# Patient Record
Sex: MALE | Race: ASIAN | NOT HISPANIC OR LATINO | URBAN - METROPOLITAN AREA
[De-identification: names, ages, dates, MRNs, and addresses within clinical notes are randomized per-mention and may not be internally consistent; named-entity substitution may affect disease eponyms.]

---

## 2023-12-07 ENCOUNTER — INPATIENT (INPATIENT)
Facility: HOSPITAL | Age: 43
LOS: 6 days | Discharge: HOME | End: 2023-12-14
Attending: HOSPITALIST | Admitting: HOSPITALIST
Payer: MEDICAID

## 2023-12-07 VITALS
TEMPERATURE: 99 F | RESPIRATION RATE: 17 BRPM | SYSTOLIC BLOOD PRESSURE: 170 MMHG | OXYGEN SATURATION: 100 % | HEART RATE: 134 BPM | DIASTOLIC BLOOD PRESSURE: 100 MMHG

## 2023-12-07 PROCEDURE — 99285 EMERGENCY DEPT VISIT HI MDM: CPT

## 2023-12-07 NOTE — ED ADULT TRIAGE NOTE - CHIEF COMPLAINT QUOTE
Pt c/o diarrhea, body shaking and body aches x2 days. Last alcoholic drink yesterday. Pt states was going to detox center tomorrow. Hx. HTN. DM2. HLD. Pt denies chest pain, sob, n/v, palpitations, seizures or hospitalizations for withdrawals. Pt tachycardiac and tremulous in triage. Awaiting EKG.

## 2023-12-08 DIAGNOSIS — F10.939 ALCOHOL USE, UNSPECIFIED WITH WITHDRAWAL, UNSPECIFIED: ICD-10-CM

## 2023-12-08 LAB
ALBUMIN SERPL ELPH-MCNC: 3.1 G/DL — LOW (ref 3.3–5)
ALBUMIN SERPL ELPH-MCNC: 3.1 G/DL — LOW (ref 3.3–5)
ALBUMIN SERPL ELPH-MCNC: 3.4 G/DL — SIGNIFICANT CHANGE UP (ref 3.3–5)
ALBUMIN SERPL ELPH-MCNC: 3.4 G/DL — SIGNIFICANT CHANGE UP (ref 3.3–5)
ALP SERPL-CCNC: 164 U/L — HIGH (ref 40–120)
ALP SERPL-CCNC: 164 U/L — HIGH (ref 40–120)
ALP SERPL-CCNC: 189 U/L — HIGH (ref 40–120)
ALP SERPL-CCNC: 189 U/L — HIGH (ref 40–120)
ALT FLD-CCNC: 43 U/L — HIGH (ref 4–41)
ALT FLD-CCNC: 43 U/L — HIGH (ref 4–41)
ALT FLD-CCNC: 50 U/L — HIGH (ref 4–41)
ALT FLD-CCNC: 50 U/L — HIGH (ref 4–41)
AMPHET UR-MCNC: NEGATIVE — SIGNIFICANT CHANGE UP
AMPHET UR-MCNC: NEGATIVE — SIGNIFICANT CHANGE UP
ANION GAP SERPL CALC-SCNC: 10 MMOL/L — SIGNIFICANT CHANGE UP (ref 7–14)
ANION GAP SERPL CALC-SCNC: 10 MMOL/L — SIGNIFICANT CHANGE UP (ref 7–14)
ANION GAP SERPL CALC-SCNC: 14 MMOL/L — SIGNIFICANT CHANGE UP (ref 7–14)
ANION GAP SERPL CALC-SCNC: 14 MMOL/L — SIGNIFICANT CHANGE UP (ref 7–14)
ANISOCYTOSIS BLD QL: SLIGHT — SIGNIFICANT CHANGE UP
ANISOCYTOSIS BLD QL: SLIGHT — SIGNIFICANT CHANGE UP
APAP SERPL-MCNC: <10 UG/ML — LOW (ref 15–25)
APAP SERPL-MCNC: <10 UG/ML — LOW (ref 15–25)
APPEARANCE UR: CLEAR — SIGNIFICANT CHANGE UP
APPEARANCE UR: CLEAR — SIGNIFICANT CHANGE UP
AST SERPL-CCNC: 158 U/L — HIGH (ref 4–40)
AST SERPL-CCNC: 158 U/L — HIGH (ref 4–40)
AST SERPL-CCNC: 184 U/L — HIGH (ref 4–40)
AST SERPL-CCNC: 184 U/L — HIGH (ref 4–40)
B PERT DNA SPEC QL NAA+PROBE: SIGNIFICANT CHANGE UP
B PERT DNA SPEC QL NAA+PROBE: SIGNIFICANT CHANGE UP
B PERT+PARAPERT DNA PNL SPEC NAA+PROBE: SIGNIFICANT CHANGE UP
B PERT+PARAPERT DNA PNL SPEC NAA+PROBE: SIGNIFICANT CHANGE UP
BACTERIA # UR AUTO: ABNORMAL /HPF
BACTERIA # UR AUTO: ABNORMAL /HPF
BARBITURATES UR SCN-MCNC: NEGATIVE — SIGNIFICANT CHANGE UP
BARBITURATES UR SCN-MCNC: NEGATIVE — SIGNIFICANT CHANGE UP
BASE EXCESS BLDV CALC-SCNC: 4.9 MMOL/L — HIGH (ref -2–3)
BASE EXCESS BLDV CALC-SCNC: 4.9 MMOL/L — HIGH (ref -2–3)
BASE EXCESS BLDV CALC-SCNC: 5 MMOL/L — HIGH (ref -2–3)
BASE EXCESS BLDV CALC-SCNC: 5 MMOL/L — HIGH (ref -2–3)
BASOPHILS # BLD AUTO: 0.07 K/UL — SIGNIFICANT CHANGE UP (ref 0–0.2)
BASOPHILS # BLD AUTO: 0.07 K/UL — SIGNIFICANT CHANGE UP (ref 0–0.2)
BASOPHILS NFR BLD AUTO: 0.9 % — SIGNIFICANT CHANGE UP (ref 0–2)
BASOPHILS NFR BLD AUTO: 0.9 % — SIGNIFICANT CHANGE UP (ref 0–2)
BENZODIAZ UR-MCNC: NEGATIVE — SIGNIFICANT CHANGE UP
BENZODIAZ UR-MCNC: NEGATIVE — SIGNIFICANT CHANGE UP
BILIRUB DIRECT SERPL-MCNC: 1.5 MG/DL — HIGH (ref 0–0.3)
BILIRUB DIRECT SERPL-MCNC: 1.5 MG/DL — HIGH (ref 0–0.3)
BILIRUB INDIRECT FLD-MCNC: 0.9 MG/DL — SIGNIFICANT CHANGE UP (ref 0–1)
BILIRUB INDIRECT FLD-MCNC: 0.9 MG/DL — SIGNIFICANT CHANGE UP (ref 0–1)
BILIRUB SERPL-MCNC: 2.3 MG/DL — HIGH (ref 0.2–1.2)
BILIRUB SERPL-MCNC: 2.3 MG/DL — HIGH (ref 0.2–1.2)
BILIRUB SERPL-MCNC: 2.4 MG/DL — HIGH (ref 0.2–1.2)
BILIRUB SERPL-MCNC: 2.4 MG/DL — HIGH (ref 0.2–1.2)
BILIRUB UR-MCNC: ABNORMAL
BILIRUB UR-MCNC: ABNORMAL
BLOOD GAS VENOUS COMPREHENSIVE RESULT: SIGNIFICANT CHANGE UP
BORDETELLA PARAPERTUSSIS (RAPRVP): SIGNIFICANT CHANGE UP
BORDETELLA PARAPERTUSSIS (RAPRVP): SIGNIFICANT CHANGE UP
BUN SERPL-MCNC: 5 MG/DL — LOW (ref 7–23)
BUN SERPL-MCNC: 5 MG/DL — LOW (ref 7–23)
BUN SERPL-MCNC: 6 MG/DL — LOW (ref 7–23)
BUN SERPL-MCNC: 6 MG/DL — LOW (ref 7–23)
C PNEUM DNA SPEC QL NAA+PROBE: SIGNIFICANT CHANGE UP
C PNEUM DNA SPEC QL NAA+PROBE: SIGNIFICANT CHANGE UP
CALCIUM SERPL-MCNC: 7.9 MG/DL — LOW (ref 8.4–10.5)
CALCIUM SERPL-MCNC: 7.9 MG/DL — LOW (ref 8.4–10.5)
CALCIUM SERPL-MCNC: 9.1 MG/DL — SIGNIFICANT CHANGE UP (ref 8.4–10.5)
CALCIUM SERPL-MCNC: 9.1 MG/DL — SIGNIFICANT CHANGE UP (ref 8.4–10.5)
CAST: 2 /LPF — SIGNIFICANT CHANGE UP (ref 0–4)
CAST: 2 /LPF — SIGNIFICANT CHANGE UP (ref 0–4)
CHLORIDE BLDV-SCNC: 98 MMOL/L — SIGNIFICANT CHANGE UP (ref 96–108)
CHLORIDE BLDV-SCNC: 98 MMOL/L — SIGNIFICANT CHANGE UP (ref 96–108)
CHLORIDE BLDV-SCNC: 99 MMOL/L — SIGNIFICANT CHANGE UP (ref 96–108)
CHLORIDE BLDV-SCNC: 99 MMOL/L — SIGNIFICANT CHANGE UP (ref 96–108)
CHLORIDE SERPL-SCNC: 94 MMOL/L — LOW (ref 98–107)
CHLORIDE SERPL-SCNC: 94 MMOL/L — LOW (ref 98–107)
CHLORIDE SERPL-SCNC: 96 MMOL/L — LOW (ref 98–107)
CHLORIDE SERPL-SCNC: 96 MMOL/L — LOW (ref 98–107)
CO2 BLDV-SCNC: 29 MMOL/L — HIGH (ref 22–26)
CO2 BLDV-SCNC: 29 MMOL/L — HIGH (ref 22–26)
CO2 BLDV-SCNC: 29.2 MMOL/L — HIGH (ref 22–26)
CO2 BLDV-SCNC: 29.2 MMOL/L — HIGH (ref 22–26)
CO2 SERPL-SCNC: 23 MMOL/L — SIGNIFICANT CHANGE UP (ref 22–31)
CO2 SERPL-SCNC: 23 MMOL/L — SIGNIFICANT CHANGE UP (ref 22–31)
CO2 SERPL-SCNC: 26 MMOL/L — SIGNIFICANT CHANGE UP (ref 22–31)
CO2 SERPL-SCNC: 26 MMOL/L — SIGNIFICANT CHANGE UP (ref 22–31)
COCAINE METAB.OTHER UR-MCNC: NEGATIVE — SIGNIFICANT CHANGE UP
COCAINE METAB.OTHER UR-MCNC: NEGATIVE — SIGNIFICANT CHANGE UP
COLOR SPEC: SIGNIFICANT CHANGE UP
COLOR SPEC: SIGNIFICANT CHANGE UP
CREAT SERPL-MCNC: 0.52 MG/DL — SIGNIFICANT CHANGE UP (ref 0.5–1.3)
CREAT SERPL-MCNC: 0.52 MG/DL — SIGNIFICANT CHANGE UP (ref 0.5–1.3)
CREAT SERPL-MCNC: 0.56 MG/DL — SIGNIFICANT CHANGE UP (ref 0.5–1.3)
CREAT SERPL-MCNC: 0.56 MG/DL — SIGNIFICANT CHANGE UP (ref 0.5–1.3)
CREATININE URINE RESULT, DAU: 150 MG/DL — SIGNIFICANT CHANGE UP
CREATININE URINE RESULT, DAU: 150 MG/DL — SIGNIFICANT CHANGE UP
DIFF PNL FLD: ABNORMAL
DIFF PNL FLD: ABNORMAL
EGFR: 124 ML/MIN/1.73M2 — SIGNIFICANT CHANGE UP
EGFR: 124 ML/MIN/1.73M2 — SIGNIFICANT CHANGE UP
EGFR: 127 ML/MIN/1.73M2 — SIGNIFICANT CHANGE UP
EGFR: 127 ML/MIN/1.73M2 — SIGNIFICANT CHANGE UP
EOSINOPHIL # BLD AUTO: 0.07 K/UL — SIGNIFICANT CHANGE UP (ref 0–0.5)
EOSINOPHIL # BLD AUTO: 0.07 K/UL — SIGNIFICANT CHANGE UP (ref 0–0.5)
EOSINOPHIL NFR BLD AUTO: 0.9 % — SIGNIFICANT CHANGE UP (ref 0–6)
EOSINOPHIL NFR BLD AUTO: 0.9 % — SIGNIFICANT CHANGE UP (ref 0–6)
ETHANOL SERPL-MCNC: <10 MG/DL — SIGNIFICANT CHANGE UP
ETHANOL SERPL-MCNC: <10 MG/DL — SIGNIFICANT CHANGE UP
FLUAV SUBTYP SPEC NAA+PROBE: SIGNIFICANT CHANGE UP
FLUAV SUBTYP SPEC NAA+PROBE: SIGNIFICANT CHANGE UP
FLUBV RNA SPEC QL NAA+PROBE: SIGNIFICANT CHANGE UP
FLUBV RNA SPEC QL NAA+PROBE: SIGNIFICANT CHANGE UP
GAS PNL BLDV: 133 MMOL/L — LOW (ref 136–145)
GAS PNL BLDV: SIGNIFICANT CHANGE UP
GAS PNL BLDV: SIGNIFICANT CHANGE UP
GIANT PLATELETS BLD QL SMEAR: PRESENT — SIGNIFICANT CHANGE UP
GIANT PLATELETS BLD QL SMEAR: PRESENT — SIGNIFICANT CHANGE UP
GLUCOSE BLDV-MCNC: 173 MG/DL — HIGH (ref 70–99)
GLUCOSE BLDV-MCNC: 173 MG/DL — HIGH (ref 70–99)
GLUCOSE BLDV-MCNC: 193 MG/DL — HIGH (ref 70–99)
GLUCOSE BLDV-MCNC: 193 MG/DL — HIGH (ref 70–99)
GLUCOSE SERPL-MCNC: 184 MG/DL — HIGH (ref 70–99)
GLUCOSE SERPL-MCNC: 184 MG/DL — HIGH (ref 70–99)
GLUCOSE SERPL-MCNC: 243 MG/DL — HIGH (ref 70–99)
GLUCOSE SERPL-MCNC: 243 MG/DL — HIGH (ref 70–99)
GLUCOSE UR QL: 500 MG/DL
GLUCOSE UR QL: 500 MG/DL
HADV DNA SPEC QL NAA+PROBE: SIGNIFICANT CHANGE UP
HADV DNA SPEC QL NAA+PROBE: SIGNIFICANT CHANGE UP
HCO3 BLDV-SCNC: 28 MMOL/L — SIGNIFICANT CHANGE UP (ref 22–29)
HCOV 229E RNA SPEC QL NAA+PROBE: SIGNIFICANT CHANGE UP
HCOV 229E RNA SPEC QL NAA+PROBE: SIGNIFICANT CHANGE UP
HCOV HKU1 RNA SPEC QL NAA+PROBE: SIGNIFICANT CHANGE UP
HCOV HKU1 RNA SPEC QL NAA+PROBE: SIGNIFICANT CHANGE UP
HCOV NL63 RNA SPEC QL NAA+PROBE: SIGNIFICANT CHANGE UP
HCOV NL63 RNA SPEC QL NAA+PROBE: SIGNIFICANT CHANGE UP
HCOV OC43 RNA SPEC QL NAA+PROBE: SIGNIFICANT CHANGE UP
HCOV OC43 RNA SPEC QL NAA+PROBE: SIGNIFICANT CHANGE UP
HCT VFR BLD CALC: 41.3 % — SIGNIFICANT CHANGE UP (ref 39–50)
HCT VFR BLD CALC: 41.3 % — SIGNIFICANT CHANGE UP (ref 39–50)
HCT VFR BLDA CALC: 41 % — SIGNIFICANT CHANGE UP (ref 39–51)
HCT VFR BLDA CALC: 41 % — SIGNIFICANT CHANGE UP (ref 39–51)
HCT VFR BLDA CALC: 44 % — SIGNIFICANT CHANGE UP (ref 39–51)
HCT VFR BLDA CALC: 44 % — SIGNIFICANT CHANGE UP (ref 39–51)
HGB BLD CALC-MCNC: 13.7 G/DL — SIGNIFICANT CHANGE UP (ref 12.6–17.4)
HGB BLD CALC-MCNC: 13.7 G/DL — SIGNIFICANT CHANGE UP (ref 12.6–17.4)
HGB BLD CALC-MCNC: 14.7 G/DL — SIGNIFICANT CHANGE UP (ref 12.6–17.4)
HGB BLD CALC-MCNC: 14.7 G/DL — SIGNIFICANT CHANGE UP (ref 12.6–17.4)
HGB BLD-MCNC: 14.4 G/DL — SIGNIFICANT CHANGE UP (ref 13–17)
HGB BLD-MCNC: 14.4 G/DL — SIGNIFICANT CHANGE UP (ref 13–17)
HMPV RNA SPEC QL NAA+PROBE: SIGNIFICANT CHANGE UP
HMPV RNA SPEC QL NAA+PROBE: SIGNIFICANT CHANGE UP
HPIV1 RNA SPEC QL NAA+PROBE: SIGNIFICANT CHANGE UP
HPIV1 RNA SPEC QL NAA+PROBE: SIGNIFICANT CHANGE UP
HPIV2 RNA SPEC QL NAA+PROBE: SIGNIFICANT CHANGE UP
HPIV2 RNA SPEC QL NAA+PROBE: SIGNIFICANT CHANGE UP
HPIV3 RNA SPEC QL NAA+PROBE: SIGNIFICANT CHANGE UP
HPIV3 RNA SPEC QL NAA+PROBE: SIGNIFICANT CHANGE UP
HPIV4 RNA SPEC QL NAA+PROBE: SIGNIFICANT CHANGE UP
HPIV4 RNA SPEC QL NAA+PROBE: SIGNIFICANT CHANGE UP
IANC: 6.04 K/UL — SIGNIFICANT CHANGE UP (ref 1.8–7.4)
IANC: 6.04 K/UL — SIGNIFICANT CHANGE UP (ref 1.8–7.4)
KETONES UR-MCNC: 15 MG/DL
KETONES UR-MCNC: 15 MG/DL
LACTATE BLDV-MCNC: 2.1 MMOL/L — HIGH (ref 0.5–2)
LACTATE BLDV-MCNC: 2.1 MMOL/L — HIGH (ref 0.5–2)
LACTATE BLDV-MCNC: 2.7 MMOL/L — HIGH (ref 0.5–2)
LACTATE BLDV-MCNC: 2.7 MMOL/L — HIGH (ref 0.5–2)
LEUKOCYTE ESTERASE UR-ACNC: ABNORMAL
LEUKOCYTE ESTERASE UR-ACNC: ABNORMAL
LIDOCAIN IGE QN: 153 U/L — HIGH (ref 7–60)
LIDOCAIN IGE QN: 153 U/L — HIGH (ref 7–60)
LYMPHOCYTES # BLD AUTO: 0.49 K/UL — LOW (ref 1–3.3)
LYMPHOCYTES # BLD AUTO: 0.49 K/UL — LOW (ref 1–3.3)
LYMPHOCYTES # BLD AUTO: 6.1 % — LOW (ref 13–44)
LYMPHOCYTES # BLD AUTO: 6.1 % — LOW (ref 13–44)
M PNEUMO DNA SPEC QL NAA+PROBE: SIGNIFICANT CHANGE UP
M PNEUMO DNA SPEC QL NAA+PROBE: SIGNIFICANT CHANGE UP
MAGNESIUM SERPL-MCNC: 2.6 MG/DL — SIGNIFICANT CHANGE UP (ref 1.6–2.6)
MAGNESIUM SERPL-MCNC: 2.6 MG/DL — SIGNIFICANT CHANGE UP (ref 1.6–2.6)
MCHC RBC-ENTMCNC: 28.8 PG — SIGNIFICANT CHANGE UP (ref 27–34)
MCHC RBC-ENTMCNC: 28.8 PG — SIGNIFICANT CHANGE UP (ref 27–34)
MCHC RBC-ENTMCNC: 34.9 GM/DL — SIGNIFICANT CHANGE UP (ref 32–36)
MCHC RBC-ENTMCNC: 34.9 GM/DL — SIGNIFICANT CHANGE UP (ref 32–36)
MCV RBC AUTO: 82.6 FL — SIGNIFICANT CHANGE UP (ref 80–100)
MCV RBC AUTO: 82.6 FL — SIGNIFICANT CHANGE UP (ref 80–100)
METHADONE UR-MCNC: NEGATIVE — SIGNIFICANT CHANGE UP
METHADONE UR-MCNC: NEGATIVE — SIGNIFICANT CHANGE UP
MICROCYTES BLD QL: SLIGHT — SIGNIFICANT CHANGE UP
MICROCYTES BLD QL: SLIGHT — SIGNIFICANT CHANGE UP
MONOCYTES # BLD AUTO: 0.91 K/UL — HIGH (ref 0–0.9)
MONOCYTES # BLD AUTO: 0.91 K/UL — HIGH (ref 0–0.9)
MONOCYTES NFR BLD AUTO: 11.4 % — SIGNIFICANT CHANGE UP (ref 2–14)
MONOCYTES NFR BLD AUTO: 11.4 % — SIGNIFICANT CHANGE UP (ref 2–14)
NEUTROPHILS # BLD AUTO: 6.46 K/UL — SIGNIFICANT CHANGE UP (ref 1.8–7.4)
NEUTROPHILS # BLD AUTO: 6.46 K/UL — SIGNIFICANT CHANGE UP (ref 1.8–7.4)
NEUTROPHILS NFR BLD AUTO: 80.7 % — HIGH (ref 43–77)
NEUTROPHILS NFR BLD AUTO: 80.7 % — HIGH (ref 43–77)
NITRITE UR-MCNC: NEGATIVE — SIGNIFICANT CHANGE UP
NITRITE UR-MCNC: NEGATIVE — SIGNIFICANT CHANGE UP
OPIATES UR-MCNC: NEGATIVE — SIGNIFICANT CHANGE UP
OPIATES UR-MCNC: NEGATIVE — SIGNIFICANT CHANGE UP
OXYCODONE UR-MCNC: NEGATIVE — SIGNIFICANT CHANGE UP
OXYCODONE UR-MCNC: NEGATIVE — SIGNIFICANT CHANGE UP
PCO2 BLDV: 35 MMHG — LOW (ref 42–55)
PCO2 BLDV: 35 MMHG — LOW (ref 42–55)
PCO2 BLDV: 36 MMHG — LOW (ref 42–55)
PCO2 BLDV: 36 MMHG — LOW (ref 42–55)
PCP SPEC-MCNC: SIGNIFICANT CHANGE UP
PCP SPEC-MCNC: SIGNIFICANT CHANGE UP
PCP UR-MCNC: NEGATIVE — SIGNIFICANT CHANGE UP
PCP UR-MCNC: NEGATIVE — SIGNIFICANT CHANGE UP
PH BLDV: 7.5 — HIGH (ref 7.32–7.43)
PH BLDV: 7.5 — HIGH (ref 7.32–7.43)
PH BLDV: 7.51 — HIGH (ref 7.32–7.43)
PH BLDV: 7.51 — HIGH (ref 7.32–7.43)
PH UR: 7 — SIGNIFICANT CHANGE UP (ref 5–8)
PH UR: 7 — SIGNIFICANT CHANGE UP (ref 5–8)
PHOSPHATE SERPL-MCNC: 2.2 MG/DL — LOW (ref 2.5–4.5)
PHOSPHATE SERPL-MCNC: 2.2 MG/DL — LOW (ref 2.5–4.5)
PLAT MORPH BLD: NORMAL — SIGNIFICANT CHANGE UP
PLAT MORPH BLD: NORMAL — SIGNIFICANT CHANGE UP
PLATELET # BLD AUTO: 78 K/UL — LOW (ref 150–400)
PLATELET # BLD AUTO: 78 K/UL — LOW (ref 150–400)
PLATELET COUNT - ESTIMATE: ABNORMAL
PLATELET COUNT - ESTIMATE: ABNORMAL
PO2 BLDV: 56 MMHG — HIGH (ref 25–45)
PO2 BLDV: 56 MMHG — HIGH (ref 25–45)
PO2 BLDV: 59 MMHG — HIGH (ref 25–45)
PO2 BLDV: 59 MMHG — HIGH (ref 25–45)
POIKILOCYTOSIS BLD QL AUTO: SLIGHT — SIGNIFICANT CHANGE UP
POIKILOCYTOSIS BLD QL AUTO: SLIGHT — SIGNIFICANT CHANGE UP
POTASSIUM BLDV-SCNC: 3.4 MMOL/L — LOW (ref 3.5–5.1)
POTASSIUM BLDV-SCNC: 3.4 MMOL/L — LOW (ref 3.5–5.1)
POTASSIUM BLDV-SCNC: 4.3 MMOL/L — SIGNIFICANT CHANGE UP (ref 3.5–5.1)
POTASSIUM BLDV-SCNC: 4.3 MMOL/L — SIGNIFICANT CHANGE UP (ref 3.5–5.1)
POTASSIUM SERPL-MCNC: 3.3 MMOL/L — LOW (ref 3.5–5.3)
POTASSIUM SERPL-MCNC: 3.3 MMOL/L — LOW (ref 3.5–5.3)
POTASSIUM SERPL-MCNC: 3.8 MMOL/L — SIGNIFICANT CHANGE UP (ref 3.5–5.3)
POTASSIUM SERPL-MCNC: 3.8 MMOL/L — SIGNIFICANT CHANGE UP (ref 3.5–5.3)
POTASSIUM SERPL-SCNC: 3.3 MMOL/L — LOW (ref 3.5–5.3)
POTASSIUM SERPL-SCNC: 3.3 MMOL/L — LOW (ref 3.5–5.3)
POTASSIUM SERPL-SCNC: 3.8 MMOL/L — SIGNIFICANT CHANGE UP (ref 3.5–5.3)
POTASSIUM SERPL-SCNC: 3.8 MMOL/L — SIGNIFICANT CHANGE UP (ref 3.5–5.3)
PROT SERPL-MCNC: 7.7 G/DL — SIGNIFICANT CHANGE UP (ref 6–8.3)
PROT SERPL-MCNC: 7.7 G/DL — SIGNIFICANT CHANGE UP (ref 6–8.3)
PROT SERPL-MCNC: 8.8 G/DL — HIGH (ref 6–8.3)
PROT SERPL-MCNC: 8.8 G/DL — HIGH (ref 6–8.3)
PROT UR-MCNC: 300 MG/DL
PROT UR-MCNC: 300 MG/DL
RAPID RVP RESULT: DETECTED
RAPID RVP RESULT: DETECTED
RBC # BLD: 5 M/UL — SIGNIFICANT CHANGE UP (ref 4.2–5.8)
RBC # BLD: 5 M/UL — SIGNIFICANT CHANGE UP (ref 4.2–5.8)
RBC # FLD: 17.2 % — HIGH (ref 10.3–14.5)
RBC # FLD: 17.2 % — HIGH (ref 10.3–14.5)
RBC BLD AUTO: ABNORMAL
RBC BLD AUTO: ABNORMAL
RBC CASTS # UR COMP ASSIST: 11 /HPF — HIGH (ref 0–4)
RBC CASTS # UR COMP ASSIST: 11 /HPF — HIGH (ref 0–4)
REVIEW: SIGNIFICANT CHANGE UP
REVIEW: SIGNIFICANT CHANGE UP
RSV RNA SPEC QL NAA+PROBE: SIGNIFICANT CHANGE UP
RSV RNA SPEC QL NAA+PROBE: SIGNIFICANT CHANGE UP
RV+EV RNA SPEC QL NAA+PROBE: SIGNIFICANT CHANGE UP
RV+EV RNA SPEC QL NAA+PROBE: SIGNIFICANT CHANGE UP
SALICYLATES SERPL-MCNC: <0.3 MG/DL — LOW (ref 15–30)
SALICYLATES SERPL-MCNC: <0.3 MG/DL — LOW (ref 15–30)
SAO2 % BLDV: 87.4 % — SIGNIFICANT CHANGE UP (ref 67–88)
SAO2 % BLDV: 87.4 % — SIGNIFICANT CHANGE UP (ref 67–88)
SAO2 % BLDV: 88.6 % — HIGH (ref 67–88)
SAO2 % BLDV: 88.6 % — HIGH (ref 67–88)
SARS-COV-2 RNA SPEC QL NAA+PROBE: DETECTED
SARS-COV-2 RNA SPEC QL NAA+PROBE: DETECTED
SODIUM SERPL-SCNC: 130 MMOL/L — LOW (ref 135–145)
SODIUM SERPL-SCNC: 130 MMOL/L — LOW (ref 135–145)
SODIUM SERPL-SCNC: 133 MMOL/L — LOW (ref 135–145)
SODIUM SERPL-SCNC: 133 MMOL/L — LOW (ref 135–145)
SP GR SPEC: 1.05 — HIGH (ref 1–1.03)
SP GR SPEC: 1.05 — HIGH (ref 1–1.03)
SQUAMOUS # UR AUTO: 3 /HPF — SIGNIFICANT CHANGE UP (ref 0–5)
SQUAMOUS # UR AUTO: 3 /HPF — SIGNIFICANT CHANGE UP (ref 0–5)
TARGETS BLD QL SMEAR: SLIGHT — SIGNIFICANT CHANGE UP
TARGETS BLD QL SMEAR: SLIGHT — SIGNIFICANT CHANGE UP
THC UR QL: NEGATIVE — SIGNIFICANT CHANGE UP
THC UR QL: NEGATIVE — SIGNIFICANT CHANGE UP
TOXICOLOGY SCREEN, DRUGS OF ABUSE, SERUM RESULT: SIGNIFICANT CHANGE UP
TOXICOLOGY SCREEN, DRUGS OF ABUSE, SERUM RESULT: SIGNIFICANT CHANGE UP
UROBILINOGEN FLD QL: 1 MG/DL — SIGNIFICANT CHANGE UP (ref 0.2–1)
UROBILINOGEN FLD QL: 1 MG/DL — SIGNIFICANT CHANGE UP (ref 0.2–1)
WBC # BLD: 8 K/UL — SIGNIFICANT CHANGE UP (ref 3.8–10.5)
WBC # BLD: 8 K/UL — SIGNIFICANT CHANGE UP (ref 3.8–10.5)
WBC # FLD AUTO: 8 K/UL — SIGNIFICANT CHANGE UP (ref 3.8–10.5)
WBC # FLD AUTO: 8 K/UL — SIGNIFICANT CHANGE UP (ref 3.8–10.5)
WBC UR QL: 24 /HPF — HIGH (ref 0–5)
WBC UR QL: 24 /HPF — HIGH (ref 0–5)

## 2023-12-08 PROCEDURE — 99223 1ST HOSP IP/OBS HIGH 75: CPT

## 2023-12-08 PROCEDURE — 74177 CT ABD & PELVIS W/CONTRAST: CPT | Mod: 26,MA

## 2023-12-08 RX ORDER — GLUCAGON INJECTION, SOLUTION 0.5 MG/.1ML
1 INJECTION, SOLUTION SUBCUTANEOUS ONCE
Refills: 0 | Status: DISCONTINUED | OUTPATIENT
Start: 2023-12-08 | End: 2023-12-14

## 2023-12-08 RX ORDER — AMLODIPINE BESYLATE 2.5 MG/1
10 TABLET ORAL DAILY
Refills: 0 | Status: DISCONTINUED | OUTPATIENT
Start: 2023-12-08 | End: 2023-12-14

## 2023-12-08 RX ORDER — INFLUENZA VIRUS VACCINE 15; 15; 15; 15 UG/.5ML; UG/.5ML; UG/.5ML; UG/.5ML
0.5 SUSPENSION INTRAMUSCULAR ONCE
Refills: 0 | Status: DISCONTINUED | OUTPATIENT
Start: 2023-12-08 | End: 2023-12-14

## 2023-12-08 RX ORDER — LOSARTAN POTASSIUM 100 MG/1
100 TABLET, FILM COATED ORAL DAILY
Refills: 0 | Status: DISCONTINUED | OUTPATIENT
Start: 2023-12-08 | End: 2023-12-14

## 2023-12-08 RX ORDER — SODIUM CHLORIDE 9 MG/ML
1000 INJECTION, SOLUTION INTRAVENOUS
Refills: 0 | Status: DISCONTINUED | OUTPATIENT
Start: 2023-12-08 | End: 2023-12-14

## 2023-12-08 RX ORDER — ALBUTEROL 90 UG/1
0 AEROSOL, METERED ORAL
Refills: 0 | DISCHARGE

## 2023-12-08 RX ORDER — SODIUM CHLORIDE 9 MG/ML
1000 INJECTION INTRAMUSCULAR; INTRAVENOUS; SUBCUTANEOUS ONCE
Refills: 0 | Status: COMPLETED | OUTPATIENT
Start: 2023-12-08 | End: 2023-12-08

## 2023-12-08 RX ORDER — KETOROLAC TROMETHAMINE 30 MG/ML
15 SYRINGE (ML) INJECTION ONCE
Refills: 0 | Status: DISCONTINUED | OUTPATIENT
Start: 2023-12-08 | End: 2023-12-08

## 2023-12-08 RX ORDER — ALBUTEROL 90 UG/1
2 AEROSOL, METERED ORAL EVERY 6 HOURS
Refills: 0 | Status: DISCONTINUED | OUTPATIENT
Start: 2023-12-08 | End: 2023-12-14

## 2023-12-08 RX ORDER — ONDANSETRON 8 MG/1
4 TABLET, FILM COATED ORAL ONCE
Refills: 0 | Status: COMPLETED | OUTPATIENT
Start: 2023-12-08 | End: 2023-12-08

## 2023-12-08 RX ORDER — MAGNESIUM SULFATE 500 MG/ML
2 VIAL (ML) INJECTION ONCE
Refills: 0 | Status: COMPLETED | OUTPATIENT
Start: 2023-12-08 | End: 2023-12-08

## 2023-12-08 RX ORDER — ACETAMINOPHEN 500 MG
650 TABLET ORAL EVERY 6 HOURS
Refills: 0 | Status: DISCONTINUED | OUTPATIENT
Start: 2023-12-08 | End: 2023-12-14

## 2023-12-08 RX ORDER — LANOLIN ALCOHOL/MO/W.PET/CERES
3 CREAM (GRAM) TOPICAL AT BEDTIME
Refills: 0 | Status: DISCONTINUED | OUTPATIENT
Start: 2023-12-08 | End: 2023-12-14

## 2023-12-08 RX ORDER — THIAMINE MONONITRATE (VIT B1) 100 MG
100 TABLET ORAL ONCE
Refills: 0 | Status: COMPLETED | OUTPATIENT
Start: 2023-12-08 | End: 2023-12-08

## 2023-12-08 RX ORDER — ONDANSETRON 8 MG/1
4 TABLET, FILM COATED ORAL EVERY 8 HOURS
Refills: 0 | Status: DISCONTINUED | OUTPATIENT
Start: 2023-12-08 | End: 2023-12-14

## 2023-12-08 RX ORDER — DEXTROSE 50 % IN WATER 50 %
25 SYRINGE (ML) INTRAVENOUS ONCE
Refills: 0 | Status: DISCONTINUED | OUTPATIENT
Start: 2023-12-08 | End: 2023-12-14

## 2023-12-08 RX ORDER — POTASSIUM CHLORIDE 20 MEQ
40 PACKET (EA) ORAL ONCE
Refills: 0 | Status: COMPLETED | OUTPATIENT
Start: 2023-12-08 | End: 2023-12-08

## 2023-12-08 RX ORDER — FOLIC ACID 0.8 MG
1 TABLET ORAL DAILY
Refills: 0 | Status: DISCONTINUED | OUTPATIENT
Start: 2023-12-08 | End: 2023-12-14

## 2023-12-08 RX ORDER — INSULIN LISPRO 100/ML
VIAL (ML) SUBCUTANEOUS
Refills: 0 | Status: DISCONTINUED | OUTPATIENT
Start: 2023-12-08 | End: 2023-12-14

## 2023-12-08 RX ORDER — THIAMINE MONONITRATE (VIT B1) 100 MG
100 TABLET ORAL DAILY
Refills: 0 | Status: COMPLETED | OUTPATIENT
Start: 2023-12-08 | End: 2023-12-10

## 2023-12-08 RX ORDER — DEXTROSE 50 % IN WATER 50 %
15 SYRINGE (ML) INTRAVENOUS ONCE
Refills: 0 | Status: DISCONTINUED | OUTPATIENT
Start: 2023-12-08 | End: 2023-12-14

## 2023-12-08 RX ADMIN — Medication 100 MILLIGRAM(S): at 11:29

## 2023-12-08 RX ADMIN — Medication 40 MILLIEQUIVALENT(S): at 18:30

## 2023-12-08 RX ADMIN — Medication 25 GRAM(S): at 10:45

## 2023-12-08 RX ADMIN — Medication 2 MILLIGRAM(S): at 10:43

## 2023-12-08 RX ADMIN — ALBUTEROL 2 PUFF(S): 90 AEROSOL, METERED ORAL at 10:45

## 2023-12-08 RX ADMIN — Medication 15 MILLIGRAM(S): at 01:02

## 2023-12-08 RX ADMIN — Medication 2 MILLIGRAM(S): at 14:43

## 2023-12-08 RX ADMIN — Medication 50 MILLIGRAM(S): at 01:02

## 2023-12-08 RX ADMIN — Medication 1: at 17:19

## 2023-12-08 RX ADMIN — Medication 2 MILLIGRAM(S): at 22:42

## 2023-12-08 RX ADMIN — SODIUM CHLORIDE 1000 MILLILITER(S): 9 INJECTION INTRAMUSCULAR; INTRAVENOUS; SUBCUTANEOUS at 04:12

## 2023-12-08 RX ADMIN — Medication 100 MILLIGRAM(S): at 04:12

## 2023-12-08 RX ADMIN — SODIUM CHLORIDE 1000 MILLILITER(S): 9 INJECTION INTRAMUSCULAR; INTRAVENOUS; SUBCUTANEOUS at 01:05

## 2023-12-08 RX ADMIN — Medication 2 MILLIGRAM(S): at 18:30

## 2023-12-08 RX ADMIN — Medication 1 MILLIGRAM(S): at 10:51

## 2023-12-08 RX ADMIN — SODIUM CHLORIDE 100 MILLILITER(S): 9 INJECTION, SOLUTION INTRAVENOUS at 10:45

## 2023-12-08 RX ADMIN — Medication 15 MILLIGRAM(S): at 02:50

## 2023-12-08 RX ADMIN — Medication 1 TABLET(S): at 04:12

## 2023-12-08 RX ADMIN — ONDANSETRON 4 MILLIGRAM(S): 8 TABLET, FILM COATED ORAL at 01:02

## 2023-12-08 RX ADMIN — Medication 2: at 11:17

## 2023-12-08 NOTE — ED PROVIDER NOTE - CLINICAL SUMMARY MEDICAL DECISION MAKING FREE TEXT BOX
42 y/o M with h/o HTN, DM, ETOH abuse p/w diarrhea.  Pt is febrile and tachycardic here, concern for underlying infectious etiology.  Pt also with mild etoh withdrawal (initial CIWA 6), will give librium and supportive care.  Plan for labs, ua, ct a/p, ivfs, antipyretics, reassess. 44 y/o M with h/o HTN, DM, ETOH abuse p/w diarrhea.  Pt is febrile and tachycardic here, concern for underlying infectious etiology.  Pt also with mild etoh withdrawal (initial CIWA 6), will give librium and supportive care.  Plan for labs, ua, ct a/p, ivfs, antipyretics, reassess.

## 2023-12-08 NOTE — ED ADULT NURSE NOTE - OBJECTIVE STATEMENT
Pt received to room 6, A&O x 4, ambulatory, hx of DM2 on Metformin, HTN, HLD, coming to the ED with complaints of diarrhea and withdrawal. Pt reports last alcoholic drink was yesterday, plan to go to detox center tomorrow, currently endorsing tremors and nausea. Sinus tachycardia noted on cardiac monitor,  Pt received to room 6, A&O x 4, ambulatory, hx of DM2 on Metformin, HTN, HLD, coming to the ED with complaints of diarrhea and withdrawal. Pt reports last alcoholic drink was at 10PM on 12/7/23, admits to drinking 1 pint of liquor daily, plans to go to detox center tomorrow. Pt currently endorsing diarrhea for 2 days and decreased appetite, has been drinking fluids, nausea, burping noted, and shakiness, moderate tremors noted to the bilateral upper extremities. Sinus tachycardia noted on cardiac monitor, , pt denies chest pain, palpitations, and SOB. +PERRLA, speech clear, intact sensation, strong , no neuro deficits noted, RR equal and unlabored, pulses 2+ bilaterally. 20G IV placed to the R AC, labs drawn and sent, urine collected. Safety maintained, comfort provided, initial CIWA complete, family member at bedside, awaiting results at this time.

## 2023-12-08 NOTE — ED ADULT NURSE REASSESSMENT NOTE - GENERAL PATIENT STATE
comfortable appearance/family/SO at bedside/resting/sleeping
comfortable appearance/family/SO at bedside
comfortable appearance/cooperative
comfortable appearance/cooperative/family/SO at bedside

## 2023-12-08 NOTE — H&P ADULT - NSHPPHYSICALEXAM_GEN_ALL_CORE
Vital Signs Last 24 Hrs  T(C): 36.8 (08 Dec 2023 10:30), Max: 37.8 (08 Dec 2023 00:45)  T(F): 98.3 (08 Dec 2023 10:30), Max: 100 (08 Dec 2023 00:45)  HR: 101 (08 Dec 2023 10:30) (101 - 134)  BP: 143/98 (08 Dec 2023 10:30) (143/97 - 170/100)  BP(mean): --  RR: 18 (08 Dec 2023 10:30) (17 - 18)  SpO2: 100% (08 Dec 2023 10:30) (100% - 100%)    Parameters below as of 08 Dec 2023 10:30  Patient On (Oxygen Delivery Method): room air        CONSTITUTIONAL: Well-groomed, in no apparent distress, not diaphoretic  EYES: + scleral injection, non-icteric; PERRLA and symmetric  ENMT: No external nasal lesions; nasal mucosa not inflamed; normal dentition; no pharyngeal injection or exudates, oral mucosa with moist membranes  NECK: Trachea midline  RESPIRATORY: Breathing comfortably; no dullness to percussion; lungs CTA without wheeze/rhonchi/rales  CARDIOVASCULAR: +S1S2, tachy, no M/G/R; no carotid bruits; pedal pulses full and symmetric; no lower extremity edema  GASTROINTESTINAL: No palpable masses or tenderness, +BS throughout, no rebound/guarding; no hepatosplenomegaly  MUSCULOSKELETAL: Normal gait and station  SKIN: No rashes or ulcers noted; no subcutaneous nodules or induration palpable  NEUROLOGIC: CN II-XII intact; normal reflexes in upper and lower extremities  PSYCHIATRIC: A+O x 3; mood and affect appropriate; appropriate insight and judgment, tremulous upper extremities

## 2023-12-08 NOTE — ED ADULT NURSE REASSESSMENT NOTE - NS ED NURSE REASSESS COMMENT FT1
Attempted to call report, floor unable to take report at this time. Sitting at edge of bed eating dinner at this time. Wife remains at BS
COVID +, CN notified. Mask provided and explained to pt and wife. Working on finding a room d/t same
Pt resting in stretcher, A&O x 4, ambulatory, offers partial relief of symptoms. RR equal and unlabored, sinus tachycardia noted on cardiac monitor , pt denies chest pain or palpitations, no acute distress noted. Safety maintained, comfort provided, medicated as per EMAR, wife at bedside, awaiting bed placement at this time.
Pt resting in stretcher, A&O x 4, offers no complaints of pain or discomfort at this time. RR equal and unlabored, sinus tachycardia noted on monitor, , pt denies chest pain or palpitations. Visibly mild tremors noted to bilateral upper extremities. , CIWA complete, safety maintained, comfort provided, repeat labs drawn and sent, IVF running, family at bedside, awaiting bed placement at this time.
Report to [facility]
Sleeps when undisturbed. Ativan held for now while sleeping
Awake. Ambulated to BR, gait steady. Sitting on edge of stretcher to eat lunch. Wife remains at BS
Care assumed. Wife at BS. Sitting in chair, awake, alert and O x 4. Denies c/o. Awaiting inpt bed assignment. Given cereal/PO fluids
Wife remains at BS. CIWA 5, medicated as per EMAR. Awaiting inpt bed assignment.

## 2023-12-08 NOTE — H&P ADULT - NSHPPOADEEPVENOUSTHROMB_GEN_A_CORE
Immediate Brief Procedure Note    Patient Name: Yusfe Moreno  YOB: 1953  DATE OF PROCEDURE: 1/19/2022  PROCEDURALIST: Corbin Marquez MD  ASSISTANT(S): None  ANESTHESIA TYPE: Moderate sedation       PROCEDURE PERFORMED: Percutaneous liver biopsy    Pre-procedure Dx:   Patient Active Problem List   Diagnosis   • Essential hypertension, benign   • PAD (peripheral artery disease) (CMS/HCC)   • Other diseases of lung, not elsewhere classified   • Malignant neoplasm of kidney, except pelvis   • Squamous cell lung cancer (CMS/HCC)   • Coronary atherosclerosis of native coronary artery   • Atrial fibrillation (CMS/HCC)   • Lung cancer, primary, with metastasis from lung to other site (CMS/HCC)   • Benign hypertension   • Hyperkalemia   • Solitary kidney, acquired   • ACS (acute coronary syndrome) (CMS/HCC)   • Angina pectoris (CMS/HCC)   • Warfarin anticoagulation   • S/P AVR   • High risk medication use - Amiodarone       Post-procedure Dx: Same    Findings: Percutaneous right lobe liver mass biopsy    Estimated Blood Loss: Less than 5 ml    Complications: None    Specimens Removed: Yes          
Immediate Brief Procedure Note    Patient: Yusef Moreno    Pre-op Diagnosis: Liver lesion    Post-op Diagnosis: Same    Procedure: CT guided liver biopsy     Proceduralist: Corbin Marquez MD    Anesthesia Type: Moderate sedation    Findings: Successful biopsy    Estimated Blood Loss: < 5 mL    Complications: None    Specimens Removed: Yes, sent to lab  
no

## 2023-12-08 NOTE — ED PROVIDER NOTE - PROGRESS NOTE DETAILS
Howard ZULETA: Pt reports feeling better.  I had an extensive discussion with pt and family.  He was planning to go to outpt detox facility in the AM.  I explained to him given his sxs of etoh withdrawal, he will need to be medically stablized prior to an outpatient program.  Pt is agreeable to stay.  Pt reports improvement in nausea, tremulousness, abd discomfort.  Cont to be tachycardic to 113 at rest, increases to 120s with sitting up.  Suspect an associated element of dehydration in setting of acute diarrhea as well.  CT is neg for acute pathology.  Will cont IV hydration, admitted to med for further management.

## 2023-12-08 NOTE — PATIENT PROFILE ADULT - FALL HARM RISK - HARM RISK INTERVENTIONS
Assistance with ambulation/Assistance OOB with selected safe patient handling equipment/Communicate Risk of Fall with Harm to all staff/Monitor for mental status changes/Monitor gait and stability/Reinforce activity limits and safety measures with patient and family/Tailored Fall Risk Interventions/Toileting schedule using arm’s reach rule for commode and bathroom/Use of alarms - bed, chair and/or voice tab/Visual Cue: Yellow wristband and red socks/Bed in lowest position, wheels locked, appropriate side rails in place/Call bell, personal items and telephone in reach/Instruct patient to call for assistance before getting out of bed or chair/Non-slip footwear when patient is out of bed/[place] to call system/Physically safe environment - no spills, clutter or unnecessary equipment/Purposeful Proactive Rounding/Room/bathroom lighting operational, light cord in reach Assistance with ambulation/Assistance OOB with selected safe patient handling equipment/Communicate Risk of Fall with Harm to all staff/Monitor for mental status changes/Monitor gait and stability/Reinforce activity limits and safety measures with patient and family/Tailored Fall Risk Interventions/Toileting schedule using arm’s reach rule for commode and bathroom/Use of alarms - bed, chair and/or voice tab/Visual Cue: Yellow wristband and red socks/Bed in lowest position, wheels locked, appropriate side rails in place/Call bell, personal items and telephone in reach/Instruct patient to call for assistance before getting out of bed or chair/Non-slip footwear when patient is out of bed/[place] to call system/Physically safe environment - no spills, clutter or unnecessary equipment/Purposeful Proactive Rounding/Room/bathroom lighting operational, light cord in reach

## 2023-12-08 NOTE — ED PROVIDER NOTE - ENMT, MLM
Airway patent, Nasal mucosa clear. Mouth with dry mucosa. Throat has no vesicles, no oropharyngeal exudates and uvula is midline.  Mild tongue fasiculations.

## 2023-12-08 NOTE — H&P ADULT - HISTORY OF PRESENT ILLNESS
43yoM admitted for worsening diarrhea over the past 2 days. PMH of etoh use (1 pint per day daily x 2 years, but has used ETOH for over 20 years), DMII, HTN. Last ETOH was Tuesday and has had liquid yellow bowel movements for the past 2 days as well as tremors. Has never had ETOH w/d symptoms in the past though he has not tried to detox in many years, never hospitalization for DTs. Also endorses a productive cough, no sick contacts at home. Has been told by his PCP that he may be developing cirrhosis of the liver. Does endorse aches of the joints. Denies chest pain, SMITH, pleuritic pain, fevers, chills, weight loss, medication changes, hematemesis, melena, or hematochezia.     In the ED, vitals notable for sinus tach in the 130s, BP 170s/100, tremors. Labs notable for lactate of 2.7, down trending to 2.1. Drug test neg. A1c 6.7%, UA showing mod hematuria, small bili, protein 300, neg nitrite, trace LE, lipase 153 T bili 2.3, , ALT 50, aslk phos 189, mag of 1.30. CBC stable except for thrombocytopenia to 78k. CT a/p showing coronary artery calcifications, small volume simple ascites in the peritoneum w patchy mesenteric edema, prominent main portal vein. Impression of cirrhosis w early portal vein hypertension.    He received a dose of Librium, ketorolac, and thiamine and was admitted to Medicine.

## 2023-12-08 NOTE — H&P ADULT - NSHPLABSRESULTS_GEN_ALL_CORE
LABS:                        14.4   8.00  )-----------( 78       ( 08 Dec 2023 01:09 )             41.3     12-    133<L>  |  96<L>  |  6<L>  ----------------------------<  184<H>  3.8   |  23  |  0.52    Ca    9.1      08 Dec 2023 01:09  Phos  2.5     12-  Mg     1.30         TPro  8.8<H>  /  Alb  3.4  /  TBili  2.3<H>  /  DBili  x   /  AST  184<H>  /  ALT  50<H>  /  AlkPhos  189<H>  12          Urinalysis Basic - ( 08 Dec 2023 01:09 )    Color: Dark Yellow / Appearance: Clear / S.047 / pH: x  Gluc: 184 mg/dL / Ketone: 15 mg/dL  / Bili: Small / Urobili: 1.0 mg/dL   Blood: x / Protein: 300 mg/dL / Nitrite: Negative   Leuk Esterase: Trace / RBC: 11 /HPF / WBC 24 /HPF   Sq Epi: x / Non Sq Epi: 3 /HPF / Bacteria: Moderate /HPF        RADIOLOGY & ADDITIONAL TESTS:    Imaging Personally Reviewed: imaging as above.    EKG abnormal-  , QTc 582. V3-V6 abn w no prior for comparison.

## 2023-12-08 NOTE — ED ADULT NURSE NOTE - NSFALLUNIVINTERV_ED_ALL_ED
Bed/Stretcher in lowest position, wheels locked, appropriate side rails in place/Call bell, personal items and telephone in reach/Instruct patient to call for assistance before getting out of bed/chair/stretcher/Non-slip footwear applied when patient is off stretcher/[place] to call system/Physically safe environment - no spills, clutter or unnecessary equipment/Purposeful proactive rounding/Room/bathroom lighting operational, light cord in reach Bed/Stretcher in lowest position, wheels locked, appropriate side rails in place/Call bell, personal items and telephone in reach/Instruct patient to call for assistance before getting out of bed/chair/stretcher/Non-slip footwear applied when patient is off stretcher/[place] to call system/Physically safe environment - no spills, clutter or unnecessary equipment/Purposeful proactive rounding/Room/bathroom lighting operational, light cord in reach

## 2023-12-08 NOTE — H&P ADULT - SOCIAL HISTORY: ALCOHOL USE
1 pint per day as above. Using for 20 years, but only in the past 2 years has he been drinking a pint per day.

## 2023-12-08 NOTE — PATIENT PROFILE ADULT - FUNCTIONAL ASSESSMENT - BASIC MOBILITY 6.
4-calculated by average/Not able to assess (calculate score using [facility] averaging method)  4-calculated by average/Not able to assess (calculate score using [facility] averaging method)

## 2023-12-08 NOTE — ED PROVIDER NOTE - OBJECTIVE STATEMENT
43-year-old male with history of hypertension, hyperlipidemia, type 2 diabetes, EtOH abuse (drinks 1 pint per day, no history of hospitalizations, last drink Wednesday 10 PM) presenting with 2 days of diarrhea.  Patient reports over 20 episodes of watery, nonbloody diarrhea.  Associated with poor appetite, nausea, generalized malaise.  Denies associated drug use.  No recent fever, chills, travel, known sick contacts.  Has been able to tolerate liquids today, but is not eating foods secondary to a poor appetite.  No hallucinations or anxiety.

## 2023-12-08 NOTE — H&P ADULT - ASSESSMENT
43yoM admitted for worsening diarrhea over the past 2 days. PMH of etoh use (1 pint per day daily x 2 years, but has used ETOH for over 20 years), DMII, HTN. Last ETOH was Tuesday and has had liquid yellow bowel movements for the past 2 days as well as tremors. Admitted for ETOH withdrawal, scheduled ativan taper (12/8 start date), electrolyte repletion and cardiac work up.     ETOH withdrawal   Diarrhea, POA, improving  Hypomagnesemia, POA  Last ETOH on Tues 12/5. Severe diarrhea now improving. No evidence of GIB. No hx of w/d though has not tried a detox. He is ready for etoh cessation at this time.   PLAN:   - Ativan scheduled taper (given ongoing tachycardia, tremulousness on admission) starting 12/8  - CTM on tele  - Daily BMP, mag, phos. Replete K to goal of 4.0 and magnesium to a goal of 2.0.     QTc prolongation, POA  Abnormal EKG  Coronary artery calcifications, POA  EKG abnormal-  , QTc 582. V3-V6 abn w no prior for comparison.  - REpeat EKG [ ]  - F/u trops [ ]  - CTM on tele  - Replete mag    DMII A1c of 6.7%  - Hold home oral antihyperglycemics (Jardiance, Janumet)    HTN  - Cont home losartan and amlodipine    Cirrhosis of the liver  Thrombocytopenia, POA  Pt already aware of this diagnosis, has been worked up by his PCP reportedly. Does not have a hepatologist. Portal HTN suggested on CT.   PLAN:  - Trend CMP, CBC  - If no improvement in liver chemistries, would obtain RUQ US  - ETOH cessation, DM mgmt    Asthma, POA  Productive cough, POA  - Uses prn albuterol at home, ordered  - Now endorsing cough, no hypoxia- f/u RVP [ ]

## 2023-12-09 DIAGNOSIS — Z29.9 ENCOUNTER FOR PROPHYLACTIC MEASURES, UNSPECIFIED: ICD-10-CM

## 2023-12-09 DIAGNOSIS — R94.31 ABNORMAL ELECTROCARDIOGRAM [ECG] [EKG]: ICD-10-CM

## 2023-12-09 DIAGNOSIS — I10 ESSENTIAL (PRIMARY) HYPERTENSION: ICD-10-CM

## 2023-12-09 DIAGNOSIS — E11.9 TYPE 2 DIABETES MELLITUS WITHOUT COMPLICATIONS: ICD-10-CM

## 2023-12-09 DIAGNOSIS — J45.909 UNSPECIFIED ASTHMA, UNCOMPLICATED: ICD-10-CM

## 2023-12-09 DIAGNOSIS — F10.239 ALCOHOL DEPENDENCE WITH WITHDRAWAL, UNSPECIFIED: ICD-10-CM

## 2023-12-09 DIAGNOSIS — K74.60 UNSPECIFIED CIRRHOSIS OF LIVER: ICD-10-CM

## 2023-12-09 LAB
ALBUMIN SERPL ELPH-MCNC: 3.3 G/DL — SIGNIFICANT CHANGE UP (ref 3.3–5)
ALBUMIN SERPL ELPH-MCNC: 3.3 G/DL — SIGNIFICANT CHANGE UP (ref 3.3–5)
ALP SERPL-CCNC: 176 U/L — HIGH (ref 40–120)
ALP SERPL-CCNC: 176 U/L — HIGH (ref 40–120)
ALT FLD-CCNC: 49 U/L — HIGH (ref 4–41)
ALT FLD-CCNC: 49 U/L — HIGH (ref 4–41)
ANION GAP SERPL CALC-SCNC: 11 MMOL/L — SIGNIFICANT CHANGE UP (ref 7–14)
AST SERPL-CCNC: 204 U/L — HIGH (ref 4–40)
AST SERPL-CCNC: 204 U/L — HIGH (ref 4–40)
BILIRUB SERPL-MCNC: 2.6 MG/DL — HIGH (ref 0.2–1.2)
BILIRUB SERPL-MCNC: 2.6 MG/DL — HIGH (ref 0.2–1.2)
BUN SERPL-MCNC: 5 MG/DL — LOW (ref 7–23)
CALCIUM SERPL-MCNC: 8.3 MG/DL — LOW (ref 8.4–10.5)
CALCIUM SERPL-MCNC: 8.3 MG/DL — LOW (ref 8.4–10.5)
CALCIUM SERPL-MCNC: 8.4 MG/DL — SIGNIFICANT CHANGE UP (ref 8.4–10.5)
CALCIUM SERPL-MCNC: 8.4 MG/DL — SIGNIFICANT CHANGE UP (ref 8.4–10.5)
CHLORIDE SERPL-SCNC: 98 MMOL/L — SIGNIFICANT CHANGE UP (ref 98–107)
CO2 SERPL-SCNC: 22 MMOL/L — SIGNIFICANT CHANGE UP (ref 22–31)
CO2 SERPL-SCNC: 22 MMOL/L — SIGNIFICANT CHANGE UP (ref 22–31)
CO2 SERPL-SCNC: 23 MMOL/L — SIGNIFICANT CHANGE UP (ref 22–31)
CO2 SERPL-SCNC: 23 MMOL/L — SIGNIFICANT CHANGE UP (ref 22–31)
CREAT SERPL-MCNC: 0.55 MG/DL — SIGNIFICANT CHANGE UP (ref 0.5–1.3)
CREAT SERPL-MCNC: 0.55 MG/DL — SIGNIFICANT CHANGE UP (ref 0.5–1.3)
CREAT SERPL-MCNC: 0.58 MG/DL — SIGNIFICANT CHANGE UP (ref 0.5–1.3)
CREAT SERPL-MCNC: 0.58 MG/DL — SIGNIFICANT CHANGE UP (ref 0.5–1.3)
EGFR: 123 ML/MIN/1.73M2 — SIGNIFICANT CHANGE UP
EGFR: 123 ML/MIN/1.73M2 — SIGNIFICANT CHANGE UP
EGFR: 125 ML/MIN/1.73M2 — SIGNIFICANT CHANGE UP
EGFR: 125 ML/MIN/1.73M2 — SIGNIFICANT CHANGE UP
GLUCOSE SERPL-MCNC: 125 MG/DL — HIGH (ref 70–99)
GLUCOSE SERPL-MCNC: 125 MG/DL — HIGH (ref 70–99)
GLUCOSE SERPL-MCNC: 128 MG/DL — HIGH (ref 70–99)
GLUCOSE SERPL-MCNC: 128 MG/DL — HIGH (ref 70–99)
HCT VFR BLD CALC: 41.2 % — SIGNIFICANT CHANGE UP (ref 39–50)
HCT VFR BLD CALC: 41.2 % — SIGNIFICANT CHANGE UP (ref 39–50)
HGB BLD-MCNC: 14 G/DL — SIGNIFICANT CHANGE UP (ref 13–17)
HGB BLD-MCNC: 14 G/DL — SIGNIFICANT CHANGE UP (ref 13–17)
MAGNESIUM SERPL-MCNC: 1.6 MG/DL — SIGNIFICANT CHANGE UP (ref 1.6–2.6)
MAGNESIUM SERPL-MCNC: 1.6 MG/DL — SIGNIFICANT CHANGE UP (ref 1.6–2.6)
MCHC RBC-ENTMCNC: 28.5 PG — SIGNIFICANT CHANGE UP (ref 27–34)
MCHC RBC-ENTMCNC: 28.5 PG — SIGNIFICANT CHANGE UP (ref 27–34)
MCHC RBC-ENTMCNC: 34 GM/DL — SIGNIFICANT CHANGE UP (ref 32–36)
MCHC RBC-ENTMCNC: 34 GM/DL — SIGNIFICANT CHANGE UP (ref 32–36)
MCV RBC AUTO: 83.9 FL — SIGNIFICANT CHANGE UP (ref 80–100)
MCV RBC AUTO: 83.9 FL — SIGNIFICANT CHANGE UP (ref 80–100)
NRBC # BLD: 0 /100 WBCS — SIGNIFICANT CHANGE UP (ref 0–0)
NRBC # BLD: 0 /100 WBCS — SIGNIFICANT CHANGE UP (ref 0–0)
NRBC # FLD: 0 K/UL — SIGNIFICANT CHANGE UP (ref 0–0)
NRBC # FLD: 0 K/UL — SIGNIFICANT CHANGE UP (ref 0–0)
PHOSPHATE SERPL-MCNC: 1.9 MG/DL — LOW (ref 2.5–4.5)
PHOSPHATE SERPL-MCNC: 1.9 MG/DL — LOW (ref 2.5–4.5)
PLATELET # BLD AUTO: 75 K/UL — LOW (ref 150–400)
PLATELET # BLD AUTO: 75 K/UL — LOW (ref 150–400)
POTASSIUM SERPL-MCNC: 3.8 MMOL/L — SIGNIFICANT CHANGE UP (ref 3.5–5.3)
POTASSIUM SERPL-MCNC: 3.8 MMOL/L — SIGNIFICANT CHANGE UP (ref 3.5–5.3)
POTASSIUM SERPL-MCNC: 4 MMOL/L — SIGNIFICANT CHANGE UP (ref 3.5–5.3)
POTASSIUM SERPL-MCNC: 4 MMOL/L — SIGNIFICANT CHANGE UP (ref 3.5–5.3)
POTASSIUM SERPL-SCNC: 3.8 MMOL/L — SIGNIFICANT CHANGE UP (ref 3.5–5.3)
POTASSIUM SERPL-SCNC: 3.8 MMOL/L — SIGNIFICANT CHANGE UP (ref 3.5–5.3)
POTASSIUM SERPL-SCNC: 4 MMOL/L — SIGNIFICANT CHANGE UP (ref 3.5–5.3)
POTASSIUM SERPL-SCNC: 4 MMOL/L — SIGNIFICANT CHANGE UP (ref 3.5–5.3)
PROT SERPL-MCNC: 8.2 G/DL — SIGNIFICANT CHANGE UP (ref 6–8.3)
PROT SERPL-MCNC: 8.2 G/DL — SIGNIFICANT CHANGE UP (ref 6–8.3)
RBC # BLD: 4.91 M/UL — SIGNIFICANT CHANGE UP (ref 4.2–5.8)
RBC # BLD: 4.91 M/UL — SIGNIFICANT CHANGE UP (ref 4.2–5.8)
RBC # FLD: 17.3 % — HIGH (ref 10.3–14.5)
RBC # FLD: 17.3 % — HIGH (ref 10.3–14.5)
SODIUM SERPL-SCNC: 131 MMOL/L — LOW (ref 135–145)
SODIUM SERPL-SCNC: 131 MMOL/L — LOW (ref 135–145)
SODIUM SERPL-SCNC: 132 MMOL/L — LOW (ref 135–145)
SODIUM SERPL-SCNC: 132 MMOL/L — LOW (ref 135–145)
WBC # BLD: 7.51 K/UL — SIGNIFICANT CHANGE UP (ref 3.8–10.5)
WBC # BLD: 7.51 K/UL — SIGNIFICANT CHANGE UP (ref 3.8–10.5)
WBC # FLD AUTO: 7.51 K/UL — SIGNIFICANT CHANGE UP (ref 3.8–10.5)
WBC # FLD AUTO: 7.51 K/UL — SIGNIFICANT CHANGE UP (ref 3.8–10.5)

## 2023-12-09 PROCEDURE — 99232 SBSQ HOSP IP/OBS MODERATE 35: CPT

## 2023-12-09 PROCEDURE — 93306 TTE W/DOPPLER COMPLETE: CPT | Mod: 26

## 2023-12-09 RX ADMIN — Medication 1 MILLIGRAM(S): at 13:21

## 2023-12-09 RX ADMIN — Medication 1.5 MILLIGRAM(S): at 10:28

## 2023-12-09 RX ADMIN — Medication 1.5 MILLIGRAM(S): at 13:20

## 2023-12-09 RX ADMIN — Medication 2 MILLIGRAM(S): at 02:18

## 2023-12-09 RX ADMIN — Medication 100 MILLIGRAM(S): at 13:22

## 2023-12-09 RX ADMIN — Medication 1.5 MILLIGRAM(S): at 18:43

## 2023-12-09 RX ADMIN — Medication 2: at 13:18

## 2023-12-09 RX ADMIN — Medication 1.5 MILLIGRAM(S): at 22:41

## 2023-12-09 RX ADMIN — AMLODIPINE BESYLATE 10 MILLIGRAM(S): 2.5 TABLET ORAL at 06:12

## 2023-12-09 RX ADMIN — LOSARTAN POTASSIUM 100 MILLIGRAM(S): 100 TABLET, FILM COATED ORAL at 06:12

## 2023-12-09 RX ADMIN — Medication 2 MILLIGRAM(S): at 06:13

## 2023-12-09 NOTE — PROVIDER CONTACT NOTE (OTHER) - SITUATION
patient unsteady on his feet, wife is at bedside assisting him to bathroom  patient and wife denying a fall but patient complaining of "leaning" on his right hand when using the bathroom and some pain

## 2023-12-09 NOTE — PROVIDER CONTACT NOTE (OTHER) - ASSESSMENT
Patient states that he is dizzy when getting up. Patient has full use of right hand, range of motion intact. No swelling or redness. Notified patient and wife that patient must stay in bed from now on and to use the call light for any assistance without getting up; both verbalized understanding. Provided urinals for patient use. Wife requested a diaper for patient; provided.

## 2023-12-09 NOTE — PROGRESS NOTE ADULT - SUBJECTIVE AND OBJECTIVE BOX
Medicine Progress Note    Patient is a 43y old  Male who presents with a chief complaint of diarrhea, shaking (08 Dec 2023 09:18)      SUBJECTIVE / OVERNIGHT EVENTS: patient denied any complaints when I saw him     ADDITIONAL REVIEW OF SYSTEMS: negative     MEDICATIONS  (STANDING):  amLODIPine   Tablet 10 milliGRAM(s) Oral daily  dextrose 5%. 1000 milliLiter(s) (50 mL/Hr) IV Continuous <Continuous>  dextrose 5%. 1000 milliLiter(s) (100 mL/Hr) IV Continuous <Continuous>  dextrose 50% Injectable 25 Gram(s) IV Push once  folic acid 1 milliGRAM(s) Oral daily  glucagon  Injectable 1 milliGRAM(s) IntraMuscular once  influenza   Vaccine 0.5 milliLiter(s) IntraMuscular once  insulin lispro (ADMELOG) corrective regimen sliding scale   SubCutaneous three times a day before meals  lactated ringers. 1000 milliLiter(s) (100 mL/Hr) IV Continuous <Continuous>  LORazepam     Tablet   Oral   LORazepam     Tablet 1.5 milliGRAM(s) Oral every 4 hours  losartan 100 milliGRAM(s) Oral daily  thiamine 100 milliGRAM(s) Oral daily    MEDICATIONS  (PRN):  acetaminophen     Tablet .. 650 milliGRAM(s) Oral every 6 hours PRN Temp greater or equal to 38C (100.4F), Mild Pain (1 - 3)  albuterol    90 MICROgram(s) HFA Inhaler 2 Puff(s) Inhalation every 6 hours PRN Bronchospasm  dextrose Oral Gel 15 Gram(s) Oral once PRN Blood Glucose LESS THAN 70 milliGRAM(s)/deciliter  melatonin 3 milliGRAM(s) Oral at bedtime PRN Insomnia  ondansetron Injectable 4 milliGRAM(s) IV Push every 8 hours PRN Nausea and/or Vomiting    CAPILLARY BLOOD GLUCOSE      POCT Blood Glucose.: 144 mg/dL (09 Dec 2023 17:30)  POCT Blood Glucose.: 213 mg/dL (09 Dec 2023 12:26)  POCT Blood Glucose.: 121 mg/dL (09 Dec 2023 09:11)  POCT Blood Glucose.: 175 mg/dL (08 Dec 2023 22:44)    I&O's Summary      PHYSICAL EXAM:  Vital Signs Last 24 Hrs  T(C): 36.8 (09 Dec 2023 15:30), Max: 36.8 (09 Dec 2023 15:30)  T(F): 98.3 (09 Dec 2023 15:30), Max: 98.3 (09 Dec 2023 15:30)  HR: 112 (09 Dec 2023 15:30) (98 - 120)  BP: 134/85 (09 Dec 2023 15:30) (125/87 - 148/86)  BP(mean): --  RR: 18 (09 Dec 2023 15:30) (18 - 18)  SpO2: 100% (09 Dec 2023 15:30) (98% - 100%)    Parameters below as of 09 Dec 2023 15:30  Patient On (Oxygen Delivery Method): room air      CONSTITUTIONAL: NAD,   ENMT: Moist oral mucosa, no pharyngeal injection or exudates;   RESPIRATORY: Normal respiratory effort; lungs are clear to auscultation bilaterally  CARDIOVASCULAR: Regular rate and rhythm, normal S1 and S2,  No lower extremity edema;  ABDOMEN: Nontender to palpation, normoactive bowel sounds, no rebound/guarding;  PSYCH: A+O to person, place, and time; affect appropriate  NEUROLOGY: CN 2-12 are intact and symmetric; no gross sensory deficits   SKIN: No rashes; no palpable lesions    LABS:                        14.0   7.51  )-----------( 75       ( 09 Dec 2023 06:55 )             41.2     12-09    132<L>  |  98  |  5<L>  ----------------------------<  128<H>  4.0   |  23  |  0.58    Ca    8.3<L>      09 Dec 2023 06:55  Phos  1.9     12-09  Mg     1.60     12-09    TPro  8.2  /  Alb  3.3  /  TBili  2.6<H>  /  DBili  x   /  AST  204<H>  /  ALT  49<H>  /  AlkPhos  176<H>  12-09          Urinalysis Basic - ( 09 Dec 2023 06:55 )    Color: x / Appearance: x / SG: x / pH: x  Gluc: 128 mg/dL / Ketone: x  / Bili: x / Urobili: x   Blood: x / Protein: x / Nitrite: x   Leuk Esterase: x / RBC: x / WBC x   Sq Epi: x / Non Sq Epi: x / Bacteria: x        Culture - Blood (collected 08 Dec 2023 01:00)  Source: .Blood Blood-Peripheral  Preliminary Report (09 Dec 2023 07:02):    No growth at 24 hours    Culture - Blood (collected 08 Dec 2023 00:30)  Source: .Blood Blood-Peripheral  Preliminary Report (09 Dec 2023 07:02):    No growth at 24 hours      SARS-CoV-2: Detected (08 Dec 2023 11:34)      RADIOLOGY & ADDITIONAL TESTS:  Imaging from Last 24 Hours:    Electrocardiogram/QTc Interval:    COORDINATION OF CARE:  Care Discussed with Consultants/Other Providers:

## 2023-12-09 NOTE — PROGRESS NOTE ADULT - ASSESSMENT
43yoM admitted for worsening diarrhea over the past 2 days. PMH of etoh use (1 pint per day daily x 2 years, but has used ETOH for over 20 years), DMII, HTN. Last ETOH was Tuesday and has had liquid yellow bowel movements for the past 2 days as well as tremors. Admitted for ETOH withdrawal, scheduled ativan taper (12/8 start date), electrolyte repletion and cardiac work up.

## 2023-12-10 DIAGNOSIS — U07.1 COVID-19: ICD-10-CM

## 2023-12-10 LAB
ALBUMIN SERPL ELPH-MCNC: 3.2 G/DL — LOW (ref 3.3–5)
ALBUMIN SERPL ELPH-MCNC: 3.2 G/DL — LOW (ref 3.3–5)
ALP SERPL-CCNC: 171 U/L — HIGH (ref 40–120)
ALP SERPL-CCNC: 171 U/L — HIGH (ref 40–120)
ALT FLD-CCNC: 45 U/L — HIGH (ref 4–41)
ALT FLD-CCNC: 45 U/L — HIGH (ref 4–41)
ANION GAP SERPL CALC-SCNC: 11 MMOL/L — SIGNIFICANT CHANGE UP (ref 7–14)
ANION GAP SERPL CALC-SCNC: 11 MMOL/L — SIGNIFICANT CHANGE UP (ref 7–14)
AST SERPL-CCNC: 170 U/L — HIGH (ref 4–40)
AST SERPL-CCNC: 170 U/L — HIGH (ref 4–40)
BILIRUB SERPL-MCNC: 2.9 MG/DL — HIGH (ref 0.2–1.2)
BILIRUB SERPL-MCNC: 2.9 MG/DL — HIGH (ref 0.2–1.2)
BUN SERPL-MCNC: 5 MG/DL — LOW (ref 7–23)
BUN SERPL-MCNC: 5 MG/DL — LOW (ref 7–23)
CALCIUM SERPL-MCNC: 8.4 MG/DL — SIGNIFICANT CHANGE UP (ref 8.4–10.5)
CALCIUM SERPL-MCNC: 8.4 MG/DL — SIGNIFICANT CHANGE UP (ref 8.4–10.5)
CHLORIDE SERPL-SCNC: 98 MMOL/L — SIGNIFICANT CHANGE UP (ref 98–107)
CHLORIDE SERPL-SCNC: 98 MMOL/L — SIGNIFICANT CHANGE UP (ref 98–107)
CO2 SERPL-SCNC: 22 MMOL/L — SIGNIFICANT CHANGE UP (ref 22–31)
CO2 SERPL-SCNC: 22 MMOL/L — SIGNIFICANT CHANGE UP (ref 22–31)
CREAT SERPL-MCNC: 0.57 MG/DL — SIGNIFICANT CHANGE UP (ref 0.5–1.3)
CREAT SERPL-MCNC: 0.57 MG/DL — SIGNIFICANT CHANGE UP (ref 0.5–1.3)
CULTURE RESULTS: SIGNIFICANT CHANGE UP
CULTURE RESULTS: SIGNIFICANT CHANGE UP
EGFR: 123 ML/MIN/1.73M2 — SIGNIFICANT CHANGE UP
EGFR: 123 ML/MIN/1.73M2 — SIGNIFICANT CHANGE UP
GLUCOSE SERPL-MCNC: 146 MG/DL — HIGH (ref 70–99)
GLUCOSE SERPL-MCNC: 146 MG/DL — HIGH (ref 70–99)
HCT VFR BLD CALC: 38.7 % — LOW (ref 39–50)
HCT VFR BLD CALC: 38.7 % — LOW (ref 39–50)
HGB BLD-MCNC: 13.6 G/DL — SIGNIFICANT CHANGE UP (ref 13–17)
HGB BLD-MCNC: 13.6 G/DL — SIGNIFICANT CHANGE UP (ref 13–17)
INR BLD: 1.74 RATIO — HIGH (ref 0.85–1.18)
INR BLD: 1.74 RATIO — HIGH (ref 0.85–1.18)
MAGNESIUM SERPL-MCNC: 1.5 MG/DL — LOW (ref 1.6–2.6)
MAGNESIUM SERPL-MCNC: 1.5 MG/DL — LOW (ref 1.6–2.6)
MCHC RBC-ENTMCNC: 28.8 PG — SIGNIFICANT CHANGE UP (ref 27–34)
MCHC RBC-ENTMCNC: 28.8 PG — SIGNIFICANT CHANGE UP (ref 27–34)
MCHC RBC-ENTMCNC: 35.1 GM/DL — SIGNIFICANT CHANGE UP (ref 32–36)
MCHC RBC-ENTMCNC: 35.1 GM/DL — SIGNIFICANT CHANGE UP (ref 32–36)
MCV RBC AUTO: 82 FL — SIGNIFICANT CHANGE UP (ref 80–100)
MCV RBC AUTO: 82 FL — SIGNIFICANT CHANGE UP (ref 80–100)
MELD SCORE WITH DIALYSIS: 32 POINTS — SIGNIFICANT CHANGE UP
MELD SCORE WITH DIALYSIS: 32 POINTS — SIGNIFICANT CHANGE UP
MELD SCORE WITHOUT DIALYSIS: 22 POINTS — SIGNIFICANT CHANGE UP
MELD SCORE WITHOUT DIALYSIS: 22 POINTS — SIGNIFICANT CHANGE UP
MRSA PCR RESULT.: SIGNIFICANT CHANGE UP
MRSA PCR RESULT.: SIGNIFICANT CHANGE UP
NRBC # BLD: 0 /100 WBCS — SIGNIFICANT CHANGE UP (ref 0–0)
NRBC # BLD: 0 /100 WBCS — SIGNIFICANT CHANGE UP (ref 0–0)
NRBC # FLD: 0 K/UL — SIGNIFICANT CHANGE UP (ref 0–0)
NRBC # FLD: 0 K/UL — SIGNIFICANT CHANGE UP (ref 0–0)
PHOSPHATE SERPL-MCNC: 2.3 MG/DL — LOW (ref 2.5–4.5)
PHOSPHATE SERPL-MCNC: 2.3 MG/DL — LOW (ref 2.5–4.5)
PLATELET # BLD AUTO: 96 K/UL — LOW (ref 150–400)
PLATELET # BLD AUTO: 96 K/UL — LOW (ref 150–400)
POTASSIUM SERPL-MCNC: 4 MMOL/L — SIGNIFICANT CHANGE UP (ref 3.5–5.3)
POTASSIUM SERPL-MCNC: 4 MMOL/L — SIGNIFICANT CHANGE UP (ref 3.5–5.3)
POTASSIUM SERPL-SCNC: 4 MMOL/L — SIGNIFICANT CHANGE UP (ref 3.5–5.3)
POTASSIUM SERPL-SCNC: 4 MMOL/L — SIGNIFICANT CHANGE UP (ref 3.5–5.3)
PROT SERPL-MCNC: 7.9 G/DL — SIGNIFICANT CHANGE UP (ref 6–8.3)
PROT SERPL-MCNC: 7.9 G/DL — SIGNIFICANT CHANGE UP (ref 6–8.3)
PROTHROM AB SERPL-ACNC: 19.2 SEC — HIGH (ref 9.5–13)
PROTHROM AB SERPL-ACNC: 19.2 SEC — HIGH (ref 9.5–13)
RBC # BLD: 4.72 M/UL — SIGNIFICANT CHANGE UP (ref 4.2–5.8)
RBC # BLD: 4.72 M/UL — SIGNIFICANT CHANGE UP (ref 4.2–5.8)
RBC # FLD: 17.2 % — HIGH (ref 10.3–14.5)
RBC # FLD: 17.2 % — HIGH (ref 10.3–14.5)
S AUREUS DNA NOSE QL NAA+PROBE: DETECTED
S AUREUS DNA NOSE QL NAA+PROBE: DETECTED
SODIUM SERPL-SCNC: 131 MMOL/L — LOW (ref 135–145)
SODIUM SERPL-SCNC: 131 MMOL/L — LOW (ref 135–145)
SPECIMEN SOURCE: SIGNIFICANT CHANGE UP
SPECIMEN SOURCE: SIGNIFICANT CHANGE UP
WBC # BLD: 8.81 K/UL — SIGNIFICANT CHANGE UP (ref 3.8–10.5)
WBC # BLD: 8.81 K/UL — SIGNIFICANT CHANGE UP (ref 3.8–10.5)
WBC # FLD AUTO: 8.81 K/UL — SIGNIFICANT CHANGE UP (ref 3.8–10.5)
WBC # FLD AUTO: 8.81 K/UL — SIGNIFICANT CHANGE UP (ref 3.8–10.5)

## 2023-12-10 PROCEDURE — 99232 SBSQ HOSP IP/OBS MODERATE 35: CPT

## 2023-12-10 RX ORDER — CHLORHEXIDINE GLUCONATE 213 G/1000ML
1 SOLUTION TOPICAL DAILY
Refills: 0 | Status: DISCONTINUED | OUTPATIENT
Start: 2023-12-10 | End: 2023-12-14

## 2023-12-10 RX ORDER — ERYTHROMYCIN BASE 5 MG/GRAM
1 OINTMENT (GRAM) OPHTHALMIC (EYE) EVERY 6 HOURS
Refills: 0 | Status: DISCONTINUED | OUTPATIENT
Start: 2023-12-10 | End: 2023-12-14

## 2023-12-10 RX ORDER — MUPIROCIN 20 MG/G
1 OINTMENT TOPICAL
Refills: 0 | Status: DISCONTINUED | OUTPATIENT
Start: 2023-12-10 | End: 2023-12-14

## 2023-12-10 RX ORDER — MAGNESIUM SULFATE 500 MG/ML
2 VIAL (ML) INJECTION ONCE
Refills: 0 | Status: COMPLETED | OUTPATIENT
Start: 2023-12-10 | End: 2023-12-10

## 2023-12-10 RX ADMIN — Medication 1 APPLICATION(S): at 18:29

## 2023-12-10 RX ADMIN — Medication 2: at 18:28

## 2023-12-10 RX ADMIN — Medication 1 MILLIGRAM(S): at 18:29

## 2023-12-10 RX ADMIN — Medication 1 MILLIGRAM(S): at 22:14

## 2023-12-10 RX ADMIN — Medication 1 MILLIGRAM(S): at 13:28

## 2023-12-10 RX ADMIN — Medication 1 MILLIGRAM(S): at 09:49

## 2023-12-10 RX ADMIN — Medication 1.5 MILLIGRAM(S): at 01:04

## 2023-12-10 RX ADMIN — Medication 1 DROP(S): at 18:30

## 2023-12-10 RX ADMIN — Medication 1.5 MILLIGRAM(S): at 05:56

## 2023-12-10 RX ADMIN — LOSARTAN POTASSIUM 100 MILLIGRAM(S): 100 TABLET, FILM COATED ORAL at 05:56

## 2023-12-10 RX ADMIN — Medication 1: at 13:31

## 2023-12-10 RX ADMIN — AMLODIPINE BESYLATE 10 MILLIGRAM(S): 2.5 TABLET ORAL at 06:02

## 2023-12-10 RX ADMIN — Medication 1 APPLICATION(S): at 05:57

## 2023-12-10 RX ADMIN — Medication 25 GRAM(S): at 18:29

## 2023-12-10 RX ADMIN — Medication 1 APPLICATION(S): at 13:29

## 2023-12-10 RX ADMIN — Medication 1 MILLIGRAM(S): at 13:27

## 2023-12-10 RX ADMIN — Medication 100 MILLIGRAM(S): at 13:27

## 2023-12-10 RX ADMIN — CHLORHEXIDINE GLUCONATE 1 APPLICATION(S): 213 SOLUTION TOPICAL at 13:27

## 2023-12-10 RX ADMIN — Medication 1 DROP(S): at 05:57

## 2023-12-10 NOTE — PROGRESS NOTE ADULT - PROBLEM SELECTOR PLAN 4
c/w home meds Amlodipine /Losartan   Avoid hypotension
c/w home meds Amlodipine /Losartan   Avoid hypotension

## 2023-12-10 NOTE — PROGRESS NOTE ADULT - SUBJECTIVE AND OBJECTIVE BOX
Medicine Progress Note    Patient is a 43y old  Male who presents with a chief complaint of diarrhea, shaking (09 Dec 2023 20:46)      SUBJECTIVE / OVERNIGHT EVENTS: CIWA-A Protocol is zero   I consulted SBIRT to see the patient   Patient is COVID positive   Asymptomatic     MEDICATIONS  (STANDING):    amLODIPine   Tablet 10 milliGRAM(s) Oral daily  artificial  tears Solution 1 Drop(s) Both EYES every 12 hours  chlorhexidine 2% Cloths 1 Application(s) Topical daily  dextrose 5%. 1000 milliLiter(s) (50 mL/Hr) IV Continuous <Continuous>  dextrose 5%. 1000 milliLiter(s) (100 mL/Hr) IV Continuous <Continuous>  dextrose 50% Injectable 25 Gram(s) IV Push once  erythromycin   Ointment 1 Application(s) Right EYE every 6 hours  folic acid 1 milliGRAM(s) Oral daily  glucagon  Injectable 1 milliGRAM(s) IntraMuscular once  influenza   Vaccine 0.5 milliLiter(s) IntraMuscular once  insulin lispro (ADMELOG) corrective regimen sliding scale   SubCutaneous three times a day before meals  lactated ringers. 1000 milliLiter(s) (100 mL/Hr) IV Continuous <Continuous>  LORazepam     Tablet   Oral   LORazepam     Tablet 1 milliGRAM(s) Oral every 4 hours  losartan 100 milliGRAM(s) Oral daily    MEDICATIONS  (PRN):  acetaminophen     Tablet .. 650 milliGRAM(s) Oral every 6 hours PRN Temp greater or equal to 38C (100.4F), Mild Pain (1 - 3)  albuterol    90 MICROgram(s) HFA Inhaler 2 Puff(s) Inhalation every 6 hours PRN Bronchospasm  dextrose Oral Gel 15 Gram(s) Oral once PRN Blood Glucose LESS THAN 70 milliGRAM(s)/deciliter  melatonin 3 milliGRAM(s) Oral at bedtime PRN Insomnia  ondansetron Injectable 4 milliGRAM(s) IV Push every 8 hours PRN Nausea and/or Vomiting    CAPILLARY BLOOD GLUCOSE      POCT Blood Glucose.: 159 mg/dL (10 Dec 2023 12:38)  POCT Blood Glucose.: 141 mg/dL (10 Dec 2023 09:11)  POCT Blood Glucose.: 149 mg/dL (09 Dec 2023 22:50)  POCT Blood Glucose.: 144 mg/dL (09 Dec 2023 17:30)    I&O's Summary      PHYSICAL EXAM:  Vital Signs Last 24 Hrs  T(C): 36.9 (10 Dec 2023 12:12), Max: 36.9 (09 Dec 2023 19:30)  T(F): 98.5 (10 Dec 2023 12:12), Max: 98.5 (10 Dec 2023 12:12)  HR: 115 (10 Dec 2023 12:12) (102 - 116)  BP: 132/89 (10 Dec 2023 12:12) (125/84 - 147/94)  BP(mean): --  RR: 18 (10 Dec 2023 12:12) (18 - 18)  SpO2: 97% (10 Dec 2023 12:12) (97% - 100%)    Parameters below as of 10 Dec 2023 12:12  Patient On (Oxygen Delivery Method): room air      CONSTITUTIONAL: NAD,   ENMT: Moist oral mucosa, no pharyngeal injection or exudates;   RESPIRATORY: Normal respiratory effort; lungs are clear to auscultation bilaterally  CARDIOVASCULAR: Regular rate and rhythm, normal S1 and S2,; No lower extremity edema;   ABDOMEN: Nontender to palpation, normoactive bowel sounds, no rebound/guarding;   PSYCH: A+O to person, place, and time; affect appropriate  NEUROLOGY: CN 2-12 are intact and symmetric; no gross sensory deficits   SKIN: No rashes; no palpable lesions    LABS:                        13.6   8.81  )-----------( 96       ( 10 Dec 2023 06:47 )             38.7     12-10    131<L>  |  98  |  5<L>  ----------------------------<  146<H>  4.0   |  22  |  0.57    Ca    8.4      10 Dec 2023 06:47  Phos  2.3     12-10  Mg     1.50     12-10    TPro  7.9  /  Alb  3.2<L>  /  TBili  2.9<H>  /  DBili  x   /  AST  170<H>  /  ALT  45<H>  /  AlkPhos  171<H>  12-10    PT/INR - ( 10 Dec 2023 06:47 )   PT: 19.2 sec;   INR: 1.74 ratio               Urinalysis Basic - ( 10 Dec 2023 06:47 )    Color: x / Appearance: x / SG: x / pH: x  Gluc: 146 mg/dL / Ketone: x  / Bili: x / Urobili: x   Blood: x / Protein: x / Nitrite: x   Leuk Esterase: x / RBC: x / WBC x   Sq Epi: x / Non Sq Epi: x / Bacteria: x        Culture - Urine (collected 08 Dec 2023 01:18)  Source: Clean Catch Clean Catch (Midstream)  Final Report (10 Dec 2023 10:28):    >=3 organisms. Probable collection contamination.    Culture - Blood (collected 08 Dec 2023 01:00)  Source: .Blood Blood-Peripheral  Preliminary Report (10 Dec 2023 07:02):    No growth at 48 Hours    Culture - Blood (collected 08 Dec 2023 00:30)  Source: .Blood Blood-Peripheral  Preliminary Report (10 Dec 2023 07:02):    No growth at 48 Hours      SARS-CoV-2: Detected (08 Dec 2023 11:34)      RADIOLOGY & ADDITIONAL TESTS:  Imaging from Last 24 Hours:    Electrocardiogram/QTc Interval:    COORDINATION OF CARE:  Care Discussed with Consultants/Other Providers: JOON

## 2023-12-10 NOTE — PHYSICAL THERAPY INITIAL EVALUATION ADULT - GENERAL OBSERVATIONS, REHAB EVAL
Patient received semi-supine in no apparent distress, agreeable to participate. Wife at bedside. 100% oxygen saturation.

## 2023-12-10 NOTE — PROGRESS NOTE ADULT - PROBLEM SELECTOR PLAN 2
EKG abnormal-  , QTc 582. V3-V6 abn w no prior for comparison.  - REpeat EKG [ ]  - F/u trops [ ]  - CTM on tele  - Replete mag, keep >2, potassium >4   Echo   1. Left ventricular cavity is small. Left ventricular wall thickness is severely increased. Left ventricular systolic function is hyperdynamic with an ejection fraction of 69 % by Damon's method of disks. There are no regional wall motion abnormalities seen.   2. Normal left ventricular diastolic function, with normal filling pressure.   3. There is no evidence of a left ventricular thrombus.   4. Normal right ventricular cavity size and systolic function.   5. The left atrium is normal.   6. Normal atria.   7. Fibrocalcific aortic valve sclerosis without stenosis.   8. Pulmonary artery systolic pressure could not be estimated.   9. No pericardial effusion seen.  10. No prior echocardiogram is available for comparison.
EKG abnormal-  , QTc 582. V3-V6 abn w no prior for comparison.  - REpeat EKG [ ]  - F/u trops [ ]  - CTM on tele  - Replete mag, keep >2, potassium >4   Echo   1. Left ventricular cavity is small. Left ventricular wall thickness is severely increased. Left ventricular systolic function is hyperdynamic with an ejection fraction of 69 % by Damon's method of disks. There are no regional wall motion abnormalities seen.   2. Normal left ventricular diastolic function, with normal filling pressure.   3. There is no evidence of a left ventricular thrombus.   4. Normal right ventricular cavity size and systolic function.   5. The left atrium is normal.   6. Normal atria.   7. Fibrocalcific aortic valve sclerosis without stenosis.   8. Pulmonary artery systolic pressure could not be estimated.   9. No pericardial effusion seen.  10. No prior echocardiogram is available for comparison.

## 2023-12-10 NOTE — PROGRESS NOTE ADULT - PROBLEM SELECTOR PLAN 3
Repeat HBA1C  BS goal 140-180  C/w ISS   Hold home antidiabetic meds , resume on DC
Repeat HBA1C  BS goal 140-180  C/w ISS   Hold home antidiabetic meds , resume on DC

## 2023-12-10 NOTE — PHYSICAL THERAPY INITIAL EVALUATION ADULT - REFERRING PHYSICIAN, REHAB EVAL
Chief Complaint   Patient presents with     Consult     New patient consult for bi lateral upper extremity pain and leg pain after losing a lot of weight. Concern that the loose skin she no has, has made it worse      Video Visit Smart phone AW touch point   [phone]  Shahram Bailey CMA on 2/3/2021 at 9:57 AM     Karthik Espinosa

## 2023-12-10 NOTE — PHYSICAL THERAPY INITIAL EVALUATION ADULT - PERTINENT HX OF CURRENT PROBLEM, REHAB EVAL
Patient is a 43 year old  Male who presents with a chief complaint of diarrhea, shaking. Found to be in ETOH withdrawal and covid+.

## 2023-12-10 NOTE — PHYSICAL THERAPY INITIAL EVALUATION ADULT - ADDITIONAL COMMENTS
Patient lives in a house with his wife, +4-5 steps to enter. Pt reports independence with ADLs and ambulation. Pt and wife report multiple falls and trips at home.    Patient left sitting at edge of bed, in no apparent distress, +call bell. Wife at bedside.

## 2023-12-10 NOTE — PROGRESS NOTE ADULT - PROBLEM SELECTOR PLAN 5
MELD labs daily   Hepatology consult outpatient   Thrombocytopenia in setting of cirrhosis , monitor  CT A/P : Cirrhosis with suggestion of early portal venous hypertension. Mild   ascites.  SBIRT consulted
MELD labs daily   Hepatology consult outpatient   Thrombocytopenia in setting of cirrhosis , monitor  CT A/P : Cirrhosis with suggestion of early portal venous hypertension. Mild   ascites.  SBIRT consult

## 2023-12-10 NOTE — PROGRESS NOTE ADULT - PROBLEM SELECTOR PLAN 1
Patient last drink was om 12/5  On CIWA Protocol, now zero , complete benzo course   Diarrhea in setting of malabsorption, now resolved   F/w labs , correct electrolytes PRN  Keep K >4, Mag >2  C/w Thiamine , Folic Acid
Patient last drink was om 12/5  On CIWA Protocol   Diarrhea in setting of malabsorption, now resolved   F/w labs , correct electrolytes PRN  Keep K >4, Mag >2  C/w Thiamine , Folic Acid

## 2023-12-10 NOTE — PHYSICAL THERAPY INITIAL EVALUATION ADULT - PLANNED THERAPY INTERVENTIONS, PT EVAL
balance training/bed mobility training/gait training/motor coordination training/strengthening/stretching/transfer training

## 2023-12-10 NOTE — PROGRESS NOTE ADULT - PROBLEM SELECTOR PLAN 7
New infection, patient is asymptomatic  He was placed in isolation   Supportive Care
SCD in setting of thrombocytopenia    D/w wife at bedside 12/9,

## 2023-12-11 LAB
ALBUMIN SERPL ELPH-MCNC: 3.3 G/DL — SIGNIFICANT CHANGE UP (ref 3.3–5)
ALBUMIN SERPL ELPH-MCNC: 3.3 G/DL — SIGNIFICANT CHANGE UP (ref 3.3–5)
ALP SERPL-CCNC: 175 U/L — HIGH (ref 40–120)
ALP SERPL-CCNC: 175 U/L — HIGH (ref 40–120)
ALT FLD-CCNC: 41 U/L — SIGNIFICANT CHANGE UP (ref 4–41)
ALT FLD-CCNC: 41 U/L — SIGNIFICANT CHANGE UP (ref 4–41)
ANION GAP SERPL CALC-SCNC: 11 MMOL/L — SIGNIFICANT CHANGE UP (ref 7–14)
ANION GAP SERPL CALC-SCNC: 11 MMOL/L — SIGNIFICANT CHANGE UP (ref 7–14)
AST SERPL-CCNC: 129 U/L — HIGH (ref 4–40)
AST SERPL-CCNC: 129 U/L — HIGH (ref 4–40)
BILIRUB SERPL-MCNC: 3.3 MG/DL — HIGH (ref 0.2–1.2)
BILIRUB SERPL-MCNC: 3.3 MG/DL — HIGH (ref 0.2–1.2)
BUN SERPL-MCNC: 7 MG/DL — SIGNIFICANT CHANGE UP (ref 7–23)
BUN SERPL-MCNC: 7 MG/DL — SIGNIFICANT CHANGE UP (ref 7–23)
CALCIUM SERPL-MCNC: 8.6 MG/DL — SIGNIFICANT CHANGE UP (ref 8.4–10.5)
CALCIUM SERPL-MCNC: 8.6 MG/DL — SIGNIFICANT CHANGE UP (ref 8.4–10.5)
CHLORIDE SERPL-SCNC: 96 MMOL/L — LOW (ref 98–107)
CHLORIDE SERPL-SCNC: 96 MMOL/L — LOW (ref 98–107)
CO2 SERPL-SCNC: 21 MMOL/L — LOW (ref 22–31)
CO2 SERPL-SCNC: 21 MMOL/L — LOW (ref 22–31)
CREAT ?TM UR-MCNC: 66 MG/DL — SIGNIFICANT CHANGE UP
CREAT ?TM UR-MCNC: 66 MG/DL — SIGNIFICANT CHANGE UP
CREAT SERPL-MCNC: 0.66 MG/DL — SIGNIFICANT CHANGE UP (ref 0.5–1.3)
CREAT SERPL-MCNC: 0.66 MG/DL — SIGNIFICANT CHANGE UP (ref 0.5–1.3)
EGFR: 118 ML/MIN/1.73M2 — SIGNIFICANT CHANGE UP
EGFR: 118 ML/MIN/1.73M2 — SIGNIFICANT CHANGE UP
GLUCOSE SERPL-MCNC: 181 MG/DL — HIGH (ref 70–99)
GLUCOSE SERPL-MCNC: 181 MG/DL — HIGH (ref 70–99)
HCT VFR BLD CALC: 41.3 % — SIGNIFICANT CHANGE UP (ref 39–50)
HCT VFR BLD CALC: 41.3 % — SIGNIFICANT CHANGE UP (ref 39–50)
HGB BLD-MCNC: 14.2 G/DL — SIGNIFICANT CHANGE UP (ref 13–17)
HGB BLD-MCNC: 14.2 G/DL — SIGNIFICANT CHANGE UP (ref 13–17)
MAGNESIUM SERPL-MCNC: 1.9 MG/DL — SIGNIFICANT CHANGE UP (ref 1.6–2.6)
MAGNESIUM SERPL-MCNC: 1.9 MG/DL — SIGNIFICANT CHANGE UP (ref 1.6–2.6)
MCHC RBC-ENTMCNC: 29 PG — SIGNIFICANT CHANGE UP (ref 27–34)
MCHC RBC-ENTMCNC: 29 PG — SIGNIFICANT CHANGE UP (ref 27–34)
MCHC RBC-ENTMCNC: 34.4 GM/DL — SIGNIFICANT CHANGE UP (ref 32–36)
MCHC RBC-ENTMCNC: 34.4 GM/DL — SIGNIFICANT CHANGE UP (ref 32–36)
MCV RBC AUTO: 84.3 FL — SIGNIFICANT CHANGE UP (ref 80–100)
MCV RBC AUTO: 84.3 FL — SIGNIFICANT CHANGE UP (ref 80–100)
NRBC # BLD: 0 /100 WBCS — SIGNIFICANT CHANGE UP (ref 0–0)
NRBC # BLD: 0 /100 WBCS — SIGNIFICANT CHANGE UP (ref 0–0)
NRBC # FLD: 0 K/UL — SIGNIFICANT CHANGE UP (ref 0–0)
NRBC # FLD: 0 K/UL — SIGNIFICANT CHANGE UP (ref 0–0)
OSMOLALITY SERPL: 273 MOSM/KG — LOW (ref 275–295)
OSMOLALITY SERPL: 273 MOSM/KG — LOW (ref 275–295)
OSMOLALITY UR: 338 MOSM/KG — SIGNIFICANT CHANGE UP (ref 50–1200)
OSMOLALITY UR: 338 MOSM/KG — SIGNIFICANT CHANGE UP (ref 50–1200)
PHOSPHATE SERPL-MCNC: 2.8 MG/DL — SIGNIFICANT CHANGE UP (ref 2.5–4.5)
PHOSPHATE SERPL-MCNC: 2.8 MG/DL — SIGNIFICANT CHANGE UP (ref 2.5–4.5)
PLATELET # BLD AUTO: 126 K/UL — LOW (ref 150–400)
PLATELET # BLD AUTO: 126 K/UL — LOW (ref 150–400)
POTASSIUM SERPL-MCNC: 4 MMOL/L — SIGNIFICANT CHANGE UP (ref 3.5–5.3)
POTASSIUM SERPL-MCNC: 4 MMOL/L — SIGNIFICANT CHANGE UP (ref 3.5–5.3)
POTASSIUM SERPL-SCNC: 4 MMOL/L — SIGNIFICANT CHANGE UP (ref 3.5–5.3)
POTASSIUM SERPL-SCNC: 4 MMOL/L — SIGNIFICANT CHANGE UP (ref 3.5–5.3)
PROT SERPL-MCNC: 8.3 G/DL — SIGNIFICANT CHANGE UP (ref 6–8.3)
PROT SERPL-MCNC: 8.3 G/DL — SIGNIFICANT CHANGE UP (ref 6–8.3)
RBC # BLD: 4.9 M/UL — SIGNIFICANT CHANGE UP (ref 4.2–5.8)
RBC # BLD: 4.9 M/UL — SIGNIFICANT CHANGE UP (ref 4.2–5.8)
RBC # FLD: 17.3 % — HIGH (ref 10.3–14.5)
RBC # FLD: 17.3 % — HIGH (ref 10.3–14.5)
SODIUM SERPL-SCNC: 128 MMOL/L — LOW (ref 135–145)
SODIUM SERPL-SCNC: 128 MMOL/L — LOW (ref 135–145)
SODIUM UR-SCNC: 64 MMOL/L — SIGNIFICANT CHANGE UP
SODIUM UR-SCNC: 64 MMOL/L — SIGNIFICANT CHANGE UP
WBC # BLD: 11.19 K/UL — HIGH (ref 3.8–10.5)
WBC # BLD: 11.19 K/UL — HIGH (ref 3.8–10.5)
WBC # FLD AUTO: 11.19 K/UL — HIGH (ref 3.8–10.5)
WBC # FLD AUTO: 11.19 K/UL — HIGH (ref 3.8–10.5)

## 2023-12-11 PROCEDURE — 99233 SBSQ HOSP IP/OBS HIGH 50: CPT

## 2023-12-11 PROCEDURE — 73120 X-RAY EXAM OF HAND: CPT | Mod: 26,RT

## 2023-12-11 PROCEDURE — 70450 CT HEAD/BRAIN W/O DYE: CPT | Mod: 26

## 2023-12-11 RX ADMIN — Medication 0.5 MILLIGRAM(S): at 10:24

## 2023-12-11 RX ADMIN — MUPIROCIN 1 APPLICATION(S): 20 OINTMENT TOPICAL at 18:15

## 2023-12-11 RX ADMIN — CHLORHEXIDINE GLUCONATE 1 APPLICATION(S): 213 SOLUTION TOPICAL at 12:58

## 2023-12-11 RX ADMIN — Medication 1 APPLICATION(S): at 00:07

## 2023-12-11 RX ADMIN — Medication 1 DROP(S): at 06:07

## 2023-12-11 RX ADMIN — LOSARTAN POTASSIUM 100 MILLIGRAM(S): 100 TABLET, FILM COATED ORAL at 06:06

## 2023-12-11 RX ADMIN — Medication 1 MILLIGRAM(S): at 02:46

## 2023-12-11 RX ADMIN — Medication 0.5 MILLIGRAM(S): at 22:02

## 2023-12-11 RX ADMIN — Medication 0.5 MILLIGRAM(S): at 15:03

## 2023-12-11 RX ADMIN — Medication 1 MILLIGRAM(S): at 06:06

## 2023-12-11 RX ADMIN — Medication 1 DROP(S): at 18:15

## 2023-12-11 RX ADMIN — Medication 1 MILLIGRAM(S): at 12:57

## 2023-12-11 RX ADMIN — Medication 1 APPLICATION(S): at 06:07

## 2023-12-11 RX ADMIN — AMLODIPINE BESYLATE 10 MILLIGRAM(S): 2.5 TABLET ORAL at 06:06

## 2023-12-11 RX ADMIN — Medication 0.5 MILLIGRAM(S): at 18:15

## 2023-12-11 RX ADMIN — MUPIROCIN 1 APPLICATION(S): 20 OINTMENT TOPICAL at 06:06

## 2023-12-11 RX ADMIN — Medication 1: at 12:59

## 2023-12-11 RX ADMIN — Medication 1 APPLICATION(S): at 18:15

## 2023-12-11 RX ADMIN — Medication 1 APPLICATION(S): at 12:58

## 2023-12-11 NOTE — PROGRESS NOTE ADULT - ASSESSMENT
42 yo M w/ etoh use disorder, cirrhosis, HTN, DM2, presenting with loose stools, admitted for alcohol withdrawal, on ativan taper. Found to be COVID+ but not hypoxic.     # etoh withdrawal  - initial CIWA 4 in ED  - monitoring on CIWA protocol with ativan taper, recent CIWA scores 0  - c/w folic acid, s/p course of thiamine  - encourage alcohol cessation, appreciate SBIRT assistance    # ?slurred speech   - speech is understandable to me, but wife notes is off since Saturday, unclear if in setting of etoh  - CT head obtained, no acute infarcts noted (thus lower concern for CVA as etiology)  - appears to be tolerating diet without issue  - continue to monitor    # COVID19  - is not hypoxic, is not requiring supplemental O2  - no indication for steroids or remdesivir at this time  - c/w supportive care  - c/w isolation as per facility protocols  - c/w albuterol prn (hx asthma)    # Hyponatremia  - Na 128  - could potentially be in setting of cirrhosis, but urine studies (Ivette 64, Uosm 338) also suggestive of SIADH  - will place patient on fluid restriction, continue to monitor Na level    # Cirrhosis  - likely etiology of patient's thrombocytopenia, elevated INR  - no asterixis or large ascites on exam  - imaging noting cirrhosis, early portal hypertension, mild ascites  - estimated MELD 3.0 22 points, continue to monitor   - reinforced importance of etoh cessation and otpt followup, patient/wife reports previously seen by hepatologist    # DM2   - A1C 6.7  - hold home medications (jardiance, janumet) while inpatient  - c/w sliding scale coverage, monitor FS, adjust as needed    # Essential HTN  - c/w amlodipine, losartan  - monitor BP and adjust as needed, goal SBP given diabetes <130    # Diarrhea  - appears to be improved, continue to monitor     # Need for prophylactic measure  VTE ppx: ambulate as tolerated  Regular diet w/ fluid restriction  PT recs home w/ otpt PT  Dispo: pending continued clinical improvement 44 yo M w/ etoh use disorder, cirrhosis, HTN, DM2, presenting with loose stools, admitted for alcohol withdrawal, on ativan taper. Found to be COVID+ but not hypoxic.     # etoh withdrawal  - initial CIWA 4 in ED  - monitoring on CIWA protocol with ativan taper, recent CIWA scores 0  - c/w folic acid, s/p course of thiamine  - encourage alcohol cessation, appreciate SBIRT assistance    # ?slurred speech   - speech is understandable to me, but wife notes is off since Saturday, unclear if in setting of etoh  - CT head obtained, no acute infarcts noted (thus lower concern for CVA as etiology)  - appears to be tolerating diet without issue  - continue to monitor    # COVID19  - is not hypoxic, is not requiring supplemental O2  - no indication for steroids or remdesivir at this time  - c/w supportive care  - c/w isolation as per facility protocols  - c/w albuterol prn (hx asthma)    # Hyponatremia  - Na 128  - could potentially be in setting of cirrhosis, but urine studies (Ivette 64, Uosm 338) also suggestive of SIADH  - will place patient on fluid restriction, continue to monitor Na level    # Cirrhosis  - likely etiology of patient's thrombocytopenia, elevated INR  - no asterixis or large ascites on exam  - imaging noting cirrhosis, early portal hypertension, mild ascites  - estimated MELD 3.0 22 points, continue to monitor   - reinforced importance of etoh cessation and otpt followup, patient/wife reports previously seen by hepatologist    # DM2   - A1C 6.7  - hold home medications (jardiance, janumet) while inpatient  - c/w sliding scale coverage, monitor FS, adjust as needed    # Essential HTN  - c/w amlodipine, losartan  - monitor BP and adjust as needed, goal SBP given diabetes <130    # Diarrhea  - appears to be improved, continue to monitor     # Need for prophylactic measure  VTE ppx: ambulate as tolerated  Regular diet w/ fluid restriction  PT recs home w/ otpt PT  Dispo: pending continued clinical improvement

## 2023-12-11 NOTE — PROGRESS NOTE ADULT - SUBJECTIVE AND OBJECTIVE BOX
[facility] Medicine  Pager [phone]    Patient is a 43y old  Male who presents with a chief complaint of diarrhea, shaking (10 Dec 2023 15:07)      INTERVAL HPI/OVERNIGHT EVENTS:    MEDICATIONS  (STANDING):  amLODIPine   Tablet 10 milliGRAM(s) Oral daily  artificial  tears Solution 1 Drop(s) Both EYES every 12 hours  chlorhexidine 2% Cloths 1 Application(s) Topical daily  dextrose 5%. 1000 milliLiter(s) (50 mL/Hr) IV Continuous <Continuous>  dextrose 5%. 1000 milliLiter(s) (100 mL/Hr) IV Continuous <Continuous>  dextrose 50% Injectable 25 Gram(s) IV Push once  erythromycin   Ointment 1 Application(s) Right EYE every 6 hours  folic acid 1 milliGRAM(s) Oral daily  glucagon  Injectable 1 milliGRAM(s) IntraMuscular once  influenza   Vaccine 0.5 milliLiter(s) IntraMuscular once  insulin lispro (ADMELOG) corrective regimen sliding scale   SubCutaneous three times a day before meals  lactated ringers. 1000 milliLiter(s) (100 mL/Hr) IV Continuous <Continuous>  LORazepam     Tablet 0.5 milliGRAM(s) Oral every 4 hours  LORazepam     Tablet   Oral   losartan 100 milliGRAM(s) Oral daily  mupirocin 2% Ointment 1 Application(s) Topical two times a day    MEDICATIONS  (PRN):  acetaminophen     Tablet .. 650 milliGRAM(s) Oral every 6 hours PRN Temp greater or equal to 38C (100.4F), Mild Pain (1 - 3)  albuterol    90 MICROgram(s) HFA Inhaler 2 Puff(s) Inhalation every 6 hours PRN Bronchospasm  dextrose Oral Gel 15 Gram(s) Oral once PRN Blood Glucose LESS THAN 70 milliGRAM(s)/deciliter  melatonin 3 milliGRAM(s) Oral at bedtime PRN Insomnia  ondansetron Injectable 4 milliGRAM(s) IV Push every 8 hours PRN Nausea and/or Vomiting      Allergies    amoxicillin (Angioedema)    Intolerances        REVIEW OF SYSTEMS:  Please see interval HPI:    Vital Signs Last 24 Hrs  T(C): 36.7 (11 Dec 2023 12:53), Max: 37.2 (11 Dec 2023 00:00)  T(F): 98 (11 Dec 2023 12:53), Max: 99 (11 Dec 2023 00:00)  HR: 110 (11 Dec 2023 12:53) (105 - 110)  BP: 130/86 (11 Dec 2023 12:53) (128/78 - 155/90)  BP(mean): --  RR: 18 (11 Dec 2023 12:53) (16 - 18)  SpO2: 98% (11 Dec 2023 12:53) (95% - 99%)    Parameters below as of 11 Dec 2023 12:53  Patient On (Oxygen Delivery Method): room air      I&O's Detail    10 Dec 2023 07:01  -  11 Dec 2023 07:00  --------------------------------------------------------  IN:    Oral Fluid: 360 mL  Total IN: 360 mL    OUT:  Total OUT: 0 mL    Total NET: 360 mL            PHYSICAL EXAM:  GENERAL:   HEAD:    EYES:   ENMT:   NECK:   NERVOUS SYSTEM:    CHEST/LUNG:   HEART:   ABDOMEN:   EXTREMITIES:    LYMPH:   SKIN:     LABS:                        14.2   11.19 )-----------( 126      ( 11 Dec 2023 03:15 )             41.3     11 Dec 2023 03:15    128    |  96     |  7      ----------------------------<  181    4.0     |  21     |  0.66     Ca    8.6        11 Dec 2023 03:15  Phos  2.8       11 Dec 2023 03:15  Mg     1.90      11 Dec 2023 03:15    TPro  8.3    /  Alb  3.3    /  TBili  3.3    /  DBili  x      /  AST  129    /  ALT  41     /  AlkPhos  175    11 Dec 2023 03:15    PT/INR - ( 10 Dec 2023 06:47 )   PT: 19.2 sec;   INR: 1.74 ratio           CAPILLARY BLOOD GLUCOSE      POCT Blood Glucose.: 199 mg/dL (11 Dec 2023 12:35)  POCT Blood Glucose.: 132 mg/dL (11 Dec 2023 08:56)  POCT Blood Glucose.: 141 mg/dL (10 Dec 2023 22:18)  POCT Blood Glucose.: 205 mg/dL (10 Dec 2023 17:47)      12-08 @ 01:18   >=3 organisms. Probable collection contamination.  --  --  12-08 @ 01:00   No growth at 72 Hours  --  --  12-08 @ 00:30   No growth at 72 Hours  --  --    RADIOLOGY & ADDITIONAL TESTS:    Imaging Personally Reviewed:  [ ] YES     Consultant(s) Notes Reviewed:      Care Discussed with Consultants/Other Providers: [facility] Medicine  Pager [phone]    Patient is a 43y old  Male who presents with a chief complaint of diarrhea, shaking (10 Dec 2023 15:07)      INTERVAL HPI/OVERNIGHT EVENTS:    MEDICATIONS  (STANDING):  amLODIPine   Tablet 10 milliGRAM(s) Oral daily  artificial  tears Solution 1 Drop(s) Both EYES every 12 hours  chlorhexidine 2% Cloths 1 Application(s) Topical daily  dextrose 5%. 1000 milliLiter(s) (50 mL/Hr) IV Continuous <Continuous>  dextrose 5%. 1000 milliLiter(s) (100 mL/Hr) IV Continuous <Continuous>  dextrose 50% Injectable 25 Gram(s) IV Push once  erythromycin   Ointment 1 Application(s) Right EYE every 6 hours  folic acid 1 milliGRAM(s) Oral daily  glucagon  Injectable 1 milliGRAM(s) IntraMuscular once  influenza   Vaccine 0.5 milliLiter(s) IntraMuscular once  insulin lispro (ADMELOG) corrective regimen sliding scale   SubCutaneous three times a day before meals  lactated ringers. 1000 milliLiter(s) (100 mL/Hr) IV Continuous <Continuous>  LORazepam     Tablet 0.5 milliGRAM(s) Oral every 4 hours  LORazepam     Tablet   Oral   losartan 100 milliGRAM(s) Oral daily  mupirocin 2% Ointment 1 Application(s) Topical two times a day    MEDICATIONS  (PRN):  acetaminophen     Tablet .. 650 milliGRAM(s) Oral every 6 hours PRN Temp greater or equal to 38C (100.4F), Mild Pain (1 - 3)  albuterol    90 MICROgram(s) HFA Inhaler 2 Puff(s) Inhalation every 6 hours PRN Bronchospasm  dextrose Oral Gel 15 Gram(s) Oral once PRN Blood Glucose LESS THAN 70 milliGRAM(s)/deciliter  melatonin 3 milliGRAM(s) Oral at bedtime PRN Insomnia  ondansetron Injectable 4 milliGRAM(s) IV Push every 8 hours PRN Nausea and/or Vomiting      Allergies    amoxicillin (Angioedema)    Intolerances        REVIEW OF SYSTEMS:  Please see interval HPI:    Vital Signs Last 24 Hrs  T(C): 36.7 (11 Dec 2023 12:53), Max: 37.2 (11 Dec 2023 00:00)  T(F): 98 (11 Dec 2023 12:53), Max: 99 (11 Dec 2023 00:00)  HR: 110 (11 Dec 2023 12:53) (105 - 110)  BP: 130/86 (11 Dec 2023 12:53) (128/78 - 155/90)  BP(mean): --  RR: 18 (11 Dec 2023 12:53) (16 - 18)  SpO2: 98% (11 Dec 2023 12:53) (95% - 99%)    Parameters below as of 11 Dec 2023 12:53  Patient On (Oxygen Delivery Method): room air      I&O's Detail    10 Dec 2023 07:01  -  11 Dec 2023 07:00  --------------------------------------------------------  IN:    Oral Fluid: 360 mL  Total IN: 360 mL    OUT:  Total OUT: 0 mL    Total NET: 360 mL            PHYSICAL EXAM:  GENERAL:   HEAD:    EYES:   ENMT:   NECK:   NERVOUS SYSTEM:    CHEST/LUNG:   HEART:   ABDOMEN:   EXTREMITIES:    LYMPH:   SKIN:     LABS:                        14.2   11.19 )-----------( 126      ( 11 Dec 2023 03:15 )             41.3     11 Dec 2023 03:15    128    |  96     |  7      ----------------------------<  181    4.0     |  21     |  0.66     Ca    8.6        11 Dec 2023 03:15  Phos  2.8       11 Dec 2023 03:15  Mg     1.90      11 Dec 2023 03:15    TPro  8.3    /  Alb  3.3    /  TBili  3.3    /  DBili  x      /  AST  129    /  ALT  41     /  AlkPhos  175    11 Dec 2023 03:15    PT/INR - ( 10 Dec 2023 06:47 )   PT: 19.2 sec;   INR: 1.74 ratio           CAPILLARY BLOOD GLUCOSE      POCT Blood Glucose.: 199 mg/dL (11 Dec 2023 12:35)  POCT Blood Glucose.: 132 mg/dL (11 Dec 2023 08:56)  POCT Blood Glucose.: 141 mg/dL (10 Dec 2023 22:18)  POCT Blood Glucose.: 205 mg/dL (10 Dec 2023 17:47)      12-08 @ 01:18   >=3 organisms. Probable collection contamination.  --  --  12-08 @ 01:00   No growth at 72 Hours  --  --  12-08 @ 00:30   No growth at 72 Hours  --  --    RADIOLOGY & ADDITIONAL TESTS:    Imaging Personally Reviewed:  [ ] YES     Consultant(s) Notes Reviewed:      Care Discussed with Consultants/Other Providers: [facility] Medicine  Pager [phone]    Patient is a 43y old  Male who presents with a chief complaint of diarrhea, shaking (10 Dec 2023 15:07)      INTERVAL HPI/OVERNIGHT EVENTS:  Denies shortness of breath or cough, did have slight muscle aches previously. Denies tremors currently. Does note hard to "hold urine", sometimes wets self, pees 5-6 times/day. Feels unsteady on feet at times. Wife at bedside has been noticing some slurring of his speech since Saturday, unclear if could be 2/2 etoh, thinks is still off. Discussed signs/symptoms of CVA that would prompt urgent notification of team.     Counseled on importance of etoh cessation, patient verbalized understanding. Discussed also findings of cirrhosis, patient/wife notes he did see a hepatologist previously (?Dr. Carbajal in [place]?) and was told he needed to stop drinking. Reviewed potential complications of cirrhosis and need for continued followup, patient/wife aware.     MEDICATIONS  (STANDING):  amLODIPine   Tablet 10 milliGRAM(s) Oral daily  artificial  tears Solution 1 Drop(s) Both EYES every 12 hours  chlorhexidine 2% Cloths 1 Application(s) Topical daily  dextrose 5%. 1000 milliLiter(s) (50 mL/Hr) IV Continuous <Continuous>  dextrose 5%. 1000 milliLiter(s) (100 mL/Hr) IV Continuous <Continuous>  dextrose 50% Injectable 25 Gram(s) IV Push once  erythromycin   Ointment 1 Application(s) Right EYE every 6 hours  folic acid 1 milliGRAM(s) Oral daily  glucagon  Injectable 1 milliGRAM(s) IntraMuscular once  influenza   Vaccine 0.5 milliLiter(s) IntraMuscular once  insulin lispro (ADMELOG) corrective regimen sliding scale   SubCutaneous three times a day before meals  lactated ringers. 1000 milliLiter(s) (100 mL/Hr) IV Continuous <Continuous>  LORazepam     Tablet 0.5 milliGRAM(s) Oral every 4 hours  LORazepam     Tablet   Oral   losartan 100 milliGRAM(s) Oral daily  mupirocin 2% Ointment 1 Application(s) Topical two times a day    MEDICATIONS  (PRN):  acetaminophen     Tablet .. 650 milliGRAM(s) Oral every 6 hours PRN Temp greater or equal to 38C (100.4F), Mild Pain (1 - 3)  albuterol    90 MICROgram(s) HFA Inhaler 2 Puff(s) Inhalation every 6 hours PRN Bronchospasm  dextrose Oral Gel 15 Gram(s) Oral once PRN Blood Glucose LESS THAN 70 milliGRAM(s)/deciliter  melatonin 3 milliGRAM(s) Oral at bedtime PRN Insomnia  ondansetron Injectable 4 milliGRAM(s) IV Push every 8 hours PRN Nausea and/or Vomiting    Allergies  amoxicillin (Angioedema)    Intolerances    REVIEW OF SYSTEMS:  Please see interval HPI:    Vital Signs Last 24 Hrs  T(C): 36.7 (11 Dec 2023 12:53), Max: 37.2 (11 Dec 2023 00:00)  T(F): 98 (11 Dec 2023 12:53), Max: 99 (11 Dec 2023 00:00)  HR: 110 (11 Dec 2023 12:53) (105 - 110)  BP: 130/86 (11 Dec 2023 12:53) (128/78 - 155/90)  RR: 18 (11 Dec 2023 12:53) (16 - 18)  SpO2: 98% (11 Dec 2023 12:53) (95% - 99%)    Parameters below as of 11 Dec 2023 12:53  Patient On (Oxygen Delivery Method): room air      I&O's Detail    10 Dec 2023 07:01  -  11 Dec 2023 07:00  --------------------------------------------------------  IN:    Oral Fluid: 360 mL  Total IN: 360 mL    OUT:  Total OUT: 0 mL    Total NET: 360 mL    PHYSICAL EXAM:  GENERAL: NAD, sitting in bed, wife at bedside  HEAD:  NC/AT  EYES: EOMI, clear sclera/conjunctiva  ENMT: MMM, hearing intact to voice  NECK: supple  NERVOUS SYSTEM: no significant tremor, moving all extremities, AOx3 (self, year 2023, [facility]), but unable to do serial 7s, does follow commands, speech is understandable to me, repeats no ifs and or buts, did have error with repeating gray arambula, no facial droop noted    CHEST/LUNG: Comfortable on RA, speaking in full sentences  ABDOMEN: soft, non-tender  EXTREMITIES:  no c/c/e      LABS:                        14.2   11.19 )-----------( 126      ( 11 Dec 2023 03:15 )             41.3     11 Dec 2023 03:15    128    |  96     |  7      ----------------------------<  181    4.0     |  21     |  0.66     Ca    8.6        11 Dec 2023 03:15  Phos  2.8       11 Dec 2023 03:15  Mg     1.90      11 Dec 2023 03:15    TPro  8.3    /  Alb  3.3    /  TBili  3.3    /  DBili  x      /  AST  129    /  ALT  41     /  AlkPhos  175    11 Dec 2023 03:15    PT/INR - ( 10 Dec 2023 06:47 )   PT: 19.2 sec;   INR: 1.74 ratio      CAPILLARY BLOOD GLUCOSE  POCT Blood Glucose.: 199 mg/dL (11 Dec 2023 12:35)  POCT Blood Glucose.: 132 mg/dL (11 Dec 2023 08:56)  POCT Blood Glucose.: 141 mg/dL (10 Dec 2023 22:18)  POCT Blood Glucose.: 205 mg/dL (10 Dec 2023 17:47)    URINE CULTURE  12-08 @ 01:18   >=3 organisms. Probable collection contamination.  --  --    BLOOD CULTURE  12-08 @ 01:00   No growth at 72 Hours  --  --  12-08 @ 00:30   No growth at 72 Hours  --  --    RADIOLOGY & ADDITIONAL TESTS:    Imaging Personally Reviewed:  [ ] YES   < from: CT Head No Cont (12.11.23 @ 13:19) >  FINDINGS: There is greater than expected cerebral volume loss for the   patient's age.    There is no acute intracranial hemorrhage, mass effect, shift of the   midline structures, herniation, extra-axial fluid collection, or   hydrocephalus.    Scattered mucosal thickening as well as secretions are seen throughout   the paranasal sinuses. The tympanomastoid cavities are clear. The orbits   are unremarkable. The calvarium is intact.    IMPRESSION: No acute intracranial hemorrhage, mass effect, or shift of   the midline structures.    < end of copied text >      Consultant(s) Notes Reviewed:      Care Discussed with Consultants/Other Providers: ACP Erick re: reasonable to obtain head imaging to further investigate speech concerns, gait instability, though also in setting of etoh, workup of hyponatremia, otpt f/u for cirrhosis [facility] Medicine  Pager [phone]    Patient is a 43y old  Male who presents with a chief complaint of diarrhea, shaking (10 Dec 2023 15:07)      INTERVAL HPI/OVERNIGHT EVENTS:  Denies shortness of breath or cough, did have slight muscle aches previously. Denies tremors currently. Does note hard to "hold urine", sometimes wets self, pees 5-6 times/day. Feels unsteady on feet at times. Wife at bedside has been noticing some slurring of his speech since Saturday, unclear if could be 2/2 etoh, thinks is still off. Discussed signs/symptoms of CVA that would prompt urgent notification of team.     Counseled on importance of etoh cessation, patient verbalized understanding. Discussed also findings of cirrhosis, patient/wife notes he did see a hepatologist previously (?Dr. Carbajal in [place]?) and was told he needed to stop drinking. Reviewed potential complications of cirrhosis and need for continued followup, patient/wife aware.     MEDICATIONS  (STANDING):  amLODIPine   Tablet 10 milliGRAM(s) Oral daily  artificial  tears Solution 1 Drop(s) Both EYES every 12 hours  chlorhexidine 2% Cloths 1 Application(s) Topical daily  dextrose 5%. 1000 milliLiter(s) (50 mL/Hr) IV Continuous <Continuous>  dextrose 5%. 1000 milliLiter(s) (100 mL/Hr) IV Continuous <Continuous>  dextrose 50% Injectable 25 Gram(s) IV Push once  erythromycin   Ointment 1 Application(s) Right EYE every 6 hours  folic acid 1 milliGRAM(s) Oral daily  glucagon  Injectable 1 milliGRAM(s) IntraMuscular once  influenza   Vaccine 0.5 milliLiter(s) IntraMuscular once  insulin lispro (ADMELOG) corrective regimen sliding scale   SubCutaneous three times a day before meals  lactated ringers. 1000 milliLiter(s) (100 mL/Hr) IV Continuous <Continuous>  LORazepam     Tablet 0.5 milliGRAM(s) Oral every 4 hours  LORazepam     Tablet   Oral   losartan 100 milliGRAM(s) Oral daily  mupirocin 2% Ointment 1 Application(s) Topical two times a day    MEDICATIONS  (PRN):  acetaminophen     Tablet .. 650 milliGRAM(s) Oral every 6 hours PRN Temp greater or equal to 38C (100.4F), Mild Pain (1 - 3)  albuterol    90 MICROgram(s) HFA Inhaler 2 Puff(s) Inhalation every 6 hours PRN Bronchospasm  dextrose Oral Gel 15 Gram(s) Oral once PRN Blood Glucose LESS THAN 70 milliGRAM(s)/deciliter  melatonin 3 milliGRAM(s) Oral at bedtime PRN Insomnia  ondansetron Injectable 4 milliGRAM(s) IV Push every 8 hours PRN Nausea and/or Vomiting    Allergies  amoxicillin (Angioedema)    Intolerances    REVIEW OF SYSTEMS:  Please see interval HPI:    Vital Signs Last 24 Hrs  T(C): 36.7 (11 Dec 2023 12:53), Max: 37.2 (11 Dec 2023 00:00)  T(F): 98 (11 Dec 2023 12:53), Max: 99 (11 Dec 2023 00:00)  HR: 110 (11 Dec 2023 12:53) (105 - 110)  BP: 130/86 (11 Dec 2023 12:53) (128/78 - 155/90)  RR: 18 (11 Dec 2023 12:53) (16 - 18)  SpO2: 98% (11 Dec 2023 12:53) (95% - 99%)    Parameters below as of 11 Dec 2023 12:53  Patient On (Oxygen Delivery Method): room air      I&O's Detail    10 Dec 2023 07:01  -  11 Dec 2023 07:00  --------------------------------------------------------  IN:    Oral Fluid: 360 mL  Total IN: 360 mL    OUT:  Total OUT: 0 mL    Total NET: 360 mL    PHYSICAL EXAM:  GENERAL: NAD, sitting in bed, wife at bedside  HEAD:  NC/AT  EYES: EOMI, clear sclera/conjunctiva  ENMT: MMM, hearing intact to voice  NECK: supple  NERVOUS SYSTEM: no significant tremor, moving all extremities, AOx3 (self, year 2023, [facility]), but unable to do serial 7s, does follow commands, speech is understandable to me, repeats no ifs and or buts, did have error with repeating gray arambula, no facial droop noted    CHEST/LUNG: Comfortable on RA, speaking in full sentences  ABDOMEN: soft, non-tender  EXTREMITIES:  no c/c/e      LABS:                        14.2   11.19 )-----------( 126      ( 11 Dec 2023 03:15 )             41.3     11 Dec 2023 03:15    128    |  96     |  7      ----------------------------<  181    4.0     |  21     |  0.66     Ca    8.6        11 Dec 2023 03:15  Phos  2.8       11 Dec 2023 03:15  Mg     1.90      11 Dec 2023 03:15    TPro  8.3    /  Alb  3.3    /  TBili  3.3    /  DBili  x      /  AST  129    /  ALT  41     /  AlkPhos  175    11 Dec 2023 03:15    PT/INR - ( 10 Dec 2023 06:47 )   PT: 19.2 sec;   INR: 1.74 ratio      CAPILLARY BLOOD GLUCOSE  POCT Blood Glucose.: 199 mg/dL (11 Dec 2023 12:35)  POCT Blood Glucose.: 132 mg/dL (11 Dec 2023 08:56)  POCT Blood Glucose.: 141 mg/dL (10 Dec 2023 22:18)  POCT Blood Glucose.: 205 mg/dL (10 Dec 2023 17:47)    URINE CULTURE  12-08 @ 01:18   >=3 organisms. Probable collection contamination.  --  --    BLOOD CULTURE  12-08 @ 01:00   No growth at 72 Hours  --  --  12-08 @ 00:30   No growth at 72 Hours  --  --    RADIOLOGY & ADDITIONAL TESTS:    Imaging Personally Reviewed:  [ ] YES   < from: CT Head No Cont (12.11.23 @ 13:19) >  FINDINGS: There is greater than expected cerebral volume loss for the   patient's age.    There is no acute intracranial hemorrhage, mass effect, shift of the   midline structures, herniation, extra-axial fluid collection, or   hydrocephalus.    Scattered mucosal thickening as well as secretions are seen throughout   the paranasal sinuses. The tympanomastoid cavities are clear. The orbits   are unremarkable. The calvarium is intact.    IMPRESSION: No acute intracranial hemorrhage, mass effect, or shift of   the midline structures.    < end of copied text >      Consultant(s) Notes Reviewed:      Care Discussed with Consultants/Other Providers: ACP Erick re: reasonable to obtain head imaging to further investigate speech concerns, gait instability, though also in setting of etoh, workup of hyponatremia, otpt f/u for cirrhosis

## 2023-12-12 LAB
ALBUMIN SERPL ELPH-MCNC: 3.1 G/DL — LOW (ref 3.3–5)
ALBUMIN SERPL ELPH-MCNC: 3.1 G/DL — LOW (ref 3.3–5)
ALP SERPL-CCNC: 171 U/L — HIGH (ref 40–120)
ALP SERPL-CCNC: 171 U/L — HIGH (ref 40–120)
ALT FLD-CCNC: 38 U/L — SIGNIFICANT CHANGE UP (ref 4–41)
ALT FLD-CCNC: 38 U/L — SIGNIFICANT CHANGE UP (ref 4–41)
ANION GAP SERPL CALC-SCNC: 13 MMOL/L — SIGNIFICANT CHANGE UP (ref 7–14)
ANION GAP SERPL CALC-SCNC: 13 MMOL/L — SIGNIFICANT CHANGE UP (ref 7–14)
AST SERPL-CCNC: 104 U/L — HIGH (ref 4–40)
AST SERPL-CCNC: 104 U/L — HIGH (ref 4–40)
BILIRUB SERPL-MCNC: 3.6 MG/DL — HIGH (ref 0.2–1.2)
BILIRUB SERPL-MCNC: 3.6 MG/DL — HIGH (ref 0.2–1.2)
BUN SERPL-MCNC: 8 MG/DL — SIGNIFICANT CHANGE UP (ref 7–23)
BUN SERPL-MCNC: 8 MG/DL — SIGNIFICANT CHANGE UP (ref 7–23)
CALCIUM SERPL-MCNC: 8.4 MG/DL — SIGNIFICANT CHANGE UP (ref 8.4–10.5)
CALCIUM SERPL-MCNC: 8.4 MG/DL — SIGNIFICANT CHANGE UP (ref 8.4–10.5)
CHLORIDE SERPL-SCNC: 95 MMOL/L — LOW (ref 98–107)
CHLORIDE SERPL-SCNC: 95 MMOL/L — LOW (ref 98–107)
CO2 SERPL-SCNC: 21 MMOL/L — LOW (ref 22–31)
CO2 SERPL-SCNC: 21 MMOL/L — LOW (ref 22–31)
CREAT SERPL-MCNC: 0.55 MG/DL — SIGNIFICANT CHANGE UP (ref 0.5–1.3)
CREAT SERPL-MCNC: 0.55 MG/DL — SIGNIFICANT CHANGE UP (ref 0.5–1.3)
EGFR: 125 ML/MIN/1.73M2 — SIGNIFICANT CHANGE UP
EGFR: 125 ML/MIN/1.73M2 — SIGNIFICANT CHANGE UP
GLUCOSE SERPL-MCNC: 172 MG/DL — HIGH (ref 70–99)
GLUCOSE SERPL-MCNC: 172 MG/DL — HIGH (ref 70–99)
HCT VFR BLD CALC: 40.8 % — SIGNIFICANT CHANGE UP (ref 39–50)
HCT VFR BLD CALC: 40.8 % — SIGNIFICANT CHANGE UP (ref 39–50)
HGB BLD-MCNC: 13.9 G/DL — SIGNIFICANT CHANGE UP (ref 13–17)
HGB BLD-MCNC: 13.9 G/DL — SIGNIFICANT CHANGE UP (ref 13–17)
MAGNESIUM SERPL-MCNC: 1.8 MG/DL — SIGNIFICANT CHANGE UP (ref 1.6–2.6)
MAGNESIUM SERPL-MCNC: 1.8 MG/DL — SIGNIFICANT CHANGE UP (ref 1.6–2.6)
MCHC RBC-ENTMCNC: 28.3 PG — SIGNIFICANT CHANGE UP (ref 27–34)
MCHC RBC-ENTMCNC: 28.3 PG — SIGNIFICANT CHANGE UP (ref 27–34)
MCHC RBC-ENTMCNC: 34.1 GM/DL — SIGNIFICANT CHANGE UP (ref 32–36)
MCHC RBC-ENTMCNC: 34.1 GM/DL — SIGNIFICANT CHANGE UP (ref 32–36)
MCV RBC AUTO: 82.9 FL — SIGNIFICANT CHANGE UP (ref 80–100)
MCV RBC AUTO: 82.9 FL — SIGNIFICANT CHANGE UP (ref 80–100)
NRBC # BLD: 0 /100 WBCS — SIGNIFICANT CHANGE UP (ref 0–0)
NRBC # BLD: 0 /100 WBCS — SIGNIFICANT CHANGE UP (ref 0–0)
NRBC # FLD: 0 K/UL — SIGNIFICANT CHANGE UP (ref 0–0)
NRBC # FLD: 0 K/UL — SIGNIFICANT CHANGE UP (ref 0–0)
PLATELET # BLD AUTO: 143 K/UL — LOW (ref 150–400)
PLATELET # BLD AUTO: 143 K/UL — LOW (ref 150–400)
POTASSIUM SERPL-MCNC: 4 MMOL/L — SIGNIFICANT CHANGE UP (ref 3.5–5.3)
POTASSIUM SERPL-MCNC: 4 MMOL/L — SIGNIFICANT CHANGE UP (ref 3.5–5.3)
POTASSIUM SERPL-SCNC: 4 MMOL/L — SIGNIFICANT CHANGE UP (ref 3.5–5.3)
POTASSIUM SERPL-SCNC: 4 MMOL/L — SIGNIFICANT CHANGE UP (ref 3.5–5.3)
PROT SERPL-MCNC: 8.2 G/DL — SIGNIFICANT CHANGE UP (ref 6–8.3)
PROT SERPL-MCNC: 8.2 G/DL — SIGNIFICANT CHANGE UP (ref 6–8.3)
RBC # BLD: 4.92 M/UL — SIGNIFICANT CHANGE UP (ref 4.2–5.8)
RBC # BLD: 4.92 M/UL — SIGNIFICANT CHANGE UP (ref 4.2–5.8)
RBC # FLD: 17.2 % — HIGH (ref 10.3–14.5)
RBC # FLD: 17.2 % — HIGH (ref 10.3–14.5)
SODIUM SERPL-SCNC: 129 MMOL/L — LOW (ref 135–145)
SODIUM SERPL-SCNC: 129 MMOL/L — LOW (ref 135–145)
WBC # BLD: 9.68 K/UL — SIGNIFICANT CHANGE UP (ref 3.8–10.5)
WBC # BLD: 9.68 K/UL — SIGNIFICANT CHANGE UP (ref 3.8–10.5)
WBC # FLD AUTO: 9.68 K/UL — SIGNIFICANT CHANGE UP (ref 3.8–10.5)
WBC # FLD AUTO: 9.68 K/UL — SIGNIFICANT CHANGE UP (ref 3.8–10.5)

## 2023-12-12 PROCEDURE — 99233 SBSQ HOSP IP/OBS HIGH 50: CPT

## 2023-12-12 RX ADMIN — Medication 1 APPLICATION(S): at 12:47

## 2023-12-12 RX ADMIN — Medication 1: at 12:46

## 2023-12-12 RX ADMIN — LOSARTAN POTASSIUM 100 MILLIGRAM(S): 100 TABLET, FILM COATED ORAL at 05:46

## 2023-12-12 RX ADMIN — Medication 1 DROP(S): at 18:58

## 2023-12-12 RX ADMIN — Medication 1 APPLICATION(S): at 05:47

## 2023-12-12 RX ADMIN — Medication 0.5 MILLIGRAM(S): at 21:23

## 2023-12-12 RX ADMIN — Medication 2: at 09:40

## 2023-12-12 RX ADMIN — Medication 0.5 MILLIGRAM(S): at 05:46

## 2023-12-12 RX ADMIN — MUPIROCIN 1 APPLICATION(S): 20 OINTMENT TOPICAL at 05:47

## 2023-12-12 RX ADMIN — Medication 0.5 MILLIGRAM(S): at 01:57

## 2023-12-12 RX ADMIN — Medication 1 MILLIGRAM(S): at 12:47

## 2023-12-12 RX ADMIN — Medication 1 APPLICATION(S): at 01:58

## 2023-12-12 RX ADMIN — MUPIROCIN 1 APPLICATION(S): 20 OINTMENT TOPICAL at 18:58

## 2023-12-12 RX ADMIN — AMLODIPINE BESYLATE 10 MILLIGRAM(S): 2.5 TABLET ORAL at 05:46

## 2023-12-12 RX ADMIN — CHLORHEXIDINE GLUCONATE 1 APPLICATION(S): 213 SOLUTION TOPICAL at 12:47

## 2023-12-12 RX ADMIN — Medication 1 DROP(S): at 05:47

## 2023-12-12 RX ADMIN — Medication 1: at 18:57

## 2023-12-12 RX ADMIN — Medication 1 APPLICATION(S): at 18:57

## 2023-12-12 NOTE — PROGRESS NOTE ADULT - NSPROGADDITIONALINFOA_GEN_ALL_CORE
Updated wife @ [phone] re: events of day, CT scan results, management of hyponatremia, reported fall in room, importance of etoh cessation. In regards to the speech, she actually feels that patient sounds cleared today when she spoke to him (so improving). No additional questions at this time. Updated wife @ [phone] re: events of day, CT scan results, management of hyponatremia, reported fall in room, importance of etoh cessation. In regards to the speech, she actually feels that patient sounds cleared today when she spoke to him (so improving). No additional questions at this time.

## 2023-12-12 NOTE — PROGRESS NOTE ADULT - ASSESSMENT
44 yo M w/ etoh use disorder, cirrhosis, HTN, DM2, presenting with loose stools, admitted for alcohol withdrawal, on ativan taper. Found to be COVID+ but not hypoxic.     # etoh withdrawal  - initial CIWA 4 in ED  - monitoring on CIWA protocol with ativan taper, recent CIWA scores 0  - c/w folic acid, s/p course of thiamine  - encourage alcohol cessation, appreciate SBIRT assistance    # ?slurred speech   - speech is understandable to me, but wife notes is off since Saturday, unclear if in setting of etoh  - CT head obtained, no acute infarcts noted (thus lower concern for CVA as etiology)  - appears to be tolerating diet without issue  - continue to monitor    # Fall - patient reporting fall in room, with prior history of falls  - Xray of R hand (related to prior fall) was negative for fracture or dislocation  - denies LOC, or head strike, thinks slipped on floor?  - exam without obvious trauma, denies pain, will hold off on additional imaging at this time  - c/w fall precautions, reinforced "call don't fall"  - PT recs otpt PT    # COVID19  - is not hypoxic, is not requiring supplemental O2  - no indication for steroids or remdesivir at this time  - c/w supportive care  - c/w isolation as per facility protocols  - c/w albuterol prn (hx asthma)    # Hyponatremia  - could potentially be in setting of cirrhosis, but urine studies (Ivette 64, Uosm 338) also suggestive of SIADH  - will place patient on fluid restriction, continue to monitor Na level (Na with slight increase, 128 -> 129)    # Cirrhosis  - likely etiology of patient's thrombocytopenia, elevated INR  - no asterixis or large ascites on exam  - imaging noting cirrhosis, early portal hypertension, mild ascites  - estimated MELD 3.0 22 points on 12/11, continue to monitor   - reinforced importance of etoh cessation and otpt followup, patient/wife reports previously seen by hepatologist    # DM2   - A1C 6.7  - hold home medications (jardiance, janumet) while inpatient  - c/w sliding scale coverage, monitor FS, adjust as needed    # Essential HTN  - c/w amlodipine, losartan  - monitor BP and adjust as needed, goal SBP given diabetes <130    # Diarrhea  - appears to be improved, continue to monitor     # Need for prophylactic measure  VTE ppx: ambulate w/ assistance  Regular diet w/ fluid restriction  PT recs home w/ otpt PT  Dispo: pending continued clinical improvement   42 yo M w/ etoh use disorder, cirrhosis, HTN, DM2, presenting with loose stools, admitted for alcohol withdrawal, on ativan taper. Found to be COVID+ but not hypoxic.     # etoh withdrawal  - initial CIWA 4 in ED  - monitoring on CIWA protocol with ativan taper, recent CIWA scores 0  - c/w folic acid, s/p course of thiamine  - encourage alcohol cessation, appreciate SBIRT assistance    # ?slurred speech   - speech is understandable to me, but wife notes is off since Saturday, unclear if in setting of etoh  - CT head obtained, no acute infarcts noted (thus lower concern for CVA as etiology)  - appears to be tolerating diet without issue  - continue to monitor    # Fall - patient reporting fall in room, with prior history of falls  - Xray of R hand (related to prior fall) was negative for fracture or dislocation  - denies LOC, or head strike, thinks slipped on floor?  - exam without obvious trauma, denies pain, will hold off on additional imaging at this time  - c/w fall precautions, reinforced "call don't fall"  - PT recs otpt PT    # COVID19  - is not hypoxic, is not requiring supplemental O2  - no indication for steroids or remdesivir at this time  - c/w supportive care  - c/w isolation as per facility protocols  - c/w albuterol prn (hx asthma)    # Hyponatremia  - could potentially be in setting of cirrhosis, but urine studies (Ivette 64, Uosm 338) also suggestive of SIADH  - will place patient on fluid restriction, continue to monitor Na level (Na with slight increase, 128 -> 129)    # Cirrhosis  - likely etiology of patient's thrombocytopenia, elevated INR  - no asterixis or large ascites on exam  - imaging noting cirrhosis, early portal hypertension, mild ascites  - estimated MELD 3.0 22 points on 12/11, continue to monitor   - reinforced importance of etoh cessation and otpt followup, patient/wife reports previously seen by hepatologist    # DM2   - A1C 6.7  - hold home medications (jardiance, janumet) while inpatient  - c/w sliding scale coverage, monitor FS, adjust as needed    # Essential HTN  - c/w amlodipine, losartan  - monitor BP and adjust as needed, goal SBP given diabetes <130    # Diarrhea  - appears to be improved, continue to monitor     # Need for prophylactic measure  VTE ppx: ambulate w/ assistance  Regular diet w/ fluid restriction  PT recs home w/ otpt PT  Dispo: pending continued clinical improvement

## 2023-12-12 NOTE — PROGRESS NOTE ADULT - SUBJECTIVE AND OBJECTIVE BOX
[facility] Medicine  Pager [phone]    Patient is a 43y old  Male who presents with a chief complaint of diarrhea, shaking (11 Dec 2023 13:07)      INTERVAL HPI/OVERNIGHT EVENTS:    MEDICATIONS  (STANDING):  amLODIPine   Tablet 10 milliGRAM(s) Oral daily  artificial  tears Solution 1 Drop(s) Both EYES every 12 hours  chlorhexidine 2% Cloths 1 Application(s) Topical daily  dextrose 5%. 1000 milliLiter(s) (50 mL/Hr) IV Continuous <Continuous>  dextrose 5%. 1000 milliLiter(s) (100 mL/Hr) IV Continuous <Continuous>  dextrose 50% Injectable 25 Gram(s) IV Push once  erythromycin   Ointment 1 Application(s) Right EYE every 6 hours  folic acid 1 milliGRAM(s) Oral daily  glucagon  Injectable 1 milliGRAM(s) IntraMuscular once  influenza   Vaccine 0.5 milliLiter(s) IntraMuscular once  insulin lispro (ADMELOG) corrective regimen sliding scale   SubCutaneous three times a day before meals  lactated ringers. 1000 milliLiter(s) (100 mL/Hr) IV Continuous <Continuous>  LORazepam     Tablet   Oral   LORazepam     Tablet 0.5 milliGRAM(s) Oral every 12 hours  losartan 100 milliGRAM(s) Oral daily  mupirocin 2% Ointment 1 Application(s) Topical two times a day    MEDICATIONS  (PRN):  acetaminophen     Tablet .. 650 milliGRAM(s) Oral every 6 hours PRN Temp greater or equal to 38C (100.4F), Mild Pain (1 - 3)  albuterol    90 MICROgram(s) HFA Inhaler 2 Puff(s) Inhalation every 6 hours PRN Bronchospasm  dextrose Oral Gel 15 Gram(s) Oral once PRN Blood Glucose LESS THAN 70 milliGRAM(s)/deciliter  melatonin 3 milliGRAM(s) Oral at bedtime PRN Insomnia  ondansetron Injectable 4 milliGRAM(s) IV Push every 8 hours PRN Nausea and/or Vomiting      Allergies    amoxicillin (Angioedema)    Intolerances        REVIEW OF SYSTEMS:  Please see interval HPI:    Vital Signs Last 24 Hrs  T(C): 36.7 (12 Dec 2023 12:32), Max: 37.2 (11 Dec 2023 20:00)  T(F): 98 (12 Dec 2023 12:32), Max: 99 (11 Dec 2023 20:00)  HR: 113 (12 Dec 2023 12:32) (98 - 114)  BP: 133/87 (12 Dec 2023 12:32) (128/72 - 143/92)  BP(mean): --  RR: 18 (12 Dec 2023 12:32) (18 - 19)  SpO2: 100% (12 Dec 2023 12:32) (95% - 100%)    Parameters below as of 12 Dec 2023 12:32  Patient On (Oxygen Delivery Method): room air      I&O's Detail        PHYSICAL EXAM:  GENERAL:   HEAD:    EYES:   ENMT:   NECK:   NERVOUS SYSTEM:    CHEST/LUNG:   HEART:   ABDOMEN:   EXTREMITIES:    LYMPH:   SKIN:     LABS:                        13.9   9.68  )-----------( 143      ( 12 Dec 2023 05:55 )             40.8     12 Dec 2023 05:55    129    |  95     |  8      ----------------------------<  172    4.0     |  21     |  0.55     Ca    8.4        12 Dec 2023 05:55  Mg     1.80      12 Dec 2023 05:55    TPro  8.2    /  Alb  3.1    /  TBili  3.6    /  DBili  x      /  AST  104    /  ALT  38     /  AlkPhos  171    12 Dec 2023 05:55      CAPILLARY BLOOD GLUCOSE      POCT Blood Glucose.: 164 mg/dL (12 Dec 2023 12:04)  POCT Blood Glucose.: 202 mg/dL (12 Dec 2023 08:41)  POCT Blood Glucose.: 219 mg/dL (11 Dec 2023 22:19)  POCT Blood Glucose.: 146 mg/dL (11 Dec 2023 18:10)    BLOOD CULTURE    RADIOLOGY & ADDITIONAL TESTS:    Imaging Personally Reviewed:  [ ] YES     Consultant(s) Notes Reviewed:      Care Discussed with Consultants/Other Providers: [facility] Medicine  Pager [phone]    Patient is a 43y old  Male who presents with a chief complaint of diarrhea, shaking (11 Dec 2023 13:07)      INTERVAL HPI/OVERNIGHT EVENTS:    MEDICATIONS  (STANDING):  amLODIPine   Tablet 10 milliGRAM(s) Oral daily  artificial  tears Solution 1 Drop(s) Both EYES every 12 hours  chlorhexidine 2% Cloths 1 Application(s) Topical daily  dextrose 5%. 1000 milliLiter(s) (50 mL/Hr) IV Continuous <Continuous>  dextrose 5%. 1000 milliLiter(s) (100 mL/Hr) IV Continuous <Continuous>  dextrose 50% Injectable 25 Gram(s) IV Push once  erythromycin   Ointment 1 Application(s) Right EYE every 6 hours  folic acid 1 milliGRAM(s) Oral daily  glucagon  Injectable 1 milliGRAM(s) IntraMuscular once  influenza   Vaccine 0.5 milliLiter(s) IntraMuscular once  insulin lispro (ADMELOG) corrective regimen sliding scale   SubCutaneous three times a day before meals  lactated ringers. 1000 milliLiter(s) (100 mL/Hr) IV Continuous <Continuous>  LORazepam     Tablet   Oral   LORazepam     Tablet 0.5 milliGRAM(s) Oral every 12 hours  losartan 100 milliGRAM(s) Oral daily  mupirocin 2% Ointment 1 Application(s) Topical two times a day    MEDICATIONS  (PRN):  acetaminophen     Tablet .. 650 milliGRAM(s) Oral every 6 hours PRN Temp greater or equal to 38C (100.4F), Mild Pain (1 - 3)  albuterol    90 MICROgram(s) HFA Inhaler 2 Puff(s) Inhalation every 6 hours PRN Bronchospasm  dextrose Oral Gel 15 Gram(s) Oral once PRN Blood Glucose LESS THAN 70 milliGRAM(s)/deciliter  melatonin 3 milliGRAM(s) Oral at bedtime PRN Insomnia  ondansetron Injectable 4 milliGRAM(s) IV Push every 8 hours PRN Nausea and/or Vomiting      Allergies    amoxicillin (Angioedema)    Intolerances        REVIEW OF SYSTEMS:  Please see interval HPI:    Vital Signs Last 24 Hrs  T(C): 36.7 (12 Dec 2023 12:32), Max: 37.2 (11 Dec 2023 20:00)  T(F): 98 (12 Dec 2023 12:32), Max: 99 (11 Dec 2023 20:00)  HR: 113 (12 Dec 2023 12:32) (98 - 114)  BP: 133/87 (12 Dec 2023 12:32) (128/72 - 143/92)  BP(mean): --  RR: 18 (12 Dec 2023 12:32) (18 - 19)  SpO2: 100% (12 Dec 2023 12:32) (95% - 100%)    Parameters below as of 12 Dec 2023 12:32  Patient On (Oxygen Delivery Method): room air      I&O's Detail        PHYSICAL EXAM:  GENERAL:   HEAD:    EYES:   ENMT:   NECK:   NERVOUS SYSTEM:    CHEST/LUNG:   HEART:   ABDOMEN:   EXTREMITIES:    LYMPH:   SKIN:     LABS:                        13.9   9.68  )-----------( 143      ( 12 Dec 2023 05:55 )             40.8     12 Dec 2023 05:55    129    |  95     |  8      ----------------------------<  172    4.0     |  21     |  0.55     Ca    8.4        12 Dec 2023 05:55  Mg     1.80      12 Dec 2023 05:55    TPro  8.2    /  Alb  3.1    /  TBili  3.6    /  DBili  x      /  AST  104    /  ALT  38     /  AlkPhos  171    12 Dec 2023 05:55      CAPILLARY BLOOD GLUCOSE      POCT Blood Glucose.: 164 mg/dL (12 Dec 2023 12:04)  POCT Blood Glucose.: 202 mg/dL (12 Dec 2023 08:41)  POCT Blood Glucose.: 219 mg/dL (11 Dec 2023 22:19)  POCT Blood Glucose.: 146 mg/dL (11 Dec 2023 18:10)    BLOOD CULTURE    RADIOLOGY & ADDITIONAL TESTS:    Imaging Personally Reviewed:  [ ] YES     Consultant(s) Notes Reviewed:      Care Discussed with Consultants/Other Providers: [facility] Medicine  Pager [phone]    Patient is a 43y old  Male who presents with a chief complaint of diarrhea, shaking (11 Dec 2023 13:07)      INTERVAL HPI/OVERNIGHT EVENTS:  Reviewed results of CT scan (negative for acute infarct). Patient thinks his speech is still off. Aware of importance of alcohol cessation. Discussed management of hyponatremia. Reinforced need for outpatient hepatology followup.   Patient reports fall in room, denies head strike, reports hit knees. Denies current pain, advised "call, don't fall".     MEDICATIONS  (STANDING):  amLODIPine   Tablet 10 milliGRAM(s) Oral daily  artificial  tears Solution 1 Drop(s) Both EYES every 12 hours  chlorhexidine 2% Cloths 1 Application(s) Topical daily  dextrose 5%. 1000 milliLiter(s) (50 mL/Hr) IV Continuous <Continuous>  dextrose 5%. 1000 milliLiter(s) (100 mL/Hr) IV Continuous <Continuous>  dextrose 50% Injectable 25 Gram(s) IV Push once  erythromycin   Ointment 1 Application(s) Right EYE every 6 hours  folic acid 1 milliGRAM(s) Oral daily  glucagon  Injectable 1 milliGRAM(s) IntraMuscular once  influenza   Vaccine 0.5 milliLiter(s) IntraMuscular once  insulin lispro (ADMELOG) corrective regimen sliding scale   SubCutaneous three times a day before meals  lactated ringers. 1000 milliLiter(s) (100 mL/Hr) IV Continuous <Continuous>  LORazepam     Tablet   Oral   LORazepam     Tablet 0.5 milliGRAM(s) Oral every 12 hours  losartan 100 milliGRAM(s) Oral daily  mupirocin 2% Ointment 1 Application(s) Topical two times a day    MEDICATIONS  (PRN):  acetaminophen     Tablet .. 650 milliGRAM(s) Oral every 6 hours PRN Temp greater or equal to 38C (100.4F), Mild Pain (1 - 3)  albuterol    90 MICROgram(s) HFA Inhaler 2 Puff(s) Inhalation every 6 hours PRN Bronchospasm  dextrose Oral Gel 15 Gram(s) Oral once PRN Blood Glucose LESS THAN 70 milliGRAM(s)/deciliter  melatonin 3 milliGRAM(s) Oral at bedtime PRN Insomnia  ondansetron Injectable 4 milliGRAM(s) IV Push every 8 hours PRN Nausea and/or Vomiting      Allergies  amoxicillin (Angioedema)    Intolerances    REVIEW OF SYSTEMS:  Please see interval HPI:    Vital Signs Last 24 Hrs  T(C): 36.7 (12 Dec 2023 12:32), Max: 37.2 (11 Dec 2023 20:00)  T(F): 98 (12 Dec 2023 12:32), Max: 99 (11 Dec 2023 20:00)  HR: 113 (12 Dec 2023 12:32) (98 - 114)  BP: 133/87 (12 Dec 2023 12:32) (128/72 - 143/92)  RR: 18 (12 Dec 2023 12:32) (18 - 19)  SpO2: 100% (12 Dec 2023 12:32) (95% - 100%)    Parameters below as of 12 Dec 2023 12:32  Patient On (Oxygen Delivery Method): room air      I&O's Detail    PHYSICAL EXAM:  GENERAL: NAD, sitting in bed, eating lunch  HEAD:  NC/AT  EYES: EOMI, clear sclera/conjunctiva  ENMT: MMM, hearing intact to voice  NECK: supple  NERVOUS SYSTEM: no significant tremor, moving all extremities, AOx3 (self, year 2023, [facility]), no facial droop noted, is able to spell WORLD, but not backwards  CHEST/LUNG: Comfortable on RA, speaking in full sentences  ABDOMEN: soft, non-tender  EXTREMITIES:  no c/c/e, knees non-tender to palpation, no obvious deformity    LABS:                        13.9   9.68  )-----------( 143      ( 12 Dec 2023 05:55 )             40.8     12 Dec 2023 05:55    129    |  95     |  8      ----------------------------<  172    4.0     |  21     |  0.55     Ca    8.4        12 Dec 2023 05:55  Mg     1.80      12 Dec 2023 05:55    TPro  8.2    /  Alb  3.1    /  TBili  3.6    /  DBili  x      /  AST  104    /  ALT  38     /  AlkPhos  171    12 Dec 2023 05:55      CAPILLARY BLOOD GLUCOSE  POCT Blood Glucose.: 164 mg/dL (12 Dec 2023 12:04)  POCT Blood Glucose.: 202 mg/dL (12 Dec 2023 08:41)  POCT Blood Glucose.: 219 mg/dL (11 Dec 2023 22:19)  POCT Blood Glucose.: 146 mg/dL (11 Dec 2023 18:10)    BLOOD CULTURE    RADIOLOGY & ADDITIONAL TESTS:    Imaging Personally Reviewed:  [ ] YES   < from: Xray Hand 2 Views, Right (12.11.23 @ 19:23) >  IMPRESSION: No fracture or dislocation.    < end of copied text >      Consultant(s) Notes Reviewed:      Care Discussed with Consultants/Other Providers: JOON Suarez re: patient reported fall in room, no obvious evidence of trauma on my exam, reinforced call don't fall, on fall precautions, PT recs home w/ otpt PT,  [facility] Medicine  Pager [phone]    Patient is a 43y old  Male who presents with a chief complaint of diarrhea, shaking (11 Dec 2023 13:07)      INTERVAL HPI/OVERNIGHT EVENTS:  Reviewed results of CT scan (negative for acute infarct). Patient thinks his speech is still off. Aware of importance of alcohol cessation. Discussed management of hyponatremia. Reinforced need for outpatient hepatology followup.   Patient reports fall in room, denies head strike, reports hit knees. Denies current pain, advised "call, don't fall".     MEDICATIONS  (STANDING):  amLODIPine   Tablet 10 milliGRAM(s) Oral daily  artificial  tears Solution 1 Drop(s) Both EYES every 12 hours  chlorhexidine 2% Cloths 1 Application(s) Topical daily  dextrose 5%. 1000 milliLiter(s) (50 mL/Hr) IV Continuous <Continuous>  dextrose 5%. 1000 milliLiter(s) (100 mL/Hr) IV Continuous <Continuous>  dextrose 50% Injectable 25 Gram(s) IV Push once  erythromycin   Ointment 1 Application(s) Right EYE every 6 hours  folic acid 1 milliGRAM(s) Oral daily  glucagon  Injectable 1 milliGRAM(s) IntraMuscular once  influenza   Vaccine 0.5 milliLiter(s) IntraMuscular once  insulin lispro (ADMELOG) corrective regimen sliding scale   SubCutaneous three times a day before meals  lactated ringers. 1000 milliLiter(s) (100 mL/Hr) IV Continuous <Continuous>  LORazepam     Tablet   Oral   LORazepam     Tablet 0.5 milliGRAM(s) Oral every 12 hours  losartan 100 milliGRAM(s) Oral daily  mupirocin 2% Ointment 1 Application(s) Topical two times a day    MEDICATIONS  (PRN):  acetaminophen     Tablet .. 650 milliGRAM(s) Oral every 6 hours PRN Temp greater or equal to 38C (100.4F), Mild Pain (1 - 3)  albuterol    90 MICROgram(s) HFA Inhaler 2 Puff(s) Inhalation every 6 hours PRN Bronchospasm  dextrose Oral Gel 15 Gram(s) Oral once PRN Blood Glucose LESS THAN 70 milliGRAM(s)/deciliter  melatonin 3 milliGRAM(s) Oral at bedtime PRN Insomnia  ondansetron Injectable 4 milliGRAM(s) IV Push every 8 hours PRN Nausea and/or Vomiting      Allergies  amoxicillin (Angioedema)    Intolerances    REVIEW OF SYSTEMS:  Please see interval HPI:    Vital Signs Last 24 Hrs  T(C): 36.7 (12 Dec 2023 12:32), Max: 37.2 (11 Dec 2023 20:00)  T(F): 98 (12 Dec 2023 12:32), Max: 99 (11 Dec 2023 20:00)  HR: 113 (12 Dec 2023 12:32) (98 - 114)  BP: 133/87 (12 Dec 2023 12:32) (128/72 - 143/92)  RR: 18 (12 Dec 2023 12:32) (18 - 19)  SpO2: 100% (12 Dec 2023 12:32) (95% - 100%)    Parameters below as of 12 Dec 2023 12:32  Patient On (Oxygen Delivery Method): room air      I&O's Detail    PHYSICAL EXAM:  GENERAL: NAD, sitting in bed, eating lunch  HEAD:  NC/AT  EYES: EOMI, clear sclera/conjunctiva  ENMT: MMM, hearing intact to voice  NECK: supple  NERVOUS SYSTEM: no significant tremor, moving all extremities, AOx3 (self, year 2023, [facility]), no facial droop noted, is able to spell WORLD, but not backwards  CHEST/LUNG: Comfortable on RA, speaking in full sentences  ABDOMEN: soft, non-tender  EXTREMITIES:  no c/c/e, knees non-tender to palpation, no obvious deformity    LABS:                        13.9   9.68  )-----------( 143      ( 12 Dec 2023 05:55 )             40.8     12 Dec 2023 05:55    129    |  95     |  8      ----------------------------<  172    4.0     |  21     |  0.55     Ca    8.4        12 Dec 2023 05:55  Mg     1.80      12 Dec 2023 05:55    TPro  8.2    /  Alb  3.1    /  TBili  3.6    /  DBili  x      /  AST  104    /  ALT  38     /  AlkPhos  171    12 Dec 2023 05:55      CAPILLARY BLOOD GLUCOSE  POCT Blood Glucose.: 164 mg/dL (12 Dec 2023 12:04)  POCT Blood Glucose.: 202 mg/dL (12 Dec 2023 08:41)  POCT Blood Glucose.: 219 mg/dL (11 Dec 2023 22:19)  POCT Blood Glucose.: 146 mg/dL (11 Dec 2023 18:10)    BLOOD CULTURE    RADIOLOGY & ADDITIONAL TESTS:    Imaging Personally Reviewed:  [ ] YES   < from: Xray Hand 2 Views, Right (12.11.23 @ 19:23) >  IMPRESSION: No fracture or dislocation.    < end of copied text >      Consultant(s) Notes Reviewed:      Care Discussed with Consultants/Other Providers: JOON Suarez re: patient reported fall in room, no obvious evidence of trauma on my exam, reinforced call don't fall, on fall precautions, PT recs home w/ otpt PT,

## 2023-12-13 LAB
ALBUMIN SERPL ELPH-MCNC: 3.3 G/DL — SIGNIFICANT CHANGE UP (ref 3.3–5)
ALBUMIN SERPL ELPH-MCNC: 3.3 G/DL — SIGNIFICANT CHANGE UP (ref 3.3–5)
ALP SERPL-CCNC: 179 U/L — HIGH (ref 40–120)
ALP SERPL-CCNC: 179 U/L — HIGH (ref 40–120)
ALT FLD-CCNC: 35 U/L — SIGNIFICANT CHANGE UP (ref 4–41)
ALT FLD-CCNC: 35 U/L — SIGNIFICANT CHANGE UP (ref 4–41)
ANION GAP SERPL CALC-SCNC: 12 MMOL/L — SIGNIFICANT CHANGE UP (ref 7–14)
ANION GAP SERPL CALC-SCNC: 12 MMOL/L — SIGNIFICANT CHANGE UP (ref 7–14)
AST SERPL-CCNC: 104 U/L — HIGH (ref 4–40)
AST SERPL-CCNC: 104 U/L — HIGH (ref 4–40)
BILIRUB SERPL-MCNC: 4.5 MG/DL — HIGH (ref 0.2–1.2)
BILIRUB SERPL-MCNC: 4.5 MG/DL — HIGH (ref 0.2–1.2)
BUN SERPL-MCNC: 10 MG/DL — SIGNIFICANT CHANGE UP (ref 7–23)
BUN SERPL-MCNC: 10 MG/DL — SIGNIFICANT CHANGE UP (ref 7–23)
CALCIUM SERPL-MCNC: 8.5 MG/DL — SIGNIFICANT CHANGE UP (ref 8.4–10.5)
CALCIUM SERPL-MCNC: 8.5 MG/DL — SIGNIFICANT CHANGE UP (ref 8.4–10.5)
CHLORIDE SERPL-SCNC: 94 MMOL/L — LOW (ref 98–107)
CHLORIDE SERPL-SCNC: 94 MMOL/L — LOW (ref 98–107)
CO2 SERPL-SCNC: 23 MMOL/L — SIGNIFICANT CHANGE UP (ref 22–31)
CO2 SERPL-SCNC: 23 MMOL/L — SIGNIFICANT CHANGE UP (ref 22–31)
CREAT SERPL-MCNC: 0.71 MG/DL — SIGNIFICANT CHANGE UP (ref 0.5–1.3)
CREAT SERPL-MCNC: 0.71 MG/DL — SIGNIFICANT CHANGE UP (ref 0.5–1.3)
CULTURE RESULTS: SIGNIFICANT CHANGE UP
EGFR: 115 ML/MIN/1.73M2 — SIGNIFICANT CHANGE UP
EGFR: 115 ML/MIN/1.73M2 — SIGNIFICANT CHANGE UP
GLUCOSE SERPL-MCNC: 219 MG/DL — HIGH (ref 70–99)
GLUCOSE SERPL-MCNC: 219 MG/DL — HIGH (ref 70–99)
HCT VFR BLD CALC: 44.3 % — SIGNIFICANT CHANGE UP (ref 39–50)
HCT VFR BLD CALC: 44.3 % — SIGNIFICANT CHANGE UP (ref 39–50)
HGB BLD-MCNC: 14.9 G/DL — SIGNIFICANT CHANGE UP (ref 13–17)
HGB BLD-MCNC: 14.9 G/DL — SIGNIFICANT CHANGE UP (ref 13–17)
MAGNESIUM SERPL-MCNC: 1.8 MG/DL — SIGNIFICANT CHANGE UP (ref 1.6–2.6)
MAGNESIUM SERPL-MCNC: 1.8 MG/DL — SIGNIFICANT CHANGE UP (ref 1.6–2.6)
MCHC RBC-ENTMCNC: 29 PG — SIGNIFICANT CHANGE UP (ref 27–34)
MCHC RBC-ENTMCNC: 29 PG — SIGNIFICANT CHANGE UP (ref 27–34)
MCHC RBC-ENTMCNC: 33.6 GM/DL — SIGNIFICANT CHANGE UP (ref 32–36)
MCHC RBC-ENTMCNC: 33.6 GM/DL — SIGNIFICANT CHANGE UP (ref 32–36)
MCV RBC AUTO: 86.2 FL — SIGNIFICANT CHANGE UP (ref 80–100)
MCV RBC AUTO: 86.2 FL — SIGNIFICANT CHANGE UP (ref 80–100)
NRBC # BLD: 0 /100 WBCS — SIGNIFICANT CHANGE UP (ref 0–0)
NRBC # BLD: 0 /100 WBCS — SIGNIFICANT CHANGE UP (ref 0–0)
NRBC # FLD: 0 K/UL — SIGNIFICANT CHANGE UP (ref 0–0)
NRBC # FLD: 0 K/UL — SIGNIFICANT CHANGE UP (ref 0–0)
PLATELET # BLD AUTO: 175 K/UL — SIGNIFICANT CHANGE UP (ref 150–400)
PLATELET # BLD AUTO: 175 K/UL — SIGNIFICANT CHANGE UP (ref 150–400)
POTASSIUM SERPL-MCNC: 3.9 MMOL/L — SIGNIFICANT CHANGE UP (ref 3.5–5.3)
POTASSIUM SERPL-MCNC: 3.9 MMOL/L — SIGNIFICANT CHANGE UP (ref 3.5–5.3)
POTASSIUM SERPL-SCNC: 3.9 MMOL/L — SIGNIFICANT CHANGE UP (ref 3.5–5.3)
POTASSIUM SERPL-SCNC: 3.9 MMOL/L — SIGNIFICANT CHANGE UP (ref 3.5–5.3)
PROT SERPL-MCNC: 8.6 G/DL — HIGH (ref 6–8.3)
PROT SERPL-MCNC: 8.6 G/DL — HIGH (ref 6–8.3)
RBC # BLD: 5.14 M/UL — SIGNIFICANT CHANGE UP (ref 4.2–5.8)
RBC # BLD: 5.14 M/UL — SIGNIFICANT CHANGE UP (ref 4.2–5.8)
RBC # FLD: 17.2 % — HIGH (ref 10.3–14.5)
RBC # FLD: 17.2 % — HIGH (ref 10.3–14.5)
SODIUM SERPL-SCNC: 129 MMOL/L — LOW (ref 135–145)
SODIUM SERPL-SCNC: 129 MMOL/L — LOW (ref 135–145)
SPECIMEN SOURCE: SIGNIFICANT CHANGE UP
WBC # BLD: 9.05 K/UL — SIGNIFICANT CHANGE UP (ref 3.8–10.5)
WBC # BLD: 9.05 K/UL — SIGNIFICANT CHANGE UP (ref 3.8–10.5)
WBC # FLD AUTO: 9.05 K/UL — SIGNIFICANT CHANGE UP (ref 3.8–10.5)
WBC # FLD AUTO: 9.05 K/UL — SIGNIFICANT CHANGE UP (ref 3.8–10.5)

## 2023-12-13 PROCEDURE — 99233 SBSQ HOSP IP/OBS HIGH 50: CPT

## 2023-12-13 RX ADMIN — Medication 1 APPLICATION(S): at 05:11

## 2023-12-13 RX ADMIN — Medication 1 MILLIGRAM(S): at 14:27

## 2023-12-13 RX ADMIN — Medication 2: at 18:49

## 2023-12-13 RX ADMIN — Medication 0.5 MILLIGRAM(S): at 10:05

## 2023-12-13 RX ADMIN — Medication 0.5 MILLIGRAM(S): at 18:59

## 2023-12-13 RX ADMIN — CHLORHEXIDINE GLUCONATE 1 APPLICATION(S): 213 SOLUTION TOPICAL at 14:26

## 2023-12-13 RX ADMIN — Medication 1 APPLICATION(S): at 14:26

## 2023-12-13 RX ADMIN — Medication 1: at 10:08

## 2023-12-13 RX ADMIN — Medication 1 APPLICATION(S): at 18:51

## 2023-12-13 RX ADMIN — Medication 1 DROP(S): at 05:12

## 2023-12-13 RX ADMIN — MUPIROCIN 1 APPLICATION(S): 20 OINTMENT TOPICAL at 18:52

## 2023-12-13 RX ADMIN — Medication 1 DROP(S): at 18:52

## 2023-12-13 RX ADMIN — AMLODIPINE BESYLATE 10 MILLIGRAM(S): 2.5 TABLET ORAL at 05:11

## 2023-12-13 RX ADMIN — LOSARTAN POTASSIUM 100 MILLIGRAM(S): 100 TABLET, FILM COATED ORAL at 05:12

## 2023-12-13 RX ADMIN — Medication 1 APPLICATION(S): at 00:06

## 2023-12-13 RX ADMIN — MUPIROCIN 1 APPLICATION(S): 20 OINTMENT TOPICAL at 05:12

## 2023-12-13 NOTE — PROGRESS NOTE ADULT - SUBJECTIVE AND OBJECTIVE BOX
[facility] Medicine  Pager [phone]    Patient is a 43y old  Male who presents with a chief complaint of diarrhea, shaking (12 Dec 2023 13:01)      INTERVAL HPI/OVERNIGHT EVENTS:    MEDICATIONS  (STANDING):  amLODIPine   Tablet 10 milliGRAM(s) Oral daily  artificial  tears Solution 1 Drop(s) Both EYES every 12 hours  chlorhexidine 2% Cloths 1 Application(s) Topical daily  dextrose 5%. 1000 milliLiter(s) (100 mL/Hr) IV Continuous <Continuous>  dextrose 5%. 1000 milliLiter(s) (50 mL/Hr) IV Continuous <Continuous>  dextrose 50% Injectable 25 Gram(s) IV Push once  erythromycin   Ointment 1 Application(s) Right EYE every 6 hours  folic acid 1 milliGRAM(s) Oral daily  glucagon  Injectable 1 milliGRAM(s) IntraMuscular once  influenza   Vaccine 0.5 milliLiter(s) IntraMuscular once  insulin lispro (ADMELOG) corrective regimen sliding scale   SubCutaneous three times a day before meals  lactated ringers. 1000 milliLiter(s) (100 mL/Hr) IV Continuous <Continuous>  LORazepam     Tablet   Oral   LORazepam     Tablet 0.5 milliGRAM(s) Oral every 12 hours  losartan 100 milliGRAM(s) Oral daily  mupirocin 2% Ointment 1 Application(s) Topical two times a day    MEDICATIONS  (PRN):  acetaminophen     Tablet .. 650 milliGRAM(s) Oral every 6 hours PRN Temp greater or equal to 38C (100.4F), Mild Pain (1 - 3)  albuterol    90 MICROgram(s) HFA Inhaler 2 Puff(s) Inhalation every 6 hours PRN Bronchospasm  dextrose Oral Gel 15 Gram(s) Oral once PRN Blood Glucose LESS THAN 70 milliGRAM(s)/deciliter  melatonin 3 milliGRAM(s) Oral at bedtime PRN Insomnia  ondansetron Injectable 4 milliGRAM(s) IV Push every 8 hours PRN Nausea and/or Vomiting      Allergies    amoxicillin (Angioedema)    Intolerances        REVIEW OF SYSTEMS:  Please see interval HPI:    Vital Signs Last 24 Hrs  T(C): 37.1 (13 Dec 2023 05:00), Max: 37.1 (13 Dec 2023 05:00)  T(F): 98.7 (13 Dec 2023 05:00), Max: 98.7 (13 Dec 2023 05:00)  HR: 100 (13 Dec 2023 05:00) (99 - 113)  BP: 137/86 (13 Dec 2023 05:00) (118/74 - 137/86)  BP(mean): --  RR: 18 (13 Dec 2023 05:00) (18 - 18)  SpO2: 100% (13 Dec 2023 05:00) (99% - 100%)    Parameters below as of 13 Dec 2023 05:00  Patient On (Oxygen Delivery Method): room air      I&O's Detail        PHYSICAL EXAM:  GENERAL:   HEAD:    EYES:   ENMT:   NECK:   NERVOUS SYSTEM:    CHEST/LUNG:   HEART:   ABDOMEN:   EXTREMITIES:    LYMPH:   SKIN:     LABS:                        14.9   9.05  )-----------( 175      ( 13 Dec 2023 06:20 )             44.3     13 Dec 2023 06:20    129    |  94     |  10     ----------------------------<  219    3.9     |  23     |  0.71     Ca    8.5        13 Dec 2023 06:20  Mg     1.80      13 Dec 2023 06:20    TPro  8.6    /  Alb  3.3    /  TBili  4.5    /  DBili  x      /  AST  104    /  ALT  35     /  AlkPhos  179    13 Dec 2023 06:20      CAPILLARY BLOOD GLUCOSE      POCT Blood Glucose.: 190 mg/dL (13 Dec 2023 08:50)  POCT Blood Glucose.: 169 mg/dL (12 Dec 2023 21:34)  POCT Blood Glucose.: 160 mg/dL (12 Dec 2023 18:05)  POCT Blood Glucose.: 164 mg/dL (12 Dec 2023 12:04)    BLOOD CULTURE    RADIOLOGY & ADDITIONAL TESTS:    Imaging Personally Reviewed:  [ ] YES     Consultant(s) Notes Reviewed:      Care Discussed with Consultants/Other Providers: [facility]  Pager [phone]    Patient is a 43y old  Male who presents with a chief complaint of diarrhea, shaking (12 Dec 2023 13:01)      INTERVAL HPI/OVERNIGHT EVENTS:    MEDICATIONS  (STANDING):  amLODIPine   Tablet 10 milliGRAM(s) Oral daily  artificial  tears Solution 1 Drop(s) Both EYES every 12 hours  chlorhexidine 2% Cloths 1 Application(s) Topical daily  dextrose 5%. 1000 milliLiter(s) (100 mL/Hr) IV Continuous <Continuous>  dextrose 5%. 1000 milliLiter(s) (50 mL/Hr) IV Continuous <Continuous>  dextrose 50% Injectable 25 Gram(s) IV Push once  erythromycin   Ointment 1 Application(s) Right EYE every 6 hours  folic acid 1 milliGRAM(s) Oral daily  glucagon  Injectable 1 milliGRAM(s) IntraMuscular once  influenza   Vaccine 0.5 milliLiter(s) IntraMuscular once  insulin lispro (ADMELOG) corrective regimen sliding scale   SubCutaneous three times a day before meals  lactated ringers. 1000 milliLiter(s) (100 mL/Hr) IV Continuous <Continuous>  LORazepam     Tablet   Oral   LORazepam     Tablet 0.5 milliGRAM(s) Oral every 12 hours  losartan 100 milliGRAM(s) Oral daily  mupirocin 2% Ointment 1 Application(s) Topical two times a day    MEDICATIONS  (PRN):  acetaminophen     Tablet .. 650 milliGRAM(s) Oral every 6 hours PRN Temp greater or equal to 38C (100.4F), Mild Pain (1 - 3)  albuterol    90 MICROgram(s) HFA Inhaler 2 Puff(s) Inhalation every 6 hours PRN Bronchospasm  dextrose Oral Gel 15 Gram(s) Oral once PRN Blood Glucose LESS THAN 70 milliGRAM(s)/deciliter  melatonin 3 milliGRAM(s) Oral at bedtime PRN Insomnia  ondansetron Injectable 4 milliGRAM(s) IV Push every 8 hours PRN Nausea and/or Vomiting      Allergies    amoxicillin (Angioedema)    Intolerances        REVIEW OF SYSTEMS:  Please see interval HPI:    Vital Signs Last 24 Hrs  T(C): 37.1 (13 Dec 2023 05:00), Max: 37.1 (13 Dec 2023 05:00)  T(F): 98.7 (13 Dec 2023 05:00), Max: 98.7 (13 Dec 2023 05:00)  HR: 100 (13 Dec 2023 05:00) (99 - 113)  BP: 137/86 (13 Dec 2023 05:00) (118/74 - 137/86)  BP(mean): --  RR: 18 (13 Dec 2023 05:00) (18 - 18)  SpO2: 100% (13 Dec 2023 05:00) (99% - 100%)    Parameters below as of 13 Dec 2023 05:00  Patient On (Oxygen Delivery Method): room air      I&O's Detail        PHYSICAL EXAM:  GENERAL:   HEAD:    EYES:   ENMT:   NECK:   NERVOUS SYSTEM:    CHEST/LUNG:   HEART:   ABDOMEN:   EXTREMITIES:    LYMPH:   SKIN:     LABS:                        14.9   9.05  )-----------( 175      ( 13 Dec 2023 06:20 )             44.3     13 Dec 2023 06:20    129    |  94     |  10     ----------------------------<  219    3.9     |  23     |  0.71     Ca    8.5        13 Dec 2023 06:20  Mg     1.80      13 Dec 2023 06:20    TPro  8.6    /  Alb  3.3    /  TBili  4.5    /  DBili  x      /  AST  104    /  ALT  35     /  AlkPhos  179    13 Dec 2023 06:20      CAPILLARY BLOOD GLUCOSE      POCT Blood Glucose.: 190 mg/dL (13 Dec 2023 08:50)  POCT Blood Glucose.: 169 mg/dL (12 Dec 2023 21:34)  POCT Blood Glucose.: 160 mg/dL (12 Dec 2023 18:05)  POCT Blood Glucose.: 164 mg/dL (12 Dec 2023 12:04)    BLOOD CULTURE    RADIOLOGY & ADDITIONAL TESTS:    Imaging Personally Reviewed:  [ ] YES     Consultant(s) Notes Reviewed:      Care Discussed with Consultants/Other Providers: [facility]  Pager [phone]    Patient is a 43y old  Male who presents with a chief complaint of diarrhea, shaking (12 Dec 2023 13:01)      INTERVAL HPI/OVERNIGHT EVENTS:  Feels better today, some gassiness, but no burping. No tremors. Wife at bedside also feels that patient's speech is clearer.   Again counseled on importance of alcohol cessation, as patient works as  also strongly counseled against drunk driving, Reviewed signs/symptoms that would prompt seeking medical attention/return to hospital.   Reinforced need to followup with PMD and hepatology.   Discussed fluid restriction and provided education on hyponatremia.     MEDICATIONS  (STANDING):  amLODIPine   Tablet 10 milliGRAM(s) Oral daily  artificial  tears Solution 1 Drop(s) Both EYES every 12 hours  chlorhexidine 2% Cloths 1 Application(s) Topical daily  dextrose 5%. 1000 milliLiter(s) (100 mL/Hr) IV Continuous <Continuous>  dextrose 5%. 1000 milliLiter(s) (50 mL/Hr) IV Continuous <Continuous>  dextrose 50% Injectable 25 Gram(s) IV Push once  erythromycin   Ointment 1 Application(s) Right EYE every 6 hours  folic acid 1 milliGRAM(s) Oral daily  glucagon  Injectable 1 milliGRAM(s) IntraMuscular once  influenza   Vaccine 0.5 milliLiter(s) IntraMuscular once  insulin lispro (ADMELOG) corrective regimen sliding scale   SubCutaneous three times a day before meals  lactated ringers. 1000 milliLiter(s) (100 mL/Hr) IV Continuous <Continuous>  LORazepam     Tablet   Oral   LORazepam     Tablet 0.5 milliGRAM(s) Oral every 12 hours  losartan 100 milliGRAM(s) Oral daily  mupirocin 2% Ointment 1 Application(s) Topical two times a day    MEDICATIONS  (PRN):  acetaminophen     Tablet .. 650 milliGRAM(s) Oral every 6 hours PRN Temp greater or equal to 38C (100.4F), Mild Pain (1 - 3)  albuterol    90 MICROgram(s) HFA Inhaler 2 Puff(s) Inhalation every 6 hours PRN Bronchospasm  dextrose Oral Gel 15 Gram(s) Oral once PRN Blood Glucose LESS THAN 70 milliGRAM(s)/deciliter  melatonin 3 milliGRAM(s) Oral at bedtime PRN Insomnia  ondansetron Injectable 4 milliGRAM(s) IV Push every 8 hours PRN Nausea and/or Vomiting      Allergies  amoxicillin (Angioedema)    Intolerances    REVIEW OF SYSTEMS:  Please see interval HPI:    Vital Signs Last 24 Hrs  T(C): 37.1 (13 Dec 2023 05:00), Max: 37.1 (13 Dec 2023 05:00)  T(F): 98.7 (13 Dec 2023 05:00), Max: 98.7 (13 Dec 2023 05:00)  HR: 100 (13 Dec 2023 05:00) (99 - 113)  BP: 137/86 (13 Dec 2023 05:00) (118/74 - 137/86)  RR: 18 (13 Dec 2023 05:00) (18 - 18)  SpO2: 100% (13 Dec 2023 05:00) (99% - 100%)    Parameters below as of 13 Dec 2023 05:00  Patient On (Oxygen Delivery Method): room air    I&O's Detail    PHYSICAL EXAM:  GENERAL: NAD, sitting in bed, wife at bedside  HEAD:  NC/AT  EYES: EOMI, clear sclera/conjunctiva  ENMT: MMM, hearing intact to voice  NECK: supple  NERVOUS SYSTEM: no significant tremor, moving all extremities, AOx3 ), follows commands, observed walking to bathroom unassisted, gait appears steady, proprioception big toe intact  CHEST/LUNG: Comfortable on RA, speaking in full sentences  ABDOMEN: soft, non-tender  EXTREMITIES:  no c/c/e, +callous on big toe    LABS:                        14.9   9.05  )-----------( 175      ( 13 Dec 2023 06:20 )             44.3     13 Dec 2023 06:20    129    |  94     |  10     ----------------------------<  219    3.9     |  23     |  0.71     Ca    8.5        13 Dec 2023 06:20  Mg     1.80      13 Dec 2023 06:20    TPro  8.6    /  Alb  3.3    /  TBili  4.5    /  DBili  x      /  AST  104    /  ALT  35     /  AlkPhos  179    13 Dec 2023 06:20      CAPILLARY BLOOD GLUCOSE      POCT Blood Glucose.: 190 mg/dL (13 Dec 2023 08:50)  POCT Blood Glucose.: 169 mg/dL (12 Dec 2023 21:34)  POCT Blood Glucose.: 160 mg/dL (12 Dec 2023 18:05)  POCT Blood Glucose.: 164 mg/dL (12 Dec 2023 12:04)    BLOOD CULTURE    RADIOLOGY & ADDITIONAL TESTS:    Imaging Personally Reviewed:  [ ] YES     Consultant(s) Notes Reviewed:      Care Discussed with Consultants/Other Providers: ACP Erick re; monitoring Na, wife reports speech improved, able to ambulate, anticipate d/c home soon [facility]  Pager [phone]    Patient is a 43y old  Male who presents with a chief complaint of diarrhea, shaking (12 Dec 2023 13:01)      INTERVAL HPI/OVERNIGHT EVENTS:  Feels better today, some gassiness, but no burping. No tremors. Wife at bedside also feels that patient's speech is clearer.   Again counseled on importance of alcohol cessation, as patient works as  also strongly counseled against drunk driving, Reviewed signs/symptoms that would prompt seeking medical attention/return to hospital.   Reinforced need to followup with PMD and hepatology.   Discussed fluid restriction and provided education on hyponatremia.     MEDICATIONS  (STANDING):  amLODIPine   Tablet 10 milliGRAM(s) Oral daily  artificial  tears Solution 1 Drop(s) Both EYES every 12 hours  chlorhexidine 2% Cloths 1 Application(s) Topical daily  dextrose 5%. 1000 milliLiter(s) (100 mL/Hr) IV Continuous <Continuous>  dextrose 5%. 1000 milliLiter(s) (50 mL/Hr) IV Continuous <Continuous>  dextrose 50% Injectable 25 Gram(s) IV Push once  erythromycin   Ointment 1 Application(s) Right EYE every 6 hours  folic acid 1 milliGRAM(s) Oral daily  glucagon  Injectable 1 milliGRAM(s) IntraMuscular once  influenza   Vaccine 0.5 milliLiter(s) IntraMuscular once  insulin lispro (ADMELOG) corrective regimen sliding scale   SubCutaneous three times a day before meals  lactated ringers. 1000 milliLiter(s) (100 mL/Hr) IV Continuous <Continuous>  LORazepam     Tablet   Oral   LORazepam     Tablet 0.5 milliGRAM(s) Oral every 12 hours  losartan 100 milliGRAM(s) Oral daily  mupirocin 2% Ointment 1 Application(s) Topical two times a day    MEDICATIONS  (PRN):  acetaminophen     Tablet .. 650 milliGRAM(s) Oral every 6 hours PRN Temp greater or equal to 38C (100.4F), Mild Pain (1 - 3)  albuterol    90 MICROgram(s) HFA Inhaler 2 Puff(s) Inhalation every 6 hours PRN Bronchospasm  dextrose Oral Gel 15 Gram(s) Oral once PRN Blood Glucose LESS THAN 70 milliGRAM(s)/deciliter  melatonin 3 milliGRAM(s) Oral at bedtime PRN Insomnia  ondansetron Injectable 4 milliGRAM(s) IV Push every 8 hours PRN Nausea and/or Vomiting      Allergies  amoxicillin (Angioedema)    Intolerances    REVIEW OF SYSTEMS:  Please see interval HPI:    Vital Signs Last 24 Hrs  T(C): 37.1 (13 Dec 2023 05:00), Max: 37.1 (13 Dec 2023 05:00)  T(F): 98.7 (13 Dec 2023 05:00), Max: 98.7 (13 Dec 2023 05:00)  HR: 100 (13 Dec 2023 05:00) (99 - 113)  BP: 137/86 (13 Dec 2023 05:00) (118/74 - 137/86)  RR: 18 (13 Dec 2023 05:00) (18 - 18)  SpO2: 100% (13 Dec 2023 05:00) (99% - 100%)    Parameters below as of 13 Dec 2023 05:00  Patient On (Oxygen Delivery Method): room air    I&O's Detail    PHYSICAL EXAM:  GENERAL: NAD, sitting in bed, wife at bedside  HEAD:  NC/AT  EYES: EOMI, clear sclera/conjunctiva  ENMT: MMM, hearing intact to voice  NECK: supple  NERVOUS SYSTEM: no significant tremor, moving all extremities, AOx3 ), follows commands, observed walking to bathroom unassisted, gait appears steady, proprioception big toe intact  CHEST/LUNG: Comfortable on RA, speaking in full sentences  ABDOMEN: soft, non-tender  EXTREMITIES:  no c/c/e, +callous on big toe    LABS:                        14.9   9.05  )-----------( 175      ( 13 Dec 2023 06:20 )             44.3     13 Dec 2023 06:20    129    |  94     |  10     ----------------------------<  219    3.9     |  23     |  0.71     Ca    8.5        13 Dec 2023 06:20  Mg     1.80      13 Dec 2023 06:20    TPro  8.6    /  Alb  3.3    /  TBili  4.5    /  DBili  x      /  AST  104    /  ALT  35     /  AlkPhos  179    13 Dec 2023 06:20      CAPILLARY BLOOD GLUCOSE      POCT Blood Glucose.: 190 mg/dL (13 Dec 2023 08:50)  POCT Blood Glucose.: 169 mg/dL (12 Dec 2023 21:34)  POCT Blood Glucose.: 160 mg/dL (12 Dec 2023 18:05)  POCT Blood Glucose.: 164 mg/dL (12 Dec 2023 12:04)    BLOOD CULTURE    RADIOLOGY & ADDITIONAL TESTS:    Imaging Personally Reviewed:  [ ] YES     Consultant(s) Notes Reviewed:      Care Discussed with Consultants/Other Providers: ACP Erick re; monitoring Na, wife reports speech improved, able to ambulate, anticipate d/c home soon

## 2023-12-13 NOTE — PROGRESS NOTE ADULT - TIME BILLING
Reviewing labs/vitals, interviewing/examining patient, updating wife, discussing plan, providing counseling/education on hyponatremia, cirrhosis, alcohol withdrawal, also discussing diabetes and role for podiatry/foot care, coordinating care, documentation
Reviewing labs/vitals/imaging, interviewing/examining patient, discussing plan, updating wife at bedside, answering questions, counseling on etoh cessation, cirrhosis, coordinating care, documentation
Reviewing labs/vitals/imaging, interviewing examining patient, providing counseling re: etoh cessation, fluid restriction, call don't fall, updating wife, answering questions, coordinating care, documentation

## 2023-12-13 NOTE — PROGRESS NOTE ADULT - ASSESSMENT
44 yo M w/ etoh use disorder, cirrhosis, HTN, DM2, presenting with loose stools, admitted for alcohol withdrawal, on ativan taper. Found to be COVID+ but not hypoxic.     # etoh withdrawal  - initial CIWA 4 in ED  - monitoring on CIWA protocol with ativan taper, recent CIWA scores 0  - c/w folic acid, s/p course of thiamine  - encourage alcohol cessation, appreciate SBIRT assistance    # ?slurred speech setting of etoh/withdrawal  - CT head obtained, no acute infarcts noted (thus lower concern for CVA as etiology)  - appears to be tolerating diet without issue  - continue to monitor, wife reports speech is much clearer today (12/13)    # Fall - patient reported fall in room, with prior history of falls  - Xray of R hand (related to prior fall) was negative for fracture or dislocation  - denies LOC, or head strike  - exam without obvious trauma, denies pain, will hold off on additional imaging at this time  - c/w fall precautions, reinforced "call don't fall"  - observed patient ambulating today, gait appears steadier  - PT recs otpt PT    # COVID19  - is not hypoxic, is not requiring supplemental O2  - no indication for steroids or remdesivir at this time  - c/w supportive care  - c/w isolation as per facility protocols  - c/w albuterol prn (hx asthma)    # Hyponatremia  - could potentially be in setting of cirrhosis, but urine studies (Ivette 64, Uosm 338) also suggestive of SIADH  - will continue patient on fluid restriction, continue to monitor Na level (128 -> 129 -> 129), assess role for salt tab?  - otpt f/u for continued monitoring    # Cirrhosis  - likely etiology of patient's thrombocytopenia, elevated INR  - no asterixis or large ascites on exam  - imaging noting cirrhosis, early portal hypertension, mild ascites  - estimated MELD 3.0 22 points on 12/11, continue to monitor   - reinforced importance of etoh cessation and otpt followup, patient/wife reports previously seen by hepatologist (reinforced importance of followup)    # DM2   - A1C 6.7  - hold home medications (jardiance, janumet) while inpatient  - c/w sliding scale coverage, monitor FS, adjust as needed  - large bottle of apple juice noted at bedside, counseled patient on limiting juice intake    # Essential HTN  - c/w amlodipine, losartan  - monitor BP and adjust as needed, goal SBP given diabetes <130    # Diarrhea  - appears to be improved, continue to monitor     # Need for prophylactic measure  VTE ppx: ambulate w/ assistance  Regular diet w/ fluid restriction  PT recs home w/ otpt PT  Dispo: anticipate d/c home tomorrow

## 2023-12-14 VITALS
RESPIRATION RATE: 18 BRPM | SYSTOLIC BLOOD PRESSURE: 135 MMHG | HEART RATE: 96 BPM | OXYGEN SATURATION: 100 % | TEMPERATURE: 99 F | DIASTOLIC BLOOD PRESSURE: 86 MMHG

## 2023-12-14 LAB
ALBUMIN SERPL ELPH-MCNC: 3 G/DL — LOW (ref 3.3–5)
ALBUMIN SERPL ELPH-MCNC: 3 G/DL — LOW (ref 3.3–5)
ALP SERPL-CCNC: 163 U/L — HIGH (ref 40–120)
ALP SERPL-CCNC: 163 U/L — HIGH (ref 40–120)
ALT FLD-CCNC: 41 U/L — SIGNIFICANT CHANGE UP (ref 4–41)
ALT FLD-CCNC: 41 U/L — SIGNIFICANT CHANGE UP (ref 4–41)
ANION GAP SERPL CALC-SCNC: 10 MMOL/L — SIGNIFICANT CHANGE UP (ref 7–14)
ANION GAP SERPL CALC-SCNC: 10 MMOL/L — SIGNIFICANT CHANGE UP (ref 7–14)
AST SERPL-CCNC: 96 U/L — HIGH (ref 4–40)
AST SERPL-CCNC: 96 U/L — HIGH (ref 4–40)
BILIRUB SERPL-MCNC: 4.3 MG/DL — HIGH (ref 0.2–1.2)
BILIRUB SERPL-MCNC: 4.3 MG/DL — HIGH (ref 0.2–1.2)
BUN SERPL-MCNC: 9 MG/DL — SIGNIFICANT CHANGE UP (ref 7–23)
BUN SERPL-MCNC: 9 MG/DL — SIGNIFICANT CHANGE UP (ref 7–23)
CALCIUM SERPL-MCNC: 8.8 MG/DL — SIGNIFICANT CHANGE UP (ref 8.4–10.5)
CALCIUM SERPL-MCNC: 8.8 MG/DL — SIGNIFICANT CHANGE UP (ref 8.4–10.5)
CHLORIDE SERPL-SCNC: 98 MMOL/L — SIGNIFICANT CHANGE UP (ref 98–107)
CHLORIDE SERPL-SCNC: 98 MMOL/L — SIGNIFICANT CHANGE UP (ref 98–107)
CO2 SERPL-SCNC: 25 MMOL/L — SIGNIFICANT CHANGE UP (ref 22–31)
CO2 SERPL-SCNC: 25 MMOL/L — SIGNIFICANT CHANGE UP (ref 22–31)
CREAT SERPL-MCNC: 0.67 MG/DL — SIGNIFICANT CHANGE UP (ref 0.5–1.3)
CREAT SERPL-MCNC: 0.67 MG/DL — SIGNIFICANT CHANGE UP (ref 0.5–1.3)
EGFR: 117 ML/MIN/1.73M2 — SIGNIFICANT CHANGE UP
EGFR: 117 ML/MIN/1.73M2 — SIGNIFICANT CHANGE UP
GLUCOSE SERPL-MCNC: 140 MG/DL — HIGH (ref 70–99)
GLUCOSE SERPL-MCNC: 140 MG/DL — HIGH (ref 70–99)
HCT VFR BLD CALC: 41.1 % — SIGNIFICANT CHANGE UP (ref 39–50)
HCT VFR BLD CALC: 41.1 % — SIGNIFICANT CHANGE UP (ref 39–50)
HGB BLD-MCNC: 13.9 G/DL — SIGNIFICANT CHANGE UP (ref 13–17)
HGB BLD-MCNC: 13.9 G/DL — SIGNIFICANT CHANGE UP (ref 13–17)
MAGNESIUM SERPL-MCNC: 1.6 MG/DL — SIGNIFICANT CHANGE UP (ref 1.6–2.6)
MAGNESIUM SERPL-MCNC: 1.6 MG/DL — SIGNIFICANT CHANGE UP (ref 1.6–2.6)
MCHC RBC-ENTMCNC: 28.3 PG — SIGNIFICANT CHANGE UP (ref 27–34)
MCHC RBC-ENTMCNC: 28.3 PG — SIGNIFICANT CHANGE UP (ref 27–34)
MCHC RBC-ENTMCNC: 33.8 GM/DL — SIGNIFICANT CHANGE UP (ref 32–36)
MCHC RBC-ENTMCNC: 33.8 GM/DL — SIGNIFICANT CHANGE UP (ref 32–36)
MCV RBC AUTO: 83.7 FL — SIGNIFICANT CHANGE UP (ref 80–100)
MCV RBC AUTO: 83.7 FL — SIGNIFICANT CHANGE UP (ref 80–100)
NRBC # BLD: 0 /100 WBCS — SIGNIFICANT CHANGE UP (ref 0–0)
NRBC # BLD: 0 /100 WBCS — SIGNIFICANT CHANGE UP (ref 0–0)
NRBC # FLD: 0 K/UL — SIGNIFICANT CHANGE UP (ref 0–0)
NRBC # FLD: 0 K/UL — SIGNIFICANT CHANGE UP (ref 0–0)
PLATELET # BLD AUTO: 186 K/UL — SIGNIFICANT CHANGE UP (ref 150–400)
PLATELET # BLD AUTO: 186 K/UL — SIGNIFICANT CHANGE UP (ref 150–400)
POTASSIUM SERPL-MCNC: 4.4 MMOL/L — SIGNIFICANT CHANGE UP (ref 3.5–5.3)
POTASSIUM SERPL-MCNC: 4.4 MMOL/L — SIGNIFICANT CHANGE UP (ref 3.5–5.3)
POTASSIUM SERPL-SCNC: 4.4 MMOL/L — SIGNIFICANT CHANGE UP (ref 3.5–5.3)
POTASSIUM SERPL-SCNC: 4.4 MMOL/L — SIGNIFICANT CHANGE UP (ref 3.5–5.3)
PROT SERPL-MCNC: 8.1 G/DL — SIGNIFICANT CHANGE UP (ref 6–8.3)
PROT SERPL-MCNC: 8.1 G/DL — SIGNIFICANT CHANGE UP (ref 6–8.3)
RBC # BLD: 4.91 M/UL — SIGNIFICANT CHANGE UP (ref 4.2–5.8)
RBC # BLD: 4.91 M/UL — SIGNIFICANT CHANGE UP (ref 4.2–5.8)
RBC # FLD: 17.4 % — HIGH (ref 10.3–14.5)
RBC # FLD: 17.4 % — HIGH (ref 10.3–14.5)
SODIUM SERPL-SCNC: 133 MMOL/L — LOW (ref 135–145)
SODIUM SERPL-SCNC: 133 MMOL/L — LOW (ref 135–145)
WBC # BLD: 10.01 K/UL — SIGNIFICANT CHANGE UP (ref 3.8–10.5)
WBC # BLD: 10.01 K/UL — SIGNIFICANT CHANGE UP (ref 3.8–10.5)
WBC # FLD AUTO: 10.01 K/UL — SIGNIFICANT CHANGE UP (ref 3.8–10.5)
WBC # FLD AUTO: 10.01 K/UL — SIGNIFICANT CHANGE UP (ref 3.8–10.5)

## 2023-12-14 PROCEDURE — 99239 HOSP IP/OBS DSCHRG MGMT >30: CPT

## 2023-12-14 RX ORDER — AMLODIPINE BESYLATE 2.5 MG/1
1 TABLET ORAL
Qty: 20 | Refills: 0
Start: 2023-12-14 | End: 2024-01-02

## 2023-12-14 RX ORDER — SITAGLIPTIN AND METFORMIN HYDROCHLORIDE 500; 50 MG/1; MG/1
1 TABLET, FILM COATED ORAL
Refills: 0 | DISCHARGE

## 2023-12-14 RX ORDER — SITAGLIPTIN AND METFORMIN HYDROCHLORIDE 500; 50 MG/1; MG/1
1 TABLET, FILM COATED ORAL
Qty: 20 | Refills: 0
Start: 2023-12-14 | End: 2024-01-02

## 2023-12-14 RX ORDER — LOSARTAN POTASSIUM 100 MG/1
1 TABLET, FILM COATED ORAL
Qty: 20 | Refills: 0
Start: 2023-12-14 | End: 2024-01-02

## 2023-12-14 RX ORDER — EMPAGLIFLOZIN 10 MG/1
1 TABLET, FILM COATED ORAL
Qty: 20 | Refills: 0
Start: 2023-12-14 | End: 2024-01-02

## 2023-12-14 RX ORDER — EMPAGLIFLOZIN 10 MG/1
1 TABLET, FILM COATED ORAL
Refills: 0 | DISCHARGE

## 2023-12-14 RX ORDER — AMLODIPINE BESYLATE 2.5 MG/1
1 TABLET ORAL
Refills: 0 | DISCHARGE

## 2023-12-14 RX ORDER — LOSARTAN POTASSIUM 100 MG/1
1 TABLET, FILM COATED ORAL
Refills: 0 | DISCHARGE

## 2023-12-14 RX ORDER — ERYTHROMYCIN BASE 5 MG/GRAM
1 OINTMENT (GRAM) OPHTHALMIC (EYE)
Qty: 1 | Refills: 0
Start: 2023-12-14 | End: 2023-12-18

## 2023-12-14 RX ADMIN — Medication 1 APPLICATION(S): at 06:36

## 2023-12-14 RX ADMIN — Medication 1 APPLICATION(S): at 00:10

## 2023-12-14 RX ADMIN — LOSARTAN POTASSIUM 100 MILLIGRAM(S): 100 TABLET, FILM COATED ORAL at 06:36

## 2023-12-14 RX ADMIN — Medication 2: at 12:49

## 2023-12-14 RX ADMIN — MUPIROCIN 1 APPLICATION(S): 20 OINTMENT TOPICAL at 06:37

## 2023-12-14 RX ADMIN — Medication 0.5 MILLIGRAM(S): at 06:36

## 2023-12-14 RX ADMIN — Medication 1: at 08:26

## 2023-12-14 RX ADMIN — Medication 1 MILLIGRAM(S): at 12:47

## 2023-12-14 RX ADMIN — Medication 1 APPLICATION(S): at 12:47

## 2023-12-14 RX ADMIN — Medication 1 DROP(S): at 06:36

## 2023-12-14 RX ADMIN — AMLODIPINE BESYLATE 10 MILLIGRAM(S): 2.5 TABLET ORAL at 06:37

## 2023-12-14 RX ADMIN — CHLORHEXIDINE GLUCONATE 1 APPLICATION(S): 213 SOLUTION TOPICAL at 12:47

## 2023-12-14 NOTE — DISCHARGE NOTE PROVIDER - NSDCMRMEDTOKEN_GEN_ALL_CORE_FT
amLODIPine 10 mg oral tablet: 1 tab(s) orally once a day  Janumet 50 mg-1000 mg oral tablet: 1 tab(s) orally  Jardiance 25 mg oral tablet: 1 tab(s) orally once a day (at bedtime)  losartan 100 mg oral tablet: 1 tab(s) orally once a day  Ventolin 90 mcg/inh inhalation aerosol: inhaled   amLODIPine 10 mg oral tablet: 1 tab(s) orally once a day  erythromycin 0.5% ophthalmic ointment: 1 application to each affected eye every 6 hours  Janumet 50 mg-1000 mg oral tablet: 1 tab(s) orally once a day  Jardiance 25 mg oral tablet: 1 tab(s) orally once a day (at bedtime)  losartan 100 mg oral tablet: 1 tab(s) orally once a day  Ventolin 90 mcg/inh inhalation aerosol: inhaled

## 2023-12-14 NOTE — DISCHARGE NOTE NURSING/CASE MANAGEMENT/SOCIAL WORK - PATIENT PORTAL LINK FT
You can access the FollowMyHealth Patient Portal offered by [facility] by registering at the following website: [email or web address]. By joining [employer]’s FollowMyHealth portal, you will also be able to view your health information using other applications (apps) compatible with our system. You can access the FollowMyHealth Patient Portal offered by [facility] by registering at the following website: [email or web address]. By joining [employer]’s FollowMyHealth portal, you will also be able to view your health information using other applications (apps) compatible with our system.

## 2023-12-14 NOTE — DISCHARGE NOTE PROVIDER - CARE PROVIDER_API CALL
Ernesto Carbajal  Your hepatologist  Phone: (   )    -  Fax: (   )    -  Follow Up Time:     Your primary medical doctor,   Phone: (   )    -  Fax: (   )    -  Follow Up Time:

## 2023-12-14 NOTE — PROGRESS NOTE ADULT - ASSESSMENT
42 yo M w/ etoh use disorder, cirrhosis, HTN, DM2, presenting with loose stools, admitted for alcohol withdrawal, s/p ativan taper. Found to be COVID+ but not hypoxic.     # etoh withdrawal  - initial CIWA 4 in ED  - s/p monitoring on CIWA protocol with ativan taper, recent CIWA scores 0  - s/p folic acid, s/p course of thiamine  - encourage alcohol cessation, appreciate SBIRT assistance    # ?slurred speech setting of etoh/withdrawal  - CT head obtained, no acute infarcts noted (thus lower concern for CVA as etiology)  - appears to be tolerating diet without issue  - improved, had confirmed with spouse    # Fall - patient reported fall in room, with prior history of falls  - Xray of R hand (related to prior fall) was negative for fracture or dislocation  - denies LOC, or head strike  - exam without obvious trauma, denies pain, will hold off on additional imaging at this time  - c/w fall precautions, reinforced "call don't fall"  - PT recs otpt PT    # COVID19  - is not hypoxic, is not requiring supplemental O2  - no indication for steroids or remdesivir at this time  - c/w supportive care  - c/w isolation as per facility protocols  - c/w albuterol prn (hx asthma)    # Hyponatremia  - could potentially be in setting of cirrhosis, but urine studies (Ivette 64, Uosm 338) also suggestive of SIADH  - will continue patient on fluid restriction, continue to monitor Na level (128 -> 129 -> 129 -> improving to 133 on 12/14)  - will hold off on salt tabs at this time  - advised otpt f/u for continued monitoring, discussed fluid restriction    # Cirrhosis  - likely etiology of patient's thrombocytopenia, elevated INR  - no asterixis or large ascites on exam  - imaging noting cirrhosis, early portal hypertension, mild ascites  - estimated MELD 3.0 22 points on 12/11, continue to monitor   - reinforced importance of etoh cessation and otpt followup, patient/wife reports previously seen by hepatologist (reinforced importance of followup)    # DM2   - A1C 6.7  - hold home medications (jardiance, janumet) while inpatient (can resume upon discharge)  - c/w sliding scale coverage, monitor FS, adjust as needed  - large bottle of apple juice was noted at bedside, counseled patient on limiting juice intake    # Essential HTN  - c/w amlodipine, losartan  - monitor BP and adjust as needed, goal SBP given diabetes <130    # Diarrhea  - appears to be improved, continue to monitor     # Need for prophylactic measure  VTE ppx: ambulate w/ assistance  Regular diet w/ fluid restriction  PT recs home w/ otpt PT    Dispo: d/c home today, anticipatory guidance provided, extensively counseled on etoh cessation and need for followup, in agreement with discharge plan. paperwork complete.     Discharge time 40 minutes

## 2023-12-14 NOTE — PROGRESS NOTE ADULT - REASON FOR ADMISSION
diarrhea, shaking

## 2023-12-14 NOTE — DISCHARGE NOTE PROVIDER - HOSPITAL COURSE
44 yo M w/ etoh use disorder, cirrhosis, HTN, DM2 (A1c 6.7), presenting with loose stools, admitted for alcohol withdrawal, monitored on CIWA protocol (initial score 4 in the ED), treated with ativan taper, received course of thiamine, folic acid. Patient improved and without further symptoms of withdrawal. He was counseled on alcohol cessation. His wife was concerned for slurring of his speech, CT head was negative for acute infarcts, and his speech was improved/clearer. Patient with history of falls, xray of R hand was negative for fractures or dislocations, PT evaluated patient and recommended otpt PT. Imaging was consistent with cirrhosis, with early portal hypertension, mild ascites, noted to have thrombocytopenia and elevated INR (likely 2/2 cirrhosis), recommended to followup with his PMD and hepatologist after discharge. Patient also hyponatremic, could be related to cirrhosis but urine studies suggestive of SIADH, placed on fluid restriction, Na improved (133 on day of discharge). Found to be COVID+ but not hypoxic, did not require the use of steroids or remdesivir. No further diarrhea noted.     Patient medically stable for discharge, to go home w/ outpatient PMD and instructions to followup with PMD and hepatology.     Followups:  [] alcohol cessation  [] PMD followup, outpatient monitoring Na level, followup re: urinary concerns, management of diabetes  [] podiatry for routine diabetic foot care  [] hepatology followup for cirrhosis 42 yo M w/ etoh use disorder, cirrhosis, HTN, DM2 (A1c 6.7), presenting with loose stools, admitted for alcohol withdrawal, monitored on CIWA protocol (initial score 4 in the ED), treated with ativan taper, received course of thiamine, folic acid. Patient improved and without further symptoms of withdrawal. He was counseled on alcohol cessation. His wife was concerned for slurring of his speech, CT head was negative for acute infarcts, and his speech was improved/clearer. Patient with history of falls, xray of R hand was negative for fractures or dislocations, PT evaluated patient and recommended otpt PT. Imaging was consistent with cirrhosis, with early portal hypertension, mild ascites, noted to have thrombocytopenia and elevated INR (likely 2/2 cirrhosis), recommended to followup with his PMD and hepatologist after discharge. Patient also hyponatremic, could be related to cirrhosis but urine studies suggestive of SIADH, placed on fluid restriction, Na improved (133 on day of discharge). Found to be COVID+ but not hypoxic, did not require the use of steroids or remdesivir. No further diarrhea noted.     Patient medically stable for discharge, to go home w/ outpatient PMD and instructions to followup with PMD and hepatology.     Followups:  [] alcohol cessation  [] PMD followup, outpatient monitoring Na level, followup re: urinary concerns, management of diabetes  [] podiatry for routine diabetic foot care  [] hepatology followup for cirrhosis

## 2023-12-14 NOTE — DISCHARGE NOTE NURSING/CASE MANAGEMENT/SOCIAL WORK - NSDCPEFALRISK_GEN_ALL_CORE
For information on Fall & Injury Prevention, visit: [email or web address] OR  [email or web address] OR  https://www.cdc.gov/steadi/patient.html For information on Fall & Injury Prevention, visit: [email or web address] OR  [email or web address] OR  https://www.cdc.gov/steadi/patient.html

## 2023-12-14 NOTE — PROGRESS NOTE ADULT - SUBJECTIVE AND OBJECTIVE BOX
[facility] Medicine  Pager [phone]    Patient is a 43y old  Male who presents with a chief complaint of diarrhea, shaking (14 Dec 2023 11:10)      INTERVAL HPI/OVERNIGHT EVENTS:    MEDICATIONS  (STANDING):  amLODIPine   Tablet 10 milliGRAM(s) Oral daily  artificial  tears Solution 1 Drop(s) Both EYES every 12 hours  chlorhexidine 2% Cloths 1 Application(s) Topical daily  dextrose 5%. 1000 milliLiter(s) (50 mL/Hr) IV Continuous <Continuous>  dextrose 5%. 1000 milliLiter(s) (100 mL/Hr) IV Continuous <Continuous>  dextrose 50% Injectable 25 Gram(s) IV Push once  erythromycin   Ointment 1 Application(s) Right EYE every 6 hours  folic acid 1 milliGRAM(s) Oral daily  glucagon  Injectable 1 milliGRAM(s) IntraMuscular once  influenza   Vaccine 0.5 milliLiter(s) IntraMuscular once  insulin lispro (ADMELOG) corrective regimen sliding scale   SubCutaneous three times a day before meals  lactated ringers. 1000 milliLiter(s) (100 mL/Hr) IV Continuous <Continuous>  losartan 100 milliGRAM(s) Oral daily  mupirocin 2% Ointment 1 Application(s) Topical two times a day    MEDICATIONS  (PRN):  acetaminophen     Tablet .. 650 milliGRAM(s) Oral every 6 hours PRN Temp greater or equal to 38C (100.4F), Mild Pain (1 - 3)  albuterol    90 MICROgram(s) HFA Inhaler 2 Puff(s) Inhalation every 6 hours PRN Bronchospasm  dextrose Oral Gel 15 Gram(s) Oral once PRN Blood Glucose LESS THAN 70 milliGRAM(s)/deciliter  melatonin 3 milliGRAM(s) Oral at bedtime PRN Insomnia  ondansetron Injectable 4 milliGRAM(s) IV Push every 8 hours PRN Nausea and/or Vomiting      Allergies    amoxicillin (Angioedema)    Intolerances        REVIEW OF SYSTEMS:  Please see interval HPI:    Vital Signs Last 24 Hrs  T(C): 37 (14 Dec 2023 07:30), Max: 37 (13 Dec 2023 12:48)  T(F): 98.6 (14 Dec 2023 07:30), Max: 98.6 (13 Dec 2023 12:48)  HR: 95 (14 Dec 2023 07:30) (95 - 100)  BP: 137/81 (14 Dec 2023 07:30) (136/84 - 146/96)  BP(mean): --  RR: 18 (14 Dec 2023 07:30) (18 - 18)  SpO2: 100% (14 Dec 2023 07:30) (100% - 100%)    Parameters below as of 14 Dec 2023 07:30  Patient On (Oxygen Delivery Method): room air      I&O's Detail        PHYSICAL EXAM:  GENERAL:   HEAD:    EYES:   ENMT:   NECK:   NERVOUS SYSTEM:    CHEST/LUNG:   HEART:   ABDOMEN:   EXTREMITIES:    LYMPH:   SKIN:     LABS:                        13.9   10.01 )-----------( 186      ( 14 Dec 2023 07:10 )             41.1     14 Dec 2023 07:10    133    |  98     |  9      ----------------------------<  140    4.4     |  25     |  0.67     Ca    8.8        14 Dec 2023 07:10  Mg     1.60      14 Dec 2023 07:10    TPro  8.1    /  Alb  3.0    /  TBili  4.3    /  DBili  x      /  AST  96     /  ALT  41     /  AlkPhos  163    14 Dec 2023 07:10      CAPILLARY BLOOD GLUCOSE      POCT Blood Glucose.: 153 mg/dL (14 Dec 2023 08:23)  POCT Blood Glucose.: 183 mg/dL (13 Dec 2023 22:43)  POCT Blood Glucose.: 221 mg/dL (13 Dec 2023 18:20)  POCT Blood Glucose.: 178 mg/dL (13 Dec 2023 12:27)    BLOOD CULTURE    RADIOLOGY & ADDITIONAL TESTS:    Imaging Personally Reviewed:  [ ] YES     Consultant(s) Notes Reviewed:      Care Discussed with Consultants/Other Providers: [facility] Medicine  Pager [phone]    Patient is a 43y old  Male who presents with a chief complaint of diarrhea, shaking (14 Dec 2023 11:10)      INTERVAL HPI/OVERNIGHT EVENTS:    MEDICATIONS  (STANDING):  amLODIPine   Tablet 10 milliGRAM(s) Oral daily  artificial  tears Solution 1 Drop(s) Both EYES every 12 hours  chlorhexidine 2% Cloths 1 Application(s) Topical daily  dextrose 5%. 1000 milliLiter(s) (50 mL/Hr) IV Continuous <Continuous>  dextrose 5%. 1000 milliLiter(s) (100 mL/Hr) IV Continuous <Continuous>  dextrose 50% Injectable 25 Gram(s) IV Push once  erythromycin   Ointment 1 Application(s) Right EYE every 6 hours  folic acid 1 milliGRAM(s) Oral daily  glucagon  Injectable 1 milliGRAM(s) IntraMuscular once  influenza   Vaccine 0.5 milliLiter(s) IntraMuscular once  insulin lispro (ADMELOG) corrective regimen sliding scale   SubCutaneous three times a day before meals  lactated ringers. 1000 milliLiter(s) (100 mL/Hr) IV Continuous <Continuous>  losartan 100 milliGRAM(s) Oral daily  mupirocin 2% Ointment 1 Application(s) Topical two times a day    MEDICATIONS  (PRN):  acetaminophen     Tablet .. 650 milliGRAM(s) Oral every 6 hours PRN Temp greater or equal to 38C (100.4F), Mild Pain (1 - 3)  albuterol    90 MICROgram(s) HFA Inhaler 2 Puff(s) Inhalation every 6 hours PRN Bronchospasm  dextrose Oral Gel 15 Gram(s) Oral once PRN Blood Glucose LESS THAN 70 milliGRAM(s)/deciliter  melatonin 3 milliGRAM(s) Oral at bedtime PRN Insomnia  ondansetron Injectable 4 milliGRAM(s) IV Push every 8 hours PRN Nausea and/or Vomiting      Allergies    amoxicillin (Angioedema)    Intolerances        REVIEW OF SYSTEMS:  Please see interval HPI:    Vital Signs Last 24 Hrs  T(C): 37 (14 Dec 2023 07:30), Max: 37 (13 Dec 2023 12:48)  T(F): 98.6 (14 Dec 2023 07:30), Max: 98.6 (13 Dec 2023 12:48)  HR: 95 (14 Dec 2023 07:30) (95 - 100)  BP: 137/81 (14 Dec 2023 07:30) (136/84 - 146/96)  BP(mean): --  RR: 18 (14 Dec 2023 07:30) (18 - 18)  SpO2: 100% (14 Dec 2023 07:30) (100% - 100%)    Parameters below as of 14 Dec 2023 07:30  Patient On (Oxygen Delivery Method): room air      I&O's Detail        PHYSICAL EXAM:  GENERAL:   HEAD:    EYES:   ENMT:   NECK:   NERVOUS SYSTEM:    CHEST/LUNG:   HEART:   ABDOMEN:   EXTREMITIES:    LYMPH:   SKIN:     LABS:                        13.9   10.01 )-----------( 186      ( 14 Dec 2023 07:10 )             41.1     14 Dec 2023 07:10    133    |  98     |  9      ----------------------------<  140    4.4     |  25     |  0.67     Ca    8.8        14 Dec 2023 07:10  Mg     1.60      14 Dec 2023 07:10    TPro  8.1    /  Alb  3.0    /  TBili  4.3    /  DBili  x      /  AST  96     /  ALT  41     /  AlkPhos  163    14 Dec 2023 07:10      CAPILLARY BLOOD GLUCOSE      POCT Blood Glucose.: 153 mg/dL (14 Dec 2023 08:23)  POCT Blood Glucose.: 183 mg/dL (13 Dec 2023 22:43)  POCT Blood Glucose.: 221 mg/dL (13 Dec 2023 18:20)  POCT Blood Glucose.: 178 mg/dL (13 Dec 2023 12:27)    BLOOD CULTURE    RADIOLOGY & ADDITIONAL TESTS:    Imaging Personally Reviewed:  [ ] YES     Consultant(s) Notes Reviewed:      Care Discussed with Consultants/Other Providers: [facility] Medicine  Pager [phone]    Patient is a 43y old  Male who presents with a chief complaint of diarrhea, shaking (14 Dec 2023 11:10)      INTERVAL HPI/OVERNIGHT EVENTS:  Feels well, ready to go home, wife currently working (in school system), can likely  this afternoon. No complaints at this time. Requests [facility].   Aware of need for etoh cessation, counseled again on need for followup for cirrhosis, also diabetes. Discussed improvement in Na level and need for continued monitoring and trying to limit excessive fluid intake.   Anticipatory guidance provided.   No other questions/concerns at this time, in agreement with discharge plan.     MEDICATIONS  (STANDING):  amLODIPine   Tablet 10 milliGRAM(s) Oral daily  artificial  tears Solution 1 Drop(s) Both EYES every 12 hours  chlorhexidine 2% Cloths 1 Application(s) Topical daily  dextrose 5%. 1000 milliLiter(s) (50 mL/Hr) IV Continuous <Continuous>  dextrose 5%. 1000 milliLiter(s) (100 mL/Hr) IV Continuous <Continuous>  dextrose 50% Injectable 25 Gram(s) IV Push once  erythromycin   Ointment 1 Application(s) Right EYE every 6 hours  folic acid 1 milliGRAM(s) Oral daily  glucagon  Injectable 1 milliGRAM(s) IntraMuscular once  influenza   Vaccine 0.5 milliLiter(s) IntraMuscular once  insulin lispro (ADMELOG) corrective regimen sliding scale   SubCutaneous three times a day before meals  lactated ringers. 1000 milliLiter(s) (100 mL/Hr) IV Continuous <Continuous>  losartan 100 milliGRAM(s) Oral daily  mupirocin 2% Ointment 1 Application(s) Topical two times a day    MEDICATIONS  (PRN):  acetaminophen     Tablet .. 650 milliGRAM(s) Oral every 6 hours PRN Temp greater or equal to 38C (100.4F), Mild Pain (1 - 3)  albuterol    90 MICROgram(s) HFA Inhaler 2 Puff(s) Inhalation every 6 hours PRN Bronchospasm  dextrose Oral Gel 15 Gram(s) Oral once PRN Blood Glucose LESS THAN 70 milliGRAM(s)/deciliter  melatonin 3 milliGRAM(s) Oral at bedtime PRN Insomnia  ondansetron Injectable 4 milliGRAM(s) IV Push every 8 hours PRN Nausea and/or Vomiting    Allergies  amoxicillin (Angioedema)    Intolerances    REVIEW OF SYSTEMS:  Please see interval HPI:    Vital Signs Last 24 Hrs  T(C): 37 (14 Dec 2023 07:30), Max: 37 (13 Dec 2023 12:48)  T(F): 98.6 (14 Dec 2023 07:30), Max: 98.6 (13 Dec 2023 12:48)  HR: 95 (14 Dec 2023 07:30) (95 - 100)  BP: 137/81 (14 Dec 2023 07:30) (136/84 - 146/96)  BP(mean): --  RR: 18 (14 Dec 2023 07:30) (18 - 18)  SpO2: 100% (14 Dec 2023 07:30) (100% - 100%)    Parameters below as of 14 Dec 2023 07:30  Patient On (Oxygen Delivery Method): room air    I&O's Detail    PHYSICAL EXAM:  GENERAL: NAD, sitting in bed  HEAD:  NC/AT  EYES: EOMI, clear sclera/conjunctiva  ENMT: MMM, hearing intact to voice  NECK: supple  NERVOUS SYSTEM: no tremor, moving all extremities, AOx3, follows commands  CHEST/LUNG: Comfortable on RA, speaking in full sentences  ABDOMEN: soft, non-tender  EXTREMITIES:  no c/c/e    LABS:                        13.9   10.01 )-----------( 186      ( 14 Dec 2023 07:10 )             41.1     14 Dec 2023 07:10    133    |  98     |  9      ----------------------------<  140    4.4     |  25     |  0.67     Ca    8.8        14 Dec 2023 07:10  Mg     1.60      14 Dec 2023 07:10    TPro  8.1    /  Alb  3.0    /  TBili  4.3    /  DBili  x      /  AST  96     /  ALT  41     /  AlkPhos  163    14 Dec 2023 07:10      CAPILLARY BLOOD GLUCOSE  POCT Blood Glucose.: 153 mg/dL (14 Dec 2023 08:23)  POCT Blood Glucose.: 183 mg/dL (13 Dec 2023 22:43)  POCT Blood Glucose.: 221 mg/dL (13 Dec 2023 18:20)  POCT Blood Glucose.: 178 mg/dL (13 Dec 2023 12:27)    BLOOD CULTURE    RADIOLOGY & ADDITIONAL TESTS:    Imaging Personally Reviewed:  [ ] YES     Consultant(s) Notes Reviewed:       Care Discussed with Consultants/Other Providers: JOON Martínez re: d/c planning, paperwork done, patient counseled [facility] Medicine  Pager [phone]    Patient is a 43y old  Male who presents with a chief complaint of diarrhea, shaking (14 Dec 2023 11:10)      INTERVAL HPI/OVERNIGHT EVENTS:  Feels well, ready to go home, wife currently working (in school system), can likely  this afternoon. No complaints at this time. Requests meds to be sent to [facility].   Aware of need for etoh cessation, counseled again on need for followup for cirrhosis, also diabetes. Discussed improvement in Na level and need for continued monitoring and trying to limit excessive fluid intake.   Anticipatory guidance provided.   No other questions/concerns at this time, in agreement with discharge plan.     MEDICATIONS  (STANDING):  amLODIPine   Tablet 10 milliGRAM(s) Oral daily  artificial  tears Solution 1 Drop(s) Both EYES every 12 hours  chlorhexidine 2% Cloths 1 Application(s) Topical daily  dextrose 5%. 1000 milliLiter(s) (50 mL/Hr) IV Continuous <Continuous>  dextrose 5%. 1000 milliLiter(s) (100 mL/Hr) IV Continuous <Continuous>  dextrose 50% Injectable 25 Gram(s) IV Push once  erythromycin   Ointment 1 Application(s) Right EYE every 6 hours  folic acid 1 milliGRAM(s) Oral daily  glucagon  Injectable 1 milliGRAM(s) IntraMuscular once  influenza   Vaccine 0.5 milliLiter(s) IntraMuscular once  insulin lispro (ADMELOG) corrective regimen sliding scale   SubCutaneous three times a day before meals  lactated ringers. 1000 milliLiter(s) (100 mL/Hr) IV Continuous <Continuous>  losartan 100 milliGRAM(s) Oral daily  mupirocin 2% Ointment 1 Application(s) Topical two times a day    MEDICATIONS  (PRN):  acetaminophen     Tablet .. 650 milliGRAM(s) Oral every 6 hours PRN Temp greater or equal to 38C (100.4F), Mild Pain (1 - 3)  albuterol    90 MICROgram(s) HFA Inhaler 2 Puff(s) Inhalation every 6 hours PRN Bronchospasm  dextrose Oral Gel 15 Gram(s) Oral once PRN Blood Glucose LESS THAN 70 milliGRAM(s)/deciliter  melatonin 3 milliGRAM(s) Oral at bedtime PRN Insomnia  ondansetron Injectable 4 milliGRAM(s) IV Push every 8 hours PRN Nausea and/or Vomiting    Allergies  amoxicillin (Angioedema)    Intolerances    REVIEW OF SYSTEMS:  Please see interval HPI:    Vital Signs Last 24 Hrs  T(C): 37 (14 Dec 2023 07:30), Max: 37 (13 Dec 2023 12:48)  T(F): 98.6 (14 Dec 2023 07:30), Max: 98.6 (13 Dec 2023 12:48)  HR: 95 (14 Dec 2023 07:30) (95 - 100)  BP: 137/81 (14 Dec 2023 07:30) (136/84 - 146/96)  BP(mean): --  RR: 18 (14 Dec 2023 07:30) (18 - 18)  SpO2: 100% (14 Dec 2023 07:30) (100% - 100%)    Parameters below as of 14 Dec 2023 07:30  Patient On (Oxygen Delivery Method): room air    I&O's Detail    PHYSICAL EXAM:  GENERAL: NAD, sitting in bed  HEAD:  NC/AT  EYES: EOMI, clear sclera/conjunctiva  ENMT: MMM, hearing intact to voice  NECK: supple  NERVOUS SYSTEM: no tremor, moving all extremities, AOx3, follows commands  CHEST/LUNG: Comfortable on RA, speaking in full sentences  ABDOMEN: soft, non-tender  EXTREMITIES:  no c/c/e    LABS:                        13.9   10.01 )-----------( 186      ( 14 Dec 2023 07:10 )             41.1     14 Dec 2023 07:10    133    |  98     |  9      ----------------------------<  140    4.4     |  25     |  0.67     Ca    8.8        14 Dec 2023 07:10  Mg     1.60      14 Dec 2023 07:10    TPro  8.1    /  Alb  3.0    /  TBili  4.3    /  DBili  x      /  AST  96     /  ALT  41     /  AlkPhos  163    14 Dec 2023 07:10      CAPILLARY BLOOD GLUCOSE  POCT Blood Glucose.: 153 mg/dL (14 Dec 2023 08:23)  POCT Blood Glucose.: 183 mg/dL (13 Dec 2023 22:43)  POCT Blood Glucose.: 221 mg/dL (13 Dec 2023 18:20)  POCT Blood Glucose.: 178 mg/dL (13 Dec 2023 12:27)    BLOOD CULTURE    RADIOLOGY & ADDITIONAL TESTS:    Imaging Personally Reviewed:  [ ] YES     Consultant(s) Notes Reviewed:       Care Discussed with Consultants/Other Providers: JOON Martínez re: d/c planning, paperwork done, patient counseled

## 2023-12-14 NOTE — DISCHARGE NOTE PROVIDER - PROVIDER TOKENS
FREE:[LAST:[Solomon],FIRST:[Ernesto],PHONE:[(   )    -],FAX:[(   )    -],ADDRESS:[Your hepatologist]],FREE:[LAST:[Your primary medical doctor],PHONE:[(   )    -],FAX:[(   )    -]]

## 2023-12-14 NOTE — DISCHARGE NOTE PROVIDER - NSDCCPCAREPLAN_GEN_ALL_CORE_FT
PRINCIPAL DISCHARGE DIAGNOSIS  Diagnosis: Alcohol withdrawal  Assessment and Plan of Treatment: You were treated in the hospital for alcohol withdrawal. It is critically important that you stop drinking alcohol. If you do experience symptoms of withdrawal in the future (including tremors, confusions, nausea, sweats etc), please seek medical attention as alcohol withdrawal can be fatal.      SECONDARY DISCHARGE DIAGNOSES  Diagnosis: Cirrhosis  Assessment and Plan of Treatment: You were noted to have evidence of cirrhosis on your lab tests and imaging studies. It is again critically important that you stop drinking alcohol. Please followup with your liver specialist (you reported seeing Dr. Carbajal in the past) for further management. If you notice increasing swelling in your abdomen or legs, confusion, tremorts, unusual bleeding/bruising, yellowing of the skin or eyes, seek medical attention.    Diagnosis: Slurred speech  Assessment and Plan of Treatment: You and your wife was concerned that your speech was different. A scan of your head was negative for stroke. It could have been related to alcohol. However you should seek medical attention if you have facial droop, inability to speak, localized weakness etc as these could be symptoms of stroke.    Diagnosis: DM type 2, goal HbA1c < 8%  Assessment and Plan of Treatment: Please see your primary medical doctor to manage your diabetes. Please take your medications as prescribed. Please seek medical attention if your sugars are excessively high, if you have uncontrolled thirst and urination. You should see the eye doctor, as well as the podiatrist for routine eye and foot care respectively.    Diagnosis: 2019 novel coronavirus disease (COVID-19)  Assessment and Plan of Treatment: You tested positive for COVID, but thankfully your breathing was fine and you did not require extra oxygen or other interventions. Please practice proper hygeine, wear a mask if in public (for at least 10 days from your positive test).    Diagnosis: Diarrhea  Assessment and Plan of Treatment: You did not have further episodes of diarrhea.    Diagnosis: Hyponatremia  Assessment and Plan of Treatment: Your sodium levels were low, which could be due to cirrhosis, however urine studies suggested a condition known as SIADH (essentially your body holding on to too much water). Please restrict how much extra fluid you consume and followup with your primary medical doctor to keep track of your sodium level.

## 2024-01-01 ENCOUNTER — INPATIENT (INPATIENT)
Facility: HOSPITAL | Age: 44
LOS: 3 days | End: 2024-12-09
Attending: INTERNAL MEDICINE | Admitting: INTERNAL MEDICINE
Payer: COMMERCIAL

## 2024-01-01 ENCOUNTER — RESULT REVIEW (OUTPATIENT)
Age: 44
End: 2024-01-01

## 2024-01-01 VITALS — TEMPERATURE: 101 F | OXYGEN SATURATION: 100 % | RESPIRATION RATE: 26 BRPM | HEART RATE: 113 BPM

## 2024-01-01 VITALS
TEMPERATURE: 98 F | SYSTOLIC BLOOD PRESSURE: 146 MMHG | WEIGHT: 216.05 LBS | RESPIRATION RATE: 17 BRPM | HEART RATE: 123 BPM | DIASTOLIC BLOOD PRESSURE: 89 MMHG | OXYGEN SATURATION: 100 %

## 2024-01-01 DIAGNOSIS — N17.9 ACUTE KIDNEY FAILURE, UNSPECIFIED: ICD-10-CM

## 2024-01-01 DIAGNOSIS — E87.20 ACIDOSIS, UNSPECIFIED: ICD-10-CM

## 2024-01-01 DIAGNOSIS — E87.5 HYPERKALEMIA: ICD-10-CM

## 2024-01-01 DIAGNOSIS — K92.2 GASTROINTESTINAL HEMORRHAGE, UNSPECIFIED: ICD-10-CM

## 2024-01-01 LAB
A1C WITH ESTIMATED AVERAGE GLUCOSE RESULT: 5 % — SIGNIFICANT CHANGE UP (ref 4–5.6)
A1C WITH ESTIMATED AVERAGE GLUCOSE RESULT: 6.1 % — HIGH (ref 4–5.6)
ACANTHOCYTES BLD QL SMEAR: SLIGHT — SIGNIFICANT CHANGE UP
ACANTHOCYTES BLD QL SMEAR: SLIGHT — SIGNIFICANT CHANGE UP
ADD ON TEST-SPECIMEN IN LAB: SIGNIFICANT CHANGE UP
ALBUMIN SERPL ELPH-MCNC: 2.1 G/DL — LOW (ref 3.3–5)
ALBUMIN SERPL ELPH-MCNC: 2.1 G/DL — LOW (ref 3.3–5)
ALBUMIN SERPL ELPH-MCNC: 2.3 G/DL — LOW (ref 3.3–5)
ALBUMIN SERPL ELPH-MCNC: 2.4 G/DL — LOW (ref 3.3–5)
ALBUMIN SERPL ELPH-MCNC: 2.5 G/DL — LOW (ref 3.3–5)
ALBUMIN SERPL ELPH-MCNC: 2.6 G/DL — LOW (ref 3.3–5)
ALBUMIN SERPL ELPH-MCNC: 3.1 G/DL — LOW (ref 3.3–5)
ALP SERPL-CCNC: 116 U/L — SIGNIFICANT CHANGE UP (ref 40–120)
ALP SERPL-CCNC: 134 U/L — HIGH (ref 40–120)
ALP SERPL-CCNC: 149 U/L — HIGH (ref 40–120)
ALP SERPL-CCNC: 196 U/L — HIGH (ref 40–120)
ALP SERPL-CCNC: 231 U/L — HIGH (ref 40–120)
ALP SERPL-CCNC: 248 U/L — HIGH (ref 40–120)
ALP SERPL-CCNC: 276 U/L — HIGH (ref 40–120)
ALP SERPL-CCNC: 276 U/L — HIGH (ref 40–120)
ALP SERPL-CCNC: 289 U/L — HIGH (ref 40–120)
ALP SERPL-CCNC: 301 U/L — HIGH (ref 40–120)
ALP SERPL-CCNC: 307 U/L — HIGH (ref 40–120)
ALT FLD-CCNC: 1596 U/L — HIGH (ref 4–41)
ALT FLD-CCNC: 1722 U/L — HIGH (ref 4–41)
ALT FLD-CCNC: 1784 U/L — HIGH (ref 4–41)
ALT FLD-CCNC: 1838 U/L — HIGH (ref 4–41)
ALT FLD-CCNC: 1840 U/L — HIGH (ref 4–41)
ALT FLD-CCNC: 1904 U/L — HIGH (ref 4–41)
ALT FLD-CCNC: 41 U/L — SIGNIFICANT CHANGE UP (ref 4–41)
ALT FLD-CCNC: 415 U/L — HIGH (ref 4–41)
ALT FLD-CCNC: 957 U/L — HIGH (ref 4–41)
ALT FLD-CCNC: SIGNIFICANT CHANGE UP U/L (ref 4–41)
ALT FLD-CCNC: SIGNIFICANT CHANGE UP U/L (ref 4–41)
AMMONIA BLD-MCNC: 120 UMOL/L — HIGH (ref 11–55)
AMMONIA BLD-MCNC: 155 UMOL/L — HIGH (ref 11–55)
AMMONIA BLD-MCNC: 198 UMOL/L — HIGH (ref 11–55)
ANION GAP SERPL CALC-SCNC: 15 MMOL/L — HIGH (ref 7–14)
ANION GAP SERPL CALC-SCNC: 17 MMOL/L — HIGH (ref 7–14)
ANION GAP SERPL CALC-SCNC: 18 MMOL/L — HIGH (ref 7–14)
ANION GAP SERPL CALC-SCNC: 19 MMOL/L — HIGH (ref 7–14)
ANION GAP SERPL CALC-SCNC: 20 MMOL/L — HIGH (ref 7–14)
ANION GAP SERPL CALC-SCNC: 21 MMOL/L — HIGH (ref 7–14)
ANION GAP SERPL CALC-SCNC: 23 MMOL/L — HIGH (ref 7–14)
ANION GAP SERPL CALC-SCNC: 24 MMOL/L — HIGH (ref 7–14)
ANION GAP SERPL CALC-SCNC: 25 MMOL/L — HIGH (ref 7–14)
ANION GAP SERPL CALC-SCNC: 28 MMOL/L — HIGH (ref 7–14)
ANISOCYTOSIS BLD QL: SLIGHT — SIGNIFICANT CHANGE UP
ANISOCYTOSIS BLD QL: SLIGHT — SIGNIFICANT CHANGE UP
APPEARANCE UR: ABNORMAL
APPEARANCE UR: CLEAR — SIGNIFICANT CHANGE UP
APTT BLD: 25.5 SEC — SIGNIFICANT CHANGE UP (ref 24.5–35.6)
APTT BLD: 33.2 SEC — SIGNIFICANT CHANGE UP (ref 24.5–35.6)
APTT BLD: 36.4 SEC — HIGH (ref 24.5–35.6)
APTT BLD: 38.6 SEC — HIGH (ref 24.5–35.6)
APTT BLD: 39.7 SEC — HIGH (ref 24.5–35.6)
APTT BLD: 40 SEC — HIGH (ref 24.5–35.6)
APTT BLD: 40.3 SEC — HIGH (ref 24.5–35.6)
APTT BLD: 42.4 SEC — HIGH (ref 24.5–35.6)
APTT BLD: 44.5 SEC — HIGH (ref 24.5–35.6)
APTT BLD: 48.5 SEC — HIGH (ref 24.5–35.6)
AST SERPL-CCNC: 1173 U/L — HIGH (ref 4–40)
AST SERPL-CCNC: 2924 U/L — HIGH (ref 4–40)
AST SERPL-CCNC: 4284 U/L — HIGH (ref 4–40)
AST SERPL-CCNC: 4596 U/L — HIGH (ref 4–40)
AST SERPL-CCNC: 4637 U/L — HIGH (ref 4–40)
AST SERPL-CCNC: 5326 U/L — HIGH (ref 4–40)
AST SERPL-CCNC: 5595 U/L — HIGH (ref 4–40)
AST SERPL-CCNC: 6711 U/L — HIGH (ref 4–40)
AST SERPL-CCNC: 79 U/L — HIGH (ref 4–40)
AST SERPL-CCNC: SIGNIFICANT CHANGE UP U/L (ref 4–40)
AST SERPL-CCNC: SIGNIFICANT CHANGE UP U/L (ref 4–40)
B-OH-BUTYR SERPL-SCNC: 0.2 MMOL/L — SIGNIFICANT CHANGE UP (ref 0–0.4)
B-OH-BUTYR SERPL-SCNC: <0 MMOL/L — SIGNIFICANT CHANGE UP (ref 0–0.4)
BACTERIA # UR AUTO: NEGATIVE /HPF — SIGNIFICANT CHANGE UP
BASOPHILS # BLD AUTO: 0 K/UL — SIGNIFICANT CHANGE UP (ref 0–0.2)
BASOPHILS # BLD AUTO: 0 K/UL — SIGNIFICANT CHANGE UP (ref 0–0.2)
BASOPHILS # BLD AUTO: 0.04 K/UL — SIGNIFICANT CHANGE UP (ref 0–0.2)
BASOPHILS # BLD AUTO: 0.04 K/UL — SIGNIFICANT CHANGE UP (ref 0–0.2)
BASOPHILS # BLD AUTO: 0.05 K/UL — SIGNIFICANT CHANGE UP (ref 0–0.2)
BASOPHILS # BLD AUTO: 0.05 K/UL — SIGNIFICANT CHANGE UP (ref 0–0.2)
BASOPHILS # BLD AUTO: 0.06 K/UL — SIGNIFICANT CHANGE UP (ref 0–0.2)
BASOPHILS # BLD AUTO: 0.06 K/UL — SIGNIFICANT CHANGE UP (ref 0–0.2)
BASOPHILS # BLD AUTO: 0.07 K/UL — SIGNIFICANT CHANGE UP (ref 0–0.2)
BASOPHILS # BLD AUTO: 0.07 K/UL — SIGNIFICANT CHANGE UP (ref 0–0.2)
BASOPHILS # BLD AUTO: 0.1 K/UL — SIGNIFICANT CHANGE UP (ref 0–0.2)
BASOPHILS # BLD AUTO: 0.14 K/UL — SIGNIFICANT CHANGE UP (ref 0–0.2)
BASOPHILS NFR BLD AUTO: 0 % — SIGNIFICANT CHANGE UP (ref 0–2)
BASOPHILS NFR BLD AUTO: 0 % — SIGNIFICANT CHANGE UP (ref 0–2)
BASOPHILS NFR BLD AUTO: 0.2 % — SIGNIFICANT CHANGE UP (ref 0–2)
BASOPHILS NFR BLD AUTO: 0.3 % — SIGNIFICANT CHANGE UP (ref 0–2)
BASOPHILS NFR BLD AUTO: 0.4 % — SIGNIFICANT CHANGE UP (ref 0–2)
BASOPHILS NFR BLD AUTO: 0.6 % — SIGNIFICANT CHANGE UP (ref 0–2)
BASOPHILS NFR BLD AUTO: 0.6 % — SIGNIFICANT CHANGE UP (ref 0–2)
BILIRUB SERPL-MCNC: 2.4 MG/DL — HIGH (ref 0.2–1.2)
BILIRUB SERPL-MCNC: 3 MG/DL — HIGH (ref 0.2–1.2)
BILIRUB SERPL-MCNC: 3.3 MG/DL — HIGH (ref 0.2–1.2)
BILIRUB SERPL-MCNC: 4.6 MG/DL — HIGH (ref 0.2–1.2)
BILIRUB SERPL-MCNC: 5.3 MG/DL — HIGH (ref 0.2–1.2)
BILIRUB SERPL-MCNC: 5.7 MG/DL — HIGH (ref 0.2–1.2)
BILIRUB SERPL-MCNC: 6.6 MG/DL — HIGH (ref 0.2–1.2)
BILIRUB SERPL-MCNC: 6.6 MG/DL — HIGH (ref 0.2–1.2)
BILIRUB SERPL-MCNC: 7 MG/DL — HIGH (ref 0.2–1.2)
BILIRUB SERPL-MCNC: 7.5 MG/DL — HIGH (ref 0.2–1.2)
BILIRUB SERPL-MCNC: 7.8 MG/DL — HIGH (ref 0.2–1.2)
BILIRUB UR-MCNC: NEGATIVE — SIGNIFICANT CHANGE UP
BILIRUB UR-MCNC: NEGATIVE — SIGNIFICANT CHANGE UP
BLD GP AB SCN SERPL QL: NEGATIVE — SIGNIFICANT CHANGE UP
BLD GP AB SCN SERPL QL: NEGATIVE — SIGNIFICANT CHANGE UP
BLOOD GAS ARTERIAL - LYTES,HGB,ICA,LACT RESULT: SIGNIFICANT CHANGE UP
BLOOD GAS ARTERIAL COMPREHENSIVE RESULT: SIGNIFICANT CHANGE UP
BLOOD GAS VENOUS COMPREHENSIVE RESULT: SIGNIFICANT CHANGE UP
BUN SERPL-MCNC: 16 MG/DL — SIGNIFICANT CHANGE UP (ref 7–23)
BUN SERPL-MCNC: 22 MG/DL — SIGNIFICANT CHANGE UP (ref 7–23)
BUN SERPL-MCNC: 23 MG/DL — SIGNIFICANT CHANGE UP (ref 7–23)
BUN SERPL-MCNC: 24 MG/DL — HIGH (ref 7–23)
BUN SERPL-MCNC: 24 MG/DL — HIGH (ref 7–23)
BUN SERPL-MCNC: 25 MG/DL — HIGH (ref 7–23)
BUN SERPL-MCNC: 25 MG/DL — HIGH (ref 7–23)
BUN SERPL-MCNC: 27 MG/DL — HIGH (ref 7–23)
BUN SERPL-MCNC: 27 MG/DL — HIGH (ref 7–23)
BUN SERPL-MCNC: 30 MG/DL — HIGH (ref 7–23)
BUN SERPL-MCNC: 31 MG/DL — HIGH (ref 7–23)
BUN SERPL-MCNC: 33 MG/DL — HIGH (ref 7–23)
C TRACH RRNA SPEC QL NAA+PROBE: SIGNIFICANT CHANGE UP
CALCIUM SERPL-MCNC: 7.2 MG/DL — LOW (ref 8.4–10.5)
CALCIUM SERPL-MCNC: 7.7 MG/DL — LOW (ref 8.4–10.5)
CALCIUM SERPL-MCNC: 7.9 MG/DL — LOW (ref 8.4–10.5)
CALCIUM SERPL-MCNC: 8 MG/DL — LOW (ref 8.4–10.5)
CALCIUM SERPL-MCNC: 8.1 MG/DL — LOW (ref 8.4–10.5)
CALCIUM SERPL-MCNC: 8.8 MG/DL — SIGNIFICANT CHANGE UP (ref 8.4–10.5)
CALCIUM SERPL-MCNC: 9.1 MG/DL — SIGNIFICANT CHANGE UP (ref 8.4–10.5)
CAST: 6 /LPF — HIGH (ref 0–4)
CHLORIDE SERPL-SCNC: 89 MMOL/L — LOW (ref 98–107)
CHLORIDE SERPL-SCNC: 90 MMOL/L — LOW (ref 98–107)
CHLORIDE SERPL-SCNC: 93 MMOL/L — LOW (ref 98–107)
CHLORIDE SERPL-SCNC: 94 MMOL/L — LOW (ref 98–107)
CHLORIDE SERPL-SCNC: 94 MMOL/L — LOW (ref 98–107)
CHLORIDE SERPL-SCNC: 96 MMOL/L — LOW (ref 98–107)
CHLORIDE SERPL-SCNC: 96 MMOL/L — LOW (ref 98–107)
CHLORIDE SERPL-SCNC: 97 MMOL/L — LOW (ref 98–107)
CHLORIDE SERPL-SCNC: 98 MMOL/L — SIGNIFICANT CHANGE UP (ref 98–107)
CHLORIDE SERPL-SCNC: 98 MMOL/L — SIGNIFICANT CHANGE UP (ref 98–107)
CO2 SERPL-SCNC: 10 MMOL/L — CRITICAL LOW (ref 22–31)
CO2 SERPL-SCNC: 11 MMOL/L — LOW (ref 22–31)
CO2 SERPL-SCNC: 12 MMOL/L — LOW (ref 22–31)
CO2 SERPL-SCNC: 13 MMOL/L — LOW (ref 22–31)
CO2 SERPL-SCNC: 13 MMOL/L — LOW (ref 22–31)
CO2 SERPL-SCNC: 16 MMOL/L — LOW (ref 22–31)
CO2 SERPL-SCNC: 16 MMOL/L — LOW (ref 22–31)
CO2 SERPL-SCNC: 17 MMOL/L — LOW (ref 22–31)
CO2 SERPL-SCNC: 18 MMOL/L — LOW (ref 22–31)
CO2 SERPL-SCNC: 19 MMOL/L — LOW (ref 22–31)
CO2 SERPL-SCNC: 20 MMOL/L — LOW (ref 22–31)
CO2 SERPL-SCNC: 21 MMOL/L — LOW (ref 22–31)
COLOR SPEC: SIGNIFICANT CHANGE UP
COLOR SPEC: YELLOW — SIGNIFICANT CHANGE UP
CREAT SERPL-MCNC: 0.64 MG/DL — SIGNIFICANT CHANGE UP (ref 0.5–1.3)
CREAT SERPL-MCNC: 1.4 MG/DL — HIGH (ref 0.5–1.3)
CREAT SERPL-MCNC: 1.47 MG/DL — HIGH (ref 0.5–1.3)
CREAT SERPL-MCNC: 1.62 MG/DL — HIGH (ref 0.5–1.3)
CREAT SERPL-MCNC: 1.73 MG/DL — HIGH (ref 0.5–1.3)
CREAT SERPL-MCNC: 1.73 MG/DL — HIGH (ref 0.5–1.3)
CREAT SERPL-MCNC: 1.77 MG/DL — HIGH (ref 0.5–1.3)
CREAT SERPL-MCNC: 1.92 MG/DL — HIGH (ref 0.5–1.3)
CREAT SERPL-MCNC: 1.95 MG/DL — HIGH (ref 0.5–1.3)
CREAT SERPL-MCNC: 1.97 MG/DL — HIGH (ref 0.5–1.3)
CREAT SERPL-MCNC: 1.97 MG/DL — HIGH (ref 0.5–1.3)
CREAT SERPL-MCNC: 2 MG/DL — HIGH (ref 0.5–1.3)
CREAT SERPL-MCNC: 2.05 MG/DL — HIGH (ref 0.5–1.3)
CREAT SERPL-MCNC: 2.77 MG/DL — HIGH (ref 0.5–1.3)
CULTURE RESULTS: SIGNIFICANT CHANGE UP
DIFF PNL FLD: ABNORMAL
DIFF PNL FLD: NEGATIVE — SIGNIFICANT CHANGE UP
EGFR: 118 ML/MIN/1.73M2 — SIGNIFICANT CHANGE UP
EGFR: 28 ML/MIN/1.73M2 — LOW
EGFR: 40 ML/MIN/1.73M2 — LOW
EGFR: 41 ML/MIN/1.73M2 — LOW
EGFR: 42 ML/MIN/1.73M2 — LOW
EGFR: 43 ML/MIN/1.73M2 — LOW
EGFR: 47 ML/MIN/1.73M2 — LOW
EGFR: 49 ML/MIN/1.73M2 — LOW
EGFR: 49 ML/MIN/1.73M2 — LOW
EGFR: 53 ML/MIN/1.73M2 — LOW
EGFR: 59 ML/MIN/1.73M2 — LOW
EGFR: 63 ML/MIN/1.73M2 — SIGNIFICANT CHANGE UP
EOSINOPHIL # BLD AUTO: 0 K/UL — SIGNIFICANT CHANGE UP (ref 0–0.5)
EOSINOPHIL # BLD AUTO: 0 K/UL — SIGNIFICANT CHANGE UP (ref 0–0.5)
EOSINOPHIL # BLD AUTO: 0.01 K/UL — SIGNIFICANT CHANGE UP (ref 0–0.5)
EOSINOPHIL # BLD AUTO: 0.03 K/UL — SIGNIFICANT CHANGE UP (ref 0–0.5)
EOSINOPHIL # BLD AUTO: 0.03 K/UL — SIGNIFICANT CHANGE UP (ref 0–0.5)
EOSINOPHIL # BLD AUTO: 0.04 K/UL — SIGNIFICANT CHANGE UP (ref 0–0.5)
EOSINOPHIL # BLD AUTO: 0.05 K/UL — SIGNIFICANT CHANGE UP (ref 0–0.5)
EOSINOPHIL # BLD AUTO: 0.21 K/UL — SIGNIFICANT CHANGE UP (ref 0–0.5)
EOSINOPHIL # BLD AUTO: 0.3 K/UL — SIGNIFICANT CHANGE UP (ref 0–0.5)
EOSINOPHIL # BLD AUTO: 0.4 K/UL — SIGNIFICANT CHANGE UP (ref 0–0.5)
EOSINOPHIL # BLD AUTO: 0.53 K/UL — HIGH (ref 0–0.5)
EOSINOPHIL # BLD AUTO: 0.6 K/UL — HIGH (ref 0–0.5)
EOSINOPHIL NFR BLD AUTO: 0 % — SIGNIFICANT CHANGE UP (ref 0–6)
EOSINOPHIL NFR BLD AUTO: 0.2 % — SIGNIFICANT CHANGE UP (ref 0–6)
EOSINOPHIL NFR BLD AUTO: 0.2 % — SIGNIFICANT CHANGE UP (ref 0–6)
EOSINOPHIL NFR BLD AUTO: 0.3 % — SIGNIFICANT CHANGE UP (ref 0–6)
EOSINOPHIL NFR BLD AUTO: 0.3 % — SIGNIFICANT CHANGE UP (ref 0–6)
EOSINOPHIL NFR BLD AUTO: 0.9 % — SIGNIFICANT CHANGE UP (ref 0–6)
EOSINOPHIL NFR BLD AUTO: 1.2 % — SIGNIFICANT CHANGE UP (ref 0–6)
EOSINOPHIL NFR BLD AUTO: 2.2 % — SIGNIFICANT CHANGE UP (ref 0–6)
EOSINOPHIL NFR BLD AUTO: 2.3 % — SIGNIFICANT CHANGE UP (ref 0–6)
EOSINOPHIL NFR BLD AUTO: 2.9 % — SIGNIFICANT CHANGE UP (ref 0–6)
ESTIMATED AVERAGE GLUCOSE: 128 — SIGNIFICANT CHANGE UP
ESTIMATED AVERAGE GLUCOSE: 97 — SIGNIFICANT CHANGE UP
FIBRINOGEN PPP-MCNC: 131 MG/DL — LOW (ref 200–465)
FIBRINOGEN PPP-MCNC: 151 MG/DL — LOW (ref 200–465)
FIBRINOGEN PPP-MCNC: 156 MG/DL — LOW (ref 200–465)
FIBRINOGEN PPP-MCNC: 180 MG/DL — LOW (ref 200–465)
FIBRINOGEN PPP-MCNC: 187 MG/DL — SIGNIFICANT CHANGE UP (ref 200–465)
GAS PNL BLDA: SIGNIFICANT CHANGE UP
GIANT PLATELETS BLD QL SMEAR: PRESENT — SIGNIFICANT CHANGE UP
GIANT PLATELETS BLD QL SMEAR: PRESENT — SIGNIFICANT CHANGE UP
GLUCOSE BLDC GLUCOMTR-MCNC: 118 MG/DL — HIGH (ref 70–99)
GLUCOSE BLDC GLUCOMTR-MCNC: 122 MG/DL — HIGH (ref 70–99)
GLUCOSE BLDC GLUCOMTR-MCNC: 131 MG/DL — HIGH (ref 70–99)
GLUCOSE BLDC GLUCOMTR-MCNC: 152 MG/DL — HIGH (ref 70–99)
GLUCOSE BLDC GLUCOMTR-MCNC: 160 MG/DL — HIGH (ref 70–99)
GLUCOSE BLDC GLUCOMTR-MCNC: 168 MG/DL — HIGH (ref 70–99)
GLUCOSE BLDC GLUCOMTR-MCNC: 190 MG/DL — HIGH (ref 70–99)
GLUCOSE BLDC GLUCOMTR-MCNC: 197 MG/DL — HIGH (ref 70–99)
GLUCOSE BLDC GLUCOMTR-MCNC: 200 MG/DL — HIGH (ref 70–99)
GLUCOSE BLDC GLUCOMTR-MCNC: 209 MG/DL — HIGH (ref 70–99)
GLUCOSE BLDC GLUCOMTR-MCNC: 227 MG/DL — HIGH (ref 70–99)
GLUCOSE BLDC GLUCOMTR-MCNC: 241 MG/DL — HIGH (ref 70–99)
GLUCOSE BLDC GLUCOMTR-MCNC: 288 MG/DL — HIGH (ref 70–99)
GLUCOSE BLDC GLUCOMTR-MCNC: 329 MG/DL — HIGH (ref 70–99)
GLUCOSE BLDC GLUCOMTR-MCNC: 345 MG/DL — HIGH (ref 70–99)
GLUCOSE BLDC GLUCOMTR-MCNC: 352 MG/DL — HIGH (ref 70–99)
GLUCOSE BLDC GLUCOMTR-MCNC: 66 MG/DL — LOW (ref 70–99)
GLUCOSE BLDC GLUCOMTR-MCNC: 74 MG/DL — SIGNIFICANT CHANGE UP (ref 70–99)
GLUCOSE BLDC GLUCOMTR-MCNC: 83 MG/DL — SIGNIFICANT CHANGE UP (ref 70–99)
GLUCOSE BLDC GLUCOMTR-MCNC: 92 MG/DL — SIGNIFICANT CHANGE UP (ref 70–99)
GLUCOSE SERPL-MCNC: 115 MG/DL — HIGH (ref 70–99)
GLUCOSE SERPL-MCNC: 132 MG/DL — HIGH (ref 70–99)
GLUCOSE SERPL-MCNC: 141 MG/DL — HIGH (ref 70–99)
GLUCOSE SERPL-MCNC: 153 MG/DL — HIGH (ref 70–99)
GLUCOSE SERPL-MCNC: 176 MG/DL — HIGH (ref 70–99)
GLUCOSE SERPL-MCNC: 196 MG/DL — HIGH (ref 70–99)
GLUCOSE SERPL-MCNC: 197 MG/DL — HIGH (ref 70–99)
GLUCOSE SERPL-MCNC: 219 MG/DL — HIGH (ref 70–99)
GLUCOSE SERPL-MCNC: 257 MG/DL — HIGH (ref 70–99)
GLUCOSE SERPL-MCNC: 294 MG/DL — HIGH (ref 70–99)
GLUCOSE SERPL-MCNC: 438 MG/DL — HIGH (ref 70–99)
GLUCOSE SERPL-MCNC: 57 MG/DL — LOW (ref 70–99)
GLUCOSE SERPL-MCNC: 70 MG/DL — SIGNIFICANT CHANGE UP (ref 70–99)
GLUCOSE SERPL-MCNC: 81 MG/DL — SIGNIFICANT CHANGE UP (ref 70–99)
GLUCOSE UR QL: 500 MG/DL
GLUCOSE UR QL: >=1000 MG/DL
HCT VFR BLD CALC: 19.4 % — CRITICAL LOW (ref 39–50)
HCT VFR BLD CALC: 20.7 % — CRITICAL LOW (ref 39–50)
HCT VFR BLD CALC: 21.6 % — LOW (ref 39–50)
HCT VFR BLD CALC: 22.6 % — LOW (ref 39–50)
HCT VFR BLD CALC: 24.6 % — LOW (ref 39–50)
HCT VFR BLD CALC: 25.5 % — LOW (ref 39–50)
HCT VFR BLD CALC: 25.8 % — LOW (ref 39–50)
HCT VFR BLD CALC: 26.3 % — LOW (ref 39–50)
HCT VFR BLD CALC: 26.8 % — LOW (ref 39–50)
HCT VFR BLD CALC: 27.2 % — LOW (ref 39–50)
HCT VFR BLD CALC: 28 % — LOW (ref 39–50)
HCT VFR BLD CALC: 29.1 % — LOW (ref 39–50)
HCT VFR BLD CALC: 30.6 % — LOW (ref 39–50)
HCT VFR BLD CALC: 31.2 % — LOW (ref 39–50)
HCT VFR BLD CALC: 31.8 % — LOW (ref 39–50)
HGB BLD-MCNC: 10 G/DL — LOW (ref 13–17)
HGB BLD-MCNC: 11.2 G/DL — LOW (ref 13–17)
HGB BLD-MCNC: 11.3 G/DL — LOW (ref 13–17)
HGB BLD-MCNC: 11.5 G/DL — LOW (ref 13–17)
HGB BLD-MCNC: 6.7 G/DL — CRITICAL LOW (ref 13–17)
HGB BLD-MCNC: 6.9 G/DL — CRITICAL LOW (ref 13–17)
HGB BLD-MCNC: 6.9 G/DL — CRITICAL LOW (ref 13–17)
HGB BLD-MCNC: 7.3 G/DL — LOW (ref 13–17)
HGB BLD-MCNC: 8.2 G/DL — LOW (ref 13–17)
HGB BLD-MCNC: 8.9 G/DL — LOW (ref 13–17)
HGB BLD-MCNC: 9.1 G/DL — LOW (ref 13–17)
HGB BLD-MCNC: 9.5 G/DL — LOW (ref 13–17)
HGB BLD-MCNC: 9.6 G/DL — LOW (ref 13–17)
HYALINE CASTS # UR AUTO: PRESENT
IANC: 11.89 K/UL — HIGH (ref 1.8–7.4)
IANC: 12.24 K/UL — HIGH (ref 1.8–7.4)
IANC: 14.07 K/UL — HIGH (ref 1.8–7.4)
IANC: 15.49 K/UL — HIGH (ref 1.8–7.4)
IANC: 17.82 K/UL — HIGH (ref 1.8–7.4)
IANC: 18.87 K/UL — HIGH (ref 1.8–7.4)
IANC: 19.17 K/UL — HIGH (ref 1.8–7.4)
IANC: 19.32 K/UL — HIGH (ref 1.8–7.4)
IANC: 19.36 K/UL — HIGH (ref 1.8–7.4)
IANC: 20.84 K/UL — HIGH (ref 1.8–7.4)
IANC: 20.87 K/UL — HIGH (ref 1.8–7.4)
IANC: 8.87 K/UL — HIGH (ref 1.8–7.4)
IMM GRANULOCYTES NFR BLD AUTO: 0.6 % — SIGNIFICANT CHANGE UP (ref 0–0.9)
IMM GRANULOCYTES NFR BLD AUTO: 0.8 % — SIGNIFICANT CHANGE UP (ref 0–0.9)
IMM GRANULOCYTES NFR BLD AUTO: 0.8 % — SIGNIFICANT CHANGE UP (ref 0–0.9)
IMM GRANULOCYTES NFR BLD AUTO: 0.9 % — SIGNIFICANT CHANGE UP (ref 0–0.9)
IMM GRANULOCYTES NFR BLD AUTO: 1.1 % — HIGH (ref 0–0.9)
IMM GRANULOCYTES NFR BLD AUTO: 1.4 % — HIGH (ref 0–0.9)
IMM GRANULOCYTES NFR BLD AUTO: 2.5 % — HIGH (ref 0–0.9)
IMM GRANULOCYTES NFR BLD AUTO: 3.3 % — HIGH (ref 0–0.9)
IMM GRANULOCYTES NFR BLD AUTO: 5.2 % — HIGH (ref 0–0.9)
IMM GRANULOCYTES NFR BLD AUTO: 6.7 % — HIGH (ref 0–0.9)
INR BLD: 1.71 RATIO — HIGH (ref 0.85–1.16)
INR BLD: 2.54 RATIO — HIGH (ref 0.85–1.16)
INR BLD: 2.61 RATIO — HIGH (ref 0.85–1.16)
INR BLD: 2.95 RATIO — HIGH (ref 0.85–1.16)
INR BLD: 3.43 RATIO — HIGH (ref 0.85–1.16)
INR BLD: 3.44 RATIO — HIGH (ref 0.85–1.16)
INR BLD: 3.57 RATIO — HIGH (ref 0.85–1.16)
INR BLD: 3.8 RATIO — HIGH (ref 0.85–1.16)
INR BLD: 4.55 RATIO — HIGH (ref 0.85–1.16)
INR BLD: 5.56 RATIO — CRITICAL HIGH (ref 0.85–1.16)
KETONES UR-MCNC: 15 MG/DL
KETONES UR-MCNC: ABNORMAL MG/DL
LEUKOCYTE ESTERASE UR-ACNC: NEGATIVE — SIGNIFICANT CHANGE UP
LEUKOCYTE ESTERASE UR-ACNC: NEGATIVE — SIGNIFICANT CHANGE UP
LYMPHOCYTES # BLD AUTO: 0.43 K/UL — LOW (ref 1–3.3)
LYMPHOCYTES # BLD AUTO: 0.85 K/UL — LOW (ref 1–3.3)
LYMPHOCYTES # BLD AUTO: 0.95 K/UL — LOW (ref 1–3.3)
LYMPHOCYTES # BLD AUTO: 1.14 K/UL — SIGNIFICANT CHANGE UP (ref 1–3.3)
LYMPHOCYTES # BLD AUTO: 1.35 K/UL — SIGNIFICANT CHANGE UP (ref 1–3.3)
LYMPHOCYTES # BLD AUTO: 1.36 K/UL — SIGNIFICANT CHANGE UP (ref 1–3.3)
LYMPHOCYTES # BLD AUTO: 1.48 K/UL — SIGNIFICANT CHANGE UP (ref 1–3.3)
LYMPHOCYTES # BLD AUTO: 1.8 % — LOW (ref 13–44)
LYMPHOCYTES # BLD AUTO: 1.96 K/UL — SIGNIFICANT CHANGE UP (ref 1–3.3)
LYMPHOCYTES # BLD AUTO: 10.7 % — LOW (ref 13–44)
LYMPHOCYTES # BLD AUTO: 11.9 % — LOW (ref 13–44)
LYMPHOCYTES # BLD AUTO: 13.2 % — SIGNIFICANT CHANGE UP (ref 13–44)
LYMPHOCYTES # BLD AUTO: 13.3 % — SIGNIFICANT CHANGE UP (ref 13–44)
LYMPHOCYTES # BLD AUTO: 17.6 % — SIGNIFICANT CHANGE UP (ref 13–44)
LYMPHOCYTES # BLD AUTO: 2.18 K/UL — SIGNIFICANT CHANGE UP (ref 1–3.3)
LYMPHOCYTES # BLD AUTO: 2.28 K/UL — SIGNIFICANT CHANGE UP (ref 1–3.3)
LYMPHOCYTES # BLD AUTO: 3.25 K/UL — SIGNIFICANT CHANGE UP (ref 1–3.3)
LYMPHOCYTES # BLD AUTO: 3.34 K/UL — HIGH (ref 1–3.3)
LYMPHOCYTES # BLD AUTO: 3.6 % — LOW (ref 13–44)
LYMPHOCYTES # BLD AUTO: 4.2 % — LOW (ref 13–44)
LYMPHOCYTES # BLD AUTO: 4.3 % — LOW (ref 13–44)
LYMPHOCYTES # BLD AUTO: 5.6 % — LOW (ref 13–44)
LYMPHOCYTES # BLD AUTO: 7.1 % — LOW (ref 13–44)
LYMPHOCYTES # BLD AUTO: 9 % — LOW (ref 13–44)
MAGNESIUM SERPL-MCNC: 1.6 MG/DL — SIGNIFICANT CHANGE UP (ref 1.6–2.6)
MAGNESIUM SERPL-MCNC: 1.7 MG/DL — SIGNIFICANT CHANGE UP (ref 1.6–2.6)
MAGNESIUM SERPL-MCNC: 1.8 MG/DL — SIGNIFICANT CHANGE UP (ref 1.6–2.6)
MAGNESIUM SERPL-MCNC: 1.9 MG/DL — SIGNIFICANT CHANGE UP (ref 1.6–2.6)
MAGNESIUM SERPL-MCNC: 1.9 MG/DL — SIGNIFICANT CHANGE UP (ref 1.6–2.6)
MAGNESIUM SERPL-MCNC: 2 MG/DL — SIGNIFICANT CHANGE UP (ref 1.6–2.6)
MCHC RBC-ENTMCNC: 27.7 PG — SIGNIFICANT CHANGE UP (ref 27–34)
MCHC RBC-ENTMCNC: 28 PG — SIGNIFICANT CHANGE UP (ref 27–34)
MCHC RBC-ENTMCNC: 28.1 PG — SIGNIFICANT CHANGE UP (ref 27–34)
MCHC RBC-ENTMCNC: 28.3 PG — SIGNIFICANT CHANGE UP (ref 27–34)
MCHC RBC-ENTMCNC: 28.4 PG — SIGNIFICANT CHANGE UP (ref 27–34)
MCHC RBC-ENTMCNC: 28.7 PG — SIGNIFICANT CHANGE UP (ref 27–34)
MCHC RBC-ENTMCNC: 29.2 PG — SIGNIFICANT CHANGE UP (ref 27–34)
MCHC RBC-ENTMCNC: 29.3 PG — SIGNIFICANT CHANGE UP (ref 27–34)
MCHC RBC-ENTMCNC: 29.5 PG — SIGNIFICANT CHANGE UP (ref 27–34)
MCHC RBC-ENTMCNC: 29.6 PG — SIGNIFICANT CHANGE UP (ref 27–34)
MCHC RBC-ENTMCNC: 29.7 PG — SIGNIFICANT CHANGE UP (ref 27–34)
MCHC RBC-ENTMCNC: 30.4 PG — SIGNIFICANT CHANGE UP (ref 27–34)
MCHC RBC-ENTMCNC: 30.5 PG — SIGNIFICANT CHANGE UP (ref 27–34)
MCHC RBC-ENTMCNC: 31.5 PG — SIGNIFICANT CHANGE UP (ref 27–34)
MCHC RBC-ENTMCNC: 31.9 G/DL — LOW (ref 32–36)
MCHC RBC-ENTMCNC: 32.3 G/DL — SIGNIFICANT CHANGE UP (ref 32–36)
MCHC RBC-ENTMCNC: 32.3 PG — SIGNIFICANT CHANGE UP (ref 27–34)
MCHC RBC-ENTMCNC: 33 G/DL — SIGNIFICANT CHANGE UP (ref 32–36)
MCHC RBC-ENTMCNC: 33.3 G/DL — SIGNIFICANT CHANGE UP (ref 32–36)
MCHC RBC-ENTMCNC: 33.3 G/DL — SIGNIFICANT CHANGE UP (ref 32–36)
MCHC RBC-ENTMCNC: 33.8 G/DL — SIGNIFICANT CHANGE UP (ref 32–36)
MCHC RBC-ENTMCNC: 34.3 G/DL — SIGNIFICANT CHANGE UP (ref 32–36)
MCHC RBC-ENTMCNC: 34.5 G/DL — SIGNIFICANT CHANGE UP (ref 32–36)
MCHC RBC-ENTMCNC: 35.2 G/DL — SIGNIFICANT CHANGE UP (ref 32–36)
MCHC RBC-ENTMCNC: 35.7 G/DL — SIGNIFICANT CHANGE UP (ref 32–36)
MCHC RBC-ENTMCNC: 35.8 G/DL — SIGNIFICANT CHANGE UP (ref 32–36)
MCHC RBC-ENTMCNC: 36.8 G/DL — HIGH (ref 32–36)
MCHC RBC-ENTMCNC: 36.8 G/DL — HIGH (ref 32–36)
MCHC RBC-ENTMCNC: 36.9 G/DL — HIGH (ref 32–36)
MCHC RBC-ENTMCNC: 36.9 G/DL — HIGH (ref 32–36)
MCV RBC AUTO: 82.4 FL — SIGNIFICANT CHANGE UP (ref 80–100)
MCV RBC AUTO: 82.5 FL — SIGNIFICANT CHANGE UP (ref 80–100)
MCV RBC AUTO: 82.8 FL — SIGNIFICANT CHANGE UP (ref 80–100)
MCV RBC AUTO: 83.3 FL — SIGNIFICANT CHANGE UP (ref 80–100)
MCV RBC AUTO: 83.5 FL — SIGNIFICANT CHANGE UP (ref 80–100)
MCV RBC AUTO: 83.8 FL — SIGNIFICANT CHANGE UP (ref 80–100)
MCV RBC AUTO: 84.1 FL — SIGNIFICANT CHANGE UP (ref 80–100)
MCV RBC AUTO: 84.1 FL — SIGNIFICANT CHANGE UP (ref 80–100)
MCV RBC AUTO: 84.7 FL — SIGNIFICANT CHANGE UP (ref 80–100)
MCV RBC AUTO: 85.2 FL — SIGNIFICANT CHANGE UP (ref 80–100)
MCV RBC AUTO: 85.2 FL — SIGNIFICANT CHANGE UP (ref 80–100)
MCV RBC AUTO: 86.9 FL — SIGNIFICANT CHANGE UP (ref 80–100)
MCV RBC AUTO: 87.5 FL — SIGNIFICANT CHANGE UP (ref 80–100)
MCV RBC AUTO: 87.7 FL — SIGNIFICANT CHANGE UP (ref 80–100)
MCV RBC AUTO: 87.8 FL — SIGNIFICANT CHANGE UP (ref 80–100)
MONOCYTES # BLD AUTO: 0.43 K/UL — SIGNIFICANT CHANGE UP (ref 0–0.9)
MONOCYTES # BLD AUTO: 0.91 K/UL — HIGH (ref 0–0.9)
MONOCYTES # BLD AUTO: 1.3 K/UL — HIGH (ref 0–0.9)
MONOCYTES # BLD AUTO: 1.5 K/UL — HIGH (ref 0–0.9)
MONOCYTES # BLD AUTO: 1.88 K/UL — HIGH (ref 0–0.9)
MONOCYTES # BLD AUTO: 2.12 K/UL — HIGH (ref 0–0.9)
MONOCYTES # BLD AUTO: 2.33 K/UL — HIGH (ref 0–0.9)
MONOCYTES # BLD AUTO: 2.33 K/UL — HIGH (ref 0–0.9)
MONOCYTES # BLD AUTO: 2.54 K/UL — HIGH (ref 0–0.9)
MONOCYTES # BLD AUTO: 2.59 K/UL — HIGH (ref 0–0.9)
MONOCYTES # BLD AUTO: 2.72 K/UL — HIGH (ref 0–0.9)
MONOCYTES # BLD AUTO: 3.01 K/UL — HIGH (ref 0–0.9)
MONOCYTES NFR BLD AUTO: 1.8 % — LOW (ref 2–14)
MONOCYTES NFR BLD AUTO: 10.2 % — SIGNIFICANT CHANGE UP (ref 2–14)
MONOCYTES NFR BLD AUTO: 10.7 % — SIGNIFICANT CHANGE UP (ref 2–14)
MONOCYTES NFR BLD AUTO: 12.2 % — SIGNIFICANT CHANGE UP (ref 2–14)
MONOCYTES NFR BLD AUTO: 13.6 % — SIGNIFICANT CHANGE UP (ref 2–14)
MONOCYTES NFR BLD AUTO: 14.9 % — HIGH (ref 2–14)
MONOCYTES NFR BLD AUTO: 6.3 % — SIGNIFICANT CHANGE UP (ref 2–14)
MONOCYTES NFR BLD AUTO: 6.8 % — SIGNIFICANT CHANGE UP (ref 2–14)
MONOCYTES NFR BLD AUTO: 8.1 % — SIGNIFICANT CHANGE UP (ref 2–14)
MONOCYTES NFR BLD AUTO: 8.2 % — SIGNIFICANT CHANGE UP (ref 2–14)
MONOCYTES NFR BLD AUTO: 9.6 % — SIGNIFICANT CHANGE UP (ref 2–14)
MONOCYTES NFR BLD AUTO: 9.6 % — SIGNIFICANT CHANGE UP (ref 2–14)
MRSA PCR RESULT.: SIGNIFICANT CHANGE UP
MYELOCYTES NFR BLD: 0.9 % — HIGH (ref 0–0)
N GONORRHOEA RRNA SPEC QL NAA+PROBE: SIGNIFICANT CHANGE UP
NEUTROPHILS # BLD AUTO: 11.89 K/UL — HIGH (ref 1.8–7.4)
NEUTROPHILS # BLD AUTO: 12.24 K/UL — HIGH (ref 1.8–7.4)
NEUTROPHILS # BLD AUTO: 14.07 K/UL — HIGH (ref 1.8–7.4)
NEUTROPHILS # BLD AUTO: 15.49 K/UL — HIGH (ref 1.8–7.4)
NEUTROPHILS # BLD AUTO: 17.82 K/UL — HIGH (ref 1.8–7.4)
NEUTROPHILS # BLD AUTO: 18.87 K/UL — HIGH (ref 1.8–7.4)
NEUTROPHILS # BLD AUTO: 19.17 K/UL — HIGH (ref 1.8–7.4)
NEUTROPHILS # BLD AUTO: 19.32 K/UL — HIGH (ref 1.8–7.4)
NEUTROPHILS # BLD AUTO: 20.87 K/UL — HIGH (ref 1.8–7.4)
NEUTROPHILS # BLD AUTO: 22.56 K/UL — HIGH (ref 1.8–7.4)
NEUTROPHILS # BLD AUTO: 23.1 K/UL — HIGH (ref 1.8–7.4)
NEUTROPHILS # BLD AUTO: 8.87 K/UL — HIGH (ref 1.8–7.4)
NEUTROPHILS NFR BLD AUTO: 65.2 % — SIGNIFICANT CHANGE UP (ref 43–77)
NEUTROPHILS NFR BLD AUTO: 71.4 % — SIGNIFICANT CHANGE UP (ref 43–77)
NEUTROPHILS NFR BLD AUTO: 72.2 % — SIGNIFICANT CHANGE UP (ref 43–77)
NEUTROPHILS NFR BLD AUTO: 74.1 % — SIGNIFICANT CHANGE UP (ref 43–77)
NEUTROPHILS NFR BLD AUTO: 74.4 % — SIGNIFICANT CHANGE UP (ref 43–77)
NEUTROPHILS NFR BLD AUTO: 78.5 % — HIGH (ref 43–77)
NEUTROPHILS NFR BLD AUTO: 79 % — HIGH (ref 43–77)
NEUTROPHILS NFR BLD AUTO: 80.2 % — HIGH (ref 43–77)
NEUTROPHILS NFR BLD AUTO: 82.6 % — HIGH (ref 43–77)
NEUTROPHILS NFR BLD AUTO: 83.5 % — HIGH (ref 43–77)
NEUTROPHILS NFR BLD AUTO: 88.3 % — HIGH (ref 43–77)
NEUTROPHILS NFR BLD AUTO: 92.7 % — HIGH (ref 43–77)
NEUTS BAND # BLD: 1.7 % — SIGNIFICANT CHANGE UP (ref 0–6)
NEUTS BAND # BLD: 3.7 % — SIGNIFICANT CHANGE UP (ref 0–6)
NITRITE UR-MCNC: NEGATIVE — SIGNIFICANT CHANGE UP
NITRITE UR-MCNC: NEGATIVE — SIGNIFICANT CHANGE UP
NRBC # BLD: 0 /100 WBCS — SIGNIFICANT CHANGE UP (ref 0–0)
NRBC # BLD: 1 /100 WBCS — HIGH (ref 0–0)
NRBC # BLD: 11 /100 WBCS — HIGH (ref 0–0)
NRBC # BLD: 2 /100 WBCS — HIGH (ref 0–0)
NRBC # BLD: 5 /100 WBCS — HIGH (ref 0–0)
NRBC # FLD: 0 K/UL — SIGNIFICANT CHANGE UP (ref 0–0)
NRBC # FLD: 0 K/UL — SIGNIFICANT CHANGE UP (ref 0–0)
NRBC # FLD: 0.03 K/UL — HIGH (ref 0–0)
NRBC # FLD: 0.05 K/UL — HIGH (ref 0–0)
NRBC # FLD: 0.09 K/UL — HIGH (ref 0–0)
NRBC # FLD: 0.09 K/UL — HIGH (ref 0–0)
NRBC # FLD: 0.13 K/UL — HIGH (ref 0–0)
NRBC # FLD: 0.29 K/UL — HIGH (ref 0–0)
NRBC # FLD: 0.61 K/UL — HIGH (ref 0–0)
NRBC # FLD: 1.04 K/UL — HIGH (ref 0–0)
NRBC # FLD: 2 K/UL — HIGH (ref 0–0)
NT-PROBNP SERPL-SCNC: 363 PG/ML — HIGH
OVALOCYTES BLD QL SMEAR: SLIGHT — SIGNIFICANT CHANGE UP
OVALOCYTES BLD QL SMEAR: SLIGHT — SIGNIFICANT CHANGE UP
PH UR: 5.5 — SIGNIFICANT CHANGE UP (ref 5–8)
PH UR: 6 — SIGNIFICANT CHANGE UP (ref 5–8)
PHOSPHATE SERPL-MCNC: 4.1 MG/DL — SIGNIFICANT CHANGE UP (ref 2.5–4.5)
PHOSPHATE SERPL-MCNC: 4.8 MG/DL — HIGH (ref 2.5–4.5)
PHOSPHATE SERPL-MCNC: 4.9 MG/DL — HIGH (ref 2.5–4.5)
PHOSPHATE SERPL-MCNC: 5.5 MG/DL — HIGH (ref 2.5–4.5)
PHOSPHATE SERPL-MCNC: 5.5 MG/DL — HIGH (ref 2.5–4.5)
PHOSPHATE SERPL-MCNC: 5.7 MG/DL — HIGH (ref 2.5–4.5)
PHOSPHATE SERPL-MCNC: 6.8 MG/DL — HIGH (ref 2.5–4.5)
PHOSPHATE SERPL-MCNC: 7.1 MG/DL — HIGH (ref 2.5–4.5)
PHOSPHATE SERPL-MCNC: 7.3 MG/DL — HIGH (ref 2.5–4.5)
PHOSPHATE SERPL-MCNC: 7.3 MG/DL — HIGH (ref 2.5–4.5)
PHOSPHATE SERPL-MCNC: 7.7 MG/DL — HIGH (ref 2.5–4.5)
PLAT MORPH BLD: NORMAL — SIGNIFICANT CHANGE UP
PLAT MORPH BLD: NORMAL — SIGNIFICANT CHANGE UP
PLATELET # BLD AUTO: 136 K/UL — LOW (ref 150–400)
PLATELET # BLD AUTO: 149 K/UL — LOW (ref 150–400)
PLATELET # BLD AUTO: 157 K/UL — SIGNIFICANT CHANGE UP (ref 150–400)
PLATELET # BLD AUTO: 159 K/UL — SIGNIFICANT CHANGE UP (ref 150–400)
PLATELET # BLD AUTO: 186 K/UL — SIGNIFICANT CHANGE UP (ref 150–400)
PLATELET # BLD AUTO: 217 K/UL — SIGNIFICANT CHANGE UP (ref 150–400)
PLATELET # BLD AUTO: 63 K/UL — LOW (ref 150–400)
PLATELET # BLD AUTO: 75 K/UL — LOW (ref 150–400)
PLATELET # BLD AUTO: 77 K/UL — LOW (ref 150–400)
PLATELET # BLD AUTO: 83 K/UL — LOW (ref 150–400)
PLATELET # BLD AUTO: 86 K/UL — LOW (ref 150–400)
PLATELET # BLD AUTO: 87 K/UL — LOW (ref 150–400)
PLATELET # BLD AUTO: 95 K/UL — LOW (ref 150–400)
PLATELET # BLD AUTO: 96 K/UL — LOW (ref 150–400)
PLATELET # BLD AUTO: 99 K/UL — LOW (ref 150–400)
PLATELET COUNT - ESTIMATE: ABNORMAL
PLATELET COUNT - ESTIMATE: NORMAL — SIGNIFICANT CHANGE UP
POIKILOCYTOSIS BLD QL AUTO: SLIGHT — SIGNIFICANT CHANGE UP
POIKILOCYTOSIS BLD QL AUTO: SLIGHT — SIGNIFICANT CHANGE UP
POLYCHROMASIA BLD QL SMEAR: SLIGHT — SIGNIFICANT CHANGE UP
POLYCHROMASIA BLD QL SMEAR: SLIGHT — SIGNIFICANT CHANGE UP
POTASSIUM SERPL-MCNC: 4.3 MMOL/L — SIGNIFICANT CHANGE UP (ref 3.5–5.3)
POTASSIUM SERPL-MCNC: 4.4 MMOL/L — SIGNIFICANT CHANGE UP (ref 3.5–5.3)
POTASSIUM SERPL-MCNC: 4.4 MMOL/L — SIGNIFICANT CHANGE UP (ref 3.5–5.3)
POTASSIUM SERPL-MCNC: 4.5 MMOL/L — SIGNIFICANT CHANGE UP (ref 3.5–5.3)
POTASSIUM SERPL-MCNC: 4.8 MMOL/L — SIGNIFICANT CHANGE UP (ref 3.5–5.3)
POTASSIUM SERPL-MCNC: 5 MMOL/L — SIGNIFICANT CHANGE UP (ref 3.5–5.3)
POTASSIUM SERPL-MCNC: 5.2 MMOL/L — SIGNIFICANT CHANGE UP (ref 3.5–5.3)
POTASSIUM SERPL-MCNC: 5.3 MMOL/L — SIGNIFICANT CHANGE UP (ref 3.5–5.3)
POTASSIUM SERPL-MCNC: 6.6 MMOL/L — CRITICAL HIGH (ref 3.5–5.3)
POTASSIUM SERPL-MCNC: 7 MMOL/L — CRITICAL HIGH (ref 3.5–5.3)
POTASSIUM SERPL-MCNC: 7.2 MMOL/L — CRITICAL HIGH (ref 3.5–5.3)
POTASSIUM SERPL-MCNC: 7.8 MMOL/L — CRITICAL HIGH (ref 3.5–5.3)
POTASSIUM SERPL-SCNC: 4.3 MMOL/L — SIGNIFICANT CHANGE UP (ref 3.5–5.3)
POTASSIUM SERPL-SCNC: 4.4 MMOL/L — SIGNIFICANT CHANGE UP (ref 3.5–5.3)
POTASSIUM SERPL-SCNC: 4.4 MMOL/L — SIGNIFICANT CHANGE UP (ref 3.5–5.3)
POTASSIUM SERPL-SCNC: 4.5 MMOL/L — SIGNIFICANT CHANGE UP (ref 3.5–5.3)
POTASSIUM SERPL-SCNC: 4.8 MMOL/L — SIGNIFICANT CHANGE UP (ref 3.5–5.3)
POTASSIUM SERPL-SCNC: 5 MMOL/L — SIGNIFICANT CHANGE UP (ref 3.5–5.3)
POTASSIUM SERPL-SCNC: 5.2 MMOL/L — SIGNIFICANT CHANGE UP (ref 3.5–5.3)
POTASSIUM SERPL-SCNC: 5.3 MMOL/L — SIGNIFICANT CHANGE UP (ref 3.5–5.3)
POTASSIUM SERPL-SCNC: 6.6 MMOL/L — CRITICAL HIGH (ref 3.5–5.3)
POTASSIUM SERPL-SCNC: 7 MMOL/L — CRITICAL HIGH (ref 3.5–5.3)
POTASSIUM SERPL-SCNC: 7.2 MMOL/L — CRITICAL HIGH (ref 3.5–5.3)
POTASSIUM SERPL-SCNC: 7.8 MMOL/L — CRITICAL HIGH (ref 3.5–5.3)
PROT SERPL-MCNC: 4.9 G/DL — LOW (ref 6–8.3)
PROT SERPL-MCNC: 5.3 G/DL — LOW (ref 6–8.3)
PROT SERPL-MCNC: 5.4 G/DL — LOW (ref 6–8.3)
PROT SERPL-MCNC: 5.7 G/DL — LOW (ref 6–8.3)
PROT SERPL-MCNC: 5.7 G/DL — LOW (ref 6–8.3)
PROT SERPL-MCNC: 5.8 G/DL — LOW (ref 6–8.3)
PROT SERPL-MCNC: 5.8 G/DL — LOW (ref 6–8.3)
PROT SERPL-MCNC: 5.9 G/DL — LOW (ref 6–8.3)
PROT SERPL-MCNC: 6 G/DL — SIGNIFICANT CHANGE UP (ref 6–8.3)
PROT SERPL-MCNC: 6.1 G/DL — SIGNIFICANT CHANGE UP (ref 6–8.3)
PROT SERPL-MCNC: 7.4 G/DL — SIGNIFICANT CHANGE UP (ref 6–8.3)
PROT UR-MCNC: 30 MG/DL
PROT UR-MCNC: SIGNIFICANT CHANGE UP MG/DL
PROTHROM AB SERPL-ACNC: 19.7 SEC — HIGH (ref 9.9–13.4)
PROTHROM AB SERPL-ACNC: 30 SEC — HIGH (ref 9.9–13.4)
PROTHROM AB SERPL-ACNC: 30.8 SEC — HIGH (ref 9.9–13.4)
PROTHROM AB SERPL-ACNC: 34.7 SEC — HIGH (ref 9.9–13.4)
PROTHROM AB SERPL-ACNC: 39.3 SEC — HIGH (ref 9.9–13.4)
PROTHROM AB SERPL-ACNC: 39.4 SEC — HIGH (ref 9.9–13.4)
PROTHROM AB SERPL-ACNC: 41.9 SEC — HIGH (ref 9.9–13.4)
PROTHROM AB SERPL-ACNC: 43.5 SEC — HIGH (ref 9.9–13.4)
PROTHROM AB SERPL-ACNC: 52 SEC — HIGH (ref 9.9–13.4)
PROTHROM AB SERPL-ACNC: 65 SEC — HIGH (ref 9.9–13.4)
RBC # BLD: 2.29 M/UL — LOW (ref 4.2–5.8)
RBC # BLD: 2.43 M/UL — LOW (ref 4.2–5.8)
RBC # BLD: 2.46 M/UL — LOW (ref 4.2–5.8)
RBC # BLD: 2.6 M/UL — LOW (ref 4.2–5.8)
RBC # BLD: 2.81 M/UL — LOW (ref 4.2–5.8)
RBC # BLD: 3.06 M/UL — LOW (ref 4.2–5.8)
RBC # BLD: 3.1 M/UL — LOW (ref 4.2–5.8)
RBC # BLD: 3.13 M/UL — LOW (ref 4.2–5.8)
RBC # BLD: 3.15 M/UL — LOW (ref 4.2–5.8)
RBC # BLD: 3.25 M/UL — LOW (ref 4.2–5.8)
RBC # BLD: 3.34 M/UL — LOW (ref 4.2–5.8)
RBC # BLD: 3.46 M/UL — LOW (ref 4.2–5.8)
RBC # BLD: 3.59 M/UL — LOW (ref 4.2–5.8)
RBC # BLD: 3.77 M/UL — LOW (ref 4.2–5.8)
RBC # BLD: 3.78 M/UL — LOW (ref 4.2–5.8)
RBC # FLD: 14.6 % — HIGH (ref 10.3–14.5)
RBC # FLD: 14.7 % — HIGH (ref 10.3–14.5)
RBC # FLD: 14.7 % — HIGH (ref 10.3–14.5)
RBC # FLD: 14.8 % — HIGH (ref 10.3–14.5)
RBC # FLD: 14.8 % — HIGH (ref 10.3–14.5)
RBC # FLD: 15 % — HIGH (ref 10.3–14.5)
RBC # FLD: 15.5 % — HIGH (ref 10.3–14.5)
RBC # FLD: 15.6 % — HIGH (ref 10.3–14.5)
RBC # FLD: 15.7 % — HIGH (ref 10.3–14.5)
RBC # FLD: 16.7 % — HIGH (ref 10.3–14.5)
RBC # FLD: 17 % — HIGH (ref 10.3–14.5)
RBC # FLD: 18.3 % — HIGH (ref 10.3–14.5)
RBC # FLD: 18.7 % — HIGH (ref 10.3–14.5)
RBC BLD AUTO: ABNORMAL
RBC BLD AUTO: ABNORMAL
RBC CASTS # UR COMP ASSIST: 54 /HPF — HIGH (ref 0–4)
REVIEW: SIGNIFICANT CHANGE UP
RH IG SCN BLD-IMP: POSITIVE — SIGNIFICANT CHANGE UP
RH IG SCN BLD-IMP: POSITIVE — SIGNIFICANT CHANGE UP
S AUREUS DNA NOSE QL NAA+PROBE: DETECTED
SMUDGE CELLS # BLD: PRESENT — SIGNIFICANT CHANGE UP
SMUDGE CELLS # BLD: PRESENT — SIGNIFICANT CHANGE UP
SODIUM SERPL-SCNC: 121 MMOL/L — LOW (ref 135–145)
SODIUM SERPL-SCNC: 127 MMOL/L — LOW (ref 135–145)
SODIUM SERPL-SCNC: 131 MMOL/L — LOW (ref 135–145)
SODIUM SERPL-SCNC: 132 MMOL/L — LOW (ref 135–145)
SODIUM SERPL-SCNC: 133 MMOL/L — LOW (ref 135–145)
SODIUM SERPL-SCNC: 134 MMOL/L — LOW (ref 135–145)
SODIUM SERPL-SCNC: 135 MMOL/L — SIGNIFICANT CHANGE UP (ref 135–145)
SP GR SPEC: 1.03 — HIGH (ref 1–1.03)
SP GR SPEC: 1.05 — HIGH (ref 1–1.03)
SPECIMEN SOURCE: SIGNIFICANT CHANGE UP
SQUAMOUS # UR AUTO: 5 /HPF — SIGNIFICANT CHANGE UP (ref 0–5)
UROBILINOGEN FLD QL: 0.2 MG/DL — SIGNIFICANT CHANGE UP (ref 0.2–1)
UROBILINOGEN FLD QL: 0.2 MG/DL — SIGNIFICANT CHANGE UP (ref 0.2–1)
WBC # BLD: 10.85 K/UL — HIGH (ref 3.8–10.5)
WBC # BLD: 11.3 K/UL — HIGH (ref 3.8–10.5)
WBC # BLD: 17.13 K/UL — HIGH (ref 3.8–10.5)
WBC # BLD: 18.25 K/UL — HIGH (ref 3.8–10.5)
WBC # BLD: 18.98 K/UL — HIGH (ref 3.8–10.5)
WBC # BLD: 20.81 K/UL — HIGH (ref 3.8–10.5)
WBC # BLD: 22.83 K/UL — HIGH (ref 3.8–10.5)
WBC # BLD: 23.64 K/UL — HIGH (ref 3.8–10.5)
WBC # BLD: 23.96 K/UL — HIGH (ref 3.8–10.5)
WBC # BLD: 24.13 K/UL — HIGH (ref 3.8–10.5)
WBC # BLD: 24.28 K/UL — HIGH (ref 3.8–10.5)
WBC # BLD: 24.66 K/UL — HIGH (ref 3.8–10.5)
WBC # BLD: 25.23 K/UL — HIGH (ref 3.8–10.5)
WBC # BLD: 26.26 K/UL — HIGH (ref 3.8–10.5)
WBC # BLD: 26.48 K/UL — HIGH (ref 3.8–10.5)
WBC # FLD AUTO: 10.85 K/UL — HIGH (ref 3.8–10.5)
WBC # FLD AUTO: 11.3 K/UL — HIGH (ref 3.8–10.5)
WBC # FLD AUTO: 17.13 K/UL — HIGH (ref 3.8–10.5)
WBC # FLD AUTO: 18.25 K/UL — HIGH (ref 3.8–10.5)
WBC # FLD AUTO: 18.98 K/UL — HIGH (ref 3.8–10.5)
WBC # FLD AUTO: 20.81 K/UL — HIGH (ref 3.8–10.5)
WBC # FLD AUTO: 22.83 K/UL — HIGH (ref 3.8–10.5)
WBC # FLD AUTO: 23.64 K/UL — HIGH (ref 3.8–10.5)
WBC # FLD AUTO: 23.96 K/UL — HIGH (ref 3.8–10.5)
WBC # FLD AUTO: 24.13 K/UL — HIGH (ref 3.8–10.5)
WBC # FLD AUTO: 24.28 K/UL — HIGH (ref 3.8–10.5)
WBC # FLD AUTO: 24.66 K/UL — HIGH (ref 3.8–10.5)
WBC # FLD AUTO: 25.23 K/UL — HIGH (ref 3.8–10.5)
WBC # FLD AUTO: 26.26 K/UL — HIGH (ref 3.8–10.5)
WBC # FLD AUTO: 26.48 K/UL — HIGH (ref 3.8–10.5)
WBC UR QL: 4 /HPF — SIGNIFICANT CHANGE UP (ref 0–5)

## 2024-01-01 PROCEDURE — 99233 SBSQ HOSP IP/OBS HIGH 50: CPT | Mod: GC

## 2024-01-01 PROCEDURE — 99232 SBSQ HOSP IP/OBS MODERATE 35: CPT

## 2024-01-01 PROCEDURE — 99222 1ST HOSP IP/OBS MODERATE 55: CPT | Mod: GC,25

## 2024-01-01 PROCEDURE — 99232 SBSQ HOSP IP/OBS MODERATE 35: CPT | Mod: GC

## 2024-01-01 PROCEDURE — 71045 X-RAY EXAM CHEST 1 VIEW: CPT | Mod: 26

## 2024-01-01 PROCEDURE — 93010 ELECTROCARDIOGRAM REPORT: CPT

## 2024-01-01 PROCEDURE — 99223 1ST HOSP IP/OBS HIGH 75: CPT

## 2024-01-01 PROCEDURE — 93975 VASCULAR STUDY: CPT | Mod: 26

## 2024-01-01 PROCEDURE — 76604 US EXAM CHEST: CPT | Mod: 26

## 2024-01-01 PROCEDURE — 99233 SBSQ HOSP IP/OBS HIGH 50: CPT

## 2024-01-01 PROCEDURE — 99285 EMERGENCY DEPT VISIT HI MDM: CPT

## 2024-01-01 PROCEDURE — 93306 TTE W/DOPPLER COMPLETE: CPT | Mod: 26

## 2024-01-01 PROCEDURE — 99291 CRITICAL CARE FIRST HOUR: CPT | Mod: GC

## 2024-01-01 PROCEDURE — 74177 CT ABD & PELVIS W/CONTRAST: CPT | Mod: 26,MC

## 2024-01-01 PROCEDURE — 71045 X-RAY EXAM CHEST 1 VIEW: CPT | Mod: 26,76

## 2024-01-01 PROCEDURE — 99233 SBSQ HOSP IP/OBS HIGH 50: CPT | Mod: GC,25

## 2024-01-01 PROCEDURE — 43244 EGD VARICES LIGATION: CPT

## 2024-01-01 PROCEDURE — 99291 CRITICAL CARE FIRST HOUR: CPT | Mod: 25

## 2024-01-01 PROCEDURE — 43244 EGD VARICES LIGATION: CPT | Mod: GC

## 2024-01-01 DEVICE — LIGATOR MULTIBAND 9.5-11.5MM: Type: IMPLANTABLE DEVICE | Status: FUNCTIONAL

## 2024-01-01 RX ORDER — INSULIN REG, HUM S-S BUFF 100/ML
10 VIAL (ML) INJECTION ONCE
Refills: 0 | Status: COMPLETED | OUTPATIENT
Start: 2024-01-01 | End: 2024-01-01

## 2024-01-01 RX ORDER — VANCOMYCIN HCL 900 MCG/MG
1500 POWDER (GRAM) MISCELLANEOUS ONCE
Refills: 0 | Status: COMPLETED | OUTPATIENT
Start: 2024-01-01 | End: 2024-01-01

## 2024-01-01 RX ORDER — FENTANYL 12 UG/H
100 PATCH, EXTENDED RELEASE TRANSDERMAL ONCE
Refills: 0 | Status: DISCONTINUED | OUTPATIENT
Start: 2024-01-01 | End: 2024-01-01

## 2024-01-01 RX ORDER — LORAZEPAM 2 MG/1
2 TABLET ORAL ONCE
Refills: 0 | Status: DISCONTINUED | OUTPATIENT
Start: 2024-01-01 | End: 2024-01-01

## 2024-01-01 RX ORDER — VANCOMYCIN HCL 900 MCG/MG
750 POWDER (GRAM) MISCELLANEOUS EVERY 12 HOURS
Refills: 0 | Status: DISCONTINUED | OUTPATIENT
Start: 2024-01-01 | End: 2024-01-01

## 2024-01-01 RX ORDER — SODIUM BICARBONATE 84 MG/ML
0.12 INJECTION, SOLUTION INTRAVENOUS
Qty: 150 | Refills: 0 | Status: DISCONTINUED | OUTPATIENT
Start: 2024-01-01 | End: 2024-01-01

## 2024-01-01 RX ORDER — LACTULOSE 10 G/15ML
200 SOLUTION ORAL ONCE
Refills: 0 | Status: COMPLETED | OUTPATIENT
Start: 2024-01-01 | End: 2024-01-01

## 2024-01-01 RX ORDER — MEROPENEM 500 MG/1
1000 INJECTION, POWDER, FOR SOLUTION INTRAVENOUS EVERY 8 HOURS
Refills: 0 | Status: DISCONTINUED | OUTPATIENT
Start: 2024-01-01 | End: 2024-01-01

## 2024-01-01 RX ORDER — FENTANYL 12 UG/H
0.5 PATCH, EXTENDED RELEASE TRANSDERMAL
Qty: 2500 | Refills: 0 | Status: DISCONTINUED | OUTPATIENT
Start: 2024-01-01 | End: 2024-01-01

## 2024-01-01 RX ORDER — CHLORHEXIDINE GLUCONATE 1.2 MG/ML
15 RINSE ORAL EVERY 12 HOURS
Refills: 0 | Status: DISCONTINUED | OUTPATIENT
Start: 2024-01-01 | End: 2024-01-01

## 2024-01-01 RX ORDER — MEROPENEM 500 MG/1
1000 INJECTION, POWDER, FOR SOLUTION INTRAVENOUS EVERY 24 HOURS
Refills: 0 | Status: DISCONTINUED | OUTPATIENT
Start: 2024-01-01 | End: 2024-01-01

## 2024-01-01 RX ORDER — 0.9 % SODIUM CHLORIDE 0.9 %
1000 INTRAVENOUS SOLUTION INTRAVENOUS ONCE
Refills: 0 | Status: COMPLETED | OUTPATIENT
Start: 2024-01-01 | End: 2024-01-01

## 2024-01-01 RX ORDER — ALBUTEROL 90 MCG
2.5 AEROSOL (GRAM) INHALATION ONCE
Refills: 0 | Status: COMPLETED | OUTPATIENT
Start: 2024-01-01 | End: 2024-01-01

## 2024-01-01 RX ORDER — VANCOMYCIN HCL 900 MCG/MG
1500 POWDER (GRAM) MISCELLANEOUS ONCE
Refills: 0 | Status: DISCONTINUED | OUTPATIENT
Start: 2024-01-01 | End: 2024-01-01

## 2024-01-01 RX ORDER — ACETYLCYSTEINE
4.8 POWDER (GRAM) MISCELLANEOUS ONCE
Refills: 0 | Status: COMPLETED | OUTPATIENT
Start: 2024-01-01 | End: 2024-01-01

## 2024-01-01 RX ORDER — FENTANYL 12 UG/H
3 PATCH, EXTENDED RELEASE TRANSDERMAL
Qty: 2500 | Refills: 0 | Status: DISCONTINUED | OUTPATIENT
Start: 2024-01-01 | End: 2024-01-01

## 2024-01-01 RX ORDER — FENTANYL 12 UG/H
1 PATCH, EXTENDED RELEASE TRANSDERMAL
Qty: 2500 | Refills: 0 | Status: DISCONTINUED | OUTPATIENT
Start: 2024-01-01 | End: 2024-01-01

## 2024-01-01 RX ORDER — PANTOPRAZOLE SODIUM 40 MG/1
8 TABLET, DELAYED RELEASE ORAL
Qty: 80 | Refills: 0 | Status: DISCONTINUED | OUTPATIENT
Start: 2024-01-01 | End: 2024-01-01

## 2024-01-01 RX ORDER — VANCOMYCIN HCL 900 MCG/MG
POWDER (GRAM) MISCELLANEOUS
Refills: 0 | Status: DISCONTINUED | OUTPATIENT
Start: 2024-01-01 | End: 2024-01-01

## 2024-01-01 RX ORDER — CEFTRIAXONE SODIUM 1 G
1000 VIAL (EA) INJECTION EVERY 24 HOURS
Refills: 0 | Status: DISCONTINUED | OUTPATIENT
Start: 2024-01-01 | End: 2024-01-01

## 2024-01-01 RX ORDER — CEFTRIAXONE SODIUM 1 G
1000 VIAL (EA) INJECTION ONCE
Refills: 0 | Status: COMPLETED | OUTPATIENT
Start: 2024-01-01 | End: 2024-01-01

## 2024-01-01 RX ORDER — CHLORHEXIDINE GLUCONATE 1.2 MG/ML
1 RINSE ORAL
Refills: 0 | Status: DISCONTINUED | OUTPATIENT
Start: 2024-01-01 | End: 2024-01-01

## 2024-01-01 RX ORDER — VANCOMYCIN HCL 900 MCG/MG
750 POWDER (GRAM) MISCELLANEOUS ONCE
Refills: 0 | Status: COMPLETED | OUTPATIENT
Start: 2024-01-01 | End: 2024-01-01

## 2024-01-01 RX ORDER — 0.9 % SODIUM CHLORIDE 0.9 %
1000 INTRAVENOUS SOLUTION INTRAVENOUS
Refills: 0 | Status: DISCONTINUED | OUTPATIENT
Start: 2024-01-01 | End: 2024-01-01

## 2024-01-01 RX ORDER — ACETYLCYSTEINE
14 POWDER (GRAM) MISCELLANEOUS EVERY 24 HOURS
Refills: 0 | Status: DISCONTINUED | OUTPATIENT
Start: 2024-01-01 | End: 2024-01-01

## 2024-01-01 RX ORDER — ACETYLCYSTEINE
14 POWDER (GRAM) MISCELLANEOUS ONCE
Refills: 0 | Status: COMPLETED | OUTPATIENT
Start: 2024-01-01 | End: 2024-01-01

## 2024-01-01 RX ORDER — GLUCAGON INJECTION, SOLUTION 0.5 MG/.1ML
1 INJECTION, SOLUTION SUBCUTANEOUS ONCE
Refills: 0 | Status: DISCONTINUED | OUTPATIENT
Start: 2024-01-01 | End: 2024-01-01

## 2024-01-01 RX ORDER — FUROSEMIDE 40 MG/1
40 TABLET ORAL ONCE
Refills: 0 | Status: COMPLETED | OUTPATIENT
Start: 2024-01-01 | End: 2024-01-01

## 2024-01-01 RX ORDER — NOREPINEPHRINE BITARTRATE 1 MG/ML
0.8 INJECTION, SOLUTION, CONCENTRATE INTRAVENOUS
Qty: 16 | Refills: 0 | Status: DISCONTINUED | OUTPATIENT
Start: 2024-01-01 | End: 2024-01-01

## 2024-01-01 RX ORDER — 0.9 % SODIUM CHLORIDE 0.9 %
1000 INTRAVENOUS SOLUTION INTRAVENOUS ONCE
Refills: 0 | Status: DISCONTINUED | OUTPATIENT
Start: 2024-01-01 | End: 2024-01-01

## 2024-01-01 RX ORDER — POVIDONE, POLYVINYL ALCOHOL 20; 27 G/1000ML; G/1000ML
1 SOLUTION OPHTHALMIC
Refills: 0 | Status: DISCONTINUED | OUTPATIENT
Start: 2024-01-01 | End: 2024-01-01

## 2024-01-01 RX ORDER — SODIUM BICARBONATE 84 MG/ML
0.24 INJECTION, SOLUTION INTRAVENOUS
Qty: 150 | Refills: 0 | Status: DISCONTINUED | OUTPATIENT
Start: 2024-01-01 | End: 2024-01-01

## 2024-01-01 RX ORDER — PROPOFOL 10 MG/ML
20 INJECTION, EMULSION INTRAVENOUS ONCE
Refills: 0 | Status: DISCONTINUED | OUTPATIENT
Start: 2024-01-01 | End: 2024-01-01

## 2024-01-01 RX ORDER — OCTREOTIDE ACETATE 500 UG/ML
50 INJECTION, SOLUTION INTRAVENOUS; SUBCUTANEOUS
Qty: 500 | Refills: 0 | Status: DISCONTINUED | OUTPATIENT
Start: 2024-01-01 | End: 2024-01-01

## 2024-01-01 RX ORDER — OCTREOTIDE ACETATE 500 UG/ML
50 INJECTION, SOLUTION INTRAVENOUS; SUBCUTANEOUS ONCE
Refills: 0 | Status: COMPLETED | OUTPATIENT
Start: 2024-01-01 | End: 2024-01-01

## 2024-01-01 RX ORDER — NOREPINEPHRINE BITARTRATE 1 MG/ML
0.4 INJECTION, SOLUTION, CONCENTRATE INTRAVENOUS
Qty: 8 | Refills: 0 | Status: DISCONTINUED | OUTPATIENT
Start: 2024-01-01 | End: 2024-01-01

## 2024-01-01 RX ORDER — ACETYLCYSTEINE
10 POWDER (GRAM) MISCELLANEOUS ONCE
Refills: 0 | Status: COMPLETED | OUTPATIENT
Start: 2024-01-01 | End: 2024-01-01

## 2024-01-01 RX ORDER — VASOPRESSIN 20 [USP'U]/ML
0.04 INJECTION, SOLUTION INTRAVENOUS
Qty: 40 | Refills: 0 | Status: DISCONTINUED | OUTPATIENT
Start: 2024-01-01 | End: 2024-01-01

## 2024-01-01 RX ORDER — LANOLIN ALCOHOL/MO/W.PET/CERES
500 CREAM (GRAM) TOPICAL DAILY
Refills: 0 | Status: COMPLETED | OUTPATIENT
Start: 2024-01-01 | End: 2024-01-01

## 2024-01-01 RX ORDER — PANTOPRAZOLE SODIUM 40 MG/1
40 TABLET, DELAYED RELEASE ORAL ONCE
Refills: 0 | Status: COMPLETED | OUTPATIENT
Start: 2024-01-01 | End: 2024-01-01

## 2024-01-01 RX ORDER — PHYTONADIONE 5 MG/1
10 TABLET ORAL DAILY
Refills: 0 | Status: DISCONTINUED | OUTPATIENT
Start: 2024-01-01 | End: 2024-01-01

## 2024-01-01 RX ORDER — SODIUM BICARBONATE 84 MG/ML
50 INJECTION, SOLUTION INTRAVENOUS ONCE
Refills: 0 | Status: COMPLETED | OUTPATIENT
Start: 2024-01-01 | End: 2024-01-01

## 2024-01-01 RX ORDER — ONDANSETRON HYDROCHLORIDE 4 MG/1
4 TABLET, FILM COATED ORAL ONCE
Refills: 0 | Status: COMPLETED | OUTPATIENT
Start: 2024-01-01 | End: 2024-01-01

## 2024-01-01 RX ORDER — PROPOFOL 10 MG/ML
30 INJECTION, EMULSION INTRAVENOUS
Qty: 1000 | Refills: 0 | Status: DISCONTINUED | OUTPATIENT
Start: 2024-01-01 | End: 2024-01-01

## 2024-01-01 RX ORDER — GLUCOSAMINE SULFATE DIPOT CHLR 500 MG
1 CAPSULE ORAL DAILY
Refills: 0 | Status: DISCONTINUED | OUTPATIENT
Start: 2024-01-01 | End: 2024-01-01

## 2024-01-01 RX ADMIN — Medication 65.63 GRAM(S): at 16:00

## 2024-01-01 RX ADMIN — Medication 3: at 12:05

## 2024-01-01 RX ADMIN — Medication 200 GRAM(S): at 00:32

## 2024-01-01 RX ADMIN — FENTANYL 19.1 MICROGRAM(S)/KG/HR: 12 PATCH, EXTENDED RELEASE TRANSDERMAL at 07:49

## 2024-01-01 RX ADMIN — OCTREOTIDE ACETATE 50 MICROGRAM(S): 500 INJECTION, SOLUTION INTRAVENOUS; SUBCUTANEOUS at 17:03

## 2024-01-01 RX ADMIN — PANTOPRAZOLE SODIUM 10 MG/HR: 40 TABLET, DELAYED RELEASE ORAL at 08:14

## 2024-01-01 RX ADMIN — FENTANYL 100 MICROGRAM(S): 12 PATCH, EXTENDED RELEASE TRANSDERMAL at 20:06

## 2024-01-01 RX ADMIN — PROPOFOL 17.2 MICROGRAM(S)/KG/MIN: 10 INJECTION, EMULSION INTRAVENOUS at 08:14

## 2024-01-01 RX ADMIN — FENTANYL 4.77 MICROGRAM(S)/KG/HR: 12 PATCH, EXTENDED RELEASE TRANSDERMAL at 01:50

## 2024-01-01 RX ADMIN — Medication 10 UNIT(S): at 07:13

## 2024-01-01 RX ADMIN — VASOPRESSIN 6 UNIT(S)/MIN: 20 INJECTION, SOLUTION INTRAVENOUS at 19:44

## 2024-01-01 RX ADMIN — Medication 50 MILLILITER(S): at 01:17

## 2024-01-01 RX ADMIN — POVIDONE, POLYVINYL ALCOHOL 1 APPLICATION(S): 20; 27 SOLUTION OPHTHALMIC at 06:04

## 2024-01-01 RX ADMIN — POVIDONE, POLYVINYL ALCOHOL 1 APPLICATION(S): 20; 27 SOLUTION OPHTHALMIC at 18:11

## 2024-01-01 RX ADMIN — PROPOFOL 17.2 MICROGRAM(S)/KG/MIN: 10 INJECTION, EMULSION INTRAVENOUS at 07:40

## 2024-01-01 RX ADMIN — FENTANYL 28.6 MICROGRAM(S)/KG/HR: 12 PATCH, EXTENDED RELEASE TRANSDERMAL at 08:15

## 2024-01-01 RX ADMIN — Medication 250 MILLIGRAM(S): at 17:09

## 2024-01-01 RX ADMIN — CHLORHEXIDINE GLUCONATE 15 MILLILITER(S): 1.2 RINSE ORAL at 06:09

## 2024-01-01 RX ADMIN — Medication 300 MILLIGRAM(S): at 07:54

## 2024-01-01 RX ADMIN — MEROPENEM 100 MILLIGRAM(S): 500 INJECTION, POWDER, FOR SOLUTION INTRAVENOUS at 06:05

## 2024-01-01 RX ADMIN — CHLORHEXIDINE GLUCONATE 15 MILLILITER(S): 1.2 RINSE ORAL at 18:13

## 2024-01-01 RX ADMIN — MEROPENEM 100 MILLIGRAM(S): 500 INJECTION, POWDER, FOR SOLUTION INTRAVENOUS at 21:50

## 2024-01-01 RX ADMIN — CHLORHEXIDINE GLUCONATE 15 MILLILITER(S): 1.2 RINSE ORAL at 17:11

## 2024-01-01 RX ADMIN — VASOPRESSIN 6 UNIT(S)/MIN: 20 INJECTION, SOLUTION INTRAVENOUS at 08:13

## 2024-01-01 RX ADMIN — PROPOFOL 17.2 MICROGRAM(S)/KG/MIN: 10 INJECTION, EMULSION INTRAVENOUS at 22:00

## 2024-01-01 RX ADMIN — Medication 250 MILLIGRAM(S): at 04:53

## 2024-01-01 RX ADMIN — LORAZEPAM 2 MILLIGRAM(S): 2 TABLET ORAL at 13:33

## 2024-01-01 RX ADMIN — OCTREOTIDE ACETATE 10 MICROGRAM(S)/HR: 500 INJECTION, SOLUTION INTRAVENOUS; SUBCUTANEOUS at 07:40

## 2024-01-01 RX ADMIN — LACTULOSE 200 GRAM(S): 10 SOLUTION ORAL at 03:39

## 2024-01-01 RX ADMIN — Medication 4 MILLIGRAM(S): at 20:05

## 2024-01-01 RX ADMIN — Medication 50 MILLILITER(S): at 02:42

## 2024-01-01 RX ADMIN — FENTANYL 100 MICROGRAM(S): 12 PATCH, EXTENDED RELEASE TRANSDERMAL at 20:05

## 2024-01-01 RX ADMIN — SODIUM BICARBONATE 50 MILLIEQUIVALENT(S): 84 INJECTION, SOLUTION INTRAVENOUS at 00:32

## 2024-01-01 RX ADMIN — PANTOPRAZOLE SODIUM 40 MILLIGRAM(S): 40 TABLET, DELAYED RELEASE ORAL at 17:03

## 2024-01-01 RX ADMIN — NOREPINEPHRINE BITARTRATE 71.5 MICROGRAM(S)/KG/MIN: 1 INJECTION, SOLUTION, CONCENTRATE INTRAVENOUS at 07:52

## 2024-01-01 RX ADMIN — CHLORHEXIDINE GLUCONATE 15 MILLILITER(S): 1.2 RINSE ORAL at 06:04

## 2024-01-01 RX ADMIN — CHLORHEXIDINE GLUCONATE 1 APPLICATION(S): 1.2 RINSE ORAL at 05:27

## 2024-01-01 RX ADMIN — MEROPENEM 100 MILLIGRAM(S): 500 INJECTION, POWDER, FOR SOLUTION INTRAVENOUS at 14:07

## 2024-01-01 RX ADMIN — MEROPENEM 100 MILLIGRAM(S): 500 INJECTION, POWDER, FOR SOLUTION INTRAVENOUS at 14:25

## 2024-01-01 RX ADMIN — OCTREOTIDE ACETATE 10 MICROGRAM(S)/HR: 500 INJECTION, SOLUTION INTRAVENOUS; SUBCUTANEOUS at 07:50

## 2024-01-01 RX ADMIN — FENTANYL 9.53 MICROGRAM(S)/KG/HR: 12 PATCH, EXTENDED RELEASE TRANSDERMAL at 08:49

## 2024-01-01 RX ADMIN — Medication 1 MILLIGRAM(S): at 22:31

## 2024-01-01 RX ADMIN — Medication 10 UNIT(S): at 02:43

## 2024-01-01 RX ADMIN — MEROPENEM 100 MILLIGRAM(S): 500 INJECTION, POWDER, FOR SOLUTION INTRAVENOUS at 05:09

## 2024-01-01 RX ADMIN — PHYTONADIONE 102 MILLIGRAM(S): 5 TABLET ORAL at 11:15

## 2024-01-01 RX ADMIN — PANTOPRAZOLE SODIUM 40 MILLIGRAM(S): 40 TABLET, DELAYED RELEASE ORAL at 13:32

## 2024-01-01 RX ADMIN — Medication 1 MILLIGRAM(S): at 12:07

## 2024-01-01 RX ADMIN — NOREPINEPHRINE BITARTRATE 71.5 MICROGRAM(S)/KG/MIN: 1 INJECTION, SOLUTION, CONCENTRATE INTRAVENOUS at 08:13

## 2024-01-01 RX ADMIN — Medication 1 APPLICATION(S): at 05:09

## 2024-01-01 RX ADMIN — Medication 320 GRAM(S): at 06:04

## 2024-01-01 RX ADMIN — Medication 200 GRAM(S): at 10:02

## 2024-01-01 RX ADMIN — VASOPRESSIN 6 UNIT(S)/MIN: 20 INJECTION, SOLUTION INTRAVENOUS at 04:15

## 2024-01-01 RX ADMIN — OCTREOTIDE ACETATE 10 MICROGRAM(S)/HR: 500 INJECTION, SOLUTION INTRAVENOUS; SUBCUTANEOUS at 21:32

## 2024-01-01 RX ADMIN — Medication 10 UNIT(S): at 10:09

## 2024-01-01 RX ADMIN — PANTOPRAZOLE SODIUM 10 MG/HR: 40 TABLET, DELAYED RELEASE ORAL at 19:17

## 2024-01-01 RX ADMIN — POVIDONE, POLYVINYL ALCOHOL 1 APPLICATION(S): 20; 27 SOLUTION OPHTHALMIC at 16:58

## 2024-01-01 RX ADMIN — SODIUM BICARBONATE 150 MEQ/KG/HR: 84 INJECTION, SOLUTION INTRAVENOUS at 07:53

## 2024-01-01 RX ADMIN — ONDANSETRON HYDROCHLORIDE 4 MILLIGRAM(S): 4 TABLET, FILM COATED ORAL at 20:06

## 2024-01-01 RX ADMIN — NOREPINEPHRINE BITARTRATE 71.5 MICROGRAM(S)/KG/MIN: 1 INJECTION, SOLUTION, CONCENTRATE INTRAVENOUS at 20:00

## 2024-01-01 RX ADMIN — NOREPINEPHRINE BITARTRATE 71.5 MICROGRAM(S)/KG/MIN: 1 INJECTION, SOLUTION, CONCENTRATE INTRAVENOUS at 19:16

## 2024-01-01 RX ADMIN — Medication 105 MILLIGRAM(S): at 11:53

## 2024-01-01 RX ADMIN — Medication 105 MILLIGRAM(S): at 22:32

## 2024-01-01 RX ADMIN — PROPOFOL 17.2 MICROGRAM(S)/KG/MIN: 10 INJECTION, EMULSION INTRAVENOUS at 19:50

## 2024-01-01 RX ADMIN — MEROPENEM 100 MILLIGRAM(S): 500 INJECTION, POWDER, FOR SOLUTION INTRAVENOUS at 13:18

## 2024-01-01 RX ADMIN — SODIUM BICARBONATE 50 MILLIEQUIVALENT(S): 84 INJECTION, SOLUTION INTRAVENOUS at 16:11

## 2024-01-01 RX ADMIN — PANTOPRAZOLE SODIUM 10 MG/HR: 40 TABLET, DELAYED RELEASE ORAL at 19:00

## 2024-01-01 RX ADMIN — MEROPENEM 100 MILLIGRAM(S): 500 INJECTION, POWDER, FOR SOLUTION INTRAVENOUS at 21:04

## 2024-01-01 RX ADMIN — Medication 1: at 06:05

## 2024-01-01 RX ADMIN — FENTANYL 9.53 MICROGRAM(S)/KG/HR: 12 PATCH, EXTENDED RELEASE TRANSDERMAL at 19:44

## 2024-01-01 RX ADMIN — NOREPINEPHRINE BITARTRATE 71.5 MICROGRAM(S)/KG/MIN: 1 INJECTION, SOLUTION, CONCENTRATE INTRAVENOUS at 07:39

## 2024-01-01 RX ADMIN — Medication 105 MILLIGRAM(S): at 15:07

## 2024-01-01 RX ADMIN — FUROSEMIDE 40 MILLIGRAM(S): 40 TABLET ORAL at 04:30

## 2024-01-01 RX ADMIN — Medication 1: at 23:25

## 2024-01-01 RX ADMIN — CHLORHEXIDINE GLUCONATE 1 APPLICATION(S): 1.2 RINSE ORAL at 06:09

## 2024-01-01 RX ADMIN — OCTREOTIDE ACETATE 10 MICROGRAM(S)/HR: 500 INJECTION, SOLUTION INTRAVENOUS; SUBCUTANEOUS at 16:06

## 2024-01-01 RX ADMIN — PANTOPRAZOLE SODIUM 10 MG/HR: 40 TABLET, DELAYED RELEASE ORAL at 18:27

## 2024-01-01 RX ADMIN — SODIUM BICARBONATE 75 MEQ/KG/HR: 84 INJECTION, SOLUTION INTRAVENOUS at 13:20

## 2024-01-01 RX ADMIN — Medication 131 GRAM(S): at 09:05

## 2024-01-01 RX ADMIN — Medication 100 MILLIGRAM(S): at 13:33

## 2024-01-01 RX ADMIN — PANTOPRAZOLE SODIUM 10 MG/HR: 40 TABLET, DELAYED RELEASE ORAL at 07:50

## 2024-01-01 RX ADMIN — OCTREOTIDE ACETATE 10 MICROGRAM(S)/HR: 500 INJECTION, SOLUTION INTRAVENOUS; SUBCUTANEOUS at 08:15

## 2024-01-01 RX ADMIN — Medication 250 MILLIGRAM(S): at 11:03

## 2024-01-01 RX ADMIN — Medication 50 MILLILITER(S): at 07:13

## 2024-01-01 RX ADMIN — Medication 1 MILLIGRAM(S): at 15:10

## 2024-01-01 RX ADMIN — CHLORHEXIDINE GLUCONATE 15 MILLILITER(S): 1.2 RINSE ORAL at 05:27

## 2024-01-01 RX ADMIN — PANTOPRAZOLE SODIUM 10 MG/HR: 40 TABLET, DELAYED RELEASE ORAL at 07:39

## 2024-01-01 RX ADMIN — FENTANYL 28.6 MICROGRAM(S)/KG/HR: 12 PATCH, EXTENDED RELEASE TRANSDERMAL at 02:00

## 2024-01-01 RX ADMIN — OCTREOTIDE ACETATE 10 MICROGRAM(S)/HR: 500 INJECTION, SOLUTION INTRAVENOUS; SUBCUTANEOUS at 19:16

## 2024-01-01 RX ADMIN — PROPOFOL 17.2 MICROGRAM(S)/KG/MIN: 10 INJECTION, EMULSION INTRAVENOUS at 07:49

## 2024-01-01 RX ADMIN — Medication 1000 MILLILITER(S): at 09:25

## 2024-01-01 RX ADMIN — POVIDONE, POLYVINYL ALCOHOL 1 APPLICATION(S): 20; 27 SOLUTION OPHTHALMIC at 05:09

## 2024-01-01 RX ADMIN — Medication 2: at 17:11

## 2024-01-01 RX ADMIN — VASOPRESSIN 6 UNIT(S)/MIN: 20 INJECTION, SOLUTION INTRAVENOUS at 07:40

## 2024-01-01 RX ADMIN — FENTANYL 28.6 MICROGRAM(S)/KG/HR: 12 PATCH, EXTENDED RELEASE TRANSDERMAL at 19:16

## 2024-01-01 RX ADMIN — POVIDONE, POLYVINYL ALCOHOL 1 APPLICATION(S): 20; 27 SOLUTION OPHTHALMIC at 17:11

## 2024-01-01 RX ADMIN — VASOPRESSIN 6 UNIT(S)/MIN: 20 INJECTION, SOLUTION INTRAVENOUS at 07:52

## 2024-01-01 RX ADMIN — POVIDONE, POLYVINYL ALCOHOL 1 APPLICATION(S): 20; 27 SOLUTION OPHTHALMIC at 06:18

## 2024-01-01 RX ADMIN — Medication 50 MILLILITER(S): at 06:02

## 2024-01-01 RX ADMIN — NOREPINEPHRINE BITARTRATE 71.5 MICROGRAM(S)/KG/MIN: 1 INJECTION, SOLUTION, CONCENTRATE INTRAVENOUS at 19:45

## 2024-01-01 RX ADMIN — CHLORHEXIDINE GLUCONATE 1 APPLICATION(S): 1.2 RINSE ORAL at 06:18

## 2024-01-01 RX ADMIN — VASOPRESSIN 6 UNIT(S)/MIN: 20 INJECTION, SOLUTION INTRAVENOUS at 19:16

## 2024-01-01 RX ADMIN — Medication 1000 MILLILITER(S): at 19:40

## 2024-01-01 RX ADMIN — Medication 125 MILLILITER(S): at 09:05

## 2024-01-01 RX ADMIN — Medication 10 UNIT(S): at 01:18

## 2024-01-01 RX ADMIN — Medication 1 APPLICATION(S): at 21:04

## 2024-01-01 RX ADMIN — Medication 2.5 MILLIGRAM(S): at 04:16

## 2024-01-01 RX ADMIN — PROPOFOL 17.2 MICROGRAM(S)/KG/MIN: 10 INJECTION, EMULSION INTRAVENOUS at 19:16

## 2024-01-22 NOTE — PROGRESS NOTE ADULT - PROBLEM SELECTOR PLAN 8
SCD in setting of thrombocytopenia    D/w wife at bedside 12/9,12/10.
Patient/Caregiver provided printed discharge information.

## 2024-08-20 ENCOUNTER — INPATIENT (INPATIENT)
Facility: HOSPITAL | Age: 44
LOS: 2 days | Discharge: HOME | End: 2024-08-23
Attending: STUDENT IN AN ORGANIZED HEALTH CARE EDUCATION/TRAINING PROGRAM | Admitting: STUDENT IN AN ORGANIZED HEALTH CARE EDUCATION/TRAINING PROGRAM
Payer: MEDICAID

## 2024-08-20 VITALS
RESPIRATION RATE: 20 BRPM | TEMPERATURE: 98 F | HEART RATE: 119 BPM | WEIGHT: 216.05 LBS | SYSTOLIC BLOOD PRESSURE: 171 MMHG | OXYGEN SATURATION: 98 % | HEIGHT: 70 IN | DIASTOLIC BLOOD PRESSURE: 110 MMHG

## 2024-08-20 DIAGNOSIS — Z78.9 OTHER SPECIFIED HEALTH STATUS: ICD-10-CM

## 2024-08-20 DIAGNOSIS — E11.9 TYPE 2 DIABETES MELLITUS WITHOUT COMPLICATIONS: ICD-10-CM

## 2024-08-20 DIAGNOSIS — I10 ESSENTIAL (PRIMARY) HYPERTENSION: ICD-10-CM

## 2024-08-20 DIAGNOSIS — Z29.9 ENCOUNTER FOR PROPHYLACTIC MEASURES, UNSPECIFIED: ICD-10-CM

## 2024-08-20 DIAGNOSIS — R19.7 DIARRHEA, UNSPECIFIED: ICD-10-CM

## 2024-08-20 DIAGNOSIS — K74.60 UNSPECIFIED CIRRHOSIS OF LIVER: ICD-10-CM

## 2024-08-20 DIAGNOSIS — R14.0 ABDOMINAL DISTENSION (GASEOUS): ICD-10-CM

## 2024-08-20 DIAGNOSIS — K29.70 GASTRITIS, UNSPECIFIED, WITHOUT BLEEDING: ICD-10-CM

## 2024-08-20 LAB
ALBUMIN FLD-MCNC: 0.7 G/DL — SIGNIFICANT CHANGE UP
ALBUMIN SERPL ELPH-MCNC: 3 G/DL — LOW (ref 3.3–5)
ALP SERPL-CCNC: 232 U/L — HIGH (ref 40–120)
ALT FLD-CCNC: 32 U/L — SIGNIFICANT CHANGE UP (ref 4–41)
AMMONIA BLD-MCNC: 87 UMOL/L — HIGH (ref 11–55)
ANION GAP SERPL CALC-SCNC: 14 MMOL/L — SIGNIFICANT CHANGE UP (ref 7–14)
ANISOCYTOSIS BLD QL: SLIGHT — SIGNIFICANT CHANGE UP
APTT BLD: 38.5 SEC — HIGH (ref 24.5–35.6)
AST SERPL-CCNC: 135 U/L — HIGH (ref 4–40)
B PERT IGG+IGM PNL SER: ABNORMAL
BASOPHILS # BLD AUTO: 0.07 K/UL — SIGNIFICANT CHANGE UP (ref 0–0.2)
BASOPHILS NFR BLD AUTO: 1 % — SIGNIFICANT CHANGE UP (ref 0–2)
BILIRUB SERPL-MCNC: 3.1 MG/DL — HIGH (ref 0.2–1.2)
BLD GP AB SCN SERPL QL: NEGATIVE — SIGNIFICANT CHANGE UP
BLOOD GAS VENOUS COMPREHENSIVE RESULT: SIGNIFICANT CHANGE UP
BUN SERPL-MCNC: 6 MG/DL — LOW (ref 7–23)
CALCIUM SERPL-MCNC: 8.9 MG/DL — SIGNIFICANT CHANGE UP (ref 8.4–10.5)
CHLORIDE SERPL-SCNC: 96 MMOL/L — LOW (ref 98–107)
CO2 SERPL-SCNC: 21 MMOL/L — LOW (ref 22–31)
COLOR FLD: YELLOW
CREAT SERPL-MCNC: 0.55 MG/DL — SIGNIFICANT CHANGE UP (ref 0.5–1.3)
EGFR: 124 ML/MIN/1.73M2 — SIGNIFICANT CHANGE UP
EOSINOPHIL # BLD AUTO: 0.04 K/UL — SIGNIFICANT CHANGE UP (ref 0–0.5)
EOSINOPHIL # FLD: 0 % — SIGNIFICANT CHANGE UP
EOSINOPHIL NFR BLD AUTO: 0.6 % — SIGNIFICANT CHANGE UP (ref 0–6)
FLUID INTAKE SUBSTANCE CLASS: SIGNIFICANT CHANGE UP
FOLATE+VIT B12 SERBLD-IMP: 0 % — SIGNIFICANT CHANGE UP
GLUCOSE BLDC GLUCOMTR-MCNC: 144 MG/DL — HIGH (ref 70–99)
GLUCOSE BLDC GLUCOMTR-MCNC: 166 MG/DL — HIGH (ref 70–99)
GLUCOSE FLD-MCNC: 172 MG/DL — SIGNIFICANT CHANGE UP
GLUCOSE SERPL-MCNC: 158 MG/DL — HIGH (ref 70–99)
GRAM STN FLD: SIGNIFICANT CHANGE UP
HCT VFR BLD CALC: 40.7 % — SIGNIFICANT CHANGE UP (ref 39–50)
HGB BLD-MCNC: 14.6 G/DL — SIGNIFICANT CHANGE UP (ref 13–17)
IANC: 4.79 K/UL — SIGNIFICANT CHANGE UP (ref 1.8–7.4)
IMM GRANULOCYTES NFR BLD AUTO: 0.4 % — SIGNIFICANT CHANGE UP (ref 0–0.9)
INR BLD: 1.64 RATIO — HIGH (ref 0.85–1.18)
LYMPHOCYTES # BLD AUTO: 0.64 K/UL — LOW (ref 1–3.3)
LYMPHOCYTES # BLD AUTO: 9.6 % — LOW (ref 13–44)
LYMPHOCYTES # FLD: 7 % — SIGNIFICANT CHANGE UP
MACROCYTES BLD QL: SLIGHT — SIGNIFICANT CHANGE UP
MAGNESIUM SERPL-MCNC: 1.7 MG/DL — SIGNIFICANT CHANGE UP (ref 1.6–2.6)
MANUAL SMEAR VERIFICATION: SIGNIFICANT CHANGE UP
MCHC RBC-ENTMCNC: 28.7 PG — SIGNIFICANT CHANGE UP (ref 27–34)
MCHC RBC-ENTMCNC: 35.9 GM/DL — SIGNIFICANT CHANGE UP (ref 32–36)
MCV RBC AUTO: 80.1 FL — SIGNIFICANT CHANGE UP (ref 80–100)
MESOTHL CELL # FLD: 46 % — SIGNIFICANT CHANGE UP
MICROCYTES BLD QL: SLIGHT — SIGNIFICANT CHANGE UP
MONOCYTES # BLD AUTO: 1.1 K/UL — HIGH (ref 0–0.9)
MONOCYTES NFR BLD AUTO: 16.5 % — HIGH (ref 2–14)
MONOS+MACROS # FLD: 47 % — SIGNIFICANT CHANGE UP
NEUTROPHILS # BLD AUTO: 4.79 K/UL — SIGNIFICANT CHANGE UP (ref 1.8–7.4)
NEUTROPHILS NFR BLD AUTO: 71.9 % — SIGNIFICANT CHANGE UP (ref 43–77)
NEUTROPHILS-BODY FLUID: 0 % — SIGNIFICANT CHANGE UP
NRBC # BLD: 0 /100 WBCS — SIGNIFICANT CHANGE UP (ref 0–0)
NRBC # FLD: 0 K/UL — SIGNIFICANT CHANGE UP (ref 0–0)
OTHER CELLS FLD MANUAL: 0 % — SIGNIFICANT CHANGE UP
PLAT MORPH BLD: NORMAL — SIGNIFICANT CHANGE UP
PLATELET # BLD AUTO: 104 K/UL — LOW (ref 150–400)
PLATELET COUNT - ESTIMATE: ABNORMAL
POIKILOCYTOSIS BLD QL AUTO: SLIGHT — SIGNIFICANT CHANGE UP
POLYCHROMASIA BLD QL SMEAR: SLIGHT — SIGNIFICANT CHANGE UP
POTASSIUM SERPL-MCNC: 5.1 MMOL/L — SIGNIFICANT CHANGE UP (ref 3.5–5.3)
POTASSIUM SERPL-SCNC: 5.1 MMOL/L — SIGNIFICANT CHANGE UP (ref 3.5–5.3)
PROT FLD-MCNC: 1.6 G/DL — SIGNIFICANT CHANGE UP
PROT SERPL-MCNC: 9.1 G/DL — HIGH (ref 6–8.3)
PROTHROM AB SERPL-ACNC: 18.3 SEC — HIGH (ref 9.5–13)
RBC # BLD: 5.08 M/UL — SIGNIFICANT CHANGE UP (ref 4.2–5.8)
RBC # FLD: 20.8 % — HIGH (ref 10.3–14.5)
RBC BLD AUTO: ABNORMAL
RCV VOL RI: <2000 CELLS/UL — HIGH (ref 0–5)
RH IG SCN BLD-IMP: POSITIVE — SIGNIFICANT CHANGE UP
SODIUM SERPL-SCNC: 131 MMOL/L — LOW (ref 135–145)
SPECIMEN SOURCE: SIGNIFICANT CHANGE UP
TARGETS BLD QL SMEAR: SLIGHT — SIGNIFICANT CHANGE UP
TOTAL CELLS COUNTED, BODY FLUID: 100 CELLS — SIGNIFICANT CHANGE UP
TOTAL NUCLEATED CELL COUNT, BODY FLUID: 277 CELLS/UL — HIGH (ref 0–5)
TUBE TYPE: SIGNIFICANT CHANGE UP
WBC # BLD: 6.67 K/UL — SIGNIFICANT CHANGE UP (ref 3.8–10.5)
WBC # FLD AUTO: 6.67 K/UL — SIGNIFICANT CHANGE UP (ref 3.8–10.5)

## 2024-08-20 PROCEDURE — 49083 ABD PARACENTESIS W/IMAGING: CPT

## 2024-08-20 PROCEDURE — 71046 X-RAY EXAM CHEST 2 VIEWS: CPT | Mod: 26

## 2024-08-20 PROCEDURE — 99223 1ST HOSP IP/OBS HIGH 75: CPT | Mod: GC

## 2024-08-20 PROCEDURE — 74177 CT ABD & PELVIS W/CONTRAST: CPT | Mod: 26,MC

## 2024-08-20 PROCEDURE — 99285 EMERGENCY DEPT VISIT HI MDM: CPT | Mod: 25

## 2024-08-20 RX ORDER — LIDOCAINE HCL 20 MG/ML
10 VIAL (ML) INJECTION ONCE
Refills: 0 | Status: COMPLETED | OUTPATIENT
Start: 2024-08-20 | End: 2024-08-20

## 2024-08-20 RX ORDER — SPIRONOLACTONE 25 MG/1
25 TABLET, FILM COATED ORAL ONCE
Refills: 0 | Status: COMPLETED | OUTPATIENT
Start: 2024-08-20 | End: 2024-08-20

## 2024-08-20 RX ORDER — CHLORDIAZEPOXIDE HCL 5 MG
25 CAPSULE ORAL ONCE
Refills: 0 | Status: DISCONTINUED | OUTPATIENT
Start: 2024-08-20 | End: 2024-08-20

## 2024-08-20 RX ORDER — DEXTROSE 15 G/33 G
12.5 GEL IN PACKET (GRAM) ORAL ONCE
Refills: 0 | Status: DISCONTINUED | OUTPATIENT
Start: 2024-08-20 | End: 2024-08-23

## 2024-08-20 RX ORDER — FUROSEMIDE 40 MG
40 TABLET ORAL DAILY
Refills: 0 | Status: DISCONTINUED | OUTPATIENT
Start: 2024-08-20 | End: 2024-08-23

## 2024-08-20 RX ORDER — NYSTATIN 100000/G
1 CREAM (GRAM) TOPICAL
Refills: 0 | Status: DISCONTINUED | OUTPATIENT
Start: 2024-08-20 | End: 2024-08-23

## 2024-08-20 RX ORDER — DEXTROSE 15 G/33 G
15 GEL IN PACKET (GRAM) ORAL ONCE
Refills: 0 | Status: DISCONTINUED | OUTPATIENT
Start: 2024-08-20 | End: 2024-08-23

## 2024-08-20 RX ORDER — LORAZEPAM 4 MG/ML
1 INJECTION INTRAMUSCULAR; INTRAVENOUS
Refills: 0 | Status: DISCONTINUED | OUTPATIENT
Start: 2024-08-20 | End: 2024-08-21

## 2024-08-20 RX ORDER — METRONIDAZOLE 250 MG
500 TABLET ORAL THREE TIMES A DAY
Refills: 0 | Status: DISCONTINUED | OUTPATIENT
Start: 2024-08-20 | End: 2024-08-23

## 2024-08-20 RX ORDER — FUROSEMIDE 40 MG
40 TABLET ORAL ONCE
Refills: 0 | Status: COMPLETED | OUTPATIENT
Start: 2024-08-20 | End: 2024-08-20

## 2024-08-20 RX ORDER — CARVEDILOL 6.25 MG/1
3.12 TABLET ORAL EVERY 12 HOURS
Refills: 0 | Status: DISCONTINUED | OUTPATIENT
Start: 2024-08-20 | End: 2024-08-21

## 2024-08-20 RX ORDER — NYSTATIN 100000/G
1 CREAM (GRAM) TOPICAL ONCE
Refills: 0 | Status: DISCONTINUED | OUTPATIENT
Start: 2024-08-20 | End: 2024-08-20

## 2024-08-20 RX ORDER — ACETAMINOPHEN 325 MG/1
650 TABLET ORAL EVERY 6 HOURS
Refills: 0 | Status: DISCONTINUED | OUTPATIENT
Start: 2024-08-20 | End: 2024-08-21

## 2024-08-20 RX ORDER — THIAMINE HCL 250 MG
500 TABLET ORAL THREE TIMES A DAY
Refills: 0 | Status: DISCONTINUED | OUTPATIENT
Start: 2024-08-20 | End: 2024-08-22

## 2024-08-20 RX ORDER — FAMOTIDINE 10 MG/ML
40 INJECTION INTRAVENOUS ONCE
Refills: 0 | Status: COMPLETED | OUTPATIENT
Start: 2024-08-20 | End: 2024-08-20

## 2024-08-20 RX ORDER — PANTOPRAZOLE SODIUM 40 MG
40 TABLET, DELAYED RELEASE (ENTERIC COATED) ORAL
Refills: 0 | Status: DISCONTINUED | OUTPATIENT
Start: 2024-08-20 | End: 2024-08-23

## 2024-08-20 RX ORDER — CLARITHROMYCIN 250 MG/1
500 TABLET ORAL
Refills: 0 | Status: DISCONTINUED | OUTPATIENT
Start: 2024-08-20 | End: 2024-08-23

## 2024-08-20 RX ORDER — SODIUM CHLORIDE 9 MG/ML
1000 INJECTION INTRAMUSCULAR; INTRAVENOUS; SUBCUTANEOUS ONCE
Refills: 0 | Status: COMPLETED | OUTPATIENT
Start: 2024-08-20 | End: 2024-08-20

## 2024-08-20 RX ORDER — DEXTROSE 15 G/33 G
25 GEL IN PACKET (GRAM) ORAL ONCE
Refills: 0 | Status: DISCONTINUED | OUTPATIENT
Start: 2024-08-20 | End: 2024-08-23

## 2024-08-20 RX ORDER — FOLIC ACID 1 MG
1 TABLET ORAL ONCE
Refills: 0 | Status: COMPLETED | OUTPATIENT
Start: 2024-08-20 | End: 2024-08-20

## 2024-08-20 RX ORDER — SPIRONOLACTONE 25 MG/1
25 TABLET, FILM COATED ORAL DAILY
Refills: 0 | Status: DISCONTINUED | OUTPATIENT
Start: 2024-08-20 | End: 2024-08-21

## 2024-08-20 RX ORDER — ENOXAPARIN SODIUM 100 MG/ML
40 INJECTION SUBCUTANEOUS EVERY 24 HOURS
Refills: 0 | Status: DISCONTINUED | OUTPATIENT
Start: 2024-08-20 | End: 2024-08-21

## 2024-08-20 RX ORDER — LOSARTAN POTASSIUM 50 MG/1
100 TABLET ORAL DAILY
Refills: 0 | Status: DISCONTINUED | OUTPATIENT
Start: 2024-08-20 | End: 2024-08-21

## 2024-08-20 RX ORDER — CARVEDILOL 6.25 MG/1
3.12 TABLET ORAL ONCE
Refills: 0 | Status: COMPLETED | OUTPATIENT
Start: 2024-08-20 | End: 2024-08-20

## 2024-08-20 RX ORDER — AMLODIPINE BESYLATE 10 MG/1
10 TABLET ORAL DAILY
Refills: 0 | Status: DISCONTINUED | OUTPATIENT
Start: 2024-08-20 | End: 2024-08-23

## 2024-08-20 RX ORDER — GLUCAGON INJECTION, SOLUTION 1 MG/.2ML
1 INJECTION, SOLUTION SUBCUTANEOUS ONCE
Refills: 0 | Status: DISCONTINUED | OUTPATIENT
Start: 2024-08-20 | End: 2024-08-23

## 2024-08-20 RX ORDER — THIAMINE HCL 250 MG
500 TABLET ORAL ONCE
Refills: 0 | Status: DISCONTINUED | OUTPATIENT
Start: 2024-08-20 | End: 2024-08-20

## 2024-08-20 RX ORDER — ONDANSETRON 2 MG/ML
4 INJECTION, SOLUTION INTRAMUSCULAR; INTRAVENOUS ONCE
Refills: 0 | Status: COMPLETED | OUTPATIENT
Start: 2024-08-20 | End: 2024-08-20

## 2024-08-20 RX ORDER — LORAZEPAM 4 MG/ML
1 INJECTION INTRAMUSCULAR; INTRAVENOUS
Refills: 0 | Status: DISCONTINUED | OUTPATIENT
Start: 2024-08-20 | End: 2024-08-22

## 2024-08-20 RX ADMIN — Medication 40 MILLIGRAM(S): at 23:01

## 2024-08-20 RX ADMIN — ONDANSETRON 4 MILLIGRAM(S): 2 INJECTION, SOLUTION INTRAMUSCULAR; INTRAVENOUS at 10:20

## 2024-08-20 RX ADMIN — CARVEDILOL 3.12 MILLIGRAM(S): 6.25 TABLET ORAL at 23:01

## 2024-08-20 RX ADMIN — SODIUM CHLORIDE 1000 MILLILITER(S): 9 INJECTION INTRAMUSCULAR; INTRAVENOUS; SUBCUTANEOUS at 10:42

## 2024-08-20 RX ADMIN — Medication 10 MILLILITER(S): at 15:30

## 2024-08-20 RX ADMIN — FAMOTIDINE 40 MILLIGRAM(S): 10 INJECTION INTRAVENOUS at 17:52

## 2024-08-20 RX ADMIN — Medication 25 MILLIGRAM(S): at 10:20

## 2024-08-20 RX ADMIN — Medication 500 MILLIGRAM(S): at 23:01

## 2024-08-20 RX ADMIN — Medication 105 MILLIGRAM(S): at 23:00

## 2024-08-20 RX ADMIN — ENOXAPARIN SODIUM 40 MILLIGRAM(S): 100 INJECTION SUBCUTANEOUS at 23:00

## 2024-08-20 RX ADMIN — SPIRONOLACTONE 25 MILLIGRAM(S): 25 TABLET, FILM COATED ORAL at 23:01

## 2024-08-20 RX ADMIN — Medication 1 MILLIGRAM(S): at 23:00

## 2024-08-20 NOTE — H&P ADULT - NSHPSOCIALHISTORY_GEN_ALL_CORE
Lives with wife, son, and mother. No issues with ADLs Denies recent travel history or sick contacts. Patient lives with 1 cat.    For alcohol usage, patient went into detox starting this January. Beforehand patient had 300 mL Vodka per day. Since then detox patient has had 300-400 mL of boxed wine or non-alcoholic wine per day. Patient described stopping alcohol altogether 1 month ago except for last Saturday (8/17) for a party. Patient describes smoking history of 1-2 cigarettes a day before quitting 5 years ago. Denies recreational drug use. Not sexually active.     Lives with wife, son, and mother. No issues with ADLs. Patient works as a . Denies financial worries, acute stressors in life, financial stressors and describes having a strong social support system with friends. Denies recent travel history or sick contacts. Has 1 pet.    For alcohol usage, patient went into detox starting this January. Beforehand patient had 300 mL Vodka per day. Since then detox patient has had 300-400 mL of boxed wine or non-alcoholic wine per day. Patient described stopping alcohol altogether 1 month ago except for last Saturday (8/17) for a party. Patient describes smoking history of 1-2 cigarettes a day before quitting 5 years ago. Denies recreational drug use. Not sexually active.     Lives with wife, son, and mother. No issues with ADLs. Patient works as a . Denies financial worries, acute stressors in life, financial stressors and describes having a strong social support system with friends.

## 2024-08-20 NOTE — ED PROVIDER NOTE - IV ALTEPLASE INCLUSION HIDDEN
----- Message from MICHELLE Esquivel sent at 12/10/2021  7:47 AM CST -----  Please notify pt. Of negative COVID test.    show

## 2024-08-20 NOTE — H&P ADULT - HISTORY OF PRESENT ILLNESS
SAMIR CAMPOS is a 44y Male with a hx of Cirrhosis 2/2 alcohol use disorder, T2DM, and HTN who presents with diarrhea.    Patient reports     In the ED, patient is afebrile and hemodynamically stable. Noted to have ~8 episodes of diarrhea since admission. Diagnostic paracentesis performed in ED. Also CIWQ 4 in ED, given librium 25mg x1 and 1L NS.  SAMIR CAMPOS is a 44y Male with a hx of Cirrhosis 2/2 alcohol use disorder, T2DM, and HTN who presents with diarrhea in the context of recent Abx use 1 week ago.    1 week ago, patient was diagnosed with H-pylori infection and was started on Clarithromycin and Metronidazole.     Sunday (8/18) night, patient began experiencing diarrhea which worsened on Monday morning. Diarrhea is described as being watery and nonbloody Patient reported having ~38 between now and admission. Diarrhea persists even at night time and has not been able to eat much since. During this period patient was also nauseous and vomited once. Experiences mild epigastric persistently. Patient denies fevers, hemoptysis, or blood in the stool.    Patient's last drink was hard liquor on Saturday night at a party.    In the ED, patient is afebrile and hemodynamically stable. Noted to have ~8 episodes of diarrhea since admission. Diagnostic paracentesis performed in ED. Also CIWQ 4 in ED, given librium 25mg x1 and 1L NS.  SAMIR CAMPOS is a 44y Male with a hx of Cirrhosis 2/2 alcohol use disorder, T2DM, and HTN who presents with diarrhea.    Sunday (8/18) night, patient began experiencing diarrhea which worsened on Monday morning. Diarrhea is described as being watery and nonbloody. Patient reported having ~38 between now and admission. Diarrhea persists even at night time and has not been able to eat much since. Of note, patient was diagnosed with H.Pylori a week ago and stared on Clarithromycin Triple Therapy with metronidazole. He has been experiencing mild epigastric pain and nausea. Has had mild spit-ups with frequent belching and heart burn. No overt emesis. Patient denies fevers, hemoptysis, blood in stool, chest pain, SOB, palpitations, lightheadedness, dysuria, or hematuria. No sick contacts, recent travel, or changes in diet.    Patient reports his last drink was hard liquor on Saturday night at a party. Normally he drinks 1 boxed wine per night.    In the ED, patient is afebrile and hemodynamically stable. Noted to have ~8 episodes of diarrhea since admission. Diagnostic paracentesis performed in ED. Also CIWQ 4 in ED, given librium 25mg x1 and 1L NS.  SAMIR CAMPOS is a 44y Male with a hx of asthma, psoriasis, Cirrhosis 2/2 alcohol use disorder, T2DM, and HTN who presents with diarrhea.    Sunday (8/18) night, patient began experiencing diarrhea which worsened on Monday morning. Diarrhea is described as being watery and nonbloody. Patient reported having ~38 between now and admission. Diarrhea persists even at night time and has not been able to eat much since. Of note, patient was diagnosed with H.Pylori a week ago and stared on Clarithromycin Triple Therapy with metronidazole. He has been experiencing mild epigastric pain and nausea. Has had mild spit-ups with frequent belching and heart burn. No overt emesis. Patient denies fevers, hemoptysis, melena or blood in stool, chest pain, SOB, palpitations, lightheadedness, dysuria, or hematuria. No sick contacts, recent travel, or changes in diet.    Patient reports his last drink was hard liquor on Saturday night at a party. Normally he drinks 1 boxed wine per night.    In the ED, patient is afebrile and hemodynamically stable. Noted to have ~8 episodes of diarrhea since admission. Diagnostic paracentesis performed in ED. Also CIWQ 4 in ED, given librium 25mg x1 and 1L NS.

## 2024-08-20 NOTE — H&P ADULT - PROBLEM SELECTOR PLAN 4
Department of Anesthesiology  Postprocedure Note    Patient: Mee Billingsley  MRN: [id number]  YOB: 1992  Date of evaluation: 8/31/2018  Time:  8:35 AM     Procedure Summary     Date:  08/31/18 Room / Location:  [facility] OR  / [facility]    Anesthesia Start:  4820 Anesthesia Stop:      Procedure:  EGD ESOPHAGOGASTRODUODENOSCOPY (N/A ) Diagnosis:  (HEARTBURN, REFLUX R12, K21.9  [phone]))    Surgeon:  Veronica Garcia MD Responsible Provider:  JIA Morris CRNA    Anesthesia Type:  MAC ASA Status:  2          Anesthesia Type: MAC    Remington Phase I: Remington Score: 10    Remington Phase II:      Last vitals: Reviewed and per EMR flowsheets.        Anesthesia Post Evaluation    Patient location during evaluation: PACU  Patient participation: complete - patient cannot participate  Level of consciousness: sleepy but conscious  Pain score: 0  Airway patency: patent  Nausea & Vomiting: no nausea and no vomiting  Complications: no  Cardiovascular status: hemodynamically stable  Respiratory status: acceptable  Hydration status: euvolemic Hx of cirrhosis 2/2 alcohol use disorder. C/b ascites, esophageal varices, and portal hypertension. No active bleed.  - Follows with hepatology outpatient Hx of ascites with cirrhosis. Having mild discomfort with distention.  - s/p diagnostic paracentesis in ED  - consult IR for therapeutic paracentesis Hx of cirrhosis 2/2 alcohol use disorder. C/b ascites, esophageal varices, and portal hypertension. No active bleed.  - Follows with hepatology, Dr. Aleida Oakley, outpatient (Child Class C)  - continue home Spironolactone 25mg qd and Furosemide 40mg qd    Having mild discomfort with distention, CT showing large volume ascites  - s/p diagnostic paracentesis in ED  - consult IR for therapeutic paracentesis Hx of cirrhosis 2/2 alcohol use disorder. C/b ascites, esophageal varices, and portal hypertension. No active bleed.  - Follows with hepatology, Dr. Aleida Oakley, outpatient (Child Class C)  - continue home Spironolactone 25mg qd and Furosemide 40mg qd (reportedly discontinued when diarrhea started)    Having mild discomfort with distention, CT showing large volume ascites  - s/p diagnostic paracentesis in ED  - restart on diuretics, if no improvement in abdominal ascites can consult IR for large vol para Hx of cirrhosis 2/2 alcohol use disorder. C/b ascites, esophageal varices, and portal hypertension. No active bleed.  - Follows with hepatology, Dr. Aleida Oakley, outpatient (Child Class C), MELD Na 21 points (19.6% estimated 3-mo mortality)  - continue home Spironolactone 25mg qd and Furosemide 40mg qd (reportedly discontinued when diarrhea started)    Having mild discomfort with distention, CT showing large volume ascites  - s/p diagnostic paracentesis in ED  - restart on diuretics, if no improvement in abdominal ascites can consult IR for large vol para

## 2024-08-20 NOTE — ED PROVIDER NOTE - CLINICAL SUMMARY MEDICAL DECISION MAKING FREE TEXT BOX
44 male past medical history of diabetes, hypertension, alcoholic cirrhosis, now presenting with watery diarrhea/vomiting and generalized weakness for the past 2 to 3 days. VSS. On exam dist abd - non tender , +ve fluid thrill. Concern for AGE, acute on chronic liver failure, ascites ? SBP, also concern for alcohol withdrawal. Will medicate, send labs, shall likely admit. LE edema and shortness of breath.  symptoms sound like fluid overload.  will admit for further evaluation.

## 2024-08-20 NOTE — H&P ADULT - PROBLEM SELECTOR PLAN 6
Home meds: Januvia qd, Jardiance 25mg qd  - hold home meds  - LDSS  - A1c Home meds: Losartan 100mg qd, Amlodipine 10mg qd, and Carvedilol 3.125mg BID  - continue home meds with hold parameters Home meds: Losartan 100mg qd, Amlodipine 10mg qd; confirm if still taking carvedilol and HCTZ (patient denies  - patient reports stopped taking diuretics recently due to diarrhea   - continue home meds with hold parameters

## 2024-08-20 NOTE — ED PROVIDER NOTE - ATTENDING CONTRIBUTION TO CARE
Agree with resident note  44-year-old male with past medical history of diabetes, hypertension, alcoholic cirrhosis presents with diarrhea/vomiting and weakness.  States has had multiple episodes of diarrhea, nonbloody.  Patient has had recent endoscopy where from history seems like was diagnosed with H. pylori and started on antibiotics.  Patient has had endoscopy which has shown esophageal varices.  Patient's last drink was on Saturday.  Does state feels tremulous and shaky.  Denies fevers.  Large distended abdomen, per wife has been increasing in size over the last 2 to 3 months.  Physical exam  Chronically ill-appearing  Mild jaundice  Hypertensive, tachycardic, afebrile  Clear to auscultation bilaterally  S1-S2 no murmurs rubs or gallops  Abdomen firm, tense, positive fluid wave, umbilical hernia, exam consistent with cirrhosis/ascites  Impression  Profuse diarrhea in the setting of possible recent antibiotic use, will attempt to send stool culture, C. difficile, check basic labs, treat patient for the beginnings of alcohol withdrawal  Given alcohol withdrawal and likely worsening liver failure patient will need to be admitted

## 2024-08-20 NOTE — ED ADULT NURSE NOTE - NSFALLUNIVINTERV_ED_ALL_ED
Bed/Stretcher in lowest position, wheels locked, appropriate side rails in place/Call bell, personal items and telephone in reach/Instruct patient to call for assistance before getting out of bed/chair/stretcher/Non-slip footwear applied when patient is off stretcher/[place] to call system/Physically safe environment - no spills, clutter or unnecessary equipment/Purposeful proactive rounding/Room/bathroom lighting operational, light cord in reach

## 2024-08-20 NOTE — H&P ADULT - TIME BILLING
Preparing to see the patient including review of tests and other providers' notes, confirming history with patient/wife, performing medical examination and evaluation, counseling and educating the patient/wife, documenting clinical information in the EMR, independently interpreting results and communicating results to the patient/wife, care coordination

## 2024-08-20 NOTE — H&P ADULT - ASSESSMENT
SAMIR CAMPOS is a 44y Male with a hx of Cirrhosis 2/2 alcohol use disorder, T2DM, and HTN who presents with diarrhea, concerning for gastroenteritis.  SAMIR CAMPOS is a 44y Male with a hx of Cirrhosis 2/2 alcohol use disorder, T2DM, and HTN who presents with diarrhea, concerning for gastroenteritis. Patient was seen and examined at bedside, was laying and described slight discomfort in abdomen. Was able to ambulate independently and use the restroom. Patient appears clinically stable.  SAMIR CAMPOS is a 44y Male with a hx of Cirrhosis 2/2 alcohol use disorder, T2DM, HTN, and recent H.Pylori infection who presents with diarrhea, concerning for gastroenteritis.  SAMIR CAMPOS is a 44y Male with a hx of Cirrhosis 2/2 alcohol use disorder, asthma, psoriasis, T2DM, HTN, and recent H.Pylori infection who presents with diarrhea, concerning for gastroenteritis.

## 2024-08-20 NOTE — H&P ADULT - NSICDXPASTMEDICALHX_GEN_ALL_CORE_FT
PAST MEDICAL HISTORY:  Alcohol use     Cirrhosis     Diabetes     Hypertension      PAST MEDICAL HISTORY:  Alcohol use     Asthma     Cirrhosis     Diabetes     Hypertension     Psoriasis

## 2024-08-20 NOTE — ED PROVIDER NOTE - OBJECTIVE STATEMENT
44 male past medical history of diabetes, hypertension, alcoholic cirrhosis, now presenting with watery diarrhea/vomiting and generalized weakness for the past 2 to 3 days.  Patient reports having watery diarrhea, quotes greater than 50 non bloody episodes per day, also has associated vomiting–nonbloody, is experiencing anorexia, feels generally weak.  Patient also mentions having abdominal distention over the past 2 months.  Denies prior paracentesis.  Reports following up with a GI doctor Aleida Oakley, upper GI endoscopy performed 2 months ago which showed esophageal varices, CT scan performed 2 weeks ago showed a cirrhotic liver plus ascites, patient is on Aldactone/amlodipine/losartan/Jardiance–reports compliance.  Denies fevers, lightheadedness, headaches, confusion, sore throat/runny nose, neck pain, chest pain, potation's, shortness of breath, urinary complaints.  Reports circular skin rashes to right lower extremity/left upper extremity for past few weeks.   Last alcohol drink was on Saturday, normally consumes a drink every day. Seems fidgety, having peripheral tremors.

## 2024-08-20 NOTE — H&P ADULT - NSHPREVIEWOFSYSTEMS_GEN_ALL_CORE
Review of Systems:  General: no fever or weight loss  Skin: no rashes or itch  HEENT: no trauma or HA. No drainage or bleeding.   CV: no chest pain or palpitations  Pulm: no SOB, cough, or wheezing  GI: (+) diarrhea. (+) mild abd pain due to distention. no nausea or vomiting  Urinary: no incontinence or dysuria  Neuro: no confusion, seizure, or numbness  MSK: no weakness, stiffness, or swelling Review of Systems:  General: no fever. Patient has been experiencing weakness for months, believes associated with cirrhosis. Patient has had 30 kg unintended weight loss since 6 years ago, attributes to cirrhosis and stress.   Skin: no rashes or itch  HEENT: no trauma or HA. No drainage or bleeding. Described having some right-sided neck + shoulder pain for 2-3 days which he attributes to muscular origins.  CV: no chest pain or palpitations  Pulm: no SOB. Endorses chronic cough and wheezing with yellow sputum production which is thick but nonpurulent.   GI: (+) diarrhea. (+) mild abd pain due to distention. no nausea or vomiting. Shifting dullness present.   Urinary: no incontinence or dysuria  Neuro: no confusion, seizure, or numbness  MSK: no weakness, stiffness. 1+ peripheral edema. Review of Systems:  General: no fever or weight loss  Skin: no rashes or itch  HEENT: no trauma or HA. No drainage or bleeding.  CV: no chest pain or palpitations  Pulm: no SOB.  GI: (+) diarrhea. (+) mild abd pain due to distention. no nausea or vomiting.   Urinary: no incontinence or dysuria  Neuro: no confusion, seizure, or numbness  MSK: No weakness and stiffness. Review of Systems:  General: no fevers, chills, or weight loss; No night sweats  Skin: no rashes or itch  HEENT: no trauma or HA. No sore throat, sinus pain/pressure or dysphagia. (+)chronic rhinorrhea  CV: no chest pain or palpitations; chronic b/l minimal LE edema, unchanged   Pulm: no shortness of breath or cough; (+)occasional wheezing  GI: (+) diarrhea. (+) mild abd pain due to distention. no nausea or vomiting.   Urinary: no incontinence or dysuria  Neuro: no confusion, seizure, or numbness, No LH/dizziness.   MSK: No weakness and stiffness. Ambulates without aid; No falls

## 2024-08-20 NOTE — H&P ADULT - PROBLEM SELECTOR PLAN 1
Acute diarrhea >50/day in setting of recent abx use. Patient unclear of abx, however, seems to be on Clarithromycin triple therapy for H.Pylori infection. Concern for C.Diff.  - s/p diagnostic paracentesis in ED: corrected . No longer having abdominal pain. Low concern for SBP  - f/u C.Diff studies, GI PCR, stool culture, BCx Acute diarrhea >50/day in setting of recent Clarithromycin triple therapy for H.Pylori infection. Concern for C.Diff.  - s/p diagnostic paracentesis in ED: corrected . No longer having abdominal pain. Low concern for SBP  - f/u C.Diff studies, GI PCR, stool culture, BCx Acute diarrhea >50/day in setting of recent Clarithromycin triple therapy for H.Pylori infection. Concern for C.Diff.  - s/p diagnostic paracentesis in ED: negative for SBP  - f/u C.Diff studies, GI PCR, stool culture, BCx  - currently on Metronidazole for H.Pylori, continue  - Stool count Acute diarrhea >50/day in setting of recent Clarithromycin triple therapy for H.Pylori infection. Concern for C.Diff.  - s/p diagnostic paracentesis in ED: negative for SBP  - f/u C.Diff studies, GI PCR, stool culture, BCx  - currently on Metronidazole for H.Pylori, continue  - Stool count  - repeat lactate with AM labs Acute diarrhea >50/day in setting of recent Clarithromycin triple therapy for H.Pylori infection. Concern for C.Diff.  - s/p diagnostic paracentesis in ED: negative for SBP  - f/u C.Diff studies, GI PCR, stool culture, BCx  - currently on abx for H.Pylori, continue  - Stool count  - repeat lactate with AM labs

## 2024-08-20 NOTE — SBIRT NOTE ADULT - NSSBIRTALCACTIVEREFTXDET_GEN_A_CORE
Screening results were reviewed with the patient. Patient was provided educational materials on low-risk guidelines and substance use and health. Motivation and goals were discussed. Patient provided with a referral to substance use treatment at [facility]. Patient enrolled in [employer]. Patient provided with resources to support their substance use goals. Patient provided with [employer] SBIRT information. Patient provided with informational flyer on text messaging program for alcohol use.

## 2024-08-20 NOTE — H&P ADULT - ATTENDING COMMENTS
44M w/alcoholic cirrhosis, NIDDM2, HTN, asthma, psoriasis presenting with profuse watery diarrhea. No melena or hematochezia. CT without evidence of colitis, may be secondary to withdrawal. C diff negative. CT showing nonspecific mural/pericholecystic edema of partially contracted gb and gas-filled patulous appendix without e/o acute appendiceal inflammation. Negative Boogie's sign and negative McBurney's point tenderness on exam, check RUQ U/S, trend lactate,  f/u GI PCR, stool counts. Per patient, recently Rx'ed diuretics and abx for H. pylori stated he started taking for a few days then self discontinued secondary to diarrhea. Would confirm home meds with hepatologist/pharmacy in AM (?on rifaximin?). Restart on diuretics, if unchanged ascites can get IR c/s for large volume tap for relief but on my exam patient denies any abdominal pain at all. Trend Lactate. Of note, protein gap noted on labs, would confirm with PCP re any prior w/u (denies any weight loss/night sweats). Patient Child class C/Maddrey score elevated however given c/f enteritis holding steroids for now. CIWA monitoring with PRN lorazepam.    Agree with resident H&P and plan as edited above. 44M w/alcoholic cirrhosis, NIDDM2, HTN, asthma, psoriasis presenting with profuse watery diarrhea. No melena or hematochezia. CT without evidence of colitis, may be secondary to withdrawal. C diff negative. CT showing nonspecific mural/pericholecystic edema of partially contracted gb and gas-filled patulous appendix without e/o acute appendiceal inflammation. Negative Boogie's sign and negative McBurney's point tenderness on exam, check RUQ U/S, trend lactate,  f/u GI PCR, stool counts. Per patient, recently Rx'ed diuretics and abx for H. pylori stated he started taking for a few days then self discontinued secondary to diarrhea. Would confirm home meds with hepatologist/pharmacy in AM (?on rifaximin?). Restart on diuretics, if unchanged ascites can get IR c/s for large volume tap for relief but on my exam patient denies any abdominal pain at all. Trend Lactate. Of note, protein gap noted on labs, would confirm with PCP re any prior w/u (denies any weight loss/night sweats). Patient Child class C/Maddrey score elevated however given c/f enteritis holding steroids for now. CIWA monitoring with PRN lorazepam, encouraged total abstinence from alcohol.    Agree with resident H&P and plan as edited above.

## 2024-08-20 NOTE — H&P ADULT - NSHPLABSRESULTS_GEN_ALL_CORE
Labs:                        14.6   6.67  )-----------( 104      ( 20 Aug 2024 10:20 )             40.7     08-20    131<L>  |  96<L>  |  6<L>  ----------------------------<  158<H>  5.1   |  21<L>  |  0.55    Ca    8.9      20 Aug 2024 10:20  Mg     1.70     08-20    TPro  9.1<H>  /  Alb  3.0<L>  /  TBili  3.1<H>  /  DBili  x   /  AST  135<H>  /  ALT  32  /  AlkPhos  232<H>  08-20    PT/INR - ( 20 Aug 2024 10:20 )   PT: 18.3 sec;   INR: 1.64 ratio    PTT - ( 20 Aug 2024 10:20 )  PTT:38.5 sec    EKG: Sinus tachycardia. QTc 456    RADIOLOGY:  CT Abdomen and Pelvis w/ IV Cont 08.20.24  IMPRESSION:  *  Cirrhosis with evidence of portal hypertension. Heterogeneous liver with numerous small hypodensities that are too small to characterize.  *  Large volume of ascites.  *  Partially contracted gallbladder with nonspecific mural/pericholecystic edema on a background of ascites.  *  Gas-filled patulous appendix measuring 10 mm in diameter without definite evidence of acute appendiceal inflammation.  *  Moderate sized umbilical hernia containing ascites fluid.    Xray Chest 2 Views PA/Lat 08.20.24  IMPRESSION: Clear lungs. Labs:                        14.6   6.67  )-----------( 104      ( 20 Aug 2024 10:20 )             40.7     08-20    131<L>  |  96<L>  |  6<L>  ----------------------------<  158<H>  5.1   |  21<L>  |  0.55    Ca    8.9      20 Aug 2024 10:20  Mg     1.70     08-20    TPro  9.1<H>  /  Alb  3.0<L>  /  TBili  3.1<H>  /  DBili  x   /  AST  135<H>  /  ALT  32  /  AlkPhos  232<H>  08-20    PT/INR - ( 20 Aug 2024 10:20 )   PT: 18.3 sec;   INR: 1.64 ratio    PTT - ( 20 Aug 2024 10:20 )  PTT:38.5 sec    Ammonia, Serum: 87 umol/L (08.20.24 @ 10:20)    10:20 - VBG - pH: 7.46  | pCO2: 37    | pO2: 42    | Lactate: 3.2      Ascites Fluid  Protein 1.6 g/dL  Albumin 0.7 g/dL (SAAG >1.1 g/dL c/w portal HTN)  glucose 172 mg/dL    Cloudy/Yellow  Total Nucleated Cell Count 277 cells/uL  Total RBC <2000 cells/uL  Neutrophils 0%  BF lymphocytes 7%  Monocyte/Macrophage Count 47%  Eosinophil Count 0%  Other Body Cells 0%  Mesothelial Cells 46%  Total Cells Counted 100 Cells   Atypical Lymphocytes 0%    < from: Xray Chest 2 Views PA/Lat (08.20.24 @ 11:30) >  Examination in frontal and lateral projection fails to show evidence of any active pulmonary disease.  The heart is not enlarged and there is no pleural effusion or pneumothorax. There is no acute bone pathology.  COMPARISON: No prior exams available.  IMPRESSION: Clear lungs.  < end of copied text >    < from: CT Abdomen and Pelvis w/ IV Cont (08.20.24 @ 15:05) >  LOWER CHEST: Coronary artery calcifications.  LIVER: Heterogeneous cirrhotic liver. Numerous small hypodensities, which are too small to characterize.  BILE DUCTS: Normal caliber.  GALLBLADDER: Partially contracted. Nonspecific mural/pericholecystic edema on a background of ascites.  SPLEEN: Within normal limits.  PANCREAS: Within normal limits.  ADRENALS: Within normal limits.  KIDNEYS/URETERS: No hydronephrosis. Subcentimeter hypodense focus in the left kidney that is too small to characterize.  BLADDER: Within normal limits.  REPRODUCTIVE ORGANS: Prostate within normal limits.  BOWEL: No bowel obstruction. No overt bowel wall thickening appreciated. Gas-filled patulous appendix measuring up to 10 mm in diameter at the tip, previously 6 mm on 12/8/2023. No significant appendiceal wall thickening. Evaluation for periappendiceal inflammatory changes is limited due to the presence of ascites fluid.  PERITONEUM/RETROPERITONEUM: Large volume of ascites, increased since 12/8/2023.  VESSELS: Mild atherosclerotic changes. Patent portal veins. Perigastric, perisplenic, paraesophageal varices. Recanalized paraumbilical vein.  LYMPHNODES: No lymphadenopathy.  ABDOMINAL WALL: Moderate-sized umbilical hernia containing ascites fluid. Subcutaneous gas and infiltration in the ventral abdominal wall, which could be due to injections.  BONES: Degenerative changes. Multilevel lower lumbar central canal narrowing.  IMPRESSION: Cirrhosis with evidence of portal hypertension. Heterogeneous liver with numerous small hypodensities that are too small to characterize.  Large volume of ascites. Partially contracted gallbladder with nonspecific   mural/pericholecystic edema on a background of ascites.  Gas-filled patulous appendix measuring 10 mm in diameter without definite evidence of acute appendiceal inflammation.  Moderate sized umbilical hernia containing ascites fluid.  < end of copied text >    EKG: Sinus tachycardia. QTc 456 Labs:                        14.6   6.67  )-----------( 104      ( 20 Aug 2024 10:20 )             40.7     08-20    131<L>  |  96<L>  |  6<L>  ----------------------------<  158<H>  5.1   |  21<L>  |  0.55    Ca    8.9      20 Aug 2024 10:20  Mg     1.70     08-20    TPro  9.1<H>  /  Alb  3.0<L>  /  TBili  3.1<H>  /  DBili  x   /  AST  135<H>  /  ALT  32  /  AlkPhos  232<H>  08-20    PT/INR - ( 20 Aug 2024 10:20 )   PT: 18.3 sec;   INR: 1.64 ratio    PTT - ( 20 Aug 2024 10:20 )  PTT:38.5 sec    Ammonia, Serum: 87 umol/L (08.20.24 @ 10:20)    10:20 - VBG - pH: 7.46  | pCO2: 37    | pO2: 42    | Lactate: 3.2      Ascites Fluid  Protein 1.6 g/dL  Albumin 0.7 g/dL (SAAG >1.1 g/dL c/w portal HTN)  glucose 172 mg/dL    Cloudy/Yellow  Total Nucleated Cell Count 277 cells/uL  Total RBC <2000 cells/uL  Neutrophils 0%  BF lymphocytes 7%  Monocyte/Macrophage Count 47%  Eosinophil Count 0%  Other Body Cells 0%  Mesothelial Cells 46%  Total Cells Counted 100 Cells   Atypical Lymphocytes 0%    < from: Xray Chest 2 Views PA/Lat (08.20.24 @ 11:30) >  Examination in frontal and lateral projection fails to show evidence of any active pulmonary disease.  The heart is not enlarged and there is no pleural effusion or pneumothorax. There is no acute bone pathology.  COMPARISON: No prior exams available.  IMPRESSION: Clear lungs.  < end of copied text >    < from: CT Abdomen and Pelvis w/ IV Cont (08.20.24 @ 15:05) >  LOWER CHEST: Coronary artery calcifications.  LIVER: Heterogeneous cirrhotic liver. Numerous small hypodensities, which are too small to characterize.  BILE DUCTS: Normal caliber.  GALLBLADDER: Partially contracted. Nonspecific mural/pericholecystic edema on a background of ascites.  SPLEEN: Within normal limits.  PANCREAS: Within normal limits.  ADRENALS: Within normal limits.  KIDNEYS/URETERS: No hydronephrosis. Subcentimeter hypodense focus in the left kidney that is too small to characterize.  BLADDER: Within normal limits.  REPRODUCTIVE ORGANS: Prostate within normal limits.  BOWEL: No bowel obstruction. No overt bowel wall thickening appreciated. Gas-filled patulous appendix measuring up to 10 mm in diameter at the tip, previously 6 mm on 12/8/2023. No significant appendiceal wall thickening. Evaluation for periappendiceal inflammatory changes is limited due to the presence of ascites fluid.  PERITONEUM/RETROPERITONEUM: Large volume of ascites, increased since 12/8/2023.  VESSELS: Mild atherosclerotic changes. Patent portal veins. Perigastric, perisplenic, paraesophageal varices. Recanalized paraumbilical vein.  LYMPHNODES: No lymphadenopathy.  ABDOMINAL WALL: Moderate-sized umbilical hernia containing ascites fluid. Subcutaneous gas and infiltration in the ventral abdominal wall, which could be due to injections.  BONES: Degenerative changes. Multilevel lower lumbar central canal narrowing.  IMPRESSION: Cirrhosis with evidence of portal hypertension. Heterogeneous liver with numerous small hypodensities that are too small to characterize.  Large volume of ascites. Partially contracted gallbladder with nonspecific   mural/pericholecystic edema on a background of ascites.  Gas-filled patulous appendix measuring 10 mm in diameter without definite evidence of acute appendiceal inflammation.  Moderate sized umbilical hernia containing ascites fluid.  < end of copied text >    EKG personally reviewed and interpreted - ST 111bpm, TWI aVL, low voltage QRS, Q in V1-V3; J point elevation V2; QTc 456ms

## 2024-08-20 NOTE — H&P ADULT - PROBLEM SELECTOR PLAN 7
Home meds: Losartan 100mg qd, Amlodipine 10mg qd Home meds: Losartan 100mg qd, Amlodipine 10mg qd, and Carvedilol 3.125mg BID  - continue home meds DVT ppx: Lovenox 40mg daily  Diet: Consistent carb/DASH  Dispo: pending clinical course. PT consult i/s/o cirrhosis  monitor/trend

## 2024-08-20 NOTE — SBIRT NOTE ADULT - [facility]  [address] [place] [address], [address] Floor,  [place] [address] [phone]

## 2024-08-20 NOTE — ED ADULT TRIAGE NOTE - GLASGOW COMA SCALE: BEST MOTOR RESPONSE, MLM
Chief complaint:   Chief Complaint   Patient presents with   • URI     Patient has had sinus congestion and cough x 2 days. Needs test to participate in baseball game.        Vitals:  Visit Vitals  /68   Pulse 87   Temp 97 °F (36.1 °C)   Resp 24   Wt 35.8 kg   SpO2 97%       HISTORY OF PRESENT ILLNESS     This is a 8 years old male coming in today accompanied by his brother but we have consent from his mother to treat the patient here today for this nasal congestion, runny nose and coughing for the past 2 days with symptoms seems to be worsening. The patient mother denies him having covid infection in the past. She is unsure of any exposure. The patient has been given over the counter medication with minimal improvement of his symptoms. He also had been a little fatigue since the onset of his symptoms. He is up to date with his immunization as well as the influenza vaccine. The patient has not seen anybody for this.       Other significant problems:  There are no problems to display for this patient.      PAST MEDICAL, FAMILY AND SOCIAL HISTORY     Medications:  Current Outpatient Medications   Medication   • azithromycin (ZITHROMAX) 250 MG tablet   • VITAMIN D, ERGOCALCIFEROL, PO   • Lactobacillus (PROBIOTIC CHILDRENS PO)   • vitamin - therapeutic multivitamins w/minerals (CENTRUM SILVER,THERA-M) TABS     No current facility-administered medications for this visit.       Allergies:  ALLERGIES:   Allergen Reactions   • Penicillins HIVES       Past Medical  History/Surgeries:  History reviewed. No pertinent past medical history.    History reviewed. No pertinent surgical history.    Family History:  History reviewed. No pertinent family history.    Social History:  Social History     Tobacco Use   • Smoking status: Not on file   Substance Use Topics   • Alcohol use: Not on file       REVIEW OF SYSTEMS     Review of Systems   Constitutional: Negative for chills, fever and irritability.   HENT: Positive for  congestion and rhinorrhea. Negative for ear discharge, ear pain, sore throat and trouble swallowing.    Respiratory: Positive for cough. Negative for chest tightness, shortness of breath and wheezing.    Cardiovascular: Negative for chest pain and palpitations.   Gastrointestinal: Negative for abdominal pain, nausea and vomiting.   Musculoskeletal: Negative for neck pain.   Neurological: Negative for dizziness and headaches.   Psychiatric/Behavioral: Negative for confusion.       PHYSICAL EXAM     Physical Exam  Vitals and nursing note reviewed.   Constitutional:       Appearance: Normal appearance. He is not ill-appearing, toxic-appearing or diaphoretic.   HENT:      Head: Normocephalic and atraumatic.      Nose: Mucosal edema, congestion and rhinorrhea present.      Mouth/Throat:      Lips: No lesions.      Mouth: Mucous membranes are moist.      Pharynx: Oropharynx is clear. Uvula midline. No pharyngeal swelling, oropharyngeal exudate, posterior oropharyngeal erythema, pharyngeal petechiae or cleft palate.   Eyes:      Conjunctiva/sclera: Conjunctivae normal.   Cardiovascular:      Rate and Rhythm: Normal rate and regular rhythm.      Heart sounds: Normal heart sounds. No murmur heard.     Pulmonary:      Effort: Pulmonary effort is normal. No respiratory distress, nasal flaring or retractions.      Breath sounds: Normal breath sounds. No stridor or decreased air movement. No wheezing, rhonchi or rales.   Musculoskeletal:      Cervical back: Normal range of motion and neck supple.   Lymphadenopathy:      Cervical: No cervical adenopathy.   Skin:     General: Skin is warm.      Capillary Refill: Capillary refill takes less than 2 seconds.      Findings: No rash.   Neurological:      Mental Status: He is alert.   Psychiatric:         Behavior: Behavior is cooperative.         ASSESSMENT/PLAN     Cecilio was seen today for uri.    Diagnoses and all orders for this visit:    Nasal congestion with rhinorrhea  -      COVID DIAGNOSTIC TEST    Cough  -     COVID DIAGNOSTIC TEST      I discussed with the mother regarding testing for covid and cdc recommendation.    Recommend quarantine until symptoms resolves for 24-48 hours even with negative covid test.    I discussed with the patient mother regarding his negative covid test result.    Symptomatic treatment    Close monitoring and follow up.   (M6) obeys commands

## 2024-08-20 NOTE — ED ADULT TRIAGE NOTE - CHIEF COMPLAINT QUOTE
Patient c/o diarrhea, nausea and bloating x 2 days. Patient reports he was started on antibiotics last week for GI infection, reports > 20 bowel movements/day. Denies fever. Patient has a documented history of alcohol abuse, reports last drink 3 days ago, reports history of alcohol withdrawal, patient appears shaky. Phx cirrhosis, HTN, DM2

## 2024-08-20 NOTE — H&P ADULT - PROBLEM SELECTOR PLAN 5
Drinks 1 boxed wine/day. Last drink was on Saturday with hard liquor for a social gathering.  - CIWA protocol  - Symptom triggered Ativan  - SBIRT Hx of cirrhosis 2/2 alcohol use disorder. C/b ascites, esophageal varices, and portal hypertension. No active bleed.  - Follows with hepatologyDr. Aleida Oakley outpatient  - continue home Spironolactone 25mg qd, HCTZ 25mg qd, and Furosemide 40mg qd NIDDM2  Home meds: Januvia qd, Jardiance 25mg qd  - hold home meds  - LDSS  - check A1c

## 2024-08-20 NOTE — PATIENT PROFILE ADULT - PATIENT'S SEXUAL ORIENTATION
Pt recently DC'd from detox, last drink 10 days ago reports feeling dizzy and weak x 6 days. Pt saw his PMD yesterday and was told to come to the ED, states he has difficulty sleeping and walking. Tremors noted. Heterosexual

## 2024-08-20 NOTE — ED PROVIDER NOTE - PHYSICAL EXAMINATION
PHYSICAL EXAM:  GENERAL: NAD, lying in bed comfortably  HEAD:  Atraumatic, Normocephalic  EYES: EOMI, PERRLA, conjunctiva and sclera clear  ENT: No erythema/pallor/petechiae/lesions  NECK: Supple, No JVD  LUNG: CTA b/l; no r/r/w  HEART: RRR, +S1/S2; No m/r/g  ABDOMEN: Distended non tender abd, fluid thrill +ve   EXTREMITIES:  2+ Peripheral Pulses, brisk cap refill. BL LE pitting edema   NERVOUS SYSTEM:  AAOx3, speech clear. No sensory/motor deficits   SKIN: Circular skin rash with skin flaking to R-LE and L-UE, ? T versicolor

## 2024-08-20 NOTE — H&P ADULT - PROBLEM SELECTOR PLAN 2
Patient/spouse notes he had an EGD recently and found to have an infection. Unclear infection and the 2 abx he was started on a week ago. Likely H.Pylori on Clarithromycin triple therapy given amoxicillin allergy.  - continue Pantoprazole 40mg qd  - Clarithromycin 500mg BID  - Metronidazole 500mg TID  - follow-up with spouse regarding abx which she has at home Drinks 1 boxed wine/day. Last drink was on Saturday with hard liquor for a social gathering.  - CIWA protocol  - Symptom triggered Ativan  - SBIRT Drinks 1 boxed wine/day. Last drink was on Saturday with hard liquor for a social gathering.  - CIWA protocol  - Symptom triggered Ativan  - SBIRT  - Thiamine 500mg TID x3d  - Folate daily #Alcohol Withdrawal  Drinks 1 boxed wine/day. Last drink was on Saturday with hard liquor for a social gathering. S/p Chlordiazepoxide 25mg x1 in ED.  - CIWA protocol  - Symptom triggered Ativan  - SBIRT  - Thiamine 500mg TID x3d  - Folate daily Drinks 1 boxed wine/day. Last drink was on Saturday with hard liquor for a social gathering. S/p Chlordiazepoxide 25mg x1 in ED.  - low risk CIWA protocol  - Symptom triggered Ativan  - SBIRT  - Thiamine 500mg TID x3d, MV, folate supplementation  - Babita Discriminant Function  36.7 points however in setting of c/f enterocolitis vs C. diff will hold off on steroids for now Drinks 1 boxed wine/day. Last drink was on Saturday with hard liquor for a social gathering. S/p Chlordiazepoxide 25mg x1 in ED.   - low risk CIWA protocol (In ED CIWA 7->4->4->4)  - Symptom triggered Ativan  - SBIRT  - Thiamine 500mg TID x3d, MV, folate supplementation  - Babita Discriminant Function  36.7 points however in setting of c/f enterocolitis vs C. diff will hold off on steroids for now

## 2024-08-20 NOTE — H&P ADULT - NSHPPHYSICALEXAM_GEN_ALL_CORE
Vital Signs Last 24 Hrs  T(C): 37.3 (20 Aug 2024 19:26), Max: 37.3 (20 Aug 2024 19:26)  T(F): 99.1 (20 Aug 2024 19:26), Max: 99.1 (20 Aug 2024 19:26)  HR: 104 (20 Aug 2024 19:26) (104 - 119)  BP: 152/101 (20 Aug 2024 19:26) (145/97 - 171/110)  BP(mean): --  RR: 17 (20 Aug 2024 19:26) (14 - 20)  SpO2: 100% (20 Aug 2024 19:26) (97% - 100%)    Parameters below as of 20 Aug 2024 14:30  Patient On (Oxygen Delivery Method): room air    Physical Exam:  CONSTITUTIONAL: no apparent distress.  SKIN: (+) round, dark lesions on BLE and LUE. No rashes or ecchymosis.  EYES: PERRLA. EOMI. No scleral icterus.  ENT: Supple. No discharge or glossitis. Oral mucosa with moist membranes. Hearing intact bilaterally.  CV: RRR. Normal S1 and S2. No murmurs, rubs, or gallops. No edema. Pulses equal bilaterally.  PULM: Clear to auscultation throughout. Respirations unlabored. No wheezing, rales, or rhonchi.  GI: (+) abdomen distended. Non-tender. No palpable masses.  MSK: No cyanosis or clubbing.  NEURO: (+) mild tremors with arms extended. (+) tongue fasciculations. CN grossly intact.  PSYCH: A+O x3 Vital Signs Last 24 Hrs  T(C): 37.3 (20 Aug 2024 19:26), Max: 37.3 (20 Aug 2024 19:26)  T(F): 99.1 (20 Aug 2024 19:26), Max: 99.1 (20 Aug 2024 19:26)  HR: 104 (20 Aug 2024 19:26) (104 - 119)  BP: 152/101 (20 Aug 2024 19:26) (145/97 - 171/110)  RR: 17 (20 Aug 2024 19:26) (14 - 20)  SpO2: 100% (20 Aug 2024 19:26) (97% - 100%)    Parameters below as of 20 Aug 2024 14:30  Patient On (Oxygen Delivery Method): room air    Physical Exam:  CONSTITUTIONAL: no apparent distress.  SKIN: (+) round, dark plaques on BLE and LUE. No rashes or ecchymosis.  EYES: PERRLA. EOMI. No scleral icterus.  ENT: Supple. No discharge or glossitis. Oral mucosa with moist membranes. Hearing intact bilaterally.  CV: RRR. Normal S1 and S2. No murmurs, rubs, or gallops. No edema. Pulses equal bilaterally.  PULM: Clear to auscultation throughout. Respirations unlabored. No wheezing, rales, or rhonchi.  GI: (+) abdomen distended. Non-tender. No palpable masses. [attending exam -NTTP, distended, (+)BS, Negative Boogie sign and Negative McBurney point tenderness]  MSK: No cyanosis or clubbing.  NEURO: (+) mild tremors with arms extended. (+) tongue fasciculations. CN grossly intact. [attending exam - no tremor, no asterixis]  PSYCH: A+O x3, normal mood and affect

## 2024-08-20 NOTE — H&P ADULT - PROBLEM SELECTOR PLAN 3
Hx of ascites with cirrhosis. Having mild discomfort with distention.  - s/p diagnostic paracentesis in ED  - consult IR for therapeutic paracentesis Recently diagnosed H.Pylori infection, started on Clarithromycin Triple Therapy with Metronidazole about a week ago.  - continue Pantoprazole 40mg qd  - Clarithromycin 500mg BID  - Metronidazole 500mg TID

## 2024-08-21 DIAGNOSIS — D69.6 THROMBOCYTOPENIA, UNSPECIFIED: ICD-10-CM

## 2024-08-21 DIAGNOSIS — F10.239 ALCOHOL DEPENDENCE WITH WITHDRAWAL, UNSPECIFIED: ICD-10-CM

## 2024-08-21 LAB
A1C WITH ESTIMATED AVERAGE GLUCOSE RESULT: 6 % — HIGH (ref 4–5.6)
ALBUMIN SERPL ELPH-MCNC: 3 G/DL — LOW (ref 3.3–5)
ALP SERPL-CCNC: 220 U/L — HIGH (ref 40–120)
ALT FLD-CCNC: 32 U/L — SIGNIFICANT CHANGE UP (ref 4–41)
ANION GAP SERPL CALC-SCNC: 10 MMOL/L — SIGNIFICANT CHANGE UP (ref 7–14)
ANION GAP SERPL CALC-SCNC: 14 MMOL/L — SIGNIFICANT CHANGE UP (ref 7–14)
AST SERPL-CCNC: 127 U/L — HIGH (ref 4–40)
BILIRUB SERPL-MCNC: 4 MG/DL — HIGH (ref 0.2–1.2)
BLOOD GAS VENOUS COMPREHENSIVE RESULT: SIGNIFICANT CHANGE UP
BUN SERPL-MCNC: 6 MG/DL — LOW (ref 7–23)
BUN SERPL-MCNC: 9 MG/DL — SIGNIFICANT CHANGE UP (ref 7–23)
C DIFF GDH STL QL: NEGATIVE — SIGNIFICANT CHANGE UP
C DIFF GDH STL QL: SIGNIFICANT CHANGE UP
CALCIUM SERPL-MCNC: 8.4 MG/DL — SIGNIFICANT CHANGE UP (ref 8.4–10.5)
CALCIUM SERPL-MCNC: 8.7 MG/DL — SIGNIFICANT CHANGE UP (ref 8.4–10.5)
CHLORIDE SERPL-SCNC: 92 MMOL/L — LOW (ref 98–107)
CHLORIDE SERPL-SCNC: 92 MMOL/L — LOW (ref 98–107)
CO2 SERPL-SCNC: 26 MMOL/L — SIGNIFICANT CHANGE UP (ref 22–31)
CO2 SERPL-SCNC: 27 MMOL/L — SIGNIFICANT CHANGE UP (ref 22–31)
COMMENT - FLUIDS: SIGNIFICANT CHANGE UP
CREAT SERPL-MCNC: 0.58 MG/DL — SIGNIFICANT CHANGE UP (ref 0.5–1.3)
CREAT SERPL-MCNC: 0.69 MG/DL — SIGNIFICANT CHANGE UP (ref 0.5–1.3)
EC EAEA GENE STL QL NAA+PROBE: DETECTED
EGFR: 116 ML/MIN/1.73M2 — SIGNIFICANT CHANGE UP
EGFR: 122 ML/MIN/1.73M2 — SIGNIFICANT CHANGE UP
ESTIMATED AVERAGE GLUCOSE: 126 — SIGNIFICANT CHANGE UP
FOLATE SERPL-MCNC: 6.5 NG/ML — SIGNIFICANT CHANGE UP (ref 3.1–17.5)
GI PCR PANEL: DETECTED
GLUCOSE BLDC GLUCOMTR-MCNC: 120 MG/DL — HIGH (ref 70–99)
GLUCOSE BLDC GLUCOMTR-MCNC: 126 MG/DL — HIGH (ref 70–99)
GLUCOSE BLDC GLUCOMTR-MCNC: 140 MG/DL — HIGH (ref 70–99)
GLUCOSE BLDC GLUCOMTR-MCNC: 174 MG/DL — HIGH (ref 70–99)
GLUCOSE SERPL-MCNC: 116 MG/DL — HIGH (ref 70–99)
GLUCOSE SERPL-MCNC: 137 MG/DL — HIGH (ref 70–99)
HCT VFR BLD CALC: 39.9 % — SIGNIFICANT CHANGE UP (ref 39–50)
HGB BLD-MCNC: 14.1 G/DL — SIGNIFICANT CHANGE UP (ref 13–17)
MAGNESIUM SERPL-MCNC: 1.5 MG/DL — LOW (ref 1.6–2.6)
MAGNESIUM SERPL-MCNC: 2.1 MG/DL — SIGNIFICANT CHANGE UP (ref 1.6–2.6)
MCHC RBC-ENTMCNC: 28.8 PG — SIGNIFICANT CHANGE UP (ref 27–34)
MCHC RBC-ENTMCNC: 35.3 GM/DL — SIGNIFICANT CHANGE UP (ref 32–36)
MCV RBC AUTO: 81.4 FL — SIGNIFICANT CHANGE UP (ref 80–100)
MRSA PCR RESULT.: SIGNIFICANT CHANGE UP
NRBC # BLD: 0 /100 WBCS — SIGNIFICANT CHANGE UP (ref 0–0)
NRBC # FLD: 0 K/UL — SIGNIFICANT CHANGE UP (ref 0–0)
PHOSPHATE SERPL-MCNC: 3.2 MG/DL — SIGNIFICANT CHANGE UP (ref 2.5–4.5)
PHOSPHATE SERPL-MCNC: 3.9 MG/DL — SIGNIFICANT CHANGE UP (ref 2.5–4.5)
PLATELET # BLD AUTO: 97 K/UL — LOW (ref 150–400)
POTASSIUM SERPL-MCNC: 4 MMOL/L — SIGNIFICANT CHANGE UP (ref 3.5–5.3)
POTASSIUM SERPL-MCNC: 4.1 MMOL/L — SIGNIFICANT CHANGE UP (ref 3.5–5.3)
POTASSIUM SERPL-SCNC: 4 MMOL/L — SIGNIFICANT CHANGE UP (ref 3.5–5.3)
POTASSIUM SERPL-SCNC: 4.1 MMOL/L — SIGNIFICANT CHANGE UP (ref 3.5–5.3)
PROT SERPL-MCNC: 9.2 G/DL — HIGH (ref 6–8.3)
RBC # BLD: 4.9 M/UL — SIGNIFICANT CHANGE UP (ref 4.2–5.8)
RBC # FLD: 20.7 % — HIGH (ref 10.3–14.5)
S AUREUS DNA NOSE QL NAA+PROBE: SIGNIFICANT CHANGE UP
SODIUM SERPL-SCNC: 129 MMOL/L — LOW (ref 135–145)
SODIUM SERPL-SCNC: 132 MMOL/L — LOW (ref 135–145)
VIT B12 SERPL-MCNC: 1495 PG/ML — HIGH (ref 200–900)
WBC # BLD: 6.56 K/UL — SIGNIFICANT CHANGE UP (ref 3.8–10.5)
WBC # FLD AUTO: 6.56 K/UL — SIGNIFICANT CHANGE UP (ref 3.8–10.5)

## 2024-08-21 PROCEDURE — 99233 SBSQ HOSP IP/OBS HIGH 50: CPT

## 2024-08-21 PROCEDURE — 76705 ECHO EXAM OF ABDOMEN: CPT | Mod: 26

## 2024-08-21 RX ORDER — ACETAMINOPHEN 325 MG/1
650 TABLET ORAL EVERY 8 HOURS
Refills: 0 | Status: DISCONTINUED | OUTPATIENT
Start: 2024-08-21 | End: 2024-08-23

## 2024-08-21 RX ORDER — SPIRONOLACTONE 25 MG/1
100 TABLET, FILM COATED ORAL DAILY
Refills: 0 | Status: DISCONTINUED | OUTPATIENT
Start: 2024-08-21 | End: 2024-08-23

## 2024-08-21 RX ORDER — CARVEDILOL 6.25 MG/1
3.12 TABLET ORAL EVERY 12 HOURS
Refills: 0 | Status: DISCONTINUED | OUTPATIENT
Start: 2024-08-21 | End: 2024-08-23

## 2024-08-21 RX ORDER — PSYLLIUM HUSK 3.4 G/6.5G
1 GRANULE ORAL DAILY
Refills: 0 | Status: DISCONTINUED | OUTPATIENT
Start: 2024-08-21 | End: 2024-08-23

## 2024-08-21 RX ORDER — L.ACID,PARA/B.BIFIDUM/S.THERM 8B CELL
1 CAPSULE ORAL DAILY
Refills: 0 | Status: DISCONTINUED | OUTPATIENT
Start: 2024-08-21 | End: 2024-08-23

## 2024-08-21 RX ADMIN — Medication 1 TABLET(S): at 14:00

## 2024-08-21 RX ADMIN — Medication 500 MILLIGRAM(S): at 05:19

## 2024-08-21 RX ADMIN — CARVEDILOL 3.12 MILLIGRAM(S): 6.25 TABLET ORAL at 05:19

## 2024-08-21 RX ADMIN — Medication 25 GRAM(S): at 13:58

## 2024-08-21 RX ADMIN — Medication 40 MILLIGRAM(S): at 05:19

## 2024-08-21 RX ADMIN — Medication 105 MILLIGRAM(S): at 05:23

## 2024-08-21 RX ADMIN — SPIRONOLACTONE 25 MILLIGRAM(S): 25 TABLET, FILM COATED ORAL at 05:19

## 2024-08-21 RX ADMIN — Medication 1: at 09:15

## 2024-08-21 RX ADMIN — Medication 500 MILLIGRAM(S): at 14:00

## 2024-08-21 RX ADMIN — LOSARTAN POTASSIUM 100 MILLIGRAM(S): 50 TABLET ORAL at 06:19

## 2024-08-21 RX ADMIN — CLARITHROMYCIN 500 MILLIGRAM(S): 250 TABLET ORAL at 06:18

## 2024-08-21 RX ADMIN — AMLODIPINE BESYLATE 10 MILLIGRAM(S): 10 TABLET ORAL at 05:19

## 2024-08-21 RX ADMIN — Medication 1 TABLET(S): at 13:59

## 2024-08-21 RX ADMIN — Medication 25 GRAM(S): at 18:04

## 2024-08-21 RX ADMIN — Medication 2 PUFF(S): at 22:23

## 2024-08-21 RX ADMIN — SPIRONOLACTONE 100 MILLIGRAM(S): 25 TABLET, FILM COATED ORAL at 18:04

## 2024-08-21 RX ADMIN — CARVEDILOL 3.12 MILLIGRAM(S): 6.25 TABLET ORAL at 18:05

## 2024-08-21 RX ADMIN — CLARITHROMYCIN 500 MILLIGRAM(S): 250 TABLET ORAL at 18:05

## 2024-08-21 RX ADMIN — Medication 40 MILLIGRAM(S): at 05:20

## 2024-08-21 RX ADMIN — Medication 500 MILLIGRAM(S): at 23:08

## 2024-08-21 RX ADMIN — Medication 1 APPLICATION(S): at 05:18

## 2024-08-21 RX ADMIN — Medication 105 MILLIGRAM(S): at 13:58

## 2024-08-21 RX ADMIN — PSYLLIUM HUSK 1 PACKET(S): 3.4 GRANULE ORAL at 14:00

## 2024-08-21 RX ADMIN — Medication 1 APPLICATION(S): at 18:05

## 2024-08-21 RX ADMIN — Medication 105 MILLIGRAM(S): at 23:08

## 2024-08-21 NOTE — CHART NOTE - NSCHARTNOTEFT_GEN_A_CORE
Invasive Procedure Team (IPT) consulted for therapeutic paracentesis.    Indications for Therapeutic Paracentesis:  - Tense ascites associated with abdominal pain, urinary retention, respiratory distress, or other evidence of end-organ compromise that is likely 2/2 the ascites itself (and not another etiology)  - Large ascites refractory to medical management (i.e. diuretics)    Absolute Contraindications:  - DIC  - Acute Abdomen  - Cellulitis    Relative Contraindications:  - Coagulopathy (INR > 2.0 per [facility] IPT policy)  - Severe Thrombocytopenia (Platelet < 20-50, discuss with team/refer to final recommendations below)  - Pregnancy (discuss with team/refer to final recommendations below)  - Cellulitis/Surgical Scarring/Vasculature or Hematoma overlying only safe bedside access site  - MELITA (specifically for therapeutic paracteneses; related to already poor intravascular perfusion. Risk of MELITA increases by 1.5x per 1L of ascites fluid removed).    ASSESSMENT  # Decompensated cirrhosis c/b ascites likely 2/2 ETOH use disorder   - s/p dx para 8/20 SAAG > 1.1, protein < 2.5 c/w transudate   - Non-tense distension of abdomen. No respiratory distress on exam   - Reports discomfort due to distension   - currently on Lasix 40mg QD; spironolactone 25mg QD to be increased to 100mg QD per hepatology     RECOMMENDATIONS  - Plan for procedure 8/22 AM   - Please obtain CBC/PTT/PT/INR/BMP for Cr on morning of procedure  - Please hold Lovenox 40QD for 24h    - Pt does NOT need to be NPO   - If repeat therapeutic paracentesis requested for severe / refractory ascites in < 7 day intervals, should consider IR consultation for indwelling catheter placement.      Jose Ladd MD (Sherry)   EM/IM PGY-2

## 2024-08-21 NOTE — CONSULT NOTE ADULT - ASSESSMENT
# Decompensated etiology cirrhosis c/b ascites  #ETOH Use Disorder on sx triggered CIWA    MELD: 20   Child-Hatfield: ***  HE: [place] ***, currently on (lactulose/rifaximin) target for 2-3 BMs/day   Varices: (last EGD), hold BB in the setting of sepsis    Ascites: (home diuretics, typical ratio lasix 40, spironolactone 100)    - Use Angie:  to titrate for goal ratio >1  SBP: no evidence of SBP on 8/20 paracentesis   HCC: (last MRI date)    #Acute Diarrhea   new diarrhea after starting clarithromycin, flagyl triple therapy for h pylori. Differential includes infectious vs antibiotic-associated diarrhea vs malabsorptive vs symptomatic EtoH withdrawal    #H Pylori Infection    Recommendations:  - ***  -trend clinical symptoms, exam findings, vital signs, CBC, CMP, INR     # Decompensated etiology cirrhosis c/b ascites  #ETOH Use Disorder on sx triggered CIWA  Pt with newly diagnosed cirrhosis, suspected to be in the setting EtOH given concomitant alcohol use disorder w/ hx of withdrawal and AST > ALT. Pt was diagnosed by Dr. Oakley though presented outpatient in the setting of new ascites. Patient was found to have grade II varices on EGD. Acute decompensation w/ worsening ascites likely in the setting of worsening diarrhea as well as medication non-compliance. Patient was prescribed lasix 40 mg and spironolactone 25 mg, however medications were not taken as patient and wife were hesitant to start new meds. No evidence of systemic infection and ascitic fluid negative for SBP. Patient expressed remorse regarding drinking and w/ good support system w/ wife who is willing to take him to appointments and help manage his care. Given absence of severe decompensation and MELD 20, does not require in patient transplant evaluation unless condition worsens.   MELD: 20   Child-Hatfield: 10 C   HE: no evidence of HE, not on lactulose at home   Varices: grade 2 esophageal varices noted on EGD 6/202/2024, no banding, no hx of variceal hemorrhage  Ascites: home dose lasix 40-spironolactone 25 QD but did not take medication now p/w moderate amount of ascites, pending therapeutic paracentesis. adjustment as below   SBP: no evidence of SBP on 8/20 paracentesis, SAAG > 1.1, and protein < 2.5 consistent w/ hepatic ascites   HCC: 1.1 cm R hepatic lobe lesion from outpatient CT, not seen in CT A/P in patient. Will need outpatient MRI abdomen.    #Acute on Chronic Diarrhea   New onset acute diarrhea 2 days after starting clarithromycin, flagyl triple therapy for h pylori. Differential includes infectious vs antibiotic-associated diarrhea vs malabsorptive vs symptomatic EtoH withdrawal. Most likely infectious given improvement on supportive treatment. Less likely antibiotic associated given that diarrhea resolving while still on same antibiotics. Pt w/ hx of chronic diarrhea, 5-6 episodes a day, suspect malabsorptive process. Work up completed outpatient w/ negative infectious w/u, fecal elastase wnl, no evidence of celiac disease on duodenal biopsy. Patient pending colonoscopy scheduled for 8/31 to r/o microscopic colitis vs IBD.     #H Pylori Infection  Diagnosed w/ stool antigen on 7/8, was prescribed clarithromycin, flagyl on 8/5 but pt had not taken abx until 8/15, but stopped on 8/18 w/ onset of diarrhea. While gastric and duodenal biopsies were negative for h pylori, per ACG guidelines, patient still requires full treatment.     Recommendations:  - Agree w/ therapeutic paracentesis for ascites   - Start carvedilol 3.125 mg BID   - Continue lasix 40 mg QD, but increase spironolactone to 100 mg QD. Monitor I/Os, daily weights   -  Continue treatment of H pylori w/ Clarithromycin, Metronidazole and protonix for full 14 days tx. Will need repeat testing as an outpatient for eradication   - Follow up w/ GI PCR, stool culture - if negative can consider immodium PRN   - Patient will need outpatient follow up w/ Dr. Tejeda - patient was referred to him for further management of cirrhosis by outpt GI Dr. Oakley  - trend clinical symptoms, exam findings, vital signs, CBC, CMP, INR    Note incomplete until finalized by attending signature/attestation.    Rickey Sumner  GI/Hepatology Fellow, PGY-4    MONDAY-FRIDAY 8AM-5PM:  Please message via [employer] or email [email or web address]     On Weekends/Holidays (All day) and Weekdays after 5 PM to 8 AM  For nonurgent consults please email:  Please email [email or web address]  For urgent consults:  Please contact on call GI team. See Amion schedule ([facility]), [employer] paging system ([facility]), or call hospital  ([facility])       # Decompensated etiology cirrhosis c/b ascites  #ETOH Use Disorder on sx triggered CIWA  Pt with newly diagnosed cirrhosis, suspected to be in the setting EtOH given concomitant alcohol use disorder w/ hx of withdrawal and AST > ALT. Pt was diagnosed by Dr. Oakley though presented outpatient in the setting of new ascites. Patient was found to have grade II varices on EGD. Acute decompensation w/ worsening ascites likely in the setting of worsening diarrhea as well as medication non-compliance. No hx encephalopathy.   MELD: 20   Child-Hatfield: 10 C   HE: no evidence of HE, not on lactulose at home   Varices: grade 2 esophageal varices noted on EGD 6/202/2024, no banding, no hx of variceal hemorrhage  Ascites: home dose lasix 40-spironolactone 25 QD but did not take medication now p/w moderate amount of ascites, pending therapeutic paracentesis. adjustment as below   SBP: no evidence of SBP on 8/20 paracentesis, SAAG > 1.1, and protein < 2.5 consistent w/ hepatic ascites   HCC: 1.1 cm R hepatic lobe lesion from outpatient CT, not seen in CT A/P in patient. Will need outpatient MRI abdomen.    #Acute on Chronic Diarrhea   New onset acute diarrhea 2 days after starting clarithromycin, flagyl triple therapy for h pylori. Differential includes infectious vs antibiotic-associated diarrhea vs malabsorptive vs symptomatic EtoH withdrawal. Most likely infectious given improvement on supportive treatment. Less likely antibiotic associated given that diarrhea resolving while still on same antibiotics. Pt w/ hx of chronic diarrhea, 5-6 episodes a day, suspect malabsorptive process. Work up completed outpatient w/ negative infectious w/u, fecal elastase wnl, no evidence of celiac disease on duodenal biopsy. Patient pending colonoscopy scheduled for 8/31 to r/o microscopic colitis vs IBD.     #H Pylori Infection  Diagnosed w/ stool antigen on 7/8, was prescribed clarithromycin, flagyl on 8/5 but pt had not taken abx until 8/15, but stopped on 8/18 w/ onset of diarrhea. While gastric and duodenal biopsies were negative for h pylori, per ACG guidelines, patient still requires full treatment.     Recommendations:  - Agree w/ therapeutic paracentesis for ascites   - Start carvedilol 3.125 mg BID   - Continue lasix 40 mg QD, but increase spironolactone to 100 mg QD. Monitor I/Os, daily weights   -  Continue treatment of H pylori w/ Clarithromycin, Metronidazole and protonix for full 14 days tx. Will need repeat testing as an outpatient for eradication   - Follow up w/ GI PCR, stool culture - if negative can consider immodium PRN   - Patient will need outpatient follow up w/ Dr. Tejeda - patient was referred to him for further management of cirrhosis by outpt GI Dr. Oakley  - trend clinical symptoms, exam findings, vital signs, CBC, CMP, INR    Note incomplete until finalized by attending signature/attestation.    Rickey Sumner  GI/Hepatology Fellow, PGY-4    MONDAY-FRIDAY 8AM-5PM:  Please message via [employer] or email [email or web address]     On Weekends/Holidays (All day) and Weekdays after 5 PM to 8 AM  For nonurgent consults please email:  Please email [email or web address] OR [email or web address]  For urgent consults:  Please contact on call GI team. See Amion schedule ([facility]), [employer] paging system ([facility]), or call hospital  ([facility])       # Decompensated etiology cirrhosis c/b ascites  #ETOH Use Disorder on sx triggered CIWA  Pt with newly diagnosed cirrhosis, suspected to be in the setting EtOH given concomitant alcohol use disorder w/ hx of withdrawal and AST > ALT. Pt was diagnosed by Dr. Oakley though presented outpatient in the setting of new ascites. Patient was found to have grade II varices on EGD. Acute decompensation w/ worsening ascites likely in the setting of worsening diarrhea as well as medication non-compliance. No hx encephalopathy.   MELD: 20   Child-Hatfield: 10 C   HE: no evidence of HE, not on lactulose at home   Varices: grade 2 esophageal varices noted on EGD 6/202/2024, no banding, no hx of variceal hemorrhage  Ascites: home dose lasix 40-spironolactone 25 QD but did not take medication now p/w moderate amount of ascites, pending therapeutic paracentesis. adjustment as below   SBP: no evidence of SBP on 8/20 paracentesis, SAAG > 1.1, and protein < 2.5 consistent w/ hepatic ascites   HCC: 1.1 cm R hepatic lobe lesion from outpatient CT, not seen in CT A/P in patient. Will need outpatient MRI abdomen.    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 Pt w/ hx of chronic diarrhea, 5-6 episodes a day. Work up completed outpatient w/ negative infectious w/u, fecal elastase wnl, no evidence of celiac disease on duodenal biopsy. Patient pending colonoscopy scheduled for 8/31 to r/o microscopic colitis vs IBD.     #H Pylori Infection  Diagnosed w/ stool antigen on 7/8, was prescribed clarithromycin, flagyl on 8/5 but pt had not taken abx until 8/15, but stopped on 8/18 w/ onset of diarrhea. While gastric and duodenal biopsies were negative for h pylori, per ACG guidelines, patient still requires full treatment.     Recommendations:  - Agree w/ therapeutic paracentesis for ascites, if > 5L removed please replete w/ albumin 6-8g/L removed  - Continue supportive management for acute diarrhea 2/2 EAEC  - Start carvedilol 3.125 mg BID   - Continue lasix 40 mg QD, but increase spironolactone to 100 mg QD. Monitor I/Os, daily weights   -  Continue treatment of H pylori w/ Clarithromycin, Metronidazole and protonix for full 14 days tx. Will need repeat testing as an outpatient for eradication   - Patient will need outpatient follow up w/ Dr. Tejeda - patient was referred to him for further management of cirrhosis by outpt GI Dr. Oakley  - trend clinical symptoms, exam findings, vital signs, CBC, CMP, INR    Note incomplete until finalized by attending signature/attestation.    Rickey Sumner  GI/Hepatology Fellow, PGY-4    MONDAY-FRIDAY 8AM-5PM:  Please message via [employer] or email [email or web address]     On Weekends/Holidays (All day) and Weekdays after 5 PM to 8 AM  For nonurgent consults please email:  Please email [email or web address]  For urgent consults:  Please contact on call GI team. See Amion schedule ([facility]), [employer] paging system ([facility]), or call hospital  ([facility])       # Decompensated etiology cirrhosis c/b ascites  #ETOH Use Disorder on sx triggered CIWA  Pt with newly diagnosed cirrhosis, suspected to be in the setting EtOH given concomitant alcohol use disorder w/ hx of withdrawal and AST > ALT. Pt was diagnosed by Dr. Oakley though presented outpatient in the setting of new ascites. Patient was found to have grade II varices on EGD. Acute decompensation w/ worsening ascites likely in the setting of worsening diarrhea as well as medication non-compliance. No hx encephalopathy.   MELD: 20   Child-Hatfield: 10 C   HE: no evidence of HE, not on lactulose at home   Varices: grade 2 esophageal varices noted on EGD 6/202/2024, no banding, no hx of variceal hemorrhage  Ascites: home dose lasix 40-spironolactone 25 QD but did not take medication now p/w moderate amount of ascites, pending therapeutic paracentesis. adjustment as below   SBP: no evidence of SBP on 8/20 paracentesis, SAAG > 1.1, and protein < 2.5 consistent w/ hepatic ascites   HCC: 1.1 cm R hepatic lobe lesion from outpatient CT, not seen in CT A/P in patient. Will need outpatient MRI abdomen.    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 Pt w/ hx of chronic diarrhea, 5-6 episodes a day. Work up completed outpatient w/ negative infectious w/u, fecal elastase wnl, no evidence of celiac disease on duodenal biopsy. Patient pending colonoscopy scheduled for 8/31 to r/o microscopic colitis vs IBD.     #H Pylori Infection  Diagnosed w/ stool antigen on 7/8, was prescribed clarithromycin, flagyl on 8/5 but pt had not taken abx until 8/15, but stopped on 8/18 w/ onset of diarrhea. While gastric and duodenal biopsies were negative for h pylori, per ACG guidelines, patient still requires full treatment.     Recommendations:  - Agree w/ therapeutic paracentesis for ascites, if > 5L removed please replete w/ albumin 6-8g/L removed  - Continue supportive management for acute diarrhea 2/2 EAEC  - Start carvedilol 3.125 mg BID   - Continue lasix 40 mg QD, but increase spironolactone to 100 mg QD. Monitor I/Os, daily weights   - Low sodium diet  -  Continue treatment of H pylori w/ Clarithromycin, Metronidazole and protonix for full 14 days tx. Will need repeat testing as an outpatient for eradication   - Patient will need outpatient follow up w/ Dr. Tejeda - patient was referred to him for further management of cirrhosis by outpt GI Dr. Oakley  - trend clinical symptoms, exam findings, vital signs, CBC, CMP, INR    Note incomplete until finalized by attending signature/attestation.    Rickey Sumner  GI/Hepatology Fellow, PGY-4    MONDAY-FRIDAY 8AM-5PM:  Please message via [employer] or email [email or web address]     On Weekends/Holidays (All day) and Weekdays after 5 PM to 8 AM  For nonurgent consults please email:  Please email [email or web address]  For urgent consults:  Please contact on call GI team. See Amion schedule ([facility]), [employer] paging system ([facility]), or call hospital  ([facility])       # Decompensated etiology cirrhosis c/b ascites  #ETOH Use Disorder on sx triggered CIWA  Pt with newly diagnosed cirrhosis, suspected to be in the setting EtOH given concomitant alcohol use disorder w/ hx of withdrawal and AST > ALT. Pt was diagnosed by Dr. Oakley though presented outpatient in the setting of new ascites. Patient was found to have grade II varices on EGD. Acute decompensation w/ worsening ascites likely in the setting of worsening diarrhea as well as medication non-compliance. No hx encephalopathy.   MELD: 20   Child-Hatfield: 10 C   HE: no evidence of HE, not on lactulose at home   Varices: grade 2 esophageal varices noted on EGD 6/202/2024, no banding, no hx of variceal hemorrhage  Ascites: home dose lasix 40-spironolactone 25 QD but did not take medication now p/w moderate amount of ascites, pending therapeutic paracentesis. adjustment as below   SBP: no evidence of SBP on 8/20 paracentesis, SAAG > 1.1, and protein < 2.5 consistent w/ hepatic ascites   HCC: 1.1 cm R hepatic lobe lesion from outpatient CT, not seen in CT A/P in patient. Will need outpatient MRI abdomen.    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 Pt w/ hx of chronic diarrhea, 5-6 episodes a day. Work up completed outpatient w/ negative infectious w/u, fecal elastase wnl, no evidence of celiac disease on duodenal biopsy. Patient pending colonoscopy scheduled for 8/31 to r/o microscopic colitis vs IBD.     #H Pylori Infection  Diagnosed w/ stool antigen on 7/8, was prescribed clarithromycin, flagyl on 8/5 but pt had not taken abx until 8/15, but stopped on 8/18 w/ onset of diarrhea. While gastric and duodenal biopsies were negative for h pylori, per ACG guidelines, patient still requires full treatment.     Recommendations:  - Please obtain urine sodium  - Agree w/ therapeutic paracentesis for ascites, if > 5L removed please replete w/ albumin 6-8g/L removed  - Continue supportive management for acute diarrhea 2/2 EAEC  - Start carvedilol 3.125 mg BID   - Continue lasix 40 mg QD, but increase spironolactone to 100 mg QD. Monitor I/Os, daily weights   - Low sodium diet  -  Continue treatment of H pylori w/ Clarithromycin, Metronidazole and protonix for full 14 days tx. Will need repeat testing as an outpatient for eradication   - Patient will need outpatient follow up w/ Dr. Tejeda - patient was referred to him for further management of cirrhosis by outpt GI Dr. Oakley  - jena clinical symptoms, exam findings, vital signs, CBC, CMP, INR    Discussed w/ Dr. Ramos    Note incomplete until finalized by attending signature/attestation.    Rickey Sumner  GI/Hepatology Fellow, PGY-4    MONDAY-FRIDAY 8AM-5PM:  Please message via [employer] or email [email or web address]     On Weekends/Holidays (All day) and Weekdays after 5 PM to 8 AM  For nonurgent consults please email:  Please email [email or web address] [place] [email or web address]  For urgent consults:  Please contact on call GI team. See Amion schedule ([facility]), [employer] paging system ([facility]), or call hospital  ([facility])

## 2024-08-21 NOTE — CONSULT NOTE ADULT - SUBJECTIVE AND OBJECTIVE BOX
Chief Complaint:  Patient is a 44y old  Male who presents with a chief complaint of Diarrhea (21 Aug 2024 07:36)      HPI:  SAMIR CAMPOS is a 44year old Male with history of *** who presents with ***.    Otherwise, patient denies fevers, chills, weight loss, dysphagia, odynophagia, early satiety, poor oral intake, abdominal pain, nausea, vomiting, diarrhea, melena, hematemesis, hematochezia, change in stool caliber, or family history of GI-related cancers.    ROS:   General:  No fevers, chills, night sweats  Eyes:  Good vision, no reported pain  ENT:  No sore throat, pain, runny nose  CV:  No pain, palpitations  Pulm:  No dyspnea, cough  GI:  See HPI, otherwise negative  :  No incontinence, nocturia  Muscle:  No reported pain, weakness  Neuro:  No memory problems  Psych:  No insomnia, psychosis  Endocrine:  No polyuria, polydipsia  Heme:  No petechiae, ecchymosis, easy bruisability  Skin:  No reported rash    PMHX/PSHX:    Hypertension    Diabetes    Cirrhosis    Alcohol use    Asthma    Psoriasis    No significant past surgical history      Allergies:  amoxicillin (Angioedema)      Home Medications: reviewed  Hospital Medications:  acetaminophen     Tablet .. 650 milliGRAM(s) Oral every 8 hours PRN  albuterol    90 MICROgram(s) HFA Inhaler 2 Puff(s) Inhalation every 6 hours PRN  amLODIPine   Tablet 10 milliGRAM(s) Oral daily  clarithromycin 500 milliGRAM(s) Oral two times a day  dextrose 5%. 1000 milliLiter(s) IV Continuous <Continuous>  dextrose 5%. 1000 milliLiter(s) IV Continuous <Continuous>  dextrose 50% Injectable 25 Gram(s) IV Push once  dextrose 50% Injectable 12.5 Gram(s) IV Push once  dextrose 50% Injectable 25 Gram(s) IV Push once  dextrose Oral Gel 15 Gram(s) Oral once PRN  enoxaparin Injectable 40 milliGRAM(s) SubCutaneous every 24 hours  furosemide    Tablet 40 milliGRAM(s) Oral daily  glucagon  Injectable 1 milliGRAM(s) IntraMuscular once  insulin lispro (ADMELOG) corrective regimen sliding scale   SubCutaneous three times a day before meals  insulin lispro (ADMELOG) corrective regimen sliding scale   SubCutaneous at bedtime  lactobacillus acidophilus 1 Tablet(s) Oral daily  LORazepam   Injectable 1 milliGRAM(s) IV Push every 1 hour PRN  magnesium sulfate  IVPB 2 Gram(s) IV Intermittent every 4 hours  metroNIDAZOLE    Tablet 500 milliGRAM(s) Oral three times a day  multivitamin 1 Tablet(s) Oral daily  nystatin Ointment 1 Application(s) Topical two times a day  pantoprazole    Tablet 40 milliGRAM(s) Oral before breakfast  psyllium Powder 1 Packet(s) Oral daily  spironolactone 25 milliGRAM(s) Oral daily  thiamine IVPB 500 milliGRAM(s) IV Intermittent three times a day      Social History:   Tobacco: Denies  EtOH: Denies  Illicit Drugs: Denies    Family history:    No family history of cardiovascular disease      Denies family history of colon cancer/polyps, stomach cancer/polyps, pancreatic cancer/masses, liver cancer/disease, ovarian cancer and endometrial cancer.    PHYSICAL EXAM:   Vital Signs:  Vital Signs Last 24 Hrs  T(C): 36.6 (21 Aug 2024 09:00), Max: 37.3 (20 Aug 2024 19:26)  T(F): 97.9 (21 Aug 2024 09:00), Max: 99.1 (20 Aug 2024 19:26)  HR: 98 (21 Aug 2024 09:00) (98 - 111)  BP: 113/82 (21 Aug 2024 09:00) (113/82 - 170/113)  BP(mean): --  RR: 18 (21 Aug 2024 09:00) (16 - 18)  SpO2: 100% (21 Aug 2024 09:00) (97% - 100%)    Parameters below as of 21 Aug 2024 09:00  Patient On (Oxygen Delivery Method): room air      Daily     Daily     GENERAL: no acute distress  NEURO: alert  HEENT: anicteric sclera, no conjunctival pallor appreciated  CHEST: no respiratory distress, no accessory muscle use  CARDIAC: regular rate, +S1/S2  ABDOMEN: soft, nondistended, nontender, no rebound or guarding  EXTREMITIES: warm, well perfused, no edema  SKIN: no lesions noted    LABS: reviewed                        14.1   6.56  )-----------( 97       ( 21 Aug 2024 05:25 )             39.9     08-21    132<L>  |  92<L>  |  6<L>  ----------------------------<  116<H>  4.1   |  26  |  0.58    Ca    8.7      21 Aug 2024 06:38  Phos  3.2     08-21  Mg     1.50     08-21    TPro  9.2<H>  /  Alb  3.0<L>  /  TBili  4.0<H>  /  DBili  x   /  AST  127<H>  /  ALT  32  /  AlkPhos  220<H>  08-21    LIVER FUNCTIONS - ( 21 Aug 2024 06:38 )  Alb: 3.0 g/dL / Pro: 9.2 g/dL / ALK PHOS: 220 U/L / ALT: 32 U/L / AST: 127 U/L / GGT: x             Culture - Body Fluid with Gram Stain (collected 20 Aug 2024 15:24)  Source: Ascites Fl Ascites Fluid  Gram Stain (20 Aug 2024 23:21):    polymorphonuclear leukocytes seen    No organisms seen    by cytocentrifuge        Diagnostic Studies: see sunrise for full report         Presented with elevated cr  Likely pre renal  Was given 1 L fluid in Ed  Monitor BMP  Avoid nephrotoxins Chief Complaint:  Patient is a 44y old  Male who presents with a chief complaint of Diarrhea (21 Aug 2024 07:36)      HPI:  SAMIR CAMPOS is a 44year old Male with history of EtOH Cirrhosis (c/b EV, ascites), asthma, psoriasis, DM2, HTN presenting for diarrhea. The patient reports acute onset of diarrhea on 8/18 evening w/ watery, nonbloody voluminous diarrhea approx 30-40 episodes over 24 hour period. The diarrhea persisted throughout the day and through the night, w/ patient unable to sleep due to the diarrhea. No associated fevers, however reports chills and fatigue because of the diarrhea. The patient denies any significant abdominal pain associated w/ his symtpoms, no melena, hematochezia, or emesis. The patient denies any sick contacts, recent travel, changes in diet. The patient however had been noticing progressive abdominal distension w/ acute worsening over the past few days. The patient was recently diagnosed with cirrhosis, and was prescribed furosemide and spironolactone however the patient had only taken 2 doses last week because the family was not sure what the medication was.    The patient is a regular drinker, has around 1 box of wine per night however last drink was on Sunday, because he was feeling unwell from diarrhea. In the ED the patient was afebrile, HDS. Diagnostic paracentesis was performed which was negative for SBP. Since being hospitalized, the patient reports improvement in his diarrhea, now w/ only 5-6 episodesa day, which is closer to his baseline prior to the acute diarrhea episode. He still has abdominal distension in the setting of his ascites, however he is currently pending therapeutic paracentesis.    The patient has a hx of chronic diarrhea for the past 2-3 months. He reports 5-6 episodes of looser stools that occur throughout the day. He notes no inciting factors. He states that he usually is able to make it to the bathroom however due to fear in the setting of fecal urgency, he wears a diaper, and has had 1 episode of fecal incontinence. he denies any hx of watery stools or oily, floating pale colored stools. He reports that they're usually brown and soft. He was evaluated by his outpatient gastroenterologist Dr. Oakley for this and he has underwent stool testing, which was only positive for H pylori. CT A/P as an outpatient w/o any gross lesions or evidence of colitis, just significant for cirrhotic liver morphology, questionable 1.1 cm R lobe lesion, ascites and thickening of duodenum in the setting of possible duodenitis. Patient underwent EGD which was significant for duodenitis and gastritis, however biopsies of duodenum w/ mild nonspecific chronic inflammation w/o evidence of celiac disease or parasitic infection gastric antrum and body biopsies w/ chronic inactive gastritis but negative for H pylori. The patient is currently scheduled for a colonoscopy on 8/31 for further work up of his chronic diarrhea.     Otherwise, patient denies fevers, chills, weight loss, dysphagia, odynophagia, early satiety, poor oral intake, abdominal pain, nausea, vomiting, diarrhea, melena, hematemesis, hematochezia, change in stool caliber, or family history of GI-related cancers.    ROS:   General:  No fevers, chills, night sweats  Eyes:  Good vision, no reported pain  ENT:  No sore throat, pain, runny nose  CV:  No pain, palpitations  Pulm:  No dyspnea, cough  GI:  See HPI, otherwise negative  :  No incontinence, nocturia  Muscle:  No reported pain, weakness  Neuro:  No memory problems  Psych:  No insomnia, psychosis  Endocrine:  No polyuria, polydipsia  Heme:  No petechiae, ecchymosis, easy bruisability  Skin:  No reported rash    PMHX/PSHX:    Hypertension    Diabetes    Cirrhosis    Alcohol use    Asthma    Psoriasis    No significant past surgical history      Allergies:  amoxicillin (Angioedema)      Home Medications: reviewed  Hospital Medications:  acetaminophen     Tablet .. 650 milliGRAM(s) Oral every 8 hours PRN  albuterol    90 MICROgram(s) HFA Inhaler 2 Puff(s) Inhalation every 6 hours PRN  amLODIPine   Tablet 10 milliGRAM(s) Oral daily  clarithromycin 500 milliGRAM(s) Oral two times a day  dextrose 5%. 1000 milliLiter(s) IV Continuous <Continuous>  dextrose 5%. 1000 milliLiter(s) IV Continuous <Continuous>  dextrose 50% Injectable 25 Gram(s) IV Push once  dextrose 50% Injectable 12.5 Gram(s) IV Push once  dextrose 50% Injectable 25 Gram(s) IV Push once  dextrose Oral Gel 15 Gram(s) Oral once PRN  enoxaparin Injectable 40 milliGRAM(s) SubCutaneous every 24 hours  furosemide    Tablet 40 milliGRAM(s) Oral daily  glucagon  Injectable 1 milliGRAM(s) IntraMuscular once  insulin lispro (ADMELOG) corrective regimen sliding scale   SubCutaneous three times a day before meals  insulin lispro (ADMELOG) corrective regimen sliding scale   SubCutaneous at bedtime  lactobacillus acidophilus 1 Tablet(s) Oral daily  LORazepam   Injectable 1 milliGRAM(s) IV Push every 1 hour PRN  magnesium sulfate  IVPB 2 Gram(s) IV Intermittent every 4 hours  metroNIDAZOLE    Tablet 500 milliGRAM(s) Oral three times a day  multivitamin 1 Tablet(s) Oral daily  nystatin Ointment 1 Application(s) Topical two times a day  pantoprazole    Tablet 40 milliGRAM(s) Oral before breakfast  psyllium Powder 1 Packet(s) Oral daily  spironolactone 25 milliGRAM(s) Oral daily  thiamine IVPB 500 milliGRAM(s) IV Intermittent three times a day      Social History:   Tobacco: Denies  EtOH: 1 box of wine a day  Drugs: Denies    Family history:    No family history of cardiovascular disease      Denies family history of colon cancer/polyps, stomach cancer/polyps, pancreatic cancer/masses, liver cancer/disease, ovarian cancer and endometrial cancer.    PHYSICAL EXAM:   Vital Signs:  Vital Signs Last 24 Hrs  T(C): 36.6 (21 Aug 2024 09:00), Max: 37.3 (20 Aug 2024 19:26)  T(F): 97.9 (21 Aug 2024 09:00), Max: 99.1 (20 Aug 2024 19:26)  HR: 98 (21 Aug 2024 09:00) (98 - 111)  BP: 113/82 (21 Aug 2024 09:00) (113/82 - 170/113)  BP(mean): --  RR: 18 (21 Aug 2024 09:00) (16 - 18)  SpO2: 100% (21 Aug 2024 09:00) (97% - 100%)    Parameters below as of 21 Aug 2024 09:00  Patient On (Oxygen Delivery Method): room air      Daily     Daily     GENERAL: no acute distress  NEURO: alert  HEENT: anicteric sclera, no conjunctival pallor appreciated  CHEST: no respiratory distress, no accessory muscle use  CARDIAC: regular rate, +S1/S2  ABDOMEN: distended abdomen w/ + fluid wave   EXTREMITIES: warm, well perfused, no edema  SKIN: no lesions noted    LABS: reviewed                        14.1   6.56  )-----------( 97       ( 21 Aug 2024 05:25 )             39.9     08-21    132<L>  |  92<L>  |  6<L>  ----------------------------<  116<H>  4.1   |  26  |  0.58    Ca    8.7      21 Aug 2024 06:38  Phos  3.2     08-21  Mg     1.50     08-21    TPro  9.2<H>  /  Alb  3.0<L>  /  TBili  4.0<H>  /  DBili  x   /  AST  127<H>  /  ALT  32  /  AlkPhos  220<H>  08-21    LIVER FUNCTIONS - ( 21 Aug 2024 06:38 )  Alb: 3.0 g/dL / Pro: 9.2 g/dL / ALK PHOS: 220 U/L / ALT: 32 U/L / AST: 127 U/L / GGT: x             Culture - Body Fluid with Gram Stain (collected 20 Aug 2024 15:24)  Source: Ascites Fl Ascites Fluid  Gram Stain (20 Aug 2024 23:21):    polymorphonuclear leukocytes seen    No organisms seen    by cytocentrifuge        Diagnostic Studies: see sunrise for full report         Presented with elevated cr  Likely pre renal  Was given 1 L fluid in Ed  Cr 1.40, likely due to contrast  continue with IV fluids   Avoid nephrotoxins

## 2024-08-21 NOTE — PROGRESS NOTE ADULT - PROBLEM SELECTOR PLAN 3
Recently diagnosed H.Pylori infection, started on Clarithromycin Triple Therapy with Metronidazole about a week ago.  - continue Pantoprazole 40mg qd  - Clarithromycin 500mg BID  - Metronidazole 500mg TID

## 2024-08-21 NOTE — PROGRESS NOTE ADULT - PROBLEM SELECTOR PLAN 6
Home meds: Losartan 100mg qd, Amlodipine 10mg qd, and Carvedilol 3.125mg BID  - continue home meds with hold parameters Home meds: Losartan 100mg qd, Amlodipine 10mg qd, and Carvedilol 3.125mg BID  - Hold Losartan

## 2024-08-21 NOTE — CONSULT NOTE ADULT - ATTENDING COMMENTS
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 He reported approximately 30-40 episodes over a 24-hour period, accompanied by chills and fatigue, though without fever or significant abdominal pain. His diarrhea has since improved to 5-6 episodes per day, closer to his baseline of chronic diarrhea. Despite improvement in his acute diarrheal symptoms, his abdominal distension persists. Ascitic fluid analysis suggestive of portal hypertension ( high SAAG) and he is pending a therapeutic paracentesis. Agree with starting diuretics. Noted primary team has already started H. pylori treatment. Advised to complete the full 14-day course of Clarithromycin and Metronidazole, in accordance with ACG guidelines. The patient remains stable, with no current evidence of hepatic encephalopathy or spontaneous bacterial peritonitis. Ongoing supportive care for his acute diarrhea and regular monitoring of clinical parameters are recommended.

## 2024-08-21 NOTE — PROGRESS NOTE ADULT - SUBJECTIVE AND OBJECTIVE BOX
****Radha Garcia Internal Medicine PGY-1*****    CHIEF COMPLAINT:    Overnight Events:  Interval Events:    OBJECTIVE:  ICU Vital Signs Last 24 Hrs  T(C): 36.9 (21 Aug 2024 05:00), Max: 37.3 (20 Aug 2024 19:26)  T(F): 98.4 (21 Aug 2024 05:00), Max: 99.1 (20 Aug 2024 19:26)  HR: 103 (21 Aug 2024 05:00) (99 - 119)  BP: 148/97 (21 Aug 2024 05:00) (132/87 - 171/110)  BP(mean): --  ABP: --  ABP(mean): --  RR: 18 (21 Aug 2024 05:00) (14 - 20)  SpO2: 99% (21 Aug 2024 05:00) (97% - 100%)    O2 Parameters below as of 21 Aug 2024 05:00  Patient On (Oxygen Delivery Method): room air              CAPILLARY BLOOD GLUCOSE      POCT Blood Glucose.: 166 mg/dL (20 Aug 2024 23:04)      PHYSICAL EXAM  General:   Head:    Eyes:   Neck:   Cardiac:   Lungs:   Abdomen:   Extremities:   Neuro  Psych:   Skin:  Etc:      HOSPITAL MEDICATIONS:  MEDICATIONS  (STANDING):  amLODIPine   Tablet 10 milliGRAM(s) Oral daily  clarithromycin 500 milliGRAM(s) Oral two times a day  dextrose 5%. 1000 milliLiter(s) (50 mL/Hr) IV Continuous <Continuous>  dextrose 5%. 1000 milliLiter(s) (100 mL/Hr) IV Continuous <Continuous>  dextrose 50% Injectable 25 Gram(s) IV Push once  dextrose 50% Injectable 12.5 Gram(s) IV Push once  dextrose 50% Injectable 25 Gram(s) IV Push once  enoxaparin Injectable 40 milliGRAM(s) SubCutaneous every 24 hours  furosemide    Tablet 40 milliGRAM(s) Oral daily  glucagon  Injectable 1 milliGRAM(s) IntraMuscular once  insulin lispro (ADMELOG) corrective regimen sliding scale   SubCutaneous three times a day before meals  insulin lispro (ADMELOG) corrective regimen sliding scale   SubCutaneous at bedtime  losartan 100 milliGRAM(s) Oral daily  metroNIDAZOLE    Tablet 500 milliGRAM(s) Oral three times a day  multivitamin 1 Tablet(s) Oral daily  nystatin Ointment 1 Application(s) Topical two times a day  pantoprazole    Tablet 40 milliGRAM(s) Oral before breakfast  spironolactone 25 milliGRAM(s) Oral daily  thiamine IVPB 500 milliGRAM(s) IV Intermittent three times a day    MEDICATIONS  (PRN):  acetaminophen     Tablet .. 650 milliGRAM(s) Oral every 6 hours PRN Temp greater or equal to 38C (100.4F), Mild Pain (1 - 3)  dextrose Oral Gel 15 Gram(s) Oral once PRN Blood Glucose LESS THAN 70 milliGRAM(s)/deciliter  LORazepam   Injectable 1 milliGRAM(s) IV Push every 2 hours PRN CIWA-Ar score increase by 2 points and a total score of 7 or less  LORazepam   Injectable 1 milliGRAM(s) IV Push every 1 hour PRN CIWA-Ar score 8 or greater      LABS:                        14.1   6.56  )-----------( x        ( 21 Aug 2024 05:25 )             39.9     Hgb Trend: 14.1<--, 14.6<--  08-21    132<L>  |  92<L>  |  6<L>  ----------------------------<  116<H>  4.1   |  26  |  0.58    Ca    8.7      21 Aug 2024 06:38  Phos  3.2     08-21  Mg     1.50     08-21    TPro  9.2<H>  /  Alb  3.0<L>  /  TBili  4.0<H>  /  DBili  x   /  AST  127<H>  /  ALT  32  /  AlkPhos  220<H>  08-21    Creatinine Trend: 0.58<--, 0.55<--  PT/INR - ( 20 Aug 2024 10:20 )   PT: 18.3 sec;   INR: 1.64 ratio         PTT - ( 20 Aug 2024 10:20 )  PTT:38.5 sec  Urinalysis Basic - ( 21 Aug 2024 06:38 )    Color: x / Appearance: x / SG: x / pH: x  Gluc: 116 mg/dL / Ketone: x  / Bili: x / Urobili: x   Blood: x / Protein: x / Nitrite: x   Leuk Esterase: x / RBC: x / WBC x   Sq Epi: x / Non Sq Epi: x / Bacteria: x        Venous Blood Gas:  08-21 @ 06:38  7.46/39/74/28/95.2  VBG Lactate: 2.6  Venous Blood Gas:  08-20 @ 10:20  7.46/37/42/26/67.8  VBG Lactate: 3.2      MICROBIOLOGY:     Culture - Body Fluid with Gram Stain (collected 20 Aug 2024 15:24)  Source: Ascites Fl Ascites Fluid  Gram Stain (20 Aug 2024 23:21):    polymorphonuclear leukocytes seen    No organisms seen    by cytocentrifuge        RADIOLOGY:  [ ] Reviewed by me   ****Radha Jose Internal Medicine PGY-1*****    CHIEF COMPLAINT: Diarrhea    Interval Events: Pt examined at bedside this morning. Wife at bedside. Denying any acute complaints, says that his mild abdominal pain has resolved. Still having watery, nonbloody diarrhea that is becoming less frequent. Denies blood in stool, fevers/chills, dysuria.    OBJECTIVE:  ICU Vital Signs Last 24 Hrs  T(C): 36.9 (21 Aug 2024 05:00), Max: 37.3 (20 Aug 2024 19:26)  T(F): 98.4 (21 Aug 2024 05:00), Max: 99.1 (20 Aug 2024 19:26)  HR: 103 (21 Aug 2024 05:00) (99 - 119)  BP: 148/97 (21 Aug 2024 05:00) (132/87 - 171/110)  BP(mean): --  ABP: --  ABP(mean): --  RR: 18 (21 Aug 2024 05:00) (14 - 20)  SpO2: 99% (21 Aug 2024 05:00) (97% - 100%)    O2 Parameters below as of 21 Aug 2024 05:00  Patient On (Oxygen Delivery Method): room air              CAPILLARY BLOOD GLUCOSE      POCT Blood Glucose.: 166 mg/dL (20 Aug 2024 23:04)      PHYSICAL EXAM  CONSTITUTIONAL: no apparent distress.  SKIN: No rashes or ecchymosis.  EYES: No scleral icterus.  CV: RRR. Normal S1 and S2. No murmurs, rubs, or gallops. No edema. Pulses equal bilaterally.  PULM: Clear to auscultation throughout. Respirations unlabored. No wheezing, rales, or rhonchi.  GI: (+) abdomen distended. Non-tender. No palpable masses.  MSK: No cyanosis or clubbing.  NEURO: No focal deficits  PSYCH: A+O x3, normal mood and affect      HOSPITAL MEDICATIONS:  MEDICATIONS  (STANDING):  amLODIPine   Tablet 10 milliGRAM(s) Oral daily  clarithromycin 500 milliGRAM(s) Oral two times a day  dextrose 5%. 1000 milliLiter(s) (50 mL/Hr) IV Continuous <Continuous>  dextrose 5%. 1000 milliLiter(s) (100 mL/Hr) IV Continuous <Continuous>  dextrose 50% Injectable 25 Gram(s) IV Push once  dextrose 50% Injectable 12.5 Gram(s) IV Push once  dextrose 50% Injectable 25 Gram(s) IV Push once  enoxaparin Injectable 40 milliGRAM(s) SubCutaneous every 24 hours  furosemide    Tablet 40 milliGRAM(s) Oral daily  glucagon  Injectable 1 milliGRAM(s) IntraMuscular once  insulin lispro (ADMELOG) corrective regimen sliding scale   SubCutaneous three times a day before meals  insulin lispro (ADMELOG) corrective regimen sliding scale   SubCutaneous at bedtime  losartan 100 milliGRAM(s) Oral daily  metroNIDAZOLE    Tablet 500 milliGRAM(s) Oral three times a day  multivitamin 1 Tablet(s) Oral daily  nystatin Ointment 1 Application(s) Topical two times a day  pantoprazole    Tablet 40 milliGRAM(s) Oral before breakfast  spironolactone 25 milliGRAM(s) Oral daily  thiamine IVPB 500 milliGRAM(s) IV Intermittent three times a day    MEDICATIONS  (PRN):  acetaminophen     Tablet .. 650 milliGRAM(s) Oral every 6 hours PRN Temp greater or equal to 38C (100.4F), Mild Pain (1 - 3)  dextrose Oral Gel 15 Gram(s) Oral once PRN Blood Glucose LESS THAN 70 milliGRAM(s)/deciliter  LORazepam   Injectable 1 milliGRAM(s) IV Push every 2 hours PRN CIWA-Ar score increase by 2 points and a total score of 7 or less  LORazepam   Injectable 1 milliGRAM(s) IV Push every 1 hour PRN CIWA-Ar score 8 or greater      LABS:                        14.1   6.56  )-----------( x        ( 21 Aug 2024 05:25 )             39.9     Hgb Trend: 14.1<--, 14.6<--  08-21    132<L>  |  92<L>  |  6<L>  ----------------------------<  116<H>  4.1   |  26  |  0.58    Ca    8.7      21 Aug 2024 06:38  Phos  3.2     08-21  Mg     1.50     08-21    TPro  9.2<H>  /  Alb  3.0<L>  /  TBili  4.0<H>  /  DBili  x   /  AST  127<H>  /  ALT  32  /  AlkPhos  220<H>  08-21    Creatinine Trend: 0.58<--, 0.55<--  PT/INR - ( 20 Aug 2024 10:20 )   PT: 18.3 sec;   INR: 1.64 ratio         PTT - ( 20 Aug 2024 10:20 )  PTT:38.5 sec  Urinalysis Basic - ( 21 Aug 2024 06:38 )    Color: x / Appearance: x / SG: x / pH: x  Gluc: 116 mg/dL / Ketone: x  / Bili: x / Urobili: x   Blood: x / Protein: x / Nitrite: x   Leuk Esterase: x / RBC: x / WBC x   Sq Epi: x / Non Sq Epi: x / Bacteria: x        Venous Blood Gas:  08-21 @ 06:38  7.46/39/74/28/95.2  VBG Lactate: 2.6  Venous Blood Gas:  08-20 @ 10:20  7.46/37/42/26/67.8  VBG Lactate: 3.2      MICROBIOLOGY:     Culture - Body Fluid with Gram Stain (collected 20 Aug 2024 15:24)  Source: Ascites Fl Ascites Fluid  Gram Stain (20 Aug 2024 23:21):    polymorphonuclear leukocytes seen    No organisms seen    by cytocentrifuge        RADIOLOGY:  [ ] Reviewed by me

## 2024-08-21 NOTE — PROGRESS NOTE ADULT - ASSESSMENT
SAMIR CAMPOS is a 44y Male with a hx of Cirrhosis 2/2 alcohol use disorder, T2DM, HTN, and recent H.Pylori infection who presents with diarrhea, concerning for gastroenteritis.  SAMIR CAMPOS is a 44y Male with a hx of Cirrhosis 2/2 alcohol use disorder, T2DM, HTN, and recent H.Pylori infection who presents with diarrhea in the setting of recently starting triple therapy for H. Pylori as well as large volume ascites. Pt is s/p diagnostic paracentesis indicating portal HTN, negative for SBP.

## 2024-08-21 NOTE — PROGRESS NOTE ADULT - PROBLEM SELECTOR PLAN 2
Drinks 1 boxed wine/day. Last drink was on Saturday with hard liquor for a social gathering. S/p Chlordiazepoxide 25mg x1 in ED.   - low risk CIWA protocol (In ED CIWA 7->4->4->4)  - Symptom triggered Ativan  - SBIRT  - Thiamine 500mg TID x3d, MV, folate supplementation  - Babita Discriminant Function  36.7 points however in setting of c/f enterocolitis vs C. diff will hold off on steroids for now Drinks 1 boxed wine/day. Last drink was on Saturday with hard liquor for a social gathering. S/p Chlordiazepoxide 25mg x1 in ED. Not currently experiencing withdrawal symptoms    - low risk CIWA protocol (In ED CIWA 7->4->4->4)  - Symptom triggered Ativan  - SBIRT  - Thiamine 500mg TID x3d, MV, folate supplementation

## 2024-08-21 NOTE — PROGRESS NOTE ADULT - PROBLEM SELECTOR PLAN 1
Acute diarrhea >50/day in setting of recent Clarithromycin triple therapy for H.Pylori infection. Concern for C.Diff.  - s/p diagnostic paracentesis in ED: negative for SBP  - f/u C.Diff studies, GI PCR, stool culture, BCx  - currently on Metronidazole for H.Pylori, continue  - Stool count  - repeat lactate with AM labs Acute diarrhea >50/day in setting of recent Clarithromycin triple therapy for H.Pylori infection. C. Diff negative.    - s/p diagnostic paracentesis in ED: negative for SBP, SAAG consistent with portal HTN  - f/u GI PCR, stool culture, BCx  - currently on Metronidazole for H.Pylori, continue  - Stool count  - GI consult

## 2024-08-21 NOTE — PROGRESS NOTE ADULT - PROBLEM SELECTOR PLAN 4
Hx of cirrhosis 2/2 alcohol use disorder. C/b ascites, esophageal varices, and portal hypertension. No active bleed.  - Follows with hepatology, Dr. Aleida Oakley, outpatient (Child Class C)  - continue home Spironolactone 25mg qd and Furosemide 40mg qd    Having mild discomfort with distention, CT showing large volume ascites  - s/p diagnostic paracentesis in ED  - consult IR for therapeutic paracentesis Hx of cirrhosis 2/2 alcohol use disorder. C/b ascites, esophageal varices, and portal hypertension. No active bleed. No SBP  - Follows with hepatology, Dr. Aleida Oakley, outpatient (Child Class C)  - continue home Spironolactone 25mg qd and Furosemide 40mg qd    Having mild discomfort with distention, CT showing large volume ascites  - s/p diagnostic paracentesis in ED  - procedure team will do therapeutic paracentesis

## 2024-08-22 LAB
ADD ON TEST-SPECIMEN IN LAB: SIGNIFICANT CHANGE UP
ALBUMIN SERPL ELPH-MCNC: 2.7 G/DL — LOW (ref 3.3–5)
ALP SERPL-CCNC: 197 U/L — HIGH (ref 40–120)
ALT FLD-CCNC: 23 U/L — SIGNIFICANT CHANGE UP (ref 4–41)
ANION GAP SERPL CALC-SCNC: 12 MMOL/L — SIGNIFICANT CHANGE UP (ref 7–14)
APTT BLD: 37 SEC — HIGH (ref 24.5–35.6)
AST SERPL-CCNC: 113 U/L — HIGH (ref 4–40)
BILIRUB SERPL-MCNC: 3.2 MG/DL — HIGH (ref 0.2–1.2)
BLD GP AB SCN SERPL QL: NEGATIVE — SIGNIFICANT CHANGE UP
BUN SERPL-MCNC: 9 MG/DL — SIGNIFICANT CHANGE UP (ref 7–23)
CALCIUM SERPL-MCNC: 8.4 MG/DL — SIGNIFICANT CHANGE UP (ref 8.4–10.5)
CHLORIDE SERPL-SCNC: 93 MMOL/L — LOW (ref 98–107)
CO2 SERPL-SCNC: 25 MMOL/L — SIGNIFICANT CHANGE UP (ref 22–31)
CREAT SERPL-MCNC: 0.69 MG/DL — SIGNIFICANT CHANGE UP (ref 0.5–1.3)
EGFR: 116 ML/MIN/1.73M2 — SIGNIFICANT CHANGE UP
GLUCOSE BLDC GLUCOMTR-MCNC: 111 MG/DL — HIGH (ref 70–99)
GLUCOSE BLDC GLUCOMTR-MCNC: 138 MG/DL — HIGH (ref 70–99)
GLUCOSE BLDC GLUCOMTR-MCNC: 146 MG/DL — HIGH (ref 70–99)
GLUCOSE BLDC GLUCOMTR-MCNC: 156 MG/DL — HIGH (ref 70–99)
GLUCOSE SERPL-MCNC: 119 MG/DL — HIGH (ref 70–99)
HCT VFR BLD CALC: 36.4 % — LOW (ref 39–50)
HGB BLD-MCNC: 13.1 G/DL — SIGNIFICANT CHANGE UP (ref 13–17)
INR BLD: 1.88 RATIO — HIGH (ref 0.85–1.18)
MAGNESIUM SERPL-MCNC: 1.7 MG/DL — SIGNIFICANT CHANGE UP (ref 1.6–2.6)
MCHC RBC-ENTMCNC: 28.8 PG — SIGNIFICANT CHANGE UP (ref 27–34)
MCHC RBC-ENTMCNC: 36 GM/DL — SIGNIFICANT CHANGE UP (ref 32–36)
MCV RBC AUTO: 80 FL — SIGNIFICANT CHANGE UP (ref 80–100)
NRBC # BLD: 0 /100 WBCS — SIGNIFICANT CHANGE UP (ref 0–0)
NRBC # FLD: 0 K/UL — SIGNIFICANT CHANGE UP (ref 0–0)
PHOSPHATE SERPL-MCNC: 4.5 MG/DL — SIGNIFICANT CHANGE UP (ref 2.5–4.5)
PLATELET # BLD AUTO: 111 K/UL — LOW (ref 150–400)
POTASSIUM SERPL-MCNC: 3.7 MMOL/L — SIGNIFICANT CHANGE UP (ref 3.5–5.3)
POTASSIUM SERPL-SCNC: 3.7 MMOL/L — SIGNIFICANT CHANGE UP (ref 3.5–5.3)
PROT SERPL-MCNC: 8.3 G/DL — SIGNIFICANT CHANGE UP (ref 6–8.3)
PROTHROM AB SERPL-ACNC: 20.9 SEC — HIGH (ref 9.5–13)
RBC # BLD: 4.55 M/UL — SIGNIFICANT CHANGE UP (ref 4.2–5.8)
RBC # FLD: 20 % — HIGH (ref 10.3–14.5)
RH IG SCN BLD-IMP: POSITIVE — SIGNIFICANT CHANGE UP
SODIUM SERPL-SCNC: 130 MMOL/L — LOW (ref 135–145)
WBC # BLD: 6.78 K/UL — SIGNIFICANT CHANGE UP (ref 3.8–10.5)
WBC # FLD AUTO: 6.78 K/UL — SIGNIFICANT CHANGE UP (ref 3.8–10.5)

## 2024-08-22 PROCEDURE — 99232 SBSQ HOSP IP/OBS MODERATE 35: CPT

## 2024-08-22 PROCEDURE — 49083 ABD PARACENTESIS W/IMAGING: CPT | Mod: GC

## 2024-08-22 PROCEDURE — 99232 SBSQ HOSP IP/OBS MODERATE 35: CPT | Mod: 25

## 2024-08-22 RX ORDER — ENOXAPARIN SODIUM 100 MG/ML
40 INJECTION SUBCUTANEOUS EVERY 24 HOURS
Refills: 0 | Status: DISCONTINUED | OUTPATIENT
Start: 2024-08-22 | End: 2024-08-23

## 2024-08-22 RX ORDER — LOSARTAN POTASSIUM 50 MG/1
50 TABLET ORAL DAILY
Refills: 0 | Status: DISCONTINUED | OUTPATIENT
Start: 2024-08-22 | End: 2024-08-23

## 2024-08-22 RX ORDER — IBUPROFEN 600 MG
600 TABLET ORAL ONCE
Refills: 0 | Status: COMPLETED | OUTPATIENT
Start: 2024-08-22 | End: 2024-08-22

## 2024-08-22 RX ADMIN — Medication 40 MILLIGRAM(S): at 06:10

## 2024-08-22 RX ADMIN — Medication 1 TABLET(S): at 12:47

## 2024-08-22 RX ADMIN — Medication 40 MILLIGRAM(S): at 06:11

## 2024-08-22 RX ADMIN — Medication 1 APPLICATION(S): at 17:37

## 2024-08-22 RX ADMIN — CLARITHROMYCIN 500 MILLIGRAM(S): 250 TABLET ORAL at 17:37

## 2024-08-22 RX ADMIN — Medication 500 MILLIGRAM(S): at 06:09

## 2024-08-22 RX ADMIN — CARVEDILOL 3.12 MILLIGRAM(S): 6.25 TABLET ORAL at 17:37

## 2024-08-22 RX ADMIN — Medication 500 MILLIGRAM(S): at 21:58

## 2024-08-22 RX ADMIN — SPIRONOLACTONE 100 MILLIGRAM(S): 25 TABLET, FILM COATED ORAL at 06:10

## 2024-08-22 RX ADMIN — ENOXAPARIN SODIUM 40 MILLIGRAM(S): 100 INJECTION SUBCUTANEOUS at 17:37

## 2024-08-22 RX ADMIN — Medication 1 TABLET(S): at 12:46

## 2024-08-22 RX ADMIN — Medication 600 MILLIGRAM(S): at 19:36

## 2024-08-22 RX ADMIN — PSYLLIUM HUSK 1 PACKET(S): 3.4 GRANULE ORAL at 12:47

## 2024-08-22 RX ADMIN — Medication 105 MILLIGRAM(S): at 06:14

## 2024-08-22 RX ADMIN — Medication 1 APPLICATION(S): at 06:11

## 2024-08-22 RX ADMIN — CLARITHROMYCIN 500 MILLIGRAM(S): 250 TABLET ORAL at 06:09

## 2024-08-22 RX ADMIN — Medication 1: at 08:33

## 2024-08-22 RX ADMIN — AMLODIPINE BESYLATE 10 MILLIGRAM(S): 10 TABLET ORAL at 06:10

## 2024-08-22 RX ADMIN — Medication 600 MILLIGRAM(S): at 18:36

## 2024-08-22 RX ADMIN — Medication 500 MILLIGRAM(S): at 12:46

## 2024-08-22 RX ADMIN — CARVEDILOL 3.12 MILLIGRAM(S): 6.25 TABLET ORAL at 06:10

## 2024-08-22 NOTE — PROGRESS NOTE ADULT - PROBLEM SELECTOR PLAN 4
Hx of cirrhosis 2/2 alcohol use disorder. C/b ascites, esophageal varices, and portal hypertension. No active bleed. No SBP  - Follows with hepatology, Dr. Aleida Oakley, outpatient (Child Class C)  - continue home Spironolactone 25mg qd and Furosemide 40mg qd    Having mild discomfort with distention, CT showing large volume ascites  - s/p diagnostic paracentesis in ED  - procedure team will do therapeutic paracentesis    Hepatology recs:  - Agree w/ therapeutic paracentesis for ascites, if > 5L removed please replete w/ albumin 6-8g/L removed  - carvedilol 3.125 mg BID   - Continue lasix 40 mg QD, but increase spironolactone to 100 mg QD. Monitor I/Os, daily weights   - Continue treatment of H pylori w/ Clarithromycin, Metronidazole and protonix for full 14 days tx. Will need repeat testing as an outpatient for eradication Hx of cirrhosis 2/2 alcohol use disorder. C/b ascites, esophageal varices, and portal hypertension. No active bleed. No SBP  - Follows with hepatology, Dr. Aleida Oakley, outpatient (Child Class C)  - continue home Spironolactone 25mg qd and Furosemide 40mg qd    Having mild discomfort with distention, CT showing large volume ascites  - s/p diagnostic paracentesis in ED  - s/p therapeutic paracentesis 8/22    Hepatology recs:  - Agree w/ therapeutic paracentesis for ascites, if > 5L removed please replete w/ albumin 6-8g/L removed  - carvedilol 3.125 mg BID   - Continue lasix 40 mg QD, but increase spironolactone to 100 mg QD. Monitor I/Os, daily weights   - Continue treatment of H pylori w/ Clarithromycin, Metronidazole and protonix for full 14 days tx. Will need repeat testing as an outpatient for eradication

## 2024-08-22 NOTE — PHYSICAL THERAPY INITIAL EVALUATION ADULT - NUMBER OF STAIRS, REHAB EVAL
Gene Nicole  1964, 57 year old  [id number]  DOS: 2/22/2022     SURGEON:   Antonino Nicole MD     ASSISTANT:    MARQUES    PREOPERATIVE DIAGNOSIS:    Prostate cancer     POSTOPERATIVE DIAGNOSIS:    Prostate cancer     PROCEDURES:   1. Robotic Assisted Laparoscopic Prostatectomy (CPT 76890)  2. Robotic Assisted Laparoscopic Lymph Node Dissection (CPT 38350)  3. Umbilical Hernia Repair (CPT 44660)  ANESTHESIA:   General.     COMPLICATIONS:    None     FINDINGS:    No evidence of leak on intraoperative cystogram.     ESTIMATED BLOOD LOSS:  50 cc    INDICATIONS:   The patient is a pleasant man who has been seen for management of his prostate cancer. He presents now for robotic prostatectomy.  Risks, benefits, and alternatives have all been discussed.    DATA POINTS REVIEWED   Prudencio score: 7  PSA: 13.9  Clinic stage: T1C  Urine control: good  Erectile function: good  There is no height or weight on file to calculate BMI.   Prostate volume: 88cc  PSA Density:  0.16  Erectile function: good    OPERATIVE PROCEDURE:   The patient was taken to the operating suite. Time-out was taken and all principals agreed on the procedure to be done today. He had already been given one dose of perioperative antibiotics within an hour of the first incision.  He had been given subcutaneous heparin in the same-day surgery area prior to arrival to the operating room.  General endotracheal anesthesia was induced. He was positioned in standard supine fashion on the operating room table.  Great care was taken to pad all pressure points. Arms were tucked at the side in a nonstressed position. The abdomen was then clipped, prepped and draped in the usual sterile fashion.  A transverse incision was made just cephalad to the umbilicus at the midline.  A towel clip was used to elevate the umbilicus and this presented the fascia.  A Veress needle was used to introduce CO2 gas and start insufflation.  Pneumoperitoneum was obtained at the level  of the umbilicus. A 12 mm port was placed at the umbilicus and the abdomen is inspected for adhesions, injury as result insufflation, and the abdominal wall was cleared in preparation for 5 additional port placement. Five additional ports were placed in the usual fanned out distribution and spacing adequate for removing of all the robotic arms and 2 assistant ports for the assistant to stand on the right side of the patient. The abdomen was then surveyed. No abnormalities were noted. The patient was put in 30 degree Trendelenburg position and the da Kt XI robot was docked at the left hip.     Using a 0 degree lens, the pelvis was cleared of all adhesions.  An anterior approach was utilized.  To this end, of the bladder and peritoneum was grasped at the midline and incision was made caudal to the umbilicus.  This exposed potential space between the bladder and the abdominal wall.  This was then developed bluntly and sharply to drop the bladder and expose the lateral aspect of the bladder, lateral aspect of the prostate, the endopelvic fascia, the external iliac artery and vein as well as the  nerve on both pelvic sidewalls.  The pubic symphysis was visualized. Fat was cleared from the anterior aspect of the prostate and removed from the pelvic compartment with this fatty tissue to be included with the eddie tissue in cases where lymph node dissection was performed.    The endopelvic fascia was then incised on either side of the prostate.  A plane was developed between the prostate and the levators and brought down towards the dorsal vein complex where the pubo prosthetic ligament was partially incised to reduce the size of the tissue in this location.  An 0 Vicryl suture was then brought behind the dorsal vein complex and tied singly and tightly.     The zero-degree lens was exchanged for a 30 degree lens in the down position. The bladder neck dissection was performed on either side of the midline. The  bladder neck was not spared but care was taken to be close to the effacement between the bladder and the prostate. The anterior bladder neck was carefully dissected free from the prostate.  Once the catheter was identified, it was grasped and placed against the abdominal wall ventrally and put on traction. The posterior bladder neck was divided. The bladder neck was  from the prostate.  Denonvieller's fascia was carefully inspected such that the posterior bladder neck and trigone was not thinned and once was determined that this was the correct tissue for division, this tissue was divided to find the posterior structures, vas deferens and seminal vesicles.  The vas deferens was then identified in the midline on both sides and dissected posteriorly and eventually divided.  This allowed identification of the tip of the seminal vesicles which were carefully dissected out, bipolar used on the vessel at the tip of the seminal vesicle and clips where needed.  This then allowed for elevation of the tip with seminal vesicle which was then used as a handle on the left and the right side and splayed out such that the posterior prostate could be identified for further dissection.  Attempt was then made to identify the midline prostate and the capsule the prostate itself.  A plane was developed between the prostate and the rectum.  This was then lateralize to find the curve of the prostate.  At the curve of the prostate this was brought proximally to the pedicle the prostate which was clipped and divided.  This showed the shoulder of the prostate bilaterally.  It was at this point that surgical plan regarding nerve-sparing, straightforward resection, and or wide resection was initiated.    A nerve-preservation approach is used.  The nerve-sparing principles applied was very careful and extensive posterior dissection to the curve of the prostate and identification of the prostate capsule at the base just after dividing  the prostate pedicle.  The 2nd principal is incision of the lateral prostatic fascia which then connected to the posterior plane allowed full and uninterrupted column of tissue along the lateral prostate which house both nerve tissue and vascular tissue.  When identified, care was taken to preserve the artery that often courses with the nerve bundle.  This is advanced down to the prostate apex.  The last principal is to released the apical nerve at time of dorsal vein division and careful dissection of the lateral aspect of the urethra.  With nerve preservation, there is consideration to apply amniofix to both nerves at the end of the anastomosis.  In this case, nerve-sparing was performed on the right.  The left side was very adhered to the posterolateral aspect of the tissue plane and the rectal wall.  I would not say incontinence at the left neurovascular bundle saved in any the traditional definitions..    Cautery was used to divide the dorsal venous complex. The periurethral tissues and puboprostatic ligaments were taken down using sharp and electrocautery dissection. The urethra was divided sharply. The prostate was placed in a specimen sac. Hemostasis was confirmed.    A Ray-Mohit was then placed in the pelvis to allow for hemostasis and in preparation for lymph node dissection.    Lymph node dissection then took place.  The 4th arm was used to carefully retract the tissue near the  nerve and allow for exposure and visualization of this important structure.  The medial aspect of the external iliac artery was identified and the tissue was taking off of this structure then posteriorly down through that soft tissue that cover the external iliac vein and down along the pelvic sidewall to the  nerve.  Attempt was made to control this tissue both proximal and distally.  The proximal dissection occurred 1st with cautery and then the distal aspect was controlled last with hemo lock clips and division.   This was then placed on the Ray-Mohit and the entire specimen placed in an entrapment bag in preparation for complete removal at time closure.    A Lauro stitch for posterior repair and strength was performed using a 3-0 V-Loc suture.  This suture include of 2 throws in the posterior fascia and 3 throws in the urethra to both reapproximate the posterior fascia and start the reapproximation of the bladder neck to the posterior urethra.  Next, the anastomosis was performed using double-armed stratefix 2-0 (strength of 3-0, 16 cm, Suture number [id number]) in a circumferential fashion. This was started at the 5:00 position at the bladder neck and then tied again at the12:00 position at the bladder neck. An 18 Turkish suarez catheter was placed.  The catheter was irrigated and there was no evidence of any leakage. The catheter was secured with 10 mL in the balloon.     All ports were removed under direct visualization. The specimen(s) was removed from the midline incision.     There is an obvious umbilical hernia.  This is taken down and the fat/hernia sac within it is dissected out sent for analysis.  The fascia is attenuated.  This is divided the midline.  The fascia caudal to the defect was dissected out from the subcutaneous fat and this provided a decent shelf for closure.  Once this was prepared, interrupted 0 Ethibond sutures were used to reapproximate the fascia.  Ten such sutures were placed and the fascial edge of both sides appeared to be nicely approximated.    Local anesthetic was injected into the fascia at all sites. Wounds were closed using a 4-0 v-lock or stratafix suture at the skin level. Skin glue was applied. The patient was awakened and taken to the recovery room in stable condition after all wounds were dressed.  Instrument, sponge, and needle counts were correct.  Again, antibiotics were given within an hour of the incision in keeping with SCIP guidelines and throughout the case when indicated.   Lovenox, SCD's, MILVIA hose were all written as orders for the postoperative period unless contraindicated.    Special Considerations:  Very adherent tissue both neurovascular bundle and particularly the apex on both sides.  The urethra was very thick which facilitated strong bites of tissue for the anastomosis but worrisome for the lack of urethral length and the possibility that tumor is locally grown in this location.    Antonino Nicole MD  [facility]  Pager [phone]  Office [phone]     6

## 2024-08-22 NOTE — PROCEDURE NOTE - ADDITIONAL PROCEDURE DETAILS
Invasive procedure team was consulted for therapeutic paracentesis due to abdominal discomfort. Explained risks (including bleeding, infection, and bowel perforation) and benefits to patient and patient expressed understanding and consented. Ultrasound was utilized and visualized  5 cm Ascitic fluid pocket identified w/ no epigastric vessels visualized. No rash or vessels overlying skin site. Pts plts  111, INR 1.88, DVT PPx held over night, not on NOACs or coumadin. Sterile technique was used and  5mL of 1% lidocaine was used for local anesthesia. Small incision with scalpel done and 18 Fr Arrow Paracentesis catheter used.  3.5 L of clear yellow ascitic fluid drained. Catheter removed and dressed. Pt tolerated procedure well and no complications. Communicated to primary team.      Jose Ladd MD ([facility])   EM/IM PGY-2

## 2024-08-22 NOTE — PHYSICAL THERAPY INITIAL EVALUATION ADULT - PERTINENT HX OF CURRENT PROBLEM, REHAB EVAL
Pt is a 44 year old male presenting with diarrhea associated with mild epigastric pain, nausea, mild spit-ups with frequent belching, and heart burn. Of note, recently diagnosed with H.Pylori and started on Clarithromycin Triple Therapy with metronidazole. CXR clear lungs. CT A/P revealed cirrhosis with evidence of portal hypertension, heterogeneous liver with numerous small hypodensities, large volume of ascites, gas-filled patulous appendix without definite evidence of acute appendiceal inflammation, and moderate sized umbilical hernia containing ascites fluid. Pt admitted for diarrhea in setting of recent Clarithromycin triple therapy for H.Pylori and large volume ascites. Pt underwent diagnostic paracentesis 08/20 indicating portal HTN, negative for SBP and therapeutic paracentesis 08/22.

## 2024-08-22 NOTE — PROGRESS NOTE ADULT - PROBLEM SELECTOR PLAN 1
Acute diarrhea >50/day in setting of recent Clarithromycin triple therapy for H.Pylori infection. C. Diff negative.    - s/p diagnostic paracentesis in ED: negative for SBP, SAAG consistent with portal HTN  - f/u GI PCR, stool culture, BCx  - currently on Metronidazole for H.Pylori, continue  - Stool count  - GI consult Acute diarrhea >50/day in setting of recent Clarithromycin triple therapy for H.Pylori infection. C. Diff negative.    - s/p diagnostic paracentesis in ED: negative for SBP, SAAG consistent with portal HTN  - s/p therapeutic paracentesis 8/22 with 3.5 L removed  - f/u GI PCR, stool culture, BCx  - currently on Metronidazole for H.Pylori, continue  - Stool count    - Hepatology following  ---Agree w/ therapeutic paracentesis for ascites, if > 5L removed please replete w/ albumin 6-8g/L removed  --- Continue supportive management for acute diarrhea 2/2 EAEC - though largely resolved at this time  --- Start carvedilol 3.125 mg BID   ---Continue lasix 40 mg QD, spironolactone to 100 mg QD. Monitor I/Os, daily weights   --- Continue treatment of H pylori w/ Clarithromycin, Metronidazole and protonix for full 14 days tx. Will need repeat testing as an outpatient for eradication

## 2024-08-22 NOTE — PROGRESS NOTE ADULT - ASSESSMENT
SAMIR CAMPOS is a 44y Male with a hx of Cirrhosis 2/2 alcohol use disorder, T2DM, HTN, and recent H.Pylori infection who presents with diarrhea in the setting of recently starting triple therapy for H. Pylori as well as large volume ascites. Pt is s/p diagnostic paracentesis indicating portal HTN, negative for SBP.  SAMIR CAMPOS is a 44y Male with a hx of Cirrhosis 2/2 alcohol use disorder, T2DM, HTN, and recent H.Pylori infection who presents with diarrhea in the setting of recently starting triple therapy for H. Pylori as well as large volume ascites. Pt is s/p diagnostic paracentesis indicating portal HTN, negative for SBP. Therapeutic paracentesis done 8/22 with 3.5 L removed.

## 2024-08-22 NOTE — PROGRESS NOTE ADULT - ASSESSMENT
# Decompensated etiology cirrhosis c/b ascites  #ETOH Use Disorder on sx triggered CIWA  Pt with newly diagnosed cirrhosis, suspected to be in the setting EtOH given concomitant alcohol use disorder w/ hx of withdrawal and AST > ALT. Pt was diagnosed by Dr. Oakley though presented outpatient in the setting of new ascites. Patient was found to have grade II varices on EGD. Acute decompensation w/ worsening ascites likely in the setting of worsening diarrhea as well as medication non-compliance. No hx encephalopathy.   MELD: 20   Child-Hatfield: 10 C   HE: no evidence of HE, not on lactulose at home   Varices: grade 2 esophageal varices noted on EGD 6/202/2024, no banding, no hx of variceal hemorrhage  Ascites: home dose lasix 40-spironolactone 25 QD but did not take medication now p/w moderate amount of ascites, pending therapeutic paracentesis. adjustment as below   SBP: no evidence of SBP on 8/20 paracentesis, SAAG > 1.1, and protein < 2.5 consistent w/ hepatic ascites   HCC: 1.1 cm R hepatic lobe lesion from outpatient CT, not seen in CT A/P in patient. Will need outpatient MRI abdomen.    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 Pt w/ hx of chronic diarrhea, 5-6 episodes a day. Work up completed outpatient w/ negative infectious w/u, fecal elastase wnl, no evidence of celiac disease on duodenal biopsy. Patient pending colonoscopy scheduled for 8/31 to r/o microscopic colitis vs IBD.     #H Pylori Infection  Diagnosed w/ stool antigen on 7/8, was prescribed clarithromycin, flagyl on 8/5 but pt had not taken abx until 8/15, but stopped on 8/18 w/ onset of diarrhea. While gastric and duodenal biopsies were negative for h pylori, per ACG guidelines, patient still requires full treatment.     Recommendations:  - Please obtain urine sodium  - Low sodium diet  - Agree w/ therapeutic paracentesis for ascites, if > 5L removed please replete w/ albumin 6-8g/L removed  - Continue supportive management for acute diarrhea 2/2 EAEC - though largely resolved at this time  - Start carvedilol 3.125 mg BID   - Continue lasix 40 mg QD, spironolactone to 100 mg QD. Monitor I/Os, daily weights   -  Continue treatment of H pylori w/ Clarithromycin, Metronidazole and protonix for full 14 days tx. Will need repeat testing as an outpatient for eradication   - Patient will need outpatient follow up w/ hepatology as outpatient. Upon discharge, will coordinate to have appointment set up.   - trend clinical symptoms, exam findings, vital signs, CBC, CMP, INR    Note incomplete until finalized by attending signature/attestation.    Rickey Sumner  GI/Hepatology Fellow, PGY-4    MONDAY-FRIDAY 8AM-5PM:  Please message via [employer] or email [email or web address]     On Weekends/Holidays (All day) and Weekdays after 5 PM to 8 AM  For nonurgent consults please email:  Please email [email or web address]  For urgent consults:  Please contact on call GI team. See Amion schedule ([facility]), [employer] paging system ([facility]), or call hospital  ([facility])         # Decompensated etiology cirrhosis c/b ascites  #ETOH Use Disorder on sx triggered CIWA  Pt with newly diagnosed cirrhosis, suspected to be in the setting EtOH given concomitant alcohol use disorder w/ hx of withdrawal and AST > ALT. Pt was diagnosed by Dr. Oakley though presented outpatient in the setting of new ascites. Patient was found to have grade II varices on EGD. Acute decompensation w/ worsening ascites likely in the setting of worsening diarrhea as well as medication non-compliance. No hx encephalopathy.   MELD: 22 (8/22)  Child-Hatfield: 10 C   HE: no evidence of HE, not on lactulose at home   Varices: grade 2 esophageal varices noted on EGD 6/202/2024, no banding, no hx of variceal hemorrhage  Ascites: home dose lasix 40-spironolactone 25 QD but did not take medication now p/w moderate amount of ascites, pending therapeutic paracentesis. adjustment as below   SBP: no evidence of SBP on 8/20 paracentesis, SAAG > 1.1, and protein < 2.5 consistent w/ hepatic ascites   HCC: 1.1 cm R hepatic lobe lesion from outpatient CT, not seen in CT A/P in patient. Will need outpatient MRI abdomen.    #Acute on Chronic Diarrhea - Resolving   New onset acute diarrhea 2 days likely infectious in the setting of EAEC on GI PCR, though improving w/ supportive tx. Pt w/ hx of chronic diarrhea, 5-6 episodes a day. Work up completed outpatient w/ negative infectious w/u, fecal elastase wnl, no evidence of celiac disease on duodenal biopsy. Patient pending colonoscopy scheduled for 8/31 to r/o microscopic colitis vs IBD.     #H Pylori Infection  Diagnosed w/ stool antigen on 7/8, was prescribed clarithromycin, flagyl on 8/5 but pt had not taken abx until 8/15, but stopped on 8/18 w/ onset of diarrhea. While gastric and duodenal biopsies were negative for h pylori, per ACG guidelines, patient still requires full treatment.     Recommendations:  - Please obtain urine sodium  - Low sodium diet  - Agree w/ therapeutic paracentesis for ascites, if > 5L removed please replete w/ albumin 6-8g/L removed  - Continue supportive management for acute diarrhea 2/2 EAEC - though largely resolved at this time  - Start carvedilol 3.125 mg BID   - Continue lasix 40 mg QD, spironolactone to 100 mg QD. Monitor I/Os, daily weights   -  Continue treatment of H pylori w/ Clarithromycin, Metronidazole and protonix for full 14 days tx. Will need repeat testing as an outpatient for eradication   - Patient will need outpatient follow up w/ hepatology as outpatient. Upon discharge, will coordinate to have appointment set up.   - trend clinical symptoms, exam findings, vital signs, CBC, CMP, INR    Note incomplete until finalized by attending signature/attestation.    Rickey Sumner  GI/Hepatology Fellow, PGY-4    MONDAY-FRIDAY 8AM-5PM:  Please message via [employer] or email [email or web address]     On Weekends/Holidays (All day) and Weekdays after 5 PM to 8 AM  For nonurgent consults please email:  Please email [email or web address] OR [email or web address]  For urgent consults:  Please contact on call GI team. See Amion schedule ([facility]), [employer] paging system ([facility]), or call hospital  ([facility])         # Decompensated etiology cirrhosis c/b ascites  #ETOH Use Disorder on sx triggered CIWA  Pt with newly diagnosed cirrhosis, suspected to be in the setting EtOH given concomitant alcohol use disorder w/ hx of withdrawal and AST > ALT. Pt was diagnosed by Dr. Oakley though presented outpatient in the setting of new ascites. Patient was found to have grade II varices on EGD. Acute decompensation w/ worsening ascites likely in the setting of worsening diarrhea as well as medication non-compliance. No hx encephalopathy.   MELD: 22 (8/22)  Child-Hatfield: 10 C   HE: no evidence of HE, not on lactulose at home   Varices: grade 2 esophageal varices noted on EGD 6/202/2024, no banding, no hx of variceal hemorrhage  Ascites: home dose lasix 40-spironolactone 25 QD but did not take medication now p/w moderate amount of ascites,s/p therapeutic para 8/22 - 3.5L   SBP: no evidence of SBP on 8/20 paracentesis, SAAG > 1.1, and protein < 2.5 consistent w/ hepatic ascites   HCC: 1.1 cm R hepatic lobe lesion from outpatient CT, not seen in CT A/P in patient. Will need outpatient MRI abdomen.    #Acute on Chronic Diarrhea - Resolving   New onset acute diarrhea 2 days likely infectious in the setting of EAEC on GI PCR, though improving w/ supportive tx. Pt w/ hx of chronic diarrhea, 5-6 episodes a day. Work up completed outpatient w/ negative infectious w/u, fecal elastase wnl, no evidence of celiac disease on duodenal biopsy. Patient pending colonoscopy scheduled for 8/31 w/ Dr. Oakley.      #H Pylori Infection  Diagnosed w/ stool antigen on 7/8, was prescribed clarithromycin, flagyl on 8/5 but pt had not taken abx until 8/15, but stopped on 8/18 w/ onset of diarrhea. While gastric and duodenal biopsies were negative for h pylori, per ACG guidelines, patient still requires full treatment.     Recommendations:  - Low sodium diet  - Continue supportive management for acute diarrhea 2/2 EAEC - though largely resolved at this time  - Start carvedilol 3.125 mg BID   - Continue lasix 40 mg QD, spironolactone to 100 mg QD. Monitor I/Os, daily weights   -  Continue treatment of H pylori w/ Clarithromycin, Metronidazole and protonix for full 14 days tx. Will need repeat testing as an outpatient for eradication   - Patient will need outpatient follow up w/ hepatology as outpatient - to follow up w/ Dr. Ramos  - trend clinical symptoms, exam findings, vital signs, CBC, CMP, INR. If patient has continued improvement and resolution of diarrhea as well as good UOP on diuretics, no contraindication for discharge from a hepatology standpoint on 8/23.     Discussed w/ Dr. Ramos    Note incomplete until finalized by attending signature/attestation.    Rickey Sumner  GI/Hepatology Fellow, PGY-4    MONDAY-FRIDAY 8AM-5PM:  Please message via [employer] or email [email or web address]     On Weekends/Holidays (All day) and Weekdays after 5 PM to 8 AM  For nonurgent consults please email:  Please email [email or web address]  For urgent consults:  Please contact on call GI team. See Amion schedule ([facility]), [employer] paging system ([facility]), or call hospital  ([facility])

## 2024-08-22 NOTE — PROGRESS NOTE ADULT - PROBLEM SELECTOR PLAN 2
Drinks 1 boxed wine/day. Last drink was on Saturday with hard liquor for a social gathering. S/p Chlordiazepoxide 25mg x1 in ED. Not currently experiencing withdrawal symptoms    - low risk CIWA protocol (In ED CIWA 7->4->4->4)  - Symptom triggered Ativan  - SBIRT  - Thiamine 500mg TID x3d, MV, folate supplementation

## 2024-08-22 NOTE — PHYSICAL THERAPY INITIAL EVALUATION ADULT - GENERAL OBSERVATIONS, REHAB EVAL
Upon entry, pt seated in recliner in NAD. HR 92 bpm. Pt left as received with all tubes/lines intact in NAD.

## 2024-08-22 NOTE — PROGRESS NOTE ADULT - SUBJECTIVE AND OBJECTIVE BOX
Patient is a 44y old  Male who presents with a chief complaint of Diarrhea (22 Aug 2024 07:54)      Interval Events:   No acute events overnight.  The patient reports resolution of diarrhea, feels much better. The patient reports good urination w/ increase in diuretics regimen. The patient and wife were requesting for hepatology follow up within [facility].     ROS:   A 12-point ROS was performed and negative except as noted in HPI.    Hospital Medications:  acetaminophen     Tablet .. 650 milliGRAM(s) Oral every 8 hours PRN  albuterol    90 MICROgram(s) HFA Inhaler 2 Puff(s) Inhalation every 6 hours PRN  amLODIPine   Tablet 10 milliGRAM(s) Oral daily  carvedilol 3.125 milliGRAM(s) Oral every 12 hours  clarithromycin 500 milliGRAM(s) Oral two times a day  dextrose 5%. 1000 milliLiter(s) IV Continuous <Continuous>  dextrose 5%. 1000 milliLiter(s) IV Continuous <Continuous>  dextrose 50% Injectable 25 Gram(s) IV Push once  dextrose 50% Injectable 12.5 Gram(s) IV Push once  dextrose 50% Injectable 25 Gram(s) IV Push once  dextrose Oral Gel 15 Gram(s) Oral once PRN  furosemide    Tablet 40 milliGRAM(s) Oral daily  glucagon  Injectable 1 milliGRAM(s) IntraMuscular once  insulin lispro (ADMELOG) corrective regimen sliding scale   SubCutaneous three times a day before meals  insulin lispro (ADMELOG) corrective regimen sliding scale   SubCutaneous at bedtime  lactobacillus acidophilus 1 Tablet(s) Oral daily  LORazepam   Injectable 1 milliGRAM(s) IV Push every 1 hour PRN  metroNIDAZOLE    Tablet 500 milliGRAM(s) Oral three times a day  multivitamin 1 Tablet(s) Oral daily  nystatin Ointment 1 Application(s) Topical two times a day  pantoprazole    Tablet 40 milliGRAM(s) Oral before breakfast  psyllium Powder 1 Packet(s) Oral daily  spironolactone 100 milliGRAM(s) Oral daily  thiamine IVPB 500 milliGRAM(s) IV Intermittent three times a day      PHYSICAL EXAM:   Vital Signs:  Vital Signs Last 24 Hrs  T(C): 36.8 (22 Aug 2024 05:22), Max: 37 (21 Aug 2024 21:00)  T(F): 98.2 (22 Aug 2024 05:22), Max: 98.6 (21 Aug 2024 21:00)  HR: 94 (22 Aug 2024 05:22) (90 - 98)  BP: 117/81 (22 Aug 2024 05:22) (113/82 - 135/93)  BP(mean): --  RR: 18 (22 Aug 2024 05:22) (18 - 18)  SpO2: 94% (22 Aug 2024 05:22) (94% - 100%)    Parameters below as of 22 Aug 2024 05:22  Patient On (Oxygen Delivery Method): room air      Daily     Daily     GENERAL: no acute distress  NEURO: alert  HEENT: anicteric sclera, no conjunctival pallor appreciated  CHEST: no respiratory distress, no accessory muscle use  CARDIAC: regular rate  ABDOMEN: mildly distended abdomen, no rebound or guarding  EXTREMITIES: warm, well perfused, no edema  SKIN: no lesions noted    LABS: reviewed                        13.1   6.78  )-----------( 111      ( 22 Aug 2024 05:46 )             36.4     08-22    130<L>  |  93<L>  |  9   ----------------------------<  119<H>  3.7   |  25  |  0.69    Ca    8.4      22 Aug 2024 05:46  Phos  4.5     08-22  Mg     1.70     08-22    TPro  8.3  /  Alb  2.7<L>  /  TBili  3.2<H>  /  DBili  x   /  AST  113<H>  /  ALT  23  /  AlkPhos  197<H>  08-22    LIVER FUNCTIONS - ( 22 Aug 2024 05:46 )  Alb: 2.7 g/dL / Pro: 8.3 g/dL / ALK PHOS: 197 U/L / ALT: 23 U/L / AST: 113 U/L / GGT: x             Interval Diagnostic Studies: see sunrise for full report

## 2024-08-22 NOTE — PROGRESS NOTE ADULT - PROBLEM SELECTOR PLAN 3
Recently diagnosed H.Pylori infection, started on Clarithromycin Triple Therapy with Metronidazole about a week ago.  - continue Pantoprazole 40mg qd  - Clarithromycin 500mg BID  - Metronidazole 500mg TID Recently diagnosed H.Pylori infection, started on Clarithromycin Triple Therapy with Metronidazole about a week ago. 14 days of treatment needed.    - continue Pantoprazole 40mg qd  - Clarithromycin 500mg BID  - Metronidazole 500mg TID

## 2024-08-22 NOTE — PROGRESS NOTE ADULT - SUBJECTIVE AND OBJECTIVE BOX
****Radha Jose Internal Medicine PGY-1*****    CHIEF COMPLAINT: Diarrhea    Interval Events: Pt examined at bedside this morning. Wife at bedside. Denying any acute complaints, says that his mild abdominal pain has resolved. Still having watery, nonbloody diarrhea that is becoming less frequent. Denies blood in stool, fevers/chills, dysuria.    OBJECTIVE:  ICU Vital Signs Last 24 Hrs  T(C): 36.9 (21 Aug 2024 05:00), Max: 37.3 (20 Aug 2024 19:26)  T(F): 98.4 (21 Aug 2024 05:00), Max: 99.1 (20 Aug 2024 19:26)  HR: 103 (21 Aug 2024 05:00) (99 - 119)  BP: 148/97 (21 Aug 2024 05:00) (132/87 - 171/110)  BP(mean): --  ABP: --  ABP(mean): --  RR: 18 (21 Aug 2024 05:00) (14 - 20)  SpO2: 99% (21 Aug 2024 05:00) (97% - 100%)    O2 Parameters below as of 21 Aug 2024 05:00  Patient On (Oxygen Delivery Method): room air              CAPILLARY BLOOD GLUCOSE      POCT Blood Glucose.: 166 mg/dL (20 Aug 2024 23:04)      PHYSICAL EXAM  CONSTITUTIONAL: no apparent distress.  SKIN: No rashes or ecchymosis.  EYES: No scleral icterus.  CV: RRR. Normal S1 and S2. No murmurs, rubs, or gallops. No edema. Pulses equal bilaterally.  PULM: Clear to auscultation throughout. Respirations unlabored. No wheezing, rales, or rhonchi.  GI: (+) abdomen distended. Non-tender. No palpable masses.  MSK: No cyanosis or clubbing.  NEURO: No focal deficits  PSYCH: A+O x3, normal mood and affect      HOSPITAL MEDICATIONS:  MEDICATIONS  (STANDING):  amLODIPine   Tablet 10 milliGRAM(s) Oral daily  clarithromycin 500 milliGRAM(s) Oral two times a day  dextrose 5%. 1000 milliLiter(s) (50 mL/Hr) IV Continuous <Continuous>  dextrose 5%. 1000 milliLiter(s) (100 mL/Hr) IV Continuous <Continuous>  dextrose 50% Injectable 25 Gram(s) IV Push once  dextrose 50% Injectable 12.5 Gram(s) IV Push once  dextrose 50% Injectable 25 Gram(s) IV Push once  enoxaparin Injectable 40 milliGRAM(s) SubCutaneous every 24 hours  furosemide    Tablet 40 milliGRAM(s) Oral daily  glucagon  Injectable 1 milliGRAM(s) IntraMuscular once  insulin lispro (ADMELOG) corrective regimen sliding scale   SubCutaneous three times a day before meals  insulin lispro (ADMELOG) corrective regimen sliding scale   SubCutaneous at bedtime  losartan 100 milliGRAM(s) Oral daily  metroNIDAZOLE    Tablet 500 milliGRAM(s) Oral three times a day  multivitamin 1 Tablet(s) Oral daily  nystatin Ointment 1 Application(s) Topical two times a day  pantoprazole    Tablet 40 milliGRAM(s) Oral before breakfast  spironolactone 25 milliGRAM(s) Oral daily  thiamine IVPB 500 milliGRAM(s) IV Intermittent three times a day    MEDICATIONS  (PRN):  acetaminophen     Tablet .. 650 milliGRAM(s) Oral every 6 hours PRN Temp greater or equal to 38C (100.4F), Mild Pain (1 - 3)  dextrose Oral Gel 15 Gram(s) Oral once PRN Blood Glucose LESS THAN 70 milliGRAM(s)/deciliter  LORazepam   Injectable 1 milliGRAM(s) IV Push every 2 hours PRN CIWA-Ar score increase by 2 points and a total score of 7 or less  LORazepam   Injectable 1 milliGRAM(s) IV Push every 1 hour PRN CIWA-Ar score 8 or greater      LABS:                        14.1   6.56  )-----------( x        ( 21 Aug 2024 05:25 )             39.9     Hgb Trend: 14.1<--, 14.6<--  08-21    132<L>  |  92<L>  |  6<L>  ----------------------------<  116<H>  4.1   |  26  |  0.58    Ca    8.7      21 Aug 2024 06:38  Phos  3.2     08-21  Mg     1.50     08-21    TPro  9.2<H>  /  Alb  3.0<L>  /  TBili  4.0<H>  /  DBili  x   /  AST  127<H>  /  ALT  32  /  AlkPhos  220<H>  08-21    Creatinine Trend: 0.58<--, 0.55<--  PT/INR - ( 20 Aug 2024 10:20 )   PT: 18.3 sec;   INR: 1.64 ratio         PTT - ( 20 Aug 2024 10:20 )  PTT:38.5 sec  Urinalysis Basic - ( 21 Aug 2024 06:38 )    Color: x / Appearance: x / SG: x / pH: x  Gluc: 116 mg/dL / Ketone: x  / Bili: x / Urobili: x   Blood: x / Protein: x / Nitrite: x   Leuk Esterase: x / RBC: x / WBC x   Sq Epi: x / Non Sq Epi: x / Bacteria: x        Venous Blood Gas:  08-21 @ 06:38  7.46/39/74/28/95.2  VBG Lactate: 2.6  Venous Blood Gas:  08-20 @ 10:20  7.46/37/42/26/67.8  VBG Lactate: 3.2      MICROBIOLOGY:     Culture - Body Fluid with Gram Stain (collected 20 Aug 2024 15:24)  Source: Ascites Fl Ascites Fluid  Gram Stain (20 Aug 2024 23:21):    polymorphonuclear leukocytes seen    No organisms seen    by cytocentrifuge        RADIOLOGY:  [ ] Reviewed by me   ****Radha Jose Internal Medicine PGY-1*****    CHIEF COMPLAINT: Diarrhea    Interval Events: Pt examined at bedside this morning. Wife at bedside. Denying any acute complaints, says that his mild abdominal pain has resolved. Had 2 hard yellow BM. Diarrhea has resolved. Denies blood in stool, fevers/chills, dysuria.    OBJECTIVE:  ICU Vital Signs Last 24 Hrs  T(C): 36.9 (21 Aug 2024 05:00), Max: 37.3 (20 Aug 2024 19:26)  T(F): 98.4 (21 Aug 2024 05:00), Max: 99.1 (20 Aug 2024 19:26)  HR: 103 (21 Aug 2024 05:00) (99 - 119)  BP: 148/97 (21 Aug 2024 05:00) (132/87 - 171/110)  BP(mean): --  ABP: --  ABP(mean): --  RR: 18 (21 Aug 2024 05:00) (14 - 20)  SpO2: 99% (21 Aug 2024 05:00) (97% - 100%)    O2 Parameters below as of 21 Aug 2024 05:00  Patient On (Oxygen Delivery Method): room air              CAPILLARY BLOOD GLUCOSE      POCT Blood Glucose.: 166 mg/dL (20 Aug 2024 23:04)      PHYSICAL EXAM  CONSTITUTIONAL: no apparent distress.  SKIN: No rashes or ecchymosis.  EYES: No scleral icterus.  CV: RRR. Normal S1 and S2. No murmurs, rubs, or gallops. No edema. Pulses equal bilaterally.  PULM: Clear to auscultation throughout. Respirations unlabored. No wheezing, rales, or rhonchi.  GI: (+) abdomen distended. Non-tender. No palpable masses.  MSK: No cyanosis or clubbing.  NEURO: No focal deficits  PSYCH: A+O x3, normal mood and affect      HOSPITAL MEDICATIONS:  MEDICATIONS  (STANDING):  amLODIPine   Tablet 10 milliGRAM(s) Oral daily  clarithromycin 500 milliGRAM(s) Oral two times a day  dextrose 5%. 1000 milliLiter(s) (50 mL/Hr) IV Continuous <Continuous>  dextrose 5%. 1000 milliLiter(s) (100 mL/Hr) IV Continuous <Continuous>  dextrose 50% Injectable 25 Gram(s) IV Push once  dextrose 50% Injectable 12.5 Gram(s) IV Push once  dextrose 50% Injectable 25 Gram(s) IV Push once  enoxaparin Injectable 40 milliGRAM(s) SubCutaneous every 24 hours  furosemide    Tablet 40 milliGRAM(s) Oral daily  glucagon  Injectable 1 milliGRAM(s) IntraMuscular once  insulin lispro (ADMELOG) corrective regimen sliding scale   SubCutaneous three times a day before meals  insulin lispro (ADMELOG) corrective regimen sliding scale   SubCutaneous at bedtime  losartan 100 milliGRAM(s) Oral daily  metroNIDAZOLE    Tablet 500 milliGRAM(s) Oral three times a day  multivitamin 1 Tablet(s) Oral daily  nystatin Ointment 1 Application(s) Topical two times a day  pantoprazole    Tablet 40 milliGRAM(s) Oral before breakfast  spironolactone 25 milliGRAM(s) Oral daily  thiamine IVPB 500 milliGRAM(s) IV Intermittent three times a day    MEDICATIONS  (PRN):  acetaminophen     Tablet .. 650 milliGRAM(s) Oral every 6 hours PRN Temp greater or equal to 38C (100.4F), Mild Pain (1 - 3)  dextrose Oral Gel 15 Gram(s) Oral once PRN Blood Glucose LESS THAN 70 milliGRAM(s)/deciliter  LORazepam   Injectable 1 milliGRAM(s) IV Push every 2 hours PRN CIWA-Ar score increase by 2 points and a total score of 7 or less  LORazepam   Injectable 1 milliGRAM(s) IV Push every 1 hour PRN CIWA-Ar score 8 or greater      LABS:                        14.1   6.56  )-----------( x        ( 21 Aug 2024 05:25 )             39.9     Hgb Trend: 14.1<--, 14.6<--  08-21    132<L>  |  92<L>  |  6<L>  ----------------------------<  116<H>  4.1   |  26  |  0.58    Ca    8.7      21 Aug 2024 06:38  Phos  3.2     08-21  Mg     1.50     08-21    TPro  9.2<H>  /  Alb  3.0<L>  /  TBili  4.0<H>  /  DBili  x   /  AST  127<H>  /  ALT  32  /  AlkPhos  220<H>  08-21    Creatinine Trend: 0.58<--, 0.55<--  PT/INR - ( 20 Aug 2024 10:20 )   PT: 18.3 sec;   INR: 1.64 ratio         PTT - ( 20 Aug 2024 10:20 )  PTT:38.5 sec  Urinalysis Basic - ( 21 Aug 2024 06:38 )    Color: x / Appearance: x / SG: x / pH: x  Gluc: 116 mg/dL / Ketone: x  / Bili: x / Urobili: x   Blood: x / Protein: x / Nitrite: x   Leuk Esterase: x / RBC: x / WBC x   Sq Epi: x / Non Sq Epi: x / Bacteria: x        Venous Blood Gas:  08-21 @ 06:38  7.46/39/74/28/95.2  VBG Lactate: 2.6  Venous Blood Gas:  08-20 @ 10:20  7.46/37/42/26/67.8  VBG Lactate: 3.2      MICROBIOLOGY:     Culture - Body Fluid with Gram Stain (collected 20 Aug 2024 15:24)  Source: Ascites Fl Ascites Fluid  Gram Stain (20 Aug 2024 23:21):    polymorphonuclear leukocytes seen    No organisms seen    by cytocentrifuge        RADIOLOGY:  [ ] Reviewed by me

## 2024-08-23 ENCOUNTER — TRANSCRIPTION ENCOUNTER (OUTPATIENT)
Age: 44
End: 2024-08-23

## 2024-08-23 VITALS
TEMPERATURE: 98 F | OXYGEN SATURATION: 100 % | HEART RATE: 79 BPM | DIASTOLIC BLOOD PRESSURE: 72 MMHG | RESPIRATION RATE: 18 BRPM | SYSTOLIC BLOOD PRESSURE: 105 MMHG

## 2024-08-23 LAB
ALBUMIN SERPL ELPH-MCNC: 2.5 G/DL — LOW (ref 3.3–5)
ALP SERPL-CCNC: 181 U/L — HIGH (ref 40–120)
ALT FLD-CCNC: 25 U/L — SIGNIFICANT CHANGE UP (ref 4–41)
ANION GAP SERPL CALC-SCNC: 12 MMOL/L — SIGNIFICANT CHANGE UP (ref 7–14)
AST SERPL-CCNC: 113 U/L — HIGH (ref 4–40)
BILIRUB SERPL-MCNC: 3.2 MG/DL — HIGH (ref 0.2–1.2)
BUN SERPL-MCNC: 10 MG/DL — SIGNIFICANT CHANGE UP (ref 7–23)
CALCIUM SERPL-MCNC: 7.8 MG/DL — LOW (ref 8.4–10.5)
CHLORIDE SERPL-SCNC: 97 MMOL/L — LOW (ref 98–107)
CO2 SERPL-SCNC: 22 MMOL/L — SIGNIFICANT CHANGE UP (ref 22–31)
CREAT SERPL-MCNC: 0.58 MG/DL — SIGNIFICANT CHANGE UP (ref 0.5–1.3)
EGFR: 122 ML/MIN/1.73M2 — SIGNIFICANT CHANGE UP
GLUCOSE BLDC GLUCOMTR-MCNC: 146 MG/DL — HIGH (ref 70–99)
GLUCOSE BLDC GLUCOMTR-MCNC: 148 MG/DL — HIGH (ref 70–99)
GLUCOSE BLDC GLUCOMTR-MCNC: 232 MG/DL — HIGH (ref 70–99)
GLUCOSE SERPL-MCNC: 124 MG/DL — HIGH (ref 70–99)
HCT VFR BLD CALC: 35.5 % — LOW (ref 39–50)
HGB BLD-MCNC: 12.9 G/DL — LOW (ref 13–17)
MAGNESIUM SERPL-MCNC: 1.7 MG/DL — SIGNIFICANT CHANGE UP (ref 1.6–2.6)
MCHC RBC-ENTMCNC: 29.1 PG — SIGNIFICANT CHANGE UP (ref 27–34)
MCHC RBC-ENTMCNC: 36.3 GM/DL — HIGH (ref 32–36)
MCV RBC AUTO: 80 FL — SIGNIFICANT CHANGE UP (ref 80–100)
NRBC # BLD: 0 /100 WBCS — SIGNIFICANT CHANGE UP (ref 0–0)
NRBC # FLD: 0 K/UL — SIGNIFICANT CHANGE UP (ref 0–0)
PHOSPHATE SERPL-MCNC: 3.2 MG/DL — SIGNIFICANT CHANGE UP (ref 2.5–4.5)
PLATELET # BLD AUTO: 123 K/UL — LOW (ref 150–400)
POTASSIUM SERPL-MCNC: 3.7 MMOL/L — SIGNIFICANT CHANGE UP (ref 3.5–5.3)
POTASSIUM SERPL-SCNC: 3.7 MMOL/L — SIGNIFICANT CHANGE UP (ref 3.5–5.3)
PROT SERPL-MCNC: 7.7 G/DL — SIGNIFICANT CHANGE UP (ref 6–8.3)
RBC # BLD: 4.44 M/UL — SIGNIFICANT CHANGE UP (ref 4.2–5.8)
RBC # FLD: 20 % — HIGH (ref 10.3–14.5)
SODIUM SERPL-SCNC: 131 MMOL/L — LOW (ref 135–145)
WBC # BLD: 6.64 K/UL — SIGNIFICANT CHANGE UP (ref 3.8–10.5)
WBC # FLD AUTO: 6.64 K/UL — SIGNIFICANT CHANGE UP (ref 3.8–10.5)

## 2024-08-23 PROCEDURE — 99239 HOSP IP/OBS DSCHRG MGMT >30: CPT

## 2024-08-23 RX ORDER — HYDROCHLOROTHIAZIDE 12.5 MG/1
1 CAPSULE ORAL
Qty: 90 | Refills: 0
Start: 2024-08-23 | End: 2024-11-20

## 2024-08-23 RX ORDER — SPIRONOLACTONE 25 MG/1
4 TABLET, FILM COATED ORAL
Qty: 0 | Refills: 0 | DISCHARGE
Start: 2024-08-23

## 2024-08-23 RX ORDER — EMPAGLIFLOZIN 10 MG/1
1 TABLET, FILM COATED ORAL
Qty: 90 | Refills: 0
Start: 2024-08-23 | End: 2024-11-20

## 2024-08-23 RX ORDER — CARVEDILOL 6.25 MG/1
1 TABLET ORAL
Qty: 180 | Refills: 0
Start: 2024-08-23 | End: 2024-11-20

## 2024-08-23 RX ORDER — METRONIDAZOLE 250 MG
1 TABLET ORAL
Refills: 0 | DISCHARGE

## 2024-08-23 RX ORDER — CLARITHROMYCIN 250 MG/1
1 TABLET ORAL
Refills: 0 | DISCHARGE

## 2024-08-23 RX ORDER — SITAGLIPTIN AND METFORMIN HYDROCHLORIDE 500; 50 MG/1; MG/1
1 TABLET, FILM COATED ORAL
Qty: 90 | Refills: 0
Start: 2024-08-23 | End: 2024-11-20

## 2024-08-23 RX ORDER — CLARITHROMYCIN 250 MG/1
1 TABLET ORAL
Qty: 28 | Refills: 0
Start: 2024-08-23 | End: 2024-09-05

## 2024-08-23 RX ORDER — AMLODIPINE BESYLATE 10 MG/1
1 TABLET ORAL
Qty: 90 | Refills: 0
Start: 2024-08-23 | End: 2024-11-20

## 2024-08-23 RX ORDER — METRONIDAZOLE 250 MG
1 TABLET ORAL
Qty: 42 | Refills: 0
Start: 2024-08-23 | End: 2024-09-05

## 2024-08-23 RX ORDER — SPIRONOLACTONE 25 MG/1
1 TABLET, FILM COATED ORAL
Refills: 0 | DISCHARGE

## 2024-08-23 RX ORDER — FUROSEMIDE 40 MG
1 TABLET ORAL
Qty: 0 | Refills: 0 | DISCHARGE
Start: 2024-08-23

## 2024-08-23 RX ORDER — LOSARTAN POTASSIUM 50 MG/1
1 TABLET ORAL
Qty: 90 | Refills: 0
Start: 2024-08-23 | End: 2024-11-20

## 2024-08-23 RX ORDER — FUROSEMIDE 40 MG
1 TABLET ORAL
Qty: 90 | Refills: 0
Start: 2024-08-23 | End: 2024-11-20

## 2024-08-23 RX ORDER — SPIRONOLACTONE 25 MG/1
1 TABLET, FILM COATED ORAL
Qty: 90 | Refills: 0
Start: 2024-08-23 | End: 2024-11-20

## 2024-08-23 RX ORDER — FUROSEMIDE 40 MG
1 TABLET ORAL
Refills: 0 | DISCHARGE

## 2024-08-23 RX ORDER — CARVEDILOL 6.25 MG/1
1 TABLET ORAL
Refills: 0 | DISCHARGE

## 2024-08-23 RX ORDER — HYDROCHLOROTHIAZIDE 12.5 MG/1
1 CAPSULE ORAL
Refills: 0 | DISCHARGE

## 2024-08-23 RX ORDER — PANTOPRAZOLE SODIUM 40 MG
1 TABLET, DELAYED RELEASE (ENTERIC COATED) ORAL
Qty: 14 | Refills: 0
Start: 2024-08-23 | End: 2024-09-05

## 2024-08-23 RX ORDER — L.ACID,PARA/B.BIFIDUM/S.THERM 8B CELL
1 CAPSULE ORAL
Qty: 30 | Refills: 0
Start: 2024-08-23 | End: 2024-09-21

## 2024-08-23 RX ADMIN — PSYLLIUM HUSK 1 PACKET(S): 3.4 GRANULE ORAL at 13:33

## 2024-08-23 RX ADMIN — CARVEDILOL 3.12 MILLIGRAM(S): 6.25 TABLET ORAL at 06:18

## 2024-08-23 RX ADMIN — Medication 40 MILLIGRAM(S): at 06:18

## 2024-08-23 RX ADMIN — Medication 1 APPLICATION(S): at 06:20

## 2024-08-23 RX ADMIN — SPIRONOLACTONE 100 MILLIGRAM(S): 25 TABLET, FILM COATED ORAL at 06:19

## 2024-08-23 RX ADMIN — Medication 2: at 09:00

## 2024-08-23 RX ADMIN — CLARITHROMYCIN 500 MILLIGRAM(S): 250 TABLET ORAL at 06:19

## 2024-08-23 RX ADMIN — Medication 500 MILLIGRAM(S): at 13:34

## 2024-08-23 RX ADMIN — CLARITHROMYCIN 500 MILLIGRAM(S): 250 TABLET ORAL at 17:26

## 2024-08-23 RX ADMIN — LOSARTAN POTASSIUM 50 MILLIGRAM(S): 50 TABLET ORAL at 06:19

## 2024-08-23 RX ADMIN — CARVEDILOL 3.12 MILLIGRAM(S): 6.25 TABLET ORAL at 17:27

## 2024-08-23 RX ADMIN — AMLODIPINE BESYLATE 10 MILLIGRAM(S): 10 TABLET ORAL at 06:19

## 2024-08-23 RX ADMIN — Medication 500 MILLIGRAM(S): at 06:18

## 2024-08-23 RX ADMIN — Medication 25 GRAM(S): at 13:31

## 2024-08-23 RX ADMIN — Medication 1 TABLET(S): at 13:33

## 2024-08-23 NOTE — PROGRESS NOTE ADULT - ATTENDING COMMENTS
The patient continues to exhibit improvement in his diarrheal symptoms, now reduced to 1-2 episodes per day. He remains hemodynamically stable, with no new signs of decompensation. The patient's ascitic fluid analysis continues to support a diagnosis of portal hypertension, and he reports feeling well following the therapeutic paracentesis with 3.5 L of fluid removed. His laboratory results are stable, and there are no indications of hepatic encephalopathy or spontaneous bacterial peritonitis at this time. The patient has been initiated on diuretics as planned and continues on the prescribed H. pylori treatment regimen. It is crucial that he completes the full 14-day course of Clarithromycin and Metronidazole.    When discharged, we recommend for close outpatient follow-up at [facility], where we will further assess his transplant candidacy.  I will be happy continue his follow up care as outpatient.
Pt is a 43 yo M with PMH fTUD, asthma, ETOH use d/o, cirrhosis (c/b ascites), HTN, HLD, T2D, and psoriasis p/w nausea and diarrhea x few days. Of note, recently had EGD showing varices and +H. pylori for which he was started on clarithromycin and flagyl x1wk. Last alcoholic beverage was the day of onset of symptoms around 3 days ago, typically daily wine. On arrival he was tachycardic and tachypneic. Labs showed plt 104, INR 1.64, transaminitis, ammonia 87, lactate 3.2--2.6, C diff neg, and GI PCR +EAEC. CXR was benign; CT a/p showed cirrhosis, gas filled patulous appendix 10mm, large volume of ascites, moderate umbilical hernia, and multi-level lower lumbar central canal narrowing; US abd showed a contracted gal bladder without cholelithiasis. ER performed diagnostic paracentesis with SAAG >1 c/w portal HTN; 60cc collection. Hepatology consulted with recs for coreg 3.125mg BID, lasix 40mg daily, and spironolactone 100mg daily. Procedure team consulted now s/p therapeutic para 8/22 with 3.5L output. PT recs home no needs. Stable for DC home today with outpt f/u.    Pt seen and examined at bedside sleeping, easily arousable; wife at bedside. Both in agreement with plan to go home today. Happy to f/u with hepatology outpt as recommended. Would like to stay with [facility] for all care. Reviewed meds for discharge. No other concerns or complaints.     Vital Signs Last 24 Hrs  T(C): 36.7 (23 Aug 2024 10:00), Max: 37 (22 Aug 2024 18:02)  T(F): 98.1 (23 Aug 2024 10:00), Max: 98.6 (22 Aug 2024 18:02)  HR: 79 (23 Aug 2024 10:00) (79 - 94)  BP: 105/72 (23 Aug 2024 10:00) (105/72 - 123/85)  BP(mean): --  RR: 18 (23 Aug 2024 10:00) (18 - 18)  SpO2: 100% (23 Aug 2024 10:00) (99% - 100%)    Parameters below as of 23 Aug 2024 06:13  Patient On (Oxygen Delivery Method): room air    PHYSICAL EXAM:  Constitutional: resting comfortably in bed; NAD  Head: NC/AT  Eyes: PERRL, EOMI, anicteric sclera  ENT: no nasal discharge; MMM  Neck: supple; no JVD  Respiratory: CTA B/L; no W/R/R; comfortable on RA  Cardiac: +S1/S2; RRR; no M/R/G  Gastrointestinal: soft, minimally tender diffusely; no rebound or guarding; +BSx4  Extremities: WWP, no clubbing or cyanosis; no peripheral edema  Vascular: 2+ radial, DP/PT pulses B/L  Dermatologic: skin warm, dry and intact; no rashes, wounds, or scars  Neurologic: AAOx3; CNII-XII grossly intact; no focal deficits  Psychiatric: affect and characteristics of appearance, verbalizations, behaviors are appropriate    Plan:  #Sepsis 2/2 EAEC  - pt p/w profuse watery diarrhea and nausea x few days; no known sick contacts and never experienced symptoms like this before; started H. pylori treatment 1 wk ago as below  - tachycardic and tachypneic on arrival, meeting SIRS criteria with likely GI source  - C diff neg, GI PCR with EAEC  - CXR benign  - CT a/p and US abd without obvious source of infection   - s/p 1L NS, flagyl, and clarithromycin in ER  - c/w flagyl/clarithromycin as below   - supportive treatment -- now with solid BMs  - hepatology following, appreciate recs     #Decompensated cirrhosis  - pt with abdominal distention and increased ascites compared to prior   - s/p diagnostic para in ER with 60cc output and SAAG >1 c/w portal HTN  - CT a/b and US abd as above   - ammonia 87 -- will need to monitor mental status  - low c/f SBP  - hepatology following, appreciate recs  - c/w coreg 3.125mg BID, lasix 40mg daily, and spironolactone 100mg daily  - procedure team consulted, s/p therapeutic para 8/22 with 3.5L output    #H. pylori   - pt with recent outpt EGD with H. pylori+, started on clarithromycin and flagyl of which he missed 3 days d/t symptoms as above  - resume clarithromycin/flagyl, protonix  - outpt GI f/u  - due for outpt c-scope 8/31 to assess for IBD/microscopic colitis     #Thrombocytopenia  - plt 104  - stable  - likely in setting of cirrhosis  - continue to trend    #Coagulopathy  - INR 1.64   - stable  - likely in setting of cirrhosis  - continue to trend     #Transaminitis  - ,   - stable   - likely in setting of cirrhosis  - continue to trend; avoid hepatotoxic agents     Dispo: dc home today; PT recs no needs  Discussed with HS, rest as outlined above.
Pt is a 45 yo M with PMH fTUD, asthma, ETOH use d/o, cirrhosis (c/b ascites), HTN, HLD, T2D, and psoriasis p/w nausea and diarrhea x few days. Of note, recently had EGD showing varices and +H. pylori for which he was started on clarithromycin and flagyl x1wk. Last alcoholic beverage was the day of onset of symptoms around 3 days ago, typically daily wine. On arrival. he was tachycardic and tachypneic with CIWA 7. Labs showed plt 104, INR 1.64, transaminitis, ammonia 87, lactate 3.2--2.6, C diff neg, and GI PCR +EAEC. CXR was benign; CT a/p showed cirrhosis, gas filled patulous appendix 10mm, large volume of ascites, moderate umbilical hernia, and multi-level lower lumbar central canal narrowing; US abd showed a contracted gal bladder without cholelithiasis. ER performed diagnostic paracentesis with SAAG >1 c/w portal HTN; 60cc collection. Hepatology consulted with recs for coreg 3.125mg BID, lasix 40mg daily, and spironolactone 100mg daily. Procedure team consulted with plan for therapeutic paracentesis 8/22. PT consulted with recs pending.     VITAL SIGNS:  T(C): 36.9 (08-21-24 @ 17:00), Max: 36.9 (08-21-24 @ 05:00)  T(F): 98.5 (08-21-24 @ 17:00), Max: 98.5 (08-21-24 @ 17:00)  HR: 90 (08-21-24 @ 17:00) (90 - 110)  BP: 125/84 (08-21-24 @ 17:00) (113/82 - 170/113)  BP(mean): --  RR: 18 (08-21-24 @ 17:00) (18 - 18)  SpO2: 100% (08-21-24 @ 17:00) (99% - 100%)  Wt(kg): --    PHYSICAL EXAM:  CIWA 1-2  Constitutional: resting comfortably in bed; NAD  Head: NC/AT  Eyes: PERRL, EOMI, anicteric sclera  ENT: no nasal discharge; MMM  Neck: supple; no JVD  Respiratory: CTA B/L; no W/R/R; comfortable on RA  Cardiac: +S1/S2; RRR; no M/R/G  Gastrointestinal: firm, distended, minimally tender diffusely; no rebound or guarding; +BSx4  Extremities: WWP, no clubbing or cyanosis; no peripheral edema  Vascular: 2+ radial, DP/PT pulses B/L  Dermatologic: skin warm, dry and intact; no rashes, wounds, or scars  Neurologic: AAOx3; CNII-XII grossly intact; no focal deficits  Psychiatric: affect and characteristics of appearance, verbalizations, behaviors are appropriate    Plan:  #Sepsis 2/2 EAEC  - pt p/w profuse watery diarrhea and nausea x few days; no known sick contacts and never experienced symptoms like this before; started H. pylori treatment 1 wk ago as below  - tachycardic and tachypneic on arrival, meeting SIRS criteria with likely GI source  - C diff neg, GI PCR with EAEC  - CXR benign  - CT a/p and US abd without obvious source of infection   - s/p 1L NS, flagyl, and clarithromycin in ER  - c/w flagyl/clarithromycin as below   - supportive treatment  - hepatology following, appreciate recs     #Decompensated cirrhosis  - pt with abdominal distention and increased ascites compared to prior   - s/p diagnostic para in ER with 60cc output and SAAG >1 c/w portal HTN  - CT a/b and US abd as above   - ammonia 87 -- will need to monitor mental status  - low c/f SBP  - hepatology following, appreciate recs  - start coreg 3.125mg BID, lasix 40mg daily, and spironolactone 100mg daily  - procedure team consulted, planned for therapeutic para 8/22     #H. pylori   - pt with recent outpt EGD with H. pylori+, started on clarithromycin and flagyl of which he missed 3 days d/t symptoms as above  - resume clarithromycin/flagyl  - outpt GI f/u  - due for outpt c-scope 8/31 to assess for IBD/microscopic colitis     #ETOH use d/o  - hx daily wine use, last drink 3d prior to arrival; denies hx w/d   - CIWA 7 per ER on arrival  - CIWA 1-2 on exam  - s/p librium 25mg in ER  - symptom triggered CIWA protocol -- can dc in 24h if remains stable    #Thrombocytopenia  - plt 104  - likely in setting of cirrhosis  - continue to trend    #Coagulopathy  - INR 1.64  - likely in setting of cirrhosis  - continue to trend     #Transaminitis  - ,   - likely in setting of cirrhosis  - continue to trend; avoid hepatotoxic agents     Dispo: pending medical optimization and PT eval  Discussed with HS, rest as outlined above.
Pt is a 45 yo M with PMH fTUD, asthma, ETOH use d/o, cirrhosis (c/b ascites), HTN, HLD, T2D, and psoriasis p/w nausea and diarrhea x few days. Of note, recently had EGD showing varices and +H. pylori for which he was started on clarithromycin and flagyl x1wk. Last alcoholic beverage was the day of onset of symptoms around 3 days ago, typically daily wine. On arrival he was tachycardic and tachypneic. Labs showed plt 104, INR 1.64, transaminitis, ammonia 87, lactate 3.2--2.6, C diff neg, and GI PCR +EAEC. CXR was benign; CT a/p showed cirrhosis, gas filled patulous appendix 10mm, large volume of ascites, moderate umbilical hernia, and multi-level lower lumbar central canal narrowing; US abd showed a contracted gal bladder without cholelithiasis. ER performed diagnostic paracentesis with SAAG >1 c/w portal HTN; 60cc collection. Hepatology consulted with recs for coreg 3.125mg BID, lasix 40mg daily, and spironolactone 100mg daily. Procedure team consulted now s/p therapeutic para 8/22 with 3.5L output. PT consulted with recs pending.     Vital Signs Last 24 Hrs  T(C): 36.8 (22 Aug 2024 12:42), Max: 37 (21 Aug 2024 21:00)  T(F): 98.2 (22 Aug 2024 12:42), Max: 98.6 (21 Aug 2024 21:00)  HR: 90 (22 Aug 2024 12:42) (86 - 98)  BP: 109/74 (22 Aug 2024 12:42) (109/74 - 135/93)  BP(mean): --  RR: 17 (22 Aug 2024 12:42) (17 - 18)  SpO2: 100% (22 Aug 2024 12:42) (94% - 100%)    Parameters below as of 22 Aug 2024 12:42  Patient On (Oxygen Delivery Method): room air    PHYSICAL EXAM:  Constitutional: resting comfortably in bed; NAD  Head: NC/AT  Eyes: PERRL, EOMI, anicteric sclera  ENT: no nasal discharge; MMM  Neck: supple; no JVD  Respiratory: CTA B/L; no W/R/R; comfortable on RA  Cardiac: +S1/S2; RRR; no M/R/G  Gastrointestinal: firm, distended, minimally tender diffusely; no rebound or guarding; +BSx4  Extremities: WWP, no clubbing or cyanosis; no peripheral edema  Vascular: 2+ radial, DP/PT pulses B/L  Dermatologic: skin warm, dry and intact; no rashes, wounds, or scars  Neurologic: AAOx3; CNII-XII grossly intact; no focal deficits  Psychiatric: affect and characteristics of appearance, verbalizations, behaviors are appropriate    Plan:  #Sepsis 2/2 EAEC  - pt p/w profuse watery diarrhea and nausea x few days; no known sick contacts and never experienced symptoms like this before; started H. pylori treatment 1 wk ago as below  - tachycardic and tachypneic on arrival, meeting SIRS criteria with likely GI source  - C diff neg, GI PCR with EAEC  - CXR benign  - CT a/p and US abd without obvious source of infection   - s/p 1L NS, flagyl, and clarithromycin in ER  - c/w flagyl/clarithromycin as below   - supportive treatment -- now with solid BMs  - hepatology following, appreciate recs     #Decompensated cirrhosis  - pt with abdominal distention and increased ascites compared to prior   - s/p diagnostic para in ER with 60cc output and SAAG >1 c/w portal HTN  - CT a/b and US abd as above   - ammonia 87 -- will need to monitor mental status  - low c/f SBP  - hepatology following, appreciate recs  - c/w coreg 3.125mg BID, lasix 40mg daily, and spironolactone 100mg daily  - procedure team consulted, s/p therapeutic para 8/22 with 3.5L output    #H. pylori   - pt with recent outpt EGD with H. pylori+, started on clarithromycin and flagyl of which he missed 3 days d/t symptoms as above  - resume clarithromycin/flagyl  - outpt GI f/u  - due for outpt c-scope 8/31 to assess for IBD/microscopic colitis     #ETOH use d/o  - hx daily wine use, last drink 3d prior to arrival; denies hx w/d   - CIWA 0 x24h  - s/p librium 25mg in ER  - symptom triggered CIWA protocol -- can dc today     #Thrombocytopenia  - plt 104  - stable  - likely in setting of cirrhosis  - continue to trend    #Coagulopathy  - INR 1.64   - stable  - likely in setting of cirrhosis  - continue to trend     #Transaminitis  - ,   - stable   - likely in setting of cirrhosis  - continue to trend; avoid hepatotoxic agents     Dispo: pending PT eval  Discussed with HS, rest as outlined above.

## 2024-08-23 NOTE — DISCHARGE NOTE PROVIDER - CARE PROVIDERS DIRECT ADDRESSES
[email or web address]

## 2024-08-23 NOTE — DISCHARGE NOTE PROVIDER - CARE PROVIDER_API CALL
Yifan Ramos  Transplant Hepatology  [address] [place] [address]  Phone: [phone]  Fax: [phone]  Follow Up Time: 2 weeks    Fabrizio Palacio  [facility]  [id number] [place] [id number]  Phone: [phone]  Fax: [phone]  Established Patient  Follow Up Time:     Amandeep Tejeda  Transplant Hepatology  [address] [place] [address]  Phone: [phone]  Fax: [phone]  Follow Up Time: 2 weeks

## 2024-08-23 NOTE — DISCHARGE NOTE PROVIDER - PROVIDER TOKENS
PROVIDER:[TOKEN:[[id number]],FOLLOWUP:[2 weeks]],PROVIDER:[TOKEN:[[id number]],ESTABLISHEDPATIENT:[T]],PROVIDER:[TOKEN:[[id number]],FOLLOWUP:[2 weeks]]

## 2024-08-23 NOTE — DISCHARGE NOTE PROVIDER - HOSPITAL COURSE
HPI:  SAMIR CAMPOS is a 44y Male with a hx of asthma, psoriasis, Cirrhosis 2/2 alcohol use disorder, T2DM, and HTN who presents with diarrhea.    Sunday (8/18) night, patient began experiencing diarrhea which worsened on Monday morning. Diarrhea is described as being watery and nonbloody. Patient reported having ~38 between now and admission. Diarrhea persists even at night time and has not been able to eat much since. Of note, patient was diagnosed with H.Pylori a week ago and stared on Clarithromycin Triple Therapy with metronidazole. He has been experiencing mild epigastric pain and nausea. Has had mild spit-ups with frequent belching and heart burn. No overt emesis. Patient denies fevers, hemoptysis, melena or blood in stool, chest pain, SOB, palpitations, lightheadedness, dysuria, or hematuria. No sick contacts, recent travel, or changes in diet. Patient reports his last drink was hard liquor on Saturday night at a party. Normally he drinks 1 boxed wine per night.    In the ED, patient is afebrile and hemodynamically stable. Noted to have ~8 episodes of diarrhea since admission. Diagnostic paracentesis performed in ED. Also CIWQ 4 in ED, given librium 25mg x1 and 1L NS.  (20 Aug 2024 20:07)    Hospital Course:    SAMIR CAMPOS is a 44y Male with a hx of Cirrhosis 2/2 alcohol use disorder, T2DM, HTN, and recent H.Pylori infection who presents with diarrhea in the setting of recently starting triple therapy for H. Pylori as well as large volume ascites. Pt is s/p diagnostic paracentesis indicating portal HTN, negative for SBP. Therapeutic paracentesis done 8/22 with 3.5 L removed. We started carvedilol 3.125 mg BID, continued Lasix 40 mg QD, increased spironolactone to 100 mg per hepatology recommendations. We also continued treatment of H Pylori with metronidazole, clarithromycin and pantoprazole.     On day of discharge, patient is clinically stable with no new exam findings or acute symptoms compared to prior. The patient was seen by the attending physician on the date of discharge and deemed stable and acceptable for discharge. The patient's chronic medical conditions were treated accordingly per the patient's home medication regimen. The patient's medication reconciliation (with changes made to chronic medications), follow up appointments, discharge orders, instructions, and significant lab and diagnostic studies are as noted.    Important Medication Changes and Reason:  Continue treatment of H pylori w/ Clarithromycin, Metronidazole and Protonix for full 14 days tx  Continue lasix 40 mg QD  Increased spironolactone to 100 mg QD  Start carvedilol 3.125 mg BID    Active or Pending Issues Requiring Follow-up:  Will need repeat testing for H Pylori as an outpatient for eradication.  Close outpatient follow-up at [facility] with Dr. Ramos (assess transplant candidacy)    Discharge Diagnoses:  Sepsis 2/2 EAEC  Decompensated cirrhosis  H. pylori           HPI:  SAMIR CAMPOS is a 44y Male with a hx of asthma, psoriasis, Cirrhosis 2/2 alcohol use disorder, T2DM, and HTN who presents with diarrhea.    Sunday (8/18) night, patient began experiencing diarrhea which worsened on Monday morning. Diarrhea is described as being watery and nonbloody. Diarrhea persists even at night time and has not been able to eat much since. Of note, patient was diagnosed with H.Pylori a week ago and stared on Clarithromycin Triple Therapy with metronidazole. He has been experiencing mild epigastric pain and nausea. Has had mild spit-ups with frequent belching and heart burn. No overt emesis. Patient denies fevers, hemoptysis, melena or blood in stool, chest pain, SOB, palpitations, lightheadedness, dysuria, or hematuria. No sick contacts, recent travel, or changes in diet. Patient reports his last drink was hard liquor on Saturday night at a party. Normally he drinks 1 boxed wine per night.    In the ED, patient is afebrile and hemodynamically stable. Noted to have ~8 episodes of diarrhea since admission. Diagnostic paracentesis performed in ED. Also CIWQ 4 in ED, given librium 25mg x1 and 1L NS.  (20 Aug 2024 20:07)    Hospital Course:    SAMIR CAMPOS is a 44y Male with a hx of Cirrhosis 2/2 alcohol use disorder, T2DM, HTN, and recent H.Pylori infection who presents with diarrhea in the setting of recently starting triple therapy for H. Pylori as well as large volume ascites. Pt is s/p diagnostic paracentesis indicating portal HTN, negative for SBP. Therapeutic paracentesis done 8/22 with 3.5 L removed. We started carvedilol 3.125 mg BID, continued Lasix 40 mg QD, increased spironolactone to 100 mg per hepatology recommendations. We also continued treatment of H Pylori with metronidazole, clarithromycin and pantoprazole.     On day of discharge, patient is clinically stable with no new exam findings or acute symptoms compared to prior. The patient was seen by the attending physician on the date of discharge and deemed stable and acceptable for discharge. The patient's chronic medical conditions were treated accordingly per the patient's home medication regimen. The patient's medication reconciliation (with changes made to chronic medications), follow up appointments, discharge orders, instructions, and significant lab and diagnostic studies are as noted.    Important Medication Changes and Reason:  Continue treatment of H pylori w/ Clarithromycin, Metronidazole and Protonix for full 14 days tx  Continue lasix 40 mg QD  Increased spironolactone to 100 mg QD  Start carvedilol 3.125 mg BID    Active or Pending Issues Requiring Follow-up:  Will need repeat testing for H Pylori as an outpatient for eradication.  Close outpatient follow-up at [facility] with Dr. Ramos (assess transplant candidacy)    Discharge Diagnoses:  Sepsis 2/2 EAEC  Decompensated cirrhosis  H. pylori           HPI:  SAMIR CAMPOS is a 44y Male with a hx of asthma, psoriasis, Cirrhosis 2/2 alcohol use disorder, T2DM, and HTN who presents with diarrhea.    Sunday (8/18) night, patient began experiencing diarrhea which worsened on Monday morning. Diarrhea is described as being watery and nonbloody. Diarrhea persists even at night time and has not been able to eat much since. Of note, patient was diagnosed with H.Pylori a week ago and stared on Clarithromycin Triple Therapy with metronidazole. He has been experiencing mild epigastric pain and nausea. Has had mild spit-ups with frequent belching and heart burn. No overt emesis. Patient denies fevers, hemoptysis, melena or blood in stool, chest pain, SOB, palpitations, lightheadedness, dysuria, or hematuria. No sick contacts, recent travel, or changes in diet. Patient reports his last drink was hard liquor on Saturday night at a party. Normally he drinks 1 boxed wine per night.    In the ED, patient is afebrile and hemodynamically stable. Noted to have ~8 episodes of diarrhea since admission. Diagnostic paracentesis performed in ED. Also CIWQ 4 in ED, given librium 25mg x1 and 1L NS.  (20 Aug 2024 20:07)    Hospital Course:    SAMIR CAMPOS is a 44y Male with a hx of Cirrhosis 2/2 alcohol use disorder, T2DM, HTN, and recent H.Pylori infection who presents with diarrhea in the setting of recently starting triple therapy for H. Pylori as well as large volume ascites. Pt is s/p diagnostic paracentesis indicating portal HTN, negative for SBP. Therapeutic paracentesis done 8/22 with 3.5 L removed. We started carvedilol 3.125 mg BID, continued Lasix 40 mg QD, increased spironolactone to 100 mg per hepatology recommendations. We also continued treatment of H Pylori with metronidazole, clarithromycin and pantoprazole.     On day of discharge, patient is clinically stable with no new exam findings or acute symptoms compared to prior. The patient was seen by the attending physician on the date of discharge and deemed stable and acceptable for discharge. The patient's chronic medical conditions were treated accordingly per the patient's home medication regimen. The patient's medication reconciliation (with changes made to chronic medications), follow up appointments, discharge orders, instructions, and significant lab and diagnostic studies are as noted.    Important Medication Changes and Reason:  Continue treatment of H pylori w/ Clarithromycin, Metronidazole and Protonix for full 14 days tx  Continue lasix 40 mg QD  Increased spironolactone to 100 mg QD  Start carvedilol 3.125 mg BID    Active or Pending Issues Requiring Follow-up:  Will need repeat testing for H Pylori as an outpatient for eradication.  Close outpatient follow-up at [facility] with Dr. Ramos (assess transplant candidacy)    Discharge Diagnoses:  Infectious diarrhea 2/2 EAEC  Decompensated cirrhosis  H. pylori

## 2024-08-23 NOTE — DISCHARGE NOTE PROVIDER - NSDCMRMEDTOKEN_GEN_ALL_CORE_FT
Albuterol (Eqv-Ventolin HFA) 90 mcg/inh inhalation aerosol: 2 puff(s) inhaled every 6 hours as needed for  shortness of breath and/or wheezing  amLODIPine 10 mg oral tablet: 1 tab(s) orally once a day  carvedilol 3.125 mg oral tablet: 1 tab(s) orally 2 times a day  clarithromycin 500 mg oral tablet: 1 tab(s) orally 2 times a day  furosemide 40 mg oral tablet: 1 tab(s) orally once a day  hydroCHLOROthiazide 25 mg oral tablet: 1 tab(s) orally once a day  Janumet 50 mg-1000 mg oral tablet: 1 tab(s) orally once a day  Jardiance 25 mg oral tablet: 1 tab(s) orally once a day (at bedtime)  losartan 100 mg oral tablet: 1 tab(s) orally once a day  metroNIDAZOLE 500 mg oral tablet: 1 tab(s) orally 3 times a day  spironolactone 25 mg oral tablet: 1 tab(s) orally once a day   Albuterol (Eqv-Ventolin HFA) 90 mcg/inh inhalation aerosol: 2 puff(s) inhaled every 6 hours as needed for  shortness of breath and/or wheezing  amLODIPine 10 mg oral tablet: 1 tab(s) orally once a day  carvedilol 3.125 mg oral tablet: 1 tab(s) orally 2 times a day  clarithromycin 500 mg oral tablet: 1 tab(s) orally 2 times a day  furosemide 40 mg oral tablet: 1 tab(s) orally once a day  hydroCHLOROthiazide 25 mg oral tablet: 1 tab(s) orally once a day  Janumet 50 mg-1000 mg oral tablet: 1 tab(s) orally once a day  Jardiance 25 mg oral tablet: 1 tab(s) orally once a day (at bedtime)  losartan 100 mg oral tablet: 1 tab(s) orally once a day  metroNIDAZOLE 500 mg oral tablet: 1 tab(s) orally 3 times a day  pantoprazole 40 mg oral delayed release tablet: 1 tab(s) orally once a day (before a meal)  spironolactone 25 mg oral tablet: 4 tab(s) orally once a day   Albuterol (Eqv-Ventolin HFA) 90 mcg/inh inhalation aerosol: 2 puff(s) inhaled every 6 hours as needed for  shortness of breath and/or wheezing  Aldactone 100 mg oral tablet: 1 tab(s) orally once a day  amLODIPine 10 mg oral tablet: 1 tab(s) orally once a day  carvedilol 3.125 mg oral tablet: 1 tab(s) orally 2 times a day  clarithromycin 500 mg oral tablet: 1 tab(s) orally 2 times a day  furosemide 40 mg oral tablet: 1 tab(s) orally once a day  hydroCHLOROthiazide 25 mg oral tablet: 1 tab(s) orally once a day  Janumet 50 mg-1000 mg oral tablet: 1 tab(s) orally once a day  Jardiance 25 mg oral tablet: 1 tab(s) orally once a day (at bedtime)  lactobacillus acidophilus oral capsule: 1 cap(s) orally once a day  losartan 100 mg oral tablet: 1 tab(s) orally once a day  metroNIDAZOLE 500 mg oral tablet: 1 tab(s) orally 3 times a day  pantoprazole 40 mg oral delayed release tablet: 1 tab(s) orally once a day (before a meal)

## 2024-08-23 NOTE — DISCHARGE NOTE PROVIDER - NSDCFUADDAPPT_GEN_ALL_CORE_FT
APPTS ARE READY TO BE MADE: [X] YES    Best Family or Patient Contact (if needed):    Additional Information about above appointments (if needed):    1: Hepatology with Dr. Ramos   2: GI with Dr. Tejeda  3: PCP    Other comments or requests:    APPTS ARE READY TO BE MADE: [X] YES    Best Family or Patient Contact (if needed):    Additional Information about above appointments (if needed):    1: Hepatology with Dr. Ramos   2: GI with Dr. Tejeda  3: PCP    Other comments or requests:     Patient informed us they already have secured a follow up appointment which is not visible on [facility]. Patient had an appointment with Fabrizio Keane 8/27/24 at 2:30pm. [id number] [place] [address]    Patient advised they did not want to proceed with scheduling appointments with the providers on their referrals. They will coordinate care on their own.

## 2024-08-23 NOTE — PROGRESS NOTE ADULT - SUBJECTIVE AND OBJECTIVE BOX
****[facility] PGY-1*****    CHIEF COMPLAINT: Diarrhea    Interval Events: Pt examined at bedside this morning. Wife at bedside. Denying any acute complaints, says that his mild abdominal pain has resolved. Had 2 hard yellow BM. Diarrhea has resolved. Denies blood in stool, fevers/chills, dysuria.    OBJECTIVE:  ICU Vital Signs Last 24 Hrs  T(C): 36.9 (21 Aug 2024 05:00), Max: 37.3 (20 Aug 2024 19:26)  T(F): 98.4 (21 Aug 2024 05:00), Max: 99.1 (20 Aug 2024 19:26)  HR: 103 (21 Aug 2024 05:00) (99 - 119)  BP: 148/97 (21 Aug 2024 05:00) (132/87 - 171/110)  BP(mean): --  ABP: --  ABP(mean): --  RR: 18 (21 Aug 2024 05:00) (14 - 20)  SpO2: 99% (21 Aug 2024 05:00) (97% - 100%)    O2 Parameters below as of 21 Aug 2024 05:00  Patient On (Oxygen Delivery Method): room air              CAPILLARY BLOOD GLUCOSE      POCT Blood Glucose.: 166 mg/dL (20 Aug 2024 23:04)      PHYSICAL EXAM  CONSTITUTIONAL: no apparent distress.  SKIN: No rashes or ecchymosis.  EYES: No scleral icterus.  CV: RRR. Normal S1 and S2. No murmurs, rubs, or gallops. No edema. Pulses equal bilaterally.  PULM: Clear to auscultation throughout. Respirations unlabored. No wheezing, rales, or rhonchi.  GI: (+) abdomen distended. Non-tender. No palpable masses.  MSK: No cyanosis or clubbing.  NEURO: No focal deficits  PSYCH: A+O x3, normal mood and affect      HOSPITAL MEDICATIONS:  MEDICATIONS  (STANDING):  amLODIPine   Tablet 10 milliGRAM(s) Oral daily  clarithromycin 500 milliGRAM(s) Oral two times a day  dextrose 5%. 1000 milliLiter(s) (50 mL/Hr) IV Continuous <Continuous>  dextrose 5%. 1000 milliLiter(s) (100 mL/Hr) IV Continuous <Continuous>  dextrose 50% Injectable 25 Gram(s) IV Push once  dextrose 50% Injectable 12.5 Gram(s) IV Push once  dextrose 50% Injectable 25 Gram(s) IV Push once  enoxaparin Injectable 40 milliGRAM(s) SubCutaneous every 24 hours  furosemide    Tablet 40 milliGRAM(s) Oral daily  glucagon  Injectable 1 milliGRAM(s) IntraMuscular once  insulin lispro (ADMELOG) corrective regimen sliding scale   SubCutaneous three times a day before meals  insulin lispro (ADMELOG) corrective regimen sliding scale   SubCutaneous at bedtime  losartan 100 milliGRAM(s) Oral daily  metroNIDAZOLE    Tablet 500 milliGRAM(s) Oral three times a day  multivitamin 1 Tablet(s) Oral daily  nystatin Ointment 1 Application(s) Topical two times a day  pantoprazole    Tablet 40 milliGRAM(s) Oral before breakfast  spironolactone 25 milliGRAM(s) Oral daily  thiamine IVPB 500 milliGRAM(s) IV Intermittent three times a day    MEDICATIONS  (PRN):  acetaminophen     Tablet .. 650 milliGRAM(s) Oral every 6 hours PRN Temp greater or equal to 38C (100.4F), Mild Pain (1 - 3)  dextrose Oral Gel 15 Gram(s) Oral once PRN Blood Glucose LESS THAN 70 milliGRAM(s)/deciliter  LORazepam   Injectable 1 milliGRAM(s) IV Push every 2 hours PRN CIWA-Ar score increase by 2 points and a total score of 7 or less  LORazepam   Injectable 1 milliGRAM(s) IV Push every 1 hour PRN CIWA-Ar score 8 or greater      LABS:                        14.1   6.56  )-----------( x        ( 21 Aug 2024 05:25 )             39.9     Hgb Trend: 14.1<--, 14.6<--  08-21    132<L>  |  92<L>  |  6<L>  ----------------------------<  116<H>  4.1   |  26  |  0.58    Ca    8.7      21 Aug 2024 06:38  Phos  3.2     08-21  Mg     1.50     08-21    TPro  9.2<H>  /  Alb  3.0<L>  /  TBili  4.0<H>  /  DBili  x   /  AST  127<H>  /  ALT  32  /  AlkPhos  220<H>  08-21    Creatinine Trend: 0.58<--, 0.55<--  PT/INR - ( 20 Aug 2024 10:20 )   PT: 18.3 sec;   INR: 1.64 ratio         PTT - ( 20 Aug 2024 10:20 )  PTT:38.5 sec  Urinalysis Basic - ( 21 Aug 2024 06:38 )    Color: x / Appearance: x / SG: x / pH: x  Gluc: 116 mg/dL / Ketone: x  / Bili: x / Urobili: x   Blood: x / Protein: x / Nitrite: x   Leuk Esterase: x / RBC: x / WBC x   Sq Epi: x / Non Sq Epi: x / Bacteria: x        Venous Blood Gas:  08-21 @ 06:38  7.46/39/74/28/95.2  VBG Lactate: 2.6  Venous Blood Gas:  08-20 @ 10:20  7.46/37/42/26/67.8  VBG Lactate: 3.2      MICROBIOLOGY:     Culture - Body Fluid with Gram Stain (collected 20 Aug 2024 15:24)  Source: Ascites Fl Ascites Fluid  Gram Stain (20 Aug 2024 23:21):    polymorphonuclear leukocytes seen    No organisms seen    by cytocentrifuge        RADIOLOGY:  [ ] Reviewed by me

## 2024-08-23 NOTE — PROGRESS NOTE ADULT - TIME BILLING
review of laboratory data, radiology results, consultants' recommendations, documentation in [place], discussion with patient/ACP and interdisciplinary staff (such as , social workers, etc). Interventions were performed as documented above.
review of laboratory data, radiology results, consultants' recommendations, documentation in [place], discussion with patient/ACP and interdisciplinary staff (such as , social workers, etc). Interventions were performed as documented above.
review of laboratory data, radiology results, consultants' recommendations, documentation in [place], discussion with patient/ACP and interdisciplinary staff (such as , social workers, etc). Interventions were performed as documented above.

## 2024-08-23 NOTE — DISCHARGE NOTE PROVIDER - ATTENDING DISCHARGE PHYSICAL EXAMINATION:
Vital Signs Last 24 Hrs  T(C): 36.7 (23 Aug 2024 10:00), Max: 37 (22 Aug 2024 18:02)  T(F): 98.1 (23 Aug 2024 10:00), Max: 98.6 (22 Aug 2024 18:02)  HR: 79 (23 Aug 2024 10:00) (79 - 94)  BP: 105/72 (23 Aug 2024 10:00) (105/72 - 123/85)  BP(mean): --  RR: 18 (23 Aug 2024 10:00) (18 - 18)  SpO2: 100% (23 Aug 2024 10:00) (99% - 100%)    Parameters below as of 23 Aug 2024 06:13  Patient On (Oxygen Delivery Method): room air    PHYSICAL EXAM:  Constitutional: resting comfortably in bed; NAD  Head: NC/AT  Eyes: PERRL, EOMI, anicteric sclera  ENT: no nasal discharge; MMM  Neck: supple; no JVD  Respiratory: CTA B/L; no W/R/R; comfortable on RA  Cardiac: +S1/S2; RRR; no M/R/G  Gastrointestinal: soft, minimally tender diffusely; no rebound or guarding; +BSx4  Extremities: WWP, no clubbing or cyanosis; no peripheral edema  Vascular: 2+ radial, DP/PT pulses B/L  Dermatologic: skin warm, dry and intact; no rashes, wounds, or scars  Neurologic: AAOx3; CNII-XII grossly intact; no focal deficits  Psychiatric: affect and characteristics of appearance, verbalizations, behaviors are appropriate

## 2024-08-23 NOTE — PROGRESS NOTE ADULT - PROBLEM SELECTOR PLAN 7
DVT ppx: Lovenox 40mg daily  Diet: Consistent carb/DASH  Dispo: pending clinical course. PT consult

## 2024-08-23 NOTE — DISCHARGE NOTE PROVIDER - NSDCCPTREATMENT_GEN_ALL_CORE_FT
PRINCIPAL PROCEDURE  Procedure: Paracentesis at bedside  Findings and Treatment: A paracentesis was done to drain the fluid from your abdomen and relieve your abdominal distension. We drained 3.5 L of fluid. The procedure was tolerated well and there were no complications. Fluid analysis was consistent with portal hypertension and no SBP.

## 2024-08-23 NOTE — PROGRESS NOTE ADULT - PROBLEM SELECTOR PLAN 1
Acute diarrhea >50/day in setting of recent Clarithromycin triple therapy for H.Pylori infection. C. Diff negative.    - s/p diagnostic paracentesis in ED: negative for SBP, SAAG consistent with portal HTN  - s/p therapeutic paracentesis 8/22 with 3.5 L removed  - f/u GI PCR, stool culture, BCx  - currently on Metronidazole for H.Pylori, continue  - Stool count    - Hepatology following  ---Agree w/ therapeutic paracentesis for ascites, if > 5L removed please replete w/ albumin 6-8g/L removed  --- Continue supportive management for acute diarrhea 2/2 EAEC - though largely resolved at this time  --- Start carvedilol 3.125 mg BID   ---Continue lasix 40 mg QD, spironolactone to 100 mg QD. Monitor I/Os, daily weights   --- Continue treatment of H pylori w/ Clarithromycin, Metronidazole and protonix for full 14 days tx. Will need repeat testing as an outpatient for eradication

## 2024-08-23 NOTE — DISCHARGE NOTE NURSING/CASE MANAGEMENT/SOCIAL WORK - NSDCPEFALRISK_GEN_ALL_CORE
For information on Fall & Injury Prevention, visit: [email or web address] OR  [email or web address] OR  https://www.cdc.gov/steadi/patient.html

## 2024-08-23 NOTE — DISCHARGE NOTE NURSING/CASE MANAGEMENT/SOCIAL WORK - NSDCFUADDAPPT_GEN_ALL_CORE_FT
APPTS ARE READY TO BE MADE: [X] YES    Best Family or Patient Contact (if needed):    Additional Information about above appointments (if needed):    1: Hepatology with Dr. Ramos   2: GI with Dr. Tejeda  3: PCP    Other comments or requests:

## 2024-08-23 NOTE — PROGRESS NOTE ADULT - PROBLEM SELECTOR PLAN 5
NIDDM2  Home meds: Januvia qd, Jardiance 25mg qd  - hold home meds  - LDSS  - check A1c

## 2024-08-23 NOTE — PROGRESS NOTE ADULT - ASSESSMENT
SAMIR CAMPOS is a 44y Male with a hx of Cirrhosis 2/2 alcohol use disorder, T2DM, HTN, and recent H.Pylori infection who presents with diarrhea in the setting of recently starting triple therapy for H. Pylori as well as large volume ascites. Pt is s/p diagnostic paracentesis indicating portal HTN, negative for SBP. Therapeutic paracentesis done 8/22 with 3.5 L removed.

## 2024-08-23 NOTE — DISCHARGE NOTE NURSING/CASE MANAGEMENT/SOCIAL WORK - NSSBIRTALCACTIVEREFTXDET_GEN_A_CORE
Screening results were reviewed with the patient. Patient was provided educational materials on low-risk guidelines and substance use and health. Motivation and goals were discussed. Patient provided with a referral to substance use treatment at [facility]. Patient enrolled in [employer]. Patient provided with resources to support their substance use goals. Patient provided with [employer]IRT information. Patient provided with informational flyer on text messaging program for alcohol use.

## 2024-08-23 NOTE — PROGRESS NOTE ADULT - PROBLEM SELECTOR PLAN 3
Recently diagnosed H.Pylori infection, started on Clarithromycin Triple Therapy with Metronidazole about a week ago. 14 days of treatment needed.    - continue Pantoprazole 40mg qd  - Clarithromycin 500mg BID  - Metronidazole 500mg TID

## 2024-08-23 NOTE — PROGRESS NOTE ADULT - PROBLEM SELECTOR PLAN 4
Hx of cirrhosis 2/2 alcohol use disorder. C/b ascites, esophageal varices, and portal hypertension. No active bleed. No SBP  - Follows with hepatology, Dr. Aleida Oakley, outpatient ([facility])  - continue home Spironolactone 25mg qd and Furosemide 40mg qd    Having mild discomfort with distention, CT showing large volume ascites  - s/p diagnostic paracentesis in ED  - s/p therapeutic paracentesis 8/22    Hepatology recs:  - Agree w/ therapeutic paracentesis for ascites, if > 5L removed please replete w/ albumin 6-8g/L removed  - carvedilol 3.125 mg BID   - Continue lasix 40 mg QD, but increase spironolactone to 100 mg QD. Monitor I/Os, daily weights   - Continue treatment of H pylori w/ Clarithromycin, Metronidazole and protonix for full 14 days tx. Will need repeat testing as an outpatient for eradication    Close outpatient follow-up at [facility]

## 2024-08-23 NOTE — DISCHARGE NOTE NURSING/CASE MANAGEMENT/SOCIAL WORK - PATIENT PORTAL LINK FT
You can access the FollowMyHealth Patient Portal offered by [facility] by registering at the following website: [email or web address]. By joining [facility]’s FollowMyHealth portal, you will also be able to view your health information using other applications (apps) compatible with our system.

## 2024-08-23 NOTE — DISCHARGE NOTE PROVIDER - NSDCCPCAREPLAN_GEN_ALL_CORE_FT
PRINCIPAL DISCHARGE DIAGNOSIS  Diagnosis: Watery diarrhea  Assessment and Plan of Treatment:      PRINCIPAL DISCHARGE DIAGNOSIS  Diagnosis: Infectious diarrhea  Assessment and Plan of Treatment: Infectious diarrhea is a condition in which the intestines become inflamed and irritated due to an infection. In your case, we found that the bacteria that caused this was enteroaggregative E. coli. This is characterized by frequent, watery and often loose bowel movements. Symptoms often include abdominal pain, nausea, vomiting, and fever. We treated this with antibiotics and then supported your symptoms. Your diarrhea resolved within your hospital stay. Please make sure to stay hydrated and take the medications we gave you as prescribed.      SECONDARY DISCHARGE DIAGNOSES  Diagnosis: Cirrhosis  Assessment and Plan of Treatment: Cirrhosis is a progressive liver disease characterized by the gradual replacement of healthy liver tissue with scar tissue, which impairs the liver's ability to function properly. This disrupts its role in processing nutrients, drugs, and toxins. A common cause of cirrhosis includes chronic alcohol use. Symptoms of cirrhosis include fatigue, yellowing of the skin and eyes and abdominal swelling (ascites). You came in with abdominal swelling, for which we performed a paracentesis. We drained fluid from your abdomen, causing you to feel better. The fluid studies showed that you have portal hypertension from your liver disease. Please make sure to follow up with hepatology outpatient and take your medications as prescribed. Managing cirrhosis involves treating the underlying cause, such as alcohol use, and in severe cases, a liver transplant may be necessary. This can be discussed with Dr. Ramos outpatient at the [facility].    Diagnosis: Gastritis, Helicobacter pylori  Assessment and Plan of Treatment: H pylori is a type of bacteria that infects the stomach lining. Infection can cause symptoms such as abdominal pain, bloating and diarrhea. If left untreated, it can contribute to more serious conditions. Please make sure to take your medications as prescribed. It is very im[portant that you complete the full 14 day course of clarithromycin, metronidazole and pantoprazole.

## 2024-08-25 LAB
CULTURE RESULTS: NO GROWTH — SIGNIFICANT CHANGE UP
SPECIMEN SOURCE: SIGNIFICANT CHANGE UP

## 2024-08-27 PROBLEM — Z00.00 ENCOUNTER FOR PREVENTIVE HEALTH EXAMINATION: Status: ACTIVE | Noted: 2024-08-27

## 2024-08-29 PROBLEM — K74.60 UNSPECIFIED CIRRHOSIS OF LIVER: Chronic | Status: ACTIVE | Noted: 2024-08-20

## 2024-08-29 PROBLEM — J45.909 UNSPECIFIED ASTHMA, UNCOMPLICATED: Chronic | Status: ACTIVE | Noted: 2024-08-21

## 2024-08-29 PROBLEM — Z78.9 OTHER SPECIFIED HEALTH STATUS: Chronic | Status: ACTIVE | Noted: 2024-08-20

## 2024-08-29 PROBLEM — L40.9 PSORIASIS, UNSPECIFIED: Chronic | Status: ACTIVE | Noted: 2024-08-21

## 2024-08-30 ENCOUNTER — INPATIENT (INPATIENT)
Facility: HOSPITAL | Age: 44
LOS: 9 days | Discharge: HOME | End: 2024-09-09
Attending: STUDENT IN AN ORGANIZED HEALTH CARE EDUCATION/TRAINING PROGRAM | Admitting: STUDENT IN AN ORGANIZED HEALTH CARE EDUCATION/TRAINING PROGRAM
Payer: MEDICAID

## 2024-08-30 VITALS
SYSTOLIC BLOOD PRESSURE: 123 MMHG | HEART RATE: 106 BPM | HEIGHT: 70 IN | TEMPERATURE: 98 F | OXYGEN SATURATION: 100 % | RESPIRATION RATE: 17 BRPM | WEIGHT: 197.98 LBS | DIASTOLIC BLOOD PRESSURE: 81 MMHG

## 2024-08-30 LAB
HCT VFR BLD CALC: 41.3 % — SIGNIFICANT CHANGE UP (ref 39–50)
HGB BLD-MCNC: 14.2 G/DL — SIGNIFICANT CHANGE UP (ref 13–17)
IANC: 8.32 K/UL — HIGH (ref 1.8–7.4)
LACTATE BLDV-MCNC: 2.9 MMOL/L — HIGH (ref 0.5–2)
MCHC RBC-ENTMCNC: 29.2 PG — SIGNIFICANT CHANGE UP (ref 27–34)
MCHC RBC-ENTMCNC: 34.4 GM/DL — SIGNIFICANT CHANGE UP (ref 32–36)
MCV RBC AUTO: 84.8 FL — SIGNIFICANT CHANGE UP (ref 80–100)
PLATELET # BLD AUTO: 259 K/UL — SIGNIFICANT CHANGE UP (ref 150–400)
RBC # BLD: 4.87 M/UL — SIGNIFICANT CHANGE UP (ref 4.2–5.8)
RBC # FLD: 20.8 % — HIGH (ref 10.3–14.5)
WBC # BLD: 10.96 K/UL — HIGH (ref 3.8–10.5)
WBC # FLD AUTO: 10.96 K/UL — HIGH (ref 3.8–10.5)

## 2024-08-30 PROCEDURE — 99285 EMERGENCY DEPT VISIT HI MDM: CPT

## 2024-08-30 RX ORDER — ACETAMINOPHEN 325 MG/1
1000 TABLET ORAL ONCE
Refills: 0 | Status: COMPLETED | OUTPATIENT
Start: 2024-08-30 | End: 2024-08-30

## 2024-08-30 RX ADMIN — ACETAMINOPHEN 400 MILLIGRAM(S): 325 TABLET ORAL at 23:23

## 2024-08-30 NOTE — ED PROVIDER NOTE - PROGRESS NOTE DETAILS
Scanned patients abdomen for potential drainage of ascites fluid. ALBIN Munroe: Patient taken on at signout.   Labs reviewed, imaging reviewed. UA positive, will start on ceftriaxone. Patient seen bedside, appears in NAD. Endorsing abdominal pain. Started one week ago, after he was d/c from the hospital for H. pylori. He then started having hiccups and gas and abdominal pain, it has been the same over the course of a week. abdominal pain is 9/10, worse with movement, better when laying flat. ROS: denies v/f/c, hematuria, scrotal swelling. admits to n/ passing gas.   Secondary to medical history, will place [facility] for inpatient continued care ALBIN Munroe: Admit to [facility] 44-year-old male received in signout found to have urinary tract infection POCUS does not reveal safe pocket for paracentesis.  Due to recent admission low threshold to admit for IV antibiotics continued monitoring

## 2024-08-30 NOTE — ED PROVIDER NOTE - OBJECTIVE STATEMENT
44 yr male w PMH of GERD, H pylori, gastritis, cirrhosis, presents due to abdominal pain. Started one week ago, after he was d/c from the hospital for H. pylori. He then started having hiccups and gas and abdominal pain, it has been the same over the course of a week. abdominal pain is 9/10, worse with movement, better when laying flat. ROS: denies v/f/c, hematuria, scrotal swelling. admits to n/ passing gas.   Ax: amoxicillin

## 2024-08-30 NOTE — ED PROVIDER NOTE - ATTENDING APP SHARED VISIT CONTRIBUTION OF CARE
I have discussed the patient's case presentation with the PA. I have also personally performed a face-to-face evaluation of the patient. I agree with the PA's assessment and plan.    Jaundiced, mild diffuse abd pain with ascites, may require diagnostic paracentesis to eval for SBP.

## 2024-08-30 NOTE — ED PROVIDER NOTE - NSICDXPASTMEDICALHX_GEN_ALL_CORE_FT
PAST MEDICAL HISTORY:  Alcohol use     Asthma     Cirrhosis     Diabetes     Hypertension     Psoriasis

## 2024-08-30 NOTE — ED ADULT TRIAGE NOTE - CHIEF COMPLAINT QUOTE
pt coming to ER c/o abdominal pain and nausea, with hiccups that has been going on for the last two weeks. Pt states he was diagnosed with H. .Pylori, was admitted to this hospital for treatment and discharged last friday. Since then patient started experiencing abdominal pain with nausea and periods of hiccups. Pt noted to have jaundice sclera which is baseline. Hx of HTN, T2DM, cirrhosis and H. pylori

## 2024-08-30 NOTE — ED ADULT NURSE NOTE - ALCOHOL PRE SCREEN (AUDIT - C)
04/07/20        Finesse Aguirre  [address] [facility] [address]    To Whom It May Concern:    This is to certify Finesse Aguirre is under my care and has a condition which places him in a high-risk category for individuals who may develop severe symptoms from COVID-19. The Center for Disease Control (CDC) has issued guidance and recommendations for individuals in high risks groups: to practice social distancing, including avoiding large gatherings, stay home as much as possible, and consideration of telecommuting for workplace settings.   I request consideration for Finesse Aguirre to continue work from home at this time until the CDC has provided recommendations otherwise.     Obviously the Coronavirus is a rapidly evolving situation and recommendations are changing regularly to prevent spread of the disease and further loss of life.    Thank you for your understanding during these unusual times.        Electronically signed by:   Soham Polo MD                 [id number] [facility] [address]       [FreeTextEntry1] : 11/15/21 12:02PM\par Spoke to mother to f/u since she called on call pulmonologist on 11/12/21. She reports he is doing better. Fevers have resolved. No cough. Trach secretions thin and clear. Still requiring 1-1.5L of oxygen- she has not tried to wean. Mom reports he has coughing fit after atrovent and 3% saline q4h. Discussed it is likely the hypertonic saline so we can wean to 2-3x daily. She also asked about weaning Atrovent. She can wean to q6h. \par Plan:\par -Atrovent q6h, then 3% saline BID or TID for next 4-5 days then resume daily ACT\par -Acetylcysteine (mucomyst) 2x daily and Budesonide 2x daily \par -Bethanechol 0.7mg PO Q6\par -Ciprodex 4 drops to trach twice daily x 10 days\par -Continue 1-1.5L of oxygen as needed to maintain Spo2 >93%, can try to wean at end of the week\par -Could treat with course of Clindamycin for MRSA colonization (last trach cx 5/2021)\par -Plan discussed with primary pulmonologist Dr. Rutledge Statement Selected

## 2024-08-30 NOTE — ED ADULT NURSE NOTE - OBJECTIVE STATEMENT
pt received to 17A, A&Ox4. Pt hx HTN, DM type 2, cirrhosis, H.pylori. Pt endorsing abdominal pain with associated nausea, also endorsing increased burping x 2 weeks. Pt recently diagnosed with H.Pylori, was discharged from hospital last friday. Pt abdomen moderately distended. Pt noted to have jaundice to sclera, baseline for pt. Pt denies fevers, chills, headache, dizziness, nausea, urinary symptoms, rectal bleeding. respirations even and unlabored. 20G IV established to right ac, labs drawn and sent. medicated per MAR. VS as noted, safety maintained thougfhout

## 2024-08-30 NOTE — ED ADULT TRIAGE NOTE - NSSEPSISSUSPECTED_ED_A_ED
Final Anesthesia Post-op Assessment    Patient: Bandar Zepeda  Procedure(s) Performed: COLONOSCOPY  Anesthesia type: MAC    Vitals Value Taken Time   Temp 36.8 °C (98.3 °F) 12/07/20 0835   Pulse 80 12/07/20 0835   Resp 10 12/07/20 0835   SpO2 95 % 12/07/20 0835   /68 12/07/20 0835         Patient Location: Phase II  Post-op Vital Signs:stable  Level of Consciousness: answers questions appropriately and sedated  Respiratory Status: spontaneous ventilation, unassisted and room air  Cardiovascular blood pressure returned to baseline and stable  Hydration: euvolemic  Pain Management: adequately controlled  Vomiting: none   Nausea: None  Airway Patency:patent  Post-op Assessment: no complications, patient tolerated procedure well with no complications, evidence of recall, dentition within defined limits, moving all extremities and No Corneal Abrasion  Comments: Pt is comfortable. SV. Tolerated procedure.    To phase 2 for recovery.  Report to RN.  No complaint at this time. Hemodynamics good. No PONV or intraoperative recall. Satisfactory condition.        No complications documented.   
Patient appears to be progressing well. No complaints.   Denies sore throat, hoarse voice, nausea, visual disturbance, muscle/joint aches, intraoperative recall.   No apparent anesthesia complications. She seems satisfied with the anesthesia care.    Patient reassessed and I find her condition appropriate for discharge to home, under the supervision of a responsible adult wife.    
No

## 2024-08-30 NOTE — ED PROVIDER NOTE - CLINICAL SUMMARY MEDICAL DECISION MAKING FREE TEXT BOX
44 yr male w PMH of GERD, H pylori, gastritis, cirrhosis, presents due to abdominal pain. Started one week ago, after he was d/c from the hospital for H. pylori. He then started having hiccups and gas and abdominal pain, it has been the same over the course of a week. abdominal pain is 9/10, worse with movement, better when laying flat. ROS: denies v/f/c, hematuria, scrotal swelling. admits to n/ passing gas.   Ax: amoxicillin   PE: juandice, yellow eyes, MMM, RUQ tenderness, distended stomach, soft no rebound no rigidity, Boogie's sign -   ddx: cholecystis, appendicitis, SBO, GI perf, pancreatitis   Plan: CT AP w contrast, CBC, CMP, Tylenol. lactate, lipase 44 yr male w PMH of GERD, H pylori, gastritis, cirrhosis, presents due to abdominal pain. Started one week ago, after he was d/c from the hospital for H. pylori. He then started having hiccups and gas and abdominal pain, it has been the same over the course of a week. abdominal pain is 9/10, worse with movement, better when laying flat. ROS: denies v/f/c, hematuria, scrotal swelling. admits to n/ passing gas.   Ax: amoxicillin   PE: juandice, yellow eyes, MMM, RUQ tenderness, distended stomach, soft no rebound no rigidity, Boogie's sign -, genitourinary exam is negative for tenderness of swelling, normal color   ddx: cholecystis, appendicitis, SBO, GI perf, pancreatitis   Plan: CT AP w contrast, CBC, CMP, Tylenol. lactate, lipase

## 2024-08-31 DIAGNOSIS — R10.9 UNSPECIFIED ABDOMINAL PAIN: ICD-10-CM

## 2024-08-31 DIAGNOSIS — N39.0 URINARY TRACT INFECTION, SITE NOT SPECIFIED: ICD-10-CM

## 2024-08-31 DIAGNOSIS — I10 ESSENTIAL (PRIMARY) HYPERTENSION: ICD-10-CM

## 2024-08-31 DIAGNOSIS — K74.60 UNSPECIFIED CIRRHOSIS OF LIVER: ICD-10-CM

## 2024-08-31 DIAGNOSIS — E11.9 TYPE 2 DIABETES MELLITUS WITHOUT COMPLICATIONS: ICD-10-CM

## 2024-08-31 DIAGNOSIS — Z29.9 ENCOUNTER FOR PROPHYLACTIC MEASURES, UNSPECIFIED: ICD-10-CM

## 2024-08-31 DIAGNOSIS — F10.20 ALCOHOL DEPENDENCE, UNCOMPLICATED: ICD-10-CM

## 2024-08-31 DIAGNOSIS — R76.8 OTHER SPECIFIED ABNORMAL IMMUNOLOGICAL FINDINGS IN SERUM: ICD-10-CM

## 2024-08-31 LAB
ALBUMIN SERPL ELPH-MCNC: 3.1 G/DL — LOW (ref 3.3–5)
ALP SERPL-CCNC: 194 U/L — HIGH (ref 40–120)
ALT FLD-CCNC: 56 U/L — HIGH (ref 4–41)
ANION GAP SERPL CALC-SCNC: 14 MMOL/L — SIGNIFICANT CHANGE UP (ref 7–14)
ANISOCYTOSIS BLD QL: SIGNIFICANT CHANGE UP
APPEARANCE UR: ABNORMAL
APTT BLD: 41.1 SEC — HIGH (ref 24.5–35.6)
AST SERPL-CCNC: 119 U/L — HIGH (ref 4–40)
BACTERIA # UR AUTO: ABNORMAL /HPF
BASOPHILS # BLD AUTO: 0 K/UL — SIGNIFICANT CHANGE UP (ref 0–0.2)
BASOPHILS NFR BLD AUTO: 0 % — SIGNIFICANT CHANGE UP (ref 0–2)
BILIRUB SERPL-MCNC: 4.2 MG/DL — HIGH (ref 0.2–1.2)
BILIRUB UR-MCNC: NEGATIVE — SIGNIFICANT CHANGE UP
BLD GP AB SCN SERPL QL: NEGATIVE — SIGNIFICANT CHANGE UP
BUN SERPL-MCNC: 10 MG/DL — SIGNIFICANT CHANGE UP (ref 7–23)
CALCIUM SERPL-MCNC: 9 MG/DL — SIGNIFICANT CHANGE UP (ref 8.4–10.5)
CAST: 0 /LPF — SIGNIFICANT CHANGE UP (ref 0–4)
CHLORIDE SERPL-SCNC: 95 MMOL/L — LOW (ref 98–107)
CO2 SERPL-SCNC: 19 MMOL/L — LOW (ref 22–31)
COLOR SPEC: YELLOW — SIGNIFICANT CHANGE UP
CREAT SERPL-MCNC: 0.63 MG/DL — SIGNIFICANT CHANGE UP (ref 0.5–1.3)
DIFF PNL FLD: ABNORMAL
EGFR: 119 ML/MIN/1.73M2 — SIGNIFICANT CHANGE UP
EOSINOPHIL # BLD AUTO: 0 K/UL — SIGNIFICANT CHANGE UP (ref 0–0.5)
EOSINOPHIL NFR BLD AUTO: 0 % — SIGNIFICANT CHANGE UP (ref 0–6)
GAS PNL BLDV: SIGNIFICANT CHANGE UP
GIANT PLATELETS BLD QL SMEAR: PRESENT — SIGNIFICANT CHANGE UP
GLUCOSE SERPL-MCNC: 159 MG/DL — HIGH (ref 70–99)
GLUCOSE UR QL: >=1000 MG/DL
INR BLD: 2.25 RATIO — HIGH (ref 0.85–1.18)
KETONES UR-MCNC: NEGATIVE MG/DL — SIGNIFICANT CHANGE UP
LEUKOCYTE ESTERASE UR-ACNC: ABNORMAL
LIDOCAIN IGE QN: 96 U/L — HIGH (ref 7–60)
LYMPHOCYTES # BLD AUTO: 0.38 K/UL — LOW (ref 1–3.3)
LYMPHOCYTES # BLD AUTO: 3.5 % — LOW (ref 13–44)
MACROCYTES BLD QL: SLIGHT — SIGNIFICANT CHANGE UP
MICROCYTES BLD QL: SLIGHT — SIGNIFICANT CHANGE UP
MONOCYTES # BLD AUTO: 1.34 K/UL — HIGH (ref 0–0.9)
MONOCYTES NFR BLD AUTO: 12.2 % — SIGNIFICANT CHANGE UP (ref 2–14)
NEUTROPHILS # BLD AUTO: 9.15 K/UL — HIGH (ref 1.8–7.4)
NEUTROPHILS NFR BLD AUTO: 83.5 % — HIGH (ref 43–77)
NITRITE UR-MCNC: NEGATIVE — SIGNIFICANT CHANGE UP
OVALOCYTES BLD QL SMEAR: SLIGHT — SIGNIFICANT CHANGE UP
PH UR: 6.5 — SIGNIFICANT CHANGE UP (ref 5–8)
PLAT MORPH BLD: NORMAL — SIGNIFICANT CHANGE UP
PLATELET COUNT - ESTIMATE: NORMAL — SIGNIFICANT CHANGE UP
POIKILOCYTOSIS BLD QL AUTO: SLIGHT — SIGNIFICANT CHANGE UP
POLYCHROMASIA BLD QL SMEAR: SLIGHT — SIGNIFICANT CHANGE UP
POTASSIUM SERPL-MCNC: 4.6 MMOL/L — SIGNIFICANT CHANGE UP (ref 3.5–5.3)
POTASSIUM SERPL-SCNC: 4.6 MMOL/L — SIGNIFICANT CHANGE UP (ref 3.5–5.3)
PROT SERPL-MCNC: 9.1 G/DL — HIGH (ref 6–8.3)
PROT UR-MCNC: SIGNIFICANT CHANGE UP MG/DL
PROTHROM AB SERPL-ACNC: 24.6 SEC — HIGH (ref 9.5–13)
RBC BLD AUTO: ABNORMAL
RBC CASTS # UR COMP ASSIST: 2 /HPF — SIGNIFICANT CHANGE UP (ref 0–4)
REVIEW: SIGNIFICANT CHANGE UP
RH IG SCN BLD-IMP: POSITIVE — SIGNIFICANT CHANGE UP
SODIUM SERPL-SCNC: 128 MMOL/L — LOW (ref 135–145)
SP GR SPEC: 1.01 — SIGNIFICANT CHANGE UP (ref 1–1.03)
SQUAMOUS # UR AUTO: 4 /HPF — SIGNIFICANT CHANGE UP (ref 0–5)
TARGETS BLD QL SMEAR: SIGNIFICANT CHANGE UP
UROBILINOGEN FLD QL: 0.2 MG/DL — SIGNIFICANT CHANGE UP (ref 0.2–1)
VARIANT LYMPHS # BLD: 0.8 % — SIGNIFICANT CHANGE UP (ref 0–6)
WBC UR QL: 126 /HPF — HIGH (ref 0–5)

## 2024-08-31 PROCEDURE — 99223 1ST HOSP IP/OBS HIGH 75: CPT

## 2024-08-31 PROCEDURE — 74177 CT ABD & PELVIS W/CONTRAST: CPT | Mod: 26,MC

## 2024-08-31 RX ORDER — LOSARTAN POTASSIUM 50 MG/1
100 TABLET ORAL DAILY
Refills: 0 | Status: DISCONTINUED | OUTPATIENT
Start: 2024-08-31 | End: 2024-09-02

## 2024-08-31 RX ORDER — ONDANSETRON 2 MG/ML
4 INJECTION, SOLUTION INTRAMUSCULAR; INTRAVENOUS EVERY 8 HOURS
Refills: 0 | Status: DISCONTINUED | OUTPATIENT
Start: 2024-08-31 | End: 2024-09-09

## 2024-08-31 RX ORDER — FUROSEMIDE 40 MG
40 TABLET ORAL DAILY
Refills: 0 | Status: DISCONTINUED | OUTPATIENT
Start: 2024-08-31 | End: 2024-09-05

## 2024-08-31 RX ORDER — L.ACID,PARA/B.BIFIDUM/S.THERM 8B CELL
1 CAPSULE ORAL DAILY
Refills: 0 | Status: DISCONTINUED | OUTPATIENT
Start: 2024-08-31 | End: 2024-09-09

## 2024-08-31 RX ORDER — CLARITHROMYCIN 250 MG/1
500 TABLET ORAL
Refills: 0 | Status: DISCONTINUED | OUTPATIENT
Start: 2024-08-31 | End: 2024-09-04

## 2024-08-31 RX ORDER — SODIUM CHLORIDE 9 MG/ML
1000 INJECTION INTRAMUSCULAR; INTRAVENOUS; SUBCUTANEOUS ONCE
Refills: 0 | Status: COMPLETED | OUTPATIENT
Start: 2024-08-31 | End: 2024-08-31

## 2024-08-31 RX ORDER — GLUCAGON INJECTION, SOLUTION 1 MG/.2ML
1 INJECTION, SOLUTION SUBCUTANEOUS ONCE
Refills: 0 | Status: DISCONTINUED | OUTPATIENT
Start: 2024-08-31 | End: 2024-09-09

## 2024-08-31 RX ORDER — MAGNESIUM, ALUMINUM HYDROXIDE 200-225/5
30 SUSPENSION, ORAL (FINAL DOSE FORM) ORAL EVERY 4 HOURS
Refills: 0 | Status: DISCONTINUED | OUTPATIENT
Start: 2024-08-31 | End: 2024-09-09

## 2024-08-31 RX ORDER — THIAMINE HCL 250 MG
100 TABLET ORAL DAILY
Refills: 0 | Status: DISCONTINUED | OUTPATIENT
Start: 2024-08-31 | End: 2024-09-09

## 2024-08-31 RX ORDER — ACETAMINOPHEN 325 MG/1
650 TABLET ORAL EVERY 6 HOURS
Refills: 0 | Status: DISCONTINUED | OUTPATIENT
Start: 2024-08-31 | End: 2024-09-09

## 2024-08-31 RX ORDER — FOLIC ACID 1 MG
1 TABLET ORAL DAILY
Refills: 0 | Status: DISCONTINUED | OUTPATIENT
Start: 2024-08-31 | End: 2024-09-09

## 2024-08-31 RX ORDER — AMLODIPINE BESYLATE 10 MG/1
10 TABLET ORAL DAILY
Refills: 0 | Status: DISCONTINUED | OUTPATIENT
Start: 2024-08-31 | End: 2024-09-02

## 2024-08-31 RX ORDER — CARVEDILOL 6.25 MG/1
3.12 TABLET ORAL EVERY 12 HOURS
Refills: 0 | Status: DISCONTINUED | OUTPATIENT
Start: 2024-08-31 | End: 2024-09-02

## 2024-08-31 RX ORDER — DEXTROSE 15 G/33 G
15 GEL IN PACKET (GRAM) ORAL ONCE
Refills: 0 | Status: DISCONTINUED | OUTPATIENT
Start: 2024-08-31 | End: 2024-09-09

## 2024-08-31 RX ORDER — DEXTROSE 15 G/33 G
25 GEL IN PACKET (GRAM) ORAL ONCE
Refills: 0 | Status: DISCONTINUED | OUTPATIENT
Start: 2024-08-31 | End: 2024-09-09

## 2024-08-31 RX ORDER — DEXTROSE 15 G/33 G
12.5 GEL IN PACKET (GRAM) ORAL ONCE
Refills: 0 | Status: DISCONTINUED | OUTPATIENT
Start: 2024-08-31 | End: 2024-09-09

## 2024-08-31 RX ORDER — SPIRONOLACTONE 25 MG/1
100 TABLET, FILM COATED ORAL DAILY
Refills: 0 | Status: DISCONTINUED | OUTPATIENT
Start: 2024-08-31 | End: 2024-09-02

## 2024-08-31 RX ORDER — PANTOPRAZOLE SODIUM 40 MG
40 TABLET, DELAYED RELEASE (ENTERIC COATED) ORAL
Refills: 0 | Status: DISCONTINUED | OUTPATIENT
Start: 2024-08-31 | End: 2024-09-09

## 2024-08-31 RX ORDER — METRONIDAZOLE 250 MG
500 TABLET ORAL THREE TIMES A DAY
Refills: 0 | Status: DISCONTINUED | OUTPATIENT
Start: 2024-08-31 | End: 2024-09-04

## 2024-08-31 RX ADMIN — CLARITHROMYCIN 500 MILLIGRAM(S): 250 TABLET ORAL at 22:09

## 2024-08-31 RX ADMIN — Medication 1 TABLET(S): at 19:47

## 2024-08-31 RX ADMIN — CARVEDILOL 3.12 MILLIGRAM(S): 6.25 TABLET ORAL at 22:09

## 2024-08-31 RX ADMIN — AMLODIPINE BESYLATE 10 MILLIGRAM(S): 10 TABLET ORAL at 19:47

## 2024-08-31 RX ADMIN — Medication 100 MILLIGRAM(S): at 19:46

## 2024-08-31 RX ADMIN — Medication 1 MILLIGRAM(S): at 19:47

## 2024-08-31 RX ADMIN — Medication 100 MILLIGRAM(S): at 07:39

## 2024-08-31 RX ADMIN — SODIUM CHLORIDE 1000 MILLILITER(S): 9 INJECTION INTRAMUSCULAR; INTRAVENOUS; SUBCUTANEOUS at 07:39

## 2024-08-31 RX ADMIN — Medication 500 MILLIGRAM(S): at 22:10

## 2024-08-31 NOTE — ED ADULT NURSE REASSESSMENT NOTE - NS ED NURSE REASSESS COMMENT FT1
report received from night [facility] this morning.. pt resting in bed described 4/10 non radiating pain in his suprapubic area. c/o "discomfort" at the end of urination. no dysuria. pt to move to [facility] for further evaluation. pt denies any other pain, discomfort, or complaints. sts he is "comfortable".

## 2024-08-31 NOTE — H&P ADULT - NSHPLABSRESULTS_GEN_ALL_CORE
08-30    128<L>  |  95<L>  |  10  ----------------------------<  159<H>  4.6   |  19<L>  |  0.63    Ca    9.0      30 Aug 2024 23:38    TPro  9.1<H>  /  Alb  3.1<L>  /  TBili  4.2<H>  /  DBili  x   /  AST  119<H>  /  ALT  56<H>  /  AlkPhos  194<H>  08-30                          14.2   10.96 )-----------( 259      ( 30 Aug 2024 23:38 )             41.3    CT A/P w/ IV 8/31/24:   IMPRESSION:  Interval decrease in abdominopelvic ascites from large volume to mild/moderate. Redemonstration of cirrhosis, indeterminate small hypodensities. Varices indicative of portal hypertension.

## 2024-08-31 NOTE — H&P ADULT - PROBLEM SELECTOR PLAN 1
- Presented with lower abdominal pain.   - UA pos for UTI and WBC borderline at 10k.   - s/p Rocephin 1g IVPB in ED 8/31, will continue QD for 4 more days.   - No kidney involvement on CT A/P.   - Await final culture results.

## 2024-08-31 NOTE — H&P ADULT - PROBLEM SELECTOR PLAN 3
- Abdominal distension, + sclereal icterus on physical exam. CT showing improvement in ascites. +Portal hypertension.   - Pt needed paracentesis during prior admission. Ammonia also high at 87 during prior admission.   - MELD 3.0 25.   - Hepatology followed during prior admission and pt was advised otupt f/u. Per pt his insurance is not accepted and he will have to f/u w/ another hepatologist 10/2024.   - He was also started on Coreg 3.125mg BID, Lasix 40mg PO QD, and Spironolactone 100mg QD during last admission.   - Self d/sawyer above bc of "not feeling well." Above resume and GI emailed.  - Trend daily LFTs, INR. Morning ammonia level added as well.

## 2024-08-31 NOTE — H&P ADULT - PROBLEM SELECTOR PLAN 6
- On Jardiance 25mg QD and Janumet 50-1000mg QD at home. Currently holding.   - A1c 6.0% 8/21/24, now preDM.   - ISS low dose, adjust as needed.   - CC diet.

## 2024-08-31 NOTE — H&P ADULT - PROBLEM SELECTOR PLAN 2
- CT A/P w/ IV contrast 8/31/24: Interval decrease in abdominopelvic ascites from large volume to mild/moderate. Redemonstration of cirrhosis, indeterminate small hypodensities. Varices indicative of portal hypertension.  - Also with recent discharge for EAEC colitis, symptoms improving, but per pt abdomen still doesn't feel back to normal completely.   - Also during prior admissions, notes state pt was to go for outpt c-scope 8/31 to assess for IBD/microscopic colitis.   - GI emailed.   - Monitor.

## 2024-08-31 NOTE — PATIENT PROFILE ADULT - PATIENT'S PREFERRED PRONOUN
Consent (Nose)/Introductory Paragraph: The rationale for Mohs was explained to the patient and consent was obtained. The risks, benefits and alternatives to therapy were discussed in detail. Specifically, the risks of nasal deformity, changes in the flow of air through the nose, infection, scarring, bleeding, prolonged wound healing, incomplete removal, allergy to anesthesia, nerve injury and recurrence were addressed. Prior to the procedure, the treatment site was clearly identified and confirmed by the patient. All components of Universal Protocol/PAUSE Rule completed. Him/He

## 2024-08-31 NOTE — H&P ADULT - PROBLEM SELECTOR PLAN 7
- On Amlodipine 10mg QD and Losartan 100mg QD at home, continuing.   - Also continuing above meds for cirrhosis which will affect bp: Spironolactone 100mg QD, Furosemide 40mg QD and Carvedilol 3.125mg BID. Since pt stopped these meds d/t not feeling well, monitor bps while on these meds. He may have had symptomatic low bps at home which may have made him self d/c meds. Adjust regimen if needed.     # Asthma: mild, c/w Albuterol PRN

## 2024-08-31 NOTE — H&P ADULT - PROBLEM SELECTOR PLAN 4
- GI note in past: "Diagnosed w/ stool antigen on 7/8, was prescribed clarithromycin, flagyl on 8/5 but pt had not taken abx until 8/15, but stopped on 8/18 w/ onset of diarrhea. While gastric and duodenal biopsies were negative for h pylori, per ACG guidelines, patient still requires full treatment."  - On clarithromycin triple therapy: Clarithromycin 500mg BID, Metronidazole 500mg TID, and Pantoprazole 40mg QD. Would need 14 days total. Was resumed 8/20/24.   - Also pt was accidentally taking Clarithromycin 500mg QD instead of BID. Pls f/u with GI r/e this as well, and if treatment should be prolonged d/t this.

## 2024-08-31 NOTE — ED ADULT NURSE REASSESSMENT NOTE - NS ED NURSE REASSESS COMMENT FT1
Break RN: Pt is A&Ox4, resting in stretcher with no complaints at this time. Respirations even and unlabored, chest rise equal b/l. Pt denies chest pain, SOB, abdominal pain, N/V/D, h/a, dizziness, numbness/tingling or any urinary symptoms at this time. UA/UC collected and sent. No acute distress noted. Safety maintained throughout.

## 2024-08-31 NOTE — H&P ADULT - PROBLEM SELECTOR PLAN 5
- Per prior admission, pt drinks 1 boxed wine/day. Now willing to stop drinking d/t dx of cirrhosis.   - No current withdrawal symptoms.   - Folic acid, thiamine, MV. (s/p high dose thiamine ~ 1 week ago)   - Will place on CIWA for monitoring. - Per prior admission, pt drinks 1 boxed wine/day. Now willing to stop drinking d/t dx of cirrhosis.   - No current withdrawal symptoms.   - Folic acid, thiamine, MV. (s/p high dose thiamine ~ 1 week ago)   - Will place on CIWA for monitoring. No meds ordered since no withdrawal; initiate if any sxns noted.

## 2024-08-31 NOTE — H&P ADULT - ASSESSMENT
43 yo M with PMH of H pylori (currently on treatment), gastritis, alcoholic liver cirrhosis, asthma, psoriasis, preDM, HTN presenting to hospital with lower abdominal pain. Diagnosed with UTI. Also continuing treatment for h pylori and cirrhosis.

## 2024-08-31 NOTE — H&P ADULT - HISTORY OF PRESENT ILLNESS
45 yo M with PMH of H pylori (currently on treatment), gastritis, alcoholic liver cirrhosis, asthma, psoriasis, preDM, HTN presenting to hospital with lower abdominal pain.     Patient was recently discharged from hospital 8/23 for EAEC diarrhea. Also during prior admission, he was followed by hepatology for his liver cirrhosis and his meds were adjusted. He was started on Aldactone, Furosemide, and Carvedilol which pt self dc/ed bc of "not feeling well" from meds. He has appt w/ hepatology outpt 9/10/24.     Currently pt endorsing lower abdominal pain that started after recent discharge and has been progressively getting worse. Was found to have UTI in ED, s/p Rocephin x 1. Also Ofirmev 1000mg IVPB x 1. Pain now improved. Admitted for further treatment.

## 2024-08-31 NOTE — H&P ADULT - PROBLEM SELECTOR PLAN 8
Code: Full   Diet: DASH, CC, low sodium   DVT ppx: thrombocytopenia and elevated INR d/t cirrhosis; SCDs

## 2024-08-31 NOTE — H&P ADULT - NSHPPHYSICALEXAM_GEN_ALL_CORE
T(C): 36.8 (08-31-24 @ 18:31), Max: 36.8 (08-30-24 @ 23:26)  HR: 90 (08-31-24 @ 18:31) (90 - 96)  BP: 139/87 (08-31-24 @ 18:31) (114/75 - 146/91)  RR: 18 (08-31-24 @ 18:31) (16 - 18)  SpO2: 100% (08-31-24 @ 18:31) (100% - 100%)    CONSTITUTIONAL: Well groomed, no apparent distress  EYES: PERRLA and symmetric, EOMI, scleral icterus noted   ENMT: Oral mucosa with moist membranes.   NECK: Supple, symmetric and without tracheal deviation   RESP: No respiratory distress, no use of accessory muscles; CTA b/l, no WRR  CV: RRR, +S1S2, no MRG; no JVD; no peripheral edema  GI: Soft, mildly tender to lower abdomen, mild distension, no rebound, no guarding; no palpable masses  MSK: No digital clubbing or cyanosis; examination of the (head/neck/spine/ribs/pelvis, RUE, LUE, RLE, LLE) without misalignment,   SKIN: No rashes or ulcers noted  NEURO: No focal deficits noted   PSYCH: Appropriate insight/judgment; A+O x 3, mood and affect appropriate, recent/remote memory intact

## 2024-08-31 NOTE — H&P ADULT - NSHPREVIEWOFSYSTEMS_GEN_ALL_CORE
CONSTITUTIONAL:  No weakness, fevers or chills  EYES/ENT:  No visual changes;  No vertigo or throat pain   NECK:  No pain or stiffness  RESPIRATORY:  No cough, wheezing, hemoptysis; No shortness of breath  CARDIOVASCULAR:  No chest pain or palpitations  GASTROINTESTINAL: + abdominal pain, now slightly improved. No nausea, vomiting, or hematemesis; No diarrhea or constipation. No melena or hematochezia.  GENITOURINARY:  No dysuria, frequency or hematuria  MUSCULOSKELETAL:  FROM all extremities, normal strength, No calf tenderness  NEUROLOGICAL:  No numbness or weakness  SKIN:  No itching, rashes

## 2024-08-31 NOTE — ED ADULT NURSE REASSESSMENT NOTE - NS ED NURSE REASSESS COMMENT FT1
Pt resting in stretcher, offers no complaints at this time. repeat labs drawn and sent. respirations even and unlabored. safety maintained throughout

## 2024-08-31 NOTE — PATIENT PROFILE ADULT - FALL HARM RISK - HARM RISK INTERVENTIONS
Communicate Risk of Fall with Harm to all staff/Monitor for mental status changes/Monitor gait and stability/Reinforce activity limits and safety measures with patient and family/Reorient to person, place and time as needed/Review medications for side effects contributing to fall risk/Sit up slowly, dangle for a short time, stand at bedside before walking/Tailored Fall Risk Interventions/Toileting schedule using arm’s reach rule for commode and bathroom/Use of alarms - bed, chair and/or voice tab/Visual Cue: Yellow wristband and red socks/Bed in lowest position, wheels locked, appropriate side rails in place/Call bell, personal items and telephone in reach/Instruct patient to call for assistance before getting out of bed or chair/Non-slip footwear when patient is out of bed/[place] to call system/Physically safe environment - no spills, clutter or unnecessary equipment/Purposeful Proactive Rounding/Room/bathroom lighting operational, light cord in reach

## 2024-09-01 LAB
ALBUMIN SERPL ELPH-MCNC: 2.9 G/DL — LOW (ref 3.3–5)
ALP SERPL-CCNC: 177 U/L — HIGH (ref 40–120)
ALT FLD-CCNC: 61 U/L — HIGH (ref 4–41)
AMMONIA BLD-MCNC: 77 UMOL/L — HIGH (ref 11–55)
ANION GAP SERPL CALC-SCNC: 11 MMOL/L — SIGNIFICANT CHANGE UP (ref 7–14)
ANION GAP SERPL CALC-SCNC: 12 MMOL/L — SIGNIFICANT CHANGE UP (ref 7–14)
AST SERPL-CCNC: 134 U/L — HIGH (ref 4–40)
BILIRUB SERPL-MCNC: 3.4 MG/DL — HIGH (ref 0.2–1.2)
BUN SERPL-MCNC: 10 MG/DL — SIGNIFICANT CHANGE UP (ref 7–23)
BUN SERPL-MCNC: 10 MG/DL — SIGNIFICANT CHANGE UP (ref 7–23)
CALCIUM SERPL-MCNC: 8.5 MG/DL — SIGNIFICANT CHANGE UP (ref 8.4–10.5)
CALCIUM SERPL-MCNC: 8.6 MG/DL — SIGNIFICANT CHANGE UP (ref 8.4–10.5)
CHLORIDE SERPL-SCNC: 97 MMOL/L — LOW (ref 98–107)
CHLORIDE SERPL-SCNC: 97 MMOL/L — LOW (ref 98–107)
CO2 SERPL-SCNC: 19 MMOL/L — LOW (ref 22–31)
CO2 SERPL-SCNC: 19 MMOL/L — LOW (ref 22–31)
CREAT SERPL-MCNC: 0.57 MG/DL — SIGNIFICANT CHANGE UP (ref 0.5–1.3)
CREAT SERPL-MCNC: 0.59 MG/DL — SIGNIFICANT CHANGE UP (ref 0.5–1.3)
EGFR: 121 ML/MIN/1.73M2 — SIGNIFICANT CHANGE UP
EGFR: 122 ML/MIN/1.73M2 — SIGNIFICANT CHANGE UP
GLUCOSE SERPL-MCNC: 108 MG/DL — HIGH (ref 70–99)
GLUCOSE SERPL-MCNC: 110 MG/DL — HIGH (ref 70–99)
HCT VFR BLD CALC: 38.9 % — LOW (ref 39–50)
HGB BLD-MCNC: 13.7 G/DL — SIGNIFICANT CHANGE UP (ref 13–17)
INR BLD: 2.15 RATIO — HIGH (ref 0.85–1.18)
MAGNESIUM SERPL-MCNC: 2.1 MG/DL — SIGNIFICANT CHANGE UP (ref 1.6–2.6)
MAGNESIUM SERPL-MCNC: 2.2 MG/DL — SIGNIFICANT CHANGE UP (ref 1.6–2.6)
MCHC RBC-ENTMCNC: 29.6 PG — SIGNIFICANT CHANGE UP (ref 27–34)
MCHC RBC-ENTMCNC: 35.2 GM/DL — SIGNIFICANT CHANGE UP (ref 32–36)
MCV RBC AUTO: 84 FL — SIGNIFICANT CHANGE UP (ref 80–100)
NRBC # BLD: 0 /100 WBCS — SIGNIFICANT CHANGE UP (ref 0–0)
NRBC # FLD: 0 K/UL — SIGNIFICANT CHANGE UP (ref 0–0)
PHOSPHATE SERPL-MCNC: 3.5 MG/DL — SIGNIFICANT CHANGE UP (ref 2.5–4.5)
PHOSPHATE SERPL-MCNC: 3.6 MG/DL — SIGNIFICANT CHANGE UP (ref 2.5–4.5)
PLATELET # BLD AUTO: 225 K/UL — SIGNIFICANT CHANGE UP (ref 150–400)
POTASSIUM SERPL-MCNC: 4.4 MMOL/L — SIGNIFICANT CHANGE UP (ref 3.5–5.3)
POTASSIUM SERPL-MCNC: 4.5 MMOL/L — SIGNIFICANT CHANGE UP (ref 3.5–5.3)
POTASSIUM SERPL-SCNC: 4.4 MMOL/L — SIGNIFICANT CHANGE UP (ref 3.5–5.3)
POTASSIUM SERPL-SCNC: 4.5 MMOL/L — SIGNIFICANT CHANGE UP (ref 3.5–5.3)
PROT SERPL-MCNC: 9.1 G/DL — HIGH (ref 6–8.3)
PROTHROM AB SERPL-ACNC: 23.6 SEC — HIGH (ref 9.5–13)
RBC # BLD: 4.63 M/UL — SIGNIFICANT CHANGE UP (ref 4.2–5.8)
RBC # FLD: 20.1 % — HIGH (ref 10.3–14.5)
SODIUM SERPL-SCNC: 127 MMOL/L — LOW (ref 135–145)
SODIUM SERPL-SCNC: 128 MMOL/L — LOW (ref 135–145)
WBC # BLD: 10.37 K/UL — SIGNIFICANT CHANGE UP (ref 3.8–10.5)
WBC # FLD AUTO: 10.37 K/UL — SIGNIFICANT CHANGE UP (ref 3.8–10.5)

## 2024-09-01 PROCEDURE — 99232 SBSQ HOSP IP/OBS MODERATE 35: CPT

## 2024-09-01 RX ORDER — SENNA 187 MG
2 TABLET ORAL AT BEDTIME
Refills: 0 | Status: DISCONTINUED | OUTPATIENT
Start: 2024-09-01 | End: 2024-09-09

## 2024-09-01 RX ORDER — POLYETHYLENE GLYCOL 3350 17 G/17G
17 POWDER, FOR SOLUTION ORAL DAILY
Refills: 0 | Status: DISCONTINUED | OUTPATIENT
Start: 2024-09-01 | End: 2024-09-09

## 2024-09-01 RX ORDER — LIDOCAINE/BENZALKONIUM/ALCOHOL
1 SOLUTION, NON-ORAL TOPICAL ONCE
Refills: 0 | Status: COMPLETED | OUTPATIENT
Start: 2024-09-01 | End: 2024-09-01

## 2024-09-01 RX ORDER — LORAZEPAM 4 MG/ML
2 INJECTION INTRAMUSCULAR; INTRAVENOUS
Refills: 0 | Status: DISCONTINUED | OUTPATIENT
Start: 2024-09-01 | End: 2024-09-05

## 2024-09-01 RX ADMIN — Medication 40 MILLIGRAM(S): at 06:55

## 2024-09-01 RX ADMIN — SPIRONOLACTONE 100 MILLIGRAM(S): 25 TABLET, FILM COATED ORAL at 12:03

## 2024-09-01 RX ADMIN — Medication 2 TABLET(S): at 21:34

## 2024-09-01 RX ADMIN — Medication 100 MILLIGRAM(S): at 12:04

## 2024-09-01 RX ADMIN — CARVEDILOL 3.12 MILLIGRAM(S): 6.25 TABLET ORAL at 17:43

## 2024-09-01 RX ADMIN — Medication 1 PATCH: at 11:53

## 2024-09-01 RX ADMIN — Medication 1 TABLET(S): at 09:50

## 2024-09-01 RX ADMIN — Medication 1 MILLIGRAM(S): at 09:50

## 2024-09-01 RX ADMIN — Medication 1: at 13:51

## 2024-09-01 RX ADMIN — LOSARTAN POTASSIUM 100 MILLIGRAM(S): 50 TABLET ORAL at 06:55

## 2024-09-01 RX ADMIN — Medication 1: at 17:33

## 2024-09-01 RX ADMIN — CLARITHROMYCIN 500 MILLIGRAM(S): 250 TABLET ORAL at 06:56

## 2024-09-01 RX ADMIN — CLARITHROMYCIN 500 MILLIGRAM(S): 250 TABLET ORAL at 17:41

## 2024-09-01 RX ADMIN — Medication 1 PATCH: at 12:00

## 2024-09-01 RX ADMIN — AMLODIPINE BESYLATE 10 MILLIGRAM(S): 10 TABLET ORAL at 17:42

## 2024-09-01 RX ADMIN — Medication 100 MILLIGRAM(S): at 06:59

## 2024-09-01 RX ADMIN — Medication 1: at 09:16

## 2024-09-01 RX ADMIN — Medication 1 PATCH: at 00:44

## 2024-09-01 RX ADMIN — POLYETHYLENE GLYCOL 3350 17 GRAM(S): 17 POWDER, FOR SOLUTION ORAL at 17:32

## 2024-09-01 RX ADMIN — Medication 30 MILLILITER(S): at 15:58

## 2024-09-01 RX ADMIN — Medication 500 MILLIGRAM(S): at 15:57

## 2024-09-01 RX ADMIN — Medication 500 MILLIGRAM(S): at 21:34

## 2024-09-01 RX ADMIN — Medication 500 MILLIGRAM(S): at 06:55

## 2024-09-01 NOTE — PROGRESS NOTE ADULT - SUBJECTIVE AND OBJECTIVE BOX
PROGRESS NOTE:   Authoted by Dr. Zachary Tellez MD, REJI  Pager [phone] [facility], Available via [employer] Teams     Patient is a 44y old  Male who presents with a chief complaint of abdominal pain (31 Aug 2024 18:40)    SUBJECTIVE / OVERNIGHT EVENTS:  No acute events overnight   Reporting abdominal pain     MEDICATIONS  (STANDING):  amLODIPine   Tablet 10 milliGRAM(s) Oral daily  carvedilol 3.125 milliGRAM(s) Oral every 12 hours  cefTRIAXone   IVPB 1000 milliGRAM(s) IV Intermittent every 24 hours  clarithromycin 500 milliGRAM(s) Oral two times a day  dextrose 5%. 1000 milliLiter(s) (50 mL/Hr) IV Continuous <Continuous>  dextrose 5%. 1000 milliLiter(s) (100 mL/Hr) IV Continuous <Continuous>  dextrose 50% Injectable 25 Gram(s) IV Push once  dextrose 50% Injectable 25 Gram(s) IV Push once  dextrose 50% Injectable 12.5 Gram(s) IV Push once  folic acid 1 milliGRAM(s) Oral daily  furosemide    Tablet 40 milliGRAM(s) Oral daily  glucagon  Injectable 1 milliGRAM(s) IntraMuscular once  hydrochlorothiazide 25 milliGRAM(s) Oral daily  insulin lispro (ADMELOG) corrective regimen sliding scale   SubCutaneous three times a day before meals  insulin lispro (ADMELOG) corrective regimen sliding scale   SubCutaneous at bedtime  lactobacillus acidophilus 1 Tablet(s) Oral daily  losartan 100 milliGRAM(s) Oral daily  metroNIDAZOLE    Tablet 500 milliGRAM(s) Oral three times a day  multivitamin 1 Tablet(s) Oral daily  pantoprazole    Tablet 40 milliGRAM(s) Oral before breakfast  spironolactone 100 milliGRAM(s) Oral daily  thiamine 100 milliGRAM(s) Oral daily    MEDICATIONS  (PRN):  acetaminophen     Tablet .. 650 milliGRAM(s) Oral every 6 hours PRN Temp greater or equal to 38C (100.4F), Mild Pain (1 - 3)  albuterol    90 MICROgram(s) HFA Inhaler 2 Puff(s) Inhalation every 6 hours PRN for shortness of breath and/or wheezing  aluminum hydroxide/magnesium hydroxide/simethicone Suspension 30 milliLiter(s) Oral every 4 hours PRN Dyspepsia  dextrose Oral Gel 15 Gram(s) Oral once PRN Blood Glucose LESS THAN 70 milliGRAM(s)/deciliter  LORazepam   Injectable 2 milliGRAM(s) IV Push every 1 hour PRN CIWA-Ar score 8 or greater  LORazepam   Injectable 2 milliGRAM(s) IV Push every 2 hours PRN CIWA-Ar score increase by 2 points and a total score of 7 or less  melatonin 3 milliGRAM(s) Oral at bedtime PRN Insomnia  ondansetron Injectable 4 milliGRAM(s) IV Push every 8 hours PRN Nausea and/or Vomiting    OBJECTIVE:  Vital Signs Last 24 Hrs  T(C): 36.7 (01 Sep 2024 09:45), Max: 37 (31 Aug 2024 22:01)  T(F): 98.1 (01 Sep 2024 09:45), Max: 98.6 (31 Aug 2024 22:01)  HR: 75 (01 Sep 2024 12:00) (75 - 90)  BP: 107/71 (01 Sep 2024 12:00) (101/69 - 139/87)  RR: 16 (01 Sep 2024 12:00) (16 - 18)  SpO2: 100% (01 Sep 2024 12:00) (100% - 100%)    Parameters below as of 01 Sep 2024 12:00  Patient On (Oxygen Delivery Method): room air    CONSTITUTIONAL: NAD  HEAD:  Atraumatic, Normocephalic  EYES: EOMI, + sclereal icterus  ENMT: No tonsillar erythema, exudates, or enlargement; Moist mucous membranes  NECK: Supple, No JVD  NERVOUS SYSTEM: AOX3, motor and sensation grossly intact in b/l UE and b/l LE  PSYCHIATRIC: Appropriate affect and mood  CHEST/LUNG: Clear to auscultation b/l    HEART: Regular rate and rhythm; No murmurs. No LE edema  ABDOMEN: Soft, Nontender, Nondistended; Bowel sounds present  EXTREMITIES:  2+ Peripheral Pulses, non cyanotic   SKIN: No rashes or lesions    LABS:                        13.7   10.37 )-----------( 225      ( 01 Sep 2024 06:30 )             38.9     09-01    127<L>  |  97<L>  |  10  ----------------------------<  108<H>  4.5   |  19<L>  |  0.59    Ca    8.5      01 Sep 2024 06:30  Phos  3.6     09-01  Mg     2.10     09-01    TPro  9.1<H>  /  Alb  2.9<L>  /  TBili  3.4<H>  /  DBili  x   /  AST  134<H>  /  ALT  61<H>  /  AlkPhos  177<H>  09-01    CAPILLARY BLOOD GLUCOSE  POCT Blood Glucose.: 169 mg/dL (01 Sep 2024 12:55)  POCT Blood Glucose.: 165 mg/dL (01 Sep 2024 08:59)  POCT Blood Glucose.: 176 mg/dL (31 Aug 2024 21:52)    PT/INR - ( 01 Sep 2024 06:30 )   PT: 23.6 sec;   INR: 2.15 ratio    PTT - ( 30 Aug 2024 23:43 )  PTT:41.1 sec    Urinalysis with Rflx Culture (collected 31 Aug 2024 02:26)

## 2024-09-02 LAB
ALBUMIN SERPL ELPH-MCNC: 2.8 G/DL — LOW (ref 3.3–5)
ALP SERPL-CCNC: 178 U/L — HIGH (ref 40–120)
ALT FLD-CCNC: 67 U/L — HIGH (ref 4–41)
ANION GAP SERPL CALC-SCNC: 11 MMOL/L — SIGNIFICANT CHANGE UP (ref 7–14)
AST SERPL-CCNC: 139 U/L — HIGH (ref 4–40)
BASOPHILS # BLD AUTO: 0.11 K/UL — SIGNIFICANT CHANGE UP (ref 0–0.2)
BASOPHILS NFR BLD AUTO: 1 % — SIGNIFICANT CHANGE UP (ref 0–2)
BILIRUB SERPL-MCNC: 3.4 MG/DL — HIGH (ref 0.2–1.2)
BUN SERPL-MCNC: 11 MG/DL — SIGNIFICANT CHANGE UP (ref 7–23)
CALCIUM SERPL-MCNC: 8.5 MG/DL — SIGNIFICANT CHANGE UP (ref 8.4–10.5)
CHLORIDE SERPL-SCNC: 94 MMOL/L — LOW (ref 98–107)
CO2 SERPL-SCNC: 22 MMOL/L — SIGNIFICANT CHANGE UP (ref 22–31)
CREAT SERPL-MCNC: 0.59 MG/DL — SIGNIFICANT CHANGE UP (ref 0.5–1.3)
EGFR: 121 ML/MIN/1.73M2 — SIGNIFICANT CHANGE UP
EOSINOPHIL # BLD AUTO: 0.44 K/UL — SIGNIFICANT CHANGE UP (ref 0–0.5)
EOSINOPHIL NFR BLD AUTO: 3.9 % — SIGNIFICANT CHANGE UP (ref 0–6)
GLUCOSE SERPL-MCNC: 191 MG/DL — HIGH (ref 70–99)
HCT VFR BLD CALC: 38.8 % — LOW (ref 39–50)
HGB BLD-MCNC: 13.2 G/DL — SIGNIFICANT CHANGE UP (ref 13–17)
IANC: 8.47 K/UL — HIGH (ref 1.8–7.4)
IMM GRANULOCYTES NFR BLD AUTO: 1.1 % — HIGH (ref 0–0.9)
INR BLD: 2.17 RATIO — HIGH (ref 0.85–1.18)
LYMPHOCYTES # BLD AUTO: 0.93 K/UL — LOW (ref 1–3.3)
LYMPHOCYTES # BLD AUTO: 8.2 % — LOW (ref 13–44)
MAGNESIUM SERPL-MCNC: 1.9 MG/DL — SIGNIFICANT CHANGE UP (ref 1.6–2.6)
MCHC RBC-ENTMCNC: 29.4 PG — SIGNIFICANT CHANGE UP (ref 27–34)
MCHC RBC-ENTMCNC: 34 GM/DL — SIGNIFICANT CHANGE UP (ref 32–36)
MCV RBC AUTO: 86.4 FL — SIGNIFICANT CHANGE UP (ref 80–100)
MELD SCORE WITH DIALYSIS: 35 POINTS — SIGNIFICANT CHANGE UP
MELD SCORE WITHOUT DIALYSIS: 27 POINTS — SIGNIFICANT CHANGE UP
MONOCYTES # BLD AUTO: 1.33 K/UL — HIGH (ref 0–0.9)
MONOCYTES NFR BLD AUTO: 11.7 % — SIGNIFICANT CHANGE UP (ref 2–14)
NEUTROPHILS # BLD AUTO: 8.47 K/UL — HIGH (ref 1.8–7.4)
NEUTROPHILS NFR BLD AUTO: 74.1 % — SIGNIFICANT CHANGE UP (ref 43–77)
NRBC # BLD: 0 /100 WBCS — SIGNIFICANT CHANGE UP (ref 0–0)
NRBC # FLD: 0 K/UL — SIGNIFICANT CHANGE UP (ref 0–0)
PHOSPHATE SERPL-MCNC: 3.1 MG/DL — SIGNIFICANT CHANGE UP (ref 2.5–4.5)
PLATELET # BLD AUTO: 255 K/UL — SIGNIFICANT CHANGE UP (ref 150–400)
POTASSIUM SERPL-MCNC: 4.9 MMOL/L — SIGNIFICANT CHANGE UP (ref 3.5–5.3)
POTASSIUM SERPL-SCNC: 4.9 MMOL/L — SIGNIFICANT CHANGE UP (ref 3.5–5.3)
PROT SERPL-MCNC: 8.7 G/DL — HIGH (ref 6–8.3)
PROTHROM AB SERPL-ACNC: 24 SEC — HIGH (ref 9.5–13)
RBC # BLD: 4.49 M/UL — SIGNIFICANT CHANGE UP (ref 4.2–5.8)
RBC # FLD: 20.3 % — HIGH (ref 10.3–14.5)
SODIUM SERPL-SCNC: 127 MMOL/L — LOW (ref 135–145)
WBC # BLD: 11.41 K/UL — HIGH (ref 3.8–10.5)
WBC # FLD AUTO: 11.41 K/UL — HIGH (ref 3.8–10.5)

## 2024-09-02 PROCEDURE — 99232 SBSQ HOSP IP/OBS MODERATE 35: CPT

## 2024-09-02 RX ORDER — SPIRONOLACTONE 25 MG/1
100 TABLET, FILM COATED ORAL DAILY
Refills: 0 | Status: DISCONTINUED | OUTPATIENT
Start: 2024-09-02 | End: 2024-09-02

## 2024-09-02 RX ORDER — CARVEDILOL 6.25 MG/1
6.25 TABLET ORAL EVERY 12 HOURS
Refills: 0 | Status: DISCONTINUED | OUTPATIENT
Start: 2024-09-02 | End: 2024-09-09

## 2024-09-02 RX ORDER — LOSARTAN POTASSIUM 50 MG/1
100 TABLET ORAL DAILY
Refills: 0 | Status: DISCONTINUED | OUTPATIENT
Start: 2024-09-02 | End: 2024-09-02

## 2024-09-02 RX ORDER — AMLODIPINE BESYLATE 10 MG/1
10 TABLET ORAL DAILY
Refills: 0 | Status: DISCONTINUED | OUTPATIENT
Start: 2024-09-02 | End: 2024-09-09

## 2024-09-02 RX ADMIN — Medication 1 MILLIGRAM(S): at 13:47

## 2024-09-02 RX ADMIN — Medication 2 TABLET(S): at 21:50

## 2024-09-02 RX ADMIN — AMLODIPINE BESYLATE 10 MILLIGRAM(S): 10 TABLET ORAL at 08:24

## 2024-09-02 RX ADMIN — CARVEDILOL 3.12 MILLIGRAM(S): 6.25 TABLET ORAL at 05:37

## 2024-09-02 RX ADMIN — Medication 100 MILLIGRAM(S): at 13:47

## 2024-09-02 RX ADMIN — Medication 500 MILLIGRAM(S): at 13:47

## 2024-09-02 RX ADMIN — Medication 1 TABLET(S): at 13:47

## 2024-09-02 RX ADMIN — Medication 1: at 11:41

## 2024-09-02 RX ADMIN — Medication 100 MILLIGRAM(S): at 06:23

## 2024-09-02 RX ADMIN — Medication 1: at 17:38

## 2024-09-02 RX ADMIN — CARVEDILOL 6.25 MILLIGRAM(S): 6.25 TABLET ORAL at 17:39

## 2024-09-02 RX ADMIN — CLARITHROMYCIN 500 MILLIGRAM(S): 250 TABLET ORAL at 17:40

## 2024-09-02 RX ADMIN — Medication 40 MILLIGRAM(S): at 05:24

## 2024-09-02 RX ADMIN — POLYETHYLENE GLYCOL 3350 17 GRAM(S): 17 POWDER, FOR SOLUTION ORAL at 13:47

## 2024-09-02 RX ADMIN — Medication 500 MILLIGRAM(S): at 05:24

## 2024-09-02 RX ADMIN — Medication 40 MILLIGRAM(S): at 05:38

## 2024-09-02 RX ADMIN — CLARITHROMYCIN 500 MILLIGRAM(S): 250 TABLET ORAL at 06:23

## 2024-09-02 RX ADMIN — Medication 500 MILLIGRAM(S): at 21:50

## 2024-09-02 NOTE — PROGRESS NOTE ADULT - PROBLEM SELECTOR PLAN 2
- CT A/P w/ IV contrast 8/31/24: Interval decrease in abdominopelvic ascites from large volume to mild/moderate. Redemonstration of cirrhosis, indeterminate small hypodensities. Varices indicative of portal hypertension.  - Also with recent discharge for EAEC colitis, symptoms improving, but per pt abdomen still doesn't feel back to normal completely.   - Also during prior admissions, notes state pt was to go for outpt c-scope 8/31 to assess for IBD/microscopic colitis.   - GI cs, appreciate recs   - Monitor.

## 2024-09-02 NOTE — PROGRESS NOTE ADULT - SUBJECTIVE AND OBJECTIVE BOX
PROGRESS NOTE:     Patient is a 44y old  Male who presents with a chief complaint of abdominal pain (31 Aug 2024 18:40)    SUBJECTIVE / OVERNIGHT EVENTS: reports bloating and constipated, passing gas.     MEDICATIONS  (STANDING):  amLODIPine   Tablet 10 milliGRAM(s) Oral daily  carvedilol 3.125 milliGRAM(s) Oral every 12 hours  cefTRIAXone   IVPB 1000 milliGRAM(s) IV Intermittent every 24 hours  clarithromycin 500 milliGRAM(s) Oral two times a day  dextrose 5%. 1000 milliLiter(s) (50 mL/Hr) IV Continuous <Continuous>  dextrose 5%. 1000 milliLiter(s) (100 mL/Hr) IV Continuous <Continuous>  dextrose 50% Injectable 25 Gram(s) IV Push once  dextrose 50% Injectable 25 Gram(s) IV Push once  dextrose 50% Injectable 12.5 Gram(s) IV Push once  folic acid 1 milliGRAM(s) Oral daily  furosemide    Tablet 40 milliGRAM(s) Oral daily  glucagon  Injectable 1 milliGRAM(s) IntraMuscular once  hydrochlorothiazide 25 milliGRAM(s) Oral daily  insulin lispro (ADMELOG) corrective regimen sliding scale   SubCutaneous three times a day before meals  insulin lispro (ADMELOG) corrective regimen sliding scale   SubCutaneous at bedtime  lactobacillus acidophilus 1 Tablet(s) Oral daily  losartan 100 milliGRAM(s) Oral daily  metroNIDAZOLE    Tablet 500 milliGRAM(s) Oral three times a day  multivitamin 1 Tablet(s) Oral daily  pantoprazole    Tablet 40 milliGRAM(s) Oral before breakfast  spironolactone 100 milliGRAM(s) Oral daily  thiamine 100 milliGRAM(s) Oral daily    MEDICATIONS  (PRN):  acetaminophen     Tablet .. 650 milliGRAM(s) Oral every 6 hours PRN Temp greater or equal to 38C (100.4F), Mild Pain (1 - 3)  albuterol    90 MICROgram(s) HFA Inhaler 2 Puff(s) Inhalation every 6 hours PRN for shortness of breath and/or wheezing  aluminum hydroxide/magnesium hydroxide/simethicone Suspension 30 milliLiter(s) Oral every 4 hours PRN Dyspepsia  dextrose Oral Gel 15 Gram(s) Oral once PRN Blood Glucose LESS THAN 70 milliGRAM(s)/deciliter  LORazepam   Injectable 2 milliGRAM(s) IV Push every 1 hour PRN CIWA-Ar score 8 or greater  LORazepam   Injectable 2 milliGRAM(s) IV Push every 2 hours PRN CIWA-Ar score increase by 2 points and a total score of 7 or less  melatonin 3 milliGRAM(s) Oral at bedtime PRN Insomnia  ondansetron Injectable 4 milliGRAM(s) IV Push every 8 hours PRN Nausea and/or Vomiting    OBJECTIVE:  Vital Signs Last 24 Hrs  T(C): 36.6 (02 Sep 2024 08:18), Max: 36.9 (01 Sep 2024 20:30)  T(F): 97.9 (02 Sep 2024 08:18), Max: 98.4 (01 Sep 2024 20:30)  HR: 79 (02 Sep 2024 08:18) (79 - 94)  BP: 111/74 (02 Sep 2024 08:18) (102/66 - 143/93)  BP(mean): --  RR: 17 (02 Sep 2024 08:18) (15 - 18)  SpO2: 100% (02 Sep 2024 08:18) (100% - 100%)    Parameters below as of 02 Sep 2024 08:18  Patient On (Oxygen Delivery Method): room air        CONSTITUTIONAL: NAD  HEAD:  Atraumatic, Normocephalic  EYES: EOMI, + sclereal icterus  ENMT: No tonsillar erythema, exudates, or enlargement; Moist mucous membranes  NECK: Supple, No JVD  NERVOUS SYSTEM: AOX3, motor and sensation grossly intact in b/l UE and b/l LE  PSYCHIATRIC: Appropriate affect and mood  CHEST/LUNG: Clear to auscultation b/l    HEART: Regular rate and rhythm; No murmurs. No LE edema  ABDOMEN: Soft, Nontender, Nondistended; Bowel sounds present  EXTREMITIES:  2+ Peripheral Pulses, non cyanotic   SKIN: No rashes or lesions    LABS:                        13.2   11.41 )-----------( 255      ( 02 Sep 2024 04:22 )             38.8     09-02    127<L>  |  94<L>  |  11  ----------------------------<  191<H>  4.9   |  22  |  0.59    Ca    8.5      02 Sep 2024 04:22  Phos  3.1     09-02  Mg     1.90     09-02    TPro  8.7<H>  /  Alb  2.8<L>  /  TBili  3.4<H>  /  DBili  x   /  AST  139<H>  /  ALT  67<H>  /  AlkPhos  178<H>  09-02    PT/INR - ( 02 Sep 2024 04:22 )   PT: 24.0 sec;   INR: 2.17 ratio           Urinalysis Basic - ( 02 Sep 2024 04:22 )    Color: x / Appearance: x / SG: x / pH: x  Gluc: 191 mg/dL / Ketone: x  / Bili: x / Urobili: x   Blood: x / Protein: x / Nitrite: x   Leuk Esterase: x / RBC: x / WBC x   Sq Epi: x / Non Sq Epi: x / Bacteria: x      CAPILLARY BLOOD GLUCOSE      POCT Blood Glucose.: 183 mg/dL (02 Sep 2024 11:40)      RADIOLOGY & ADDITIONAL TESTS: Reviewed.

## 2024-09-03 LAB
ALBUMIN SERPL ELPH-MCNC: 2.8 G/DL — LOW (ref 3.3–5)
ALP SERPL-CCNC: 190 U/L — HIGH (ref 40–120)
ALT FLD-CCNC: 64 U/L — HIGH (ref 4–41)
ANION GAP SERPL CALC-SCNC: 11 MMOL/L — SIGNIFICANT CHANGE UP (ref 7–14)
ANISOCYTOSIS BLD QL: SIGNIFICANT CHANGE UP
AST SERPL-CCNC: 122 U/L — HIGH (ref 4–40)
BASOPHILS # BLD AUTO: 0.11 K/UL — SIGNIFICANT CHANGE UP (ref 0–0.2)
BASOPHILS NFR BLD AUTO: 0.9 % — SIGNIFICANT CHANGE UP (ref 0–2)
BILIRUB SERPL-MCNC: 2.9 MG/DL — HIGH (ref 0.2–1.2)
BUN SERPL-MCNC: 12 MG/DL — SIGNIFICANT CHANGE UP (ref 7–23)
CALCIUM SERPL-MCNC: 8.6 MG/DL — SIGNIFICANT CHANGE UP (ref 8.4–10.5)
CHLORIDE SERPL-SCNC: 92 MMOL/L — LOW (ref 98–107)
CO2 SERPL-SCNC: 22 MMOL/L — SIGNIFICANT CHANGE UP (ref 22–31)
CREAT SERPL-MCNC: 0.66 MG/DL — SIGNIFICANT CHANGE UP (ref 0.5–1.3)
CULTURE RESULTS: ABNORMAL
DACRYOCYTES BLD QL SMEAR: SLIGHT — SIGNIFICANT CHANGE UP
EGFR: 117 ML/MIN/1.73M2 — SIGNIFICANT CHANGE UP
EOSINOPHIL # BLD AUTO: 0.22 K/UL — SIGNIFICANT CHANGE UP (ref 0–0.5)
EOSINOPHIL NFR BLD AUTO: 1.8 % — SIGNIFICANT CHANGE UP (ref 0–6)
GIANT PLATELETS BLD QL SMEAR: PRESENT — SIGNIFICANT CHANGE UP
GLUCOSE SERPL-MCNC: 220 MG/DL — HIGH (ref 70–99)
HCT VFR BLD CALC: 38.3 % — LOW (ref 39–50)
HGB BLD-MCNC: 13.4 G/DL — SIGNIFICANT CHANGE UP (ref 13–17)
IANC: 8.68 K/UL — HIGH (ref 1.8–7.4)
LYMPHOCYTES # BLD AUTO: 0.53 K/UL — LOW (ref 1–3.3)
LYMPHOCYTES # BLD AUTO: 4.4 % — LOW (ref 13–44)
MACROCYTES BLD QL: SLIGHT — SIGNIFICANT CHANGE UP
MAGNESIUM SERPL-MCNC: 1.8 MG/DL — SIGNIFICANT CHANGE UP (ref 1.6–2.6)
MANUAL SMEAR VERIFICATION: SIGNIFICANT CHANGE UP
MCHC RBC-ENTMCNC: 29.5 PG — SIGNIFICANT CHANGE UP (ref 27–34)
MCHC RBC-ENTMCNC: 35 GM/DL — SIGNIFICANT CHANGE UP (ref 32–36)
MCV RBC AUTO: 84.2 FL — SIGNIFICANT CHANGE UP (ref 80–100)
METAMYELOCYTES # FLD: 0.9 % — SIGNIFICANT CHANGE UP (ref 0–1)
MONOCYTES # BLD AUTO: 1.27 K/UL — HIGH (ref 0–0.9)
MONOCYTES NFR BLD AUTO: 10.6 % — SIGNIFICANT CHANGE UP (ref 2–14)
NEUTROPHILS # BLD AUTO: 9.53 K/UL — HIGH (ref 1.8–7.4)
NEUTROPHILS NFR BLD AUTO: 78.7 % — HIGH (ref 43–77)
NEUTS BAND # BLD: 0.9 % — SIGNIFICANT CHANGE UP (ref 0–6)
PHOSPHATE SERPL-MCNC: 3.9 MG/DL — SIGNIFICANT CHANGE UP (ref 2.5–4.5)
PLAT MORPH BLD: ABNORMAL
PLATELET # BLD AUTO: 258 K/UL — SIGNIFICANT CHANGE UP (ref 150–400)
PLATELET COUNT - ESTIMATE: NORMAL — SIGNIFICANT CHANGE UP
POIKILOCYTOSIS BLD QL AUTO: SIGNIFICANT CHANGE UP
POLYCHROMASIA BLD QL SMEAR: SLIGHT — SIGNIFICANT CHANGE UP
POTASSIUM SERPL-MCNC: 5 MMOL/L — SIGNIFICANT CHANGE UP (ref 3.5–5.3)
POTASSIUM SERPL-SCNC: 5 MMOL/L — SIGNIFICANT CHANGE UP (ref 3.5–5.3)
PROT SERPL-MCNC: 8.6 G/DL — HIGH (ref 6–8.3)
RBC # BLD: 4.55 M/UL — SIGNIFICANT CHANGE UP (ref 4.2–5.8)
RBC # FLD: 19.9 % — HIGH (ref 10.3–14.5)
RBC BLD AUTO: ABNORMAL
SODIUM SERPL-SCNC: 125 MMOL/L — LOW (ref 135–145)
SPECIMEN SOURCE: SIGNIFICANT CHANGE UP
TARGETS BLD QL SMEAR: SIGNIFICANT CHANGE UP
VARIANT LYMPHS # BLD: 1.8 % — SIGNIFICANT CHANGE UP (ref 0–6)
WBC # BLD: 11.97 K/UL — HIGH (ref 3.8–10.5)
WBC # FLD AUTO: 11.97 K/UL — HIGH (ref 3.8–10.5)

## 2024-09-03 PROCEDURE — 76705 ECHO EXAM OF ABDOMEN: CPT | Mod: 26

## 2024-09-03 PROCEDURE — 99232 SBSQ HOSP IP/OBS MODERATE 35: CPT

## 2024-09-03 PROCEDURE — 99232 SBSQ HOSP IP/OBS MODERATE 35: CPT | Mod: GC

## 2024-09-03 RX ADMIN — CLARITHROMYCIN 500 MILLIGRAM(S): 250 TABLET ORAL at 06:07

## 2024-09-03 RX ADMIN — Medication 40 MILLIGRAM(S): at 08:39

## 2024-09-03 RX ADMIN — Medication 1 TABLET(S): at 12:31

## 2024-09-03 RX ADMIN — Medication 500 MILLIGRAM(S): at 13:49

## 2024-09-03 RX ADMIN — Medication 1: at 17:01

## 2024-09-03 RX ADMIN — Medication 1 TABLET(S): at 12:30

## 2024-09-03 RX ADMIN — CARVEDILOL 6.25 MILLIGRAM(S): 6.25 TABLET ORAL at 17:01

## 2024-09-03 RX ADMIN — ACETAMINOPHEN 650 MILLIGRAM(S): 325 TABLET ORAL at 18:36

## 2024-09-03 RX ADMIN — Medication 2 TABLET(S): at 21:47

## 2024-09-03 RX ADMIN — Medication 2: at 08:02

## 2024-09-03 RX ADMIN — AMLODIPINE BESYLATE 10 MILLIGRAM(S): 10 TABLET ORAL at 06:06

## 2024-09-03 RX ADMIN — Medication 1: at 12:41

## 2024-09-03 RX ADMIN — Medication 500 MILLIGRAM(S): at 06:08

## 2024-09-03 RX ADMIN — CARVEDILOL 6.25 MILLIGRAM(S): 6.25 TABLET ORAL at 06:07

## 2024-09-03 RX ADMIN — POLYETHYLENE GLYCOL 3350 17 GRAM(S): 17 POWDER, FOR SOLUTION ORAL at 12:30

## 2024-09-03 RX ADMIN — Medication 30 MILLILITER(S): at 18:37

## 2024-09-03 RX ADMIN — Medication 40 MILLIGRAM(S): at 06:07

## 2024-09-03 RX ADMIN — Medication 100 MILLIGRAM(S): at 12:30

## 2024-09-03 RX ADMIN — Medication 1 MILLIGRAM(S): at 12:31

## 2024-09-03 RX ADMIN — Medication 100 MILLIGRAM(S): at 06:08

## 2024-09-03 RX ADMIN — CLARITHROMYCIN 500 MILLIGRAM(S): 250 TABLET ORAL at 17:01

## 2024-09-03 RX ADMIN — Medication 500 MILLIGRAM(S): at 21:48

## 2024-09-03 NOTE — PROGRESS NOTE ADULT - PROBLEM SELECTOR PLAN 1
- Presented with lower abdominal pain.   - UA pos for UTI   - Ucx with group B strep   - ceftriaxone 1 g IV qd till 9/4 to complete X 5 days

## 2024-09-03 NOTE — PROGRESS NOTE ADULT - ATTENDING COMMENTS
44M with AUD and cirrhosis admitted for UTi and hyponatremia  Appears euvolemic at this time  Agree with holding diuretics for now  Water restriction  Nodules on imaging noted likely regenerative in nature - agree with outpatient multiphase MRI  Should follow close with Dr. rosales    Discussed natural hx of cirrhosis with patient and importance of ETOH cessation  New insurance starting 10/1 needs clarification

## 2024-09-03 NOTE — PROGRESS NOTE ADULT - PROBLEM SELECTOR PLAN 2
- CT A/P w/ IV contrast 8/31/24: Interval decrease in abdominopelvic ascites from large volume to mild/moderate. Redemonstration of cirrhosis, indeterminate small hypodensities. Varices indicative of portal hypertension.  - Also with recent discharge for EAEC colitis.   - Also during prior admissions, notes state pt was to go for outpt c-scope 8/31 to assess for IBD/microscopic colitis.   - Hepatology recommending paracentesis - CT A/P w/ IV contrast 8/31/24: Interval decrease in abdominopelvic ascites from large volume to mild/moderate. Redemonstration of cirrhosis, indeterminate small hypodensities. Varices indicative of portal hypertension.  - Also with recent discharge for EAEC colitis.   - Also during prior admissions, notes state pt was to go for outpt c-scope 8/31 to assess for IBD/microscopic colitis.   - US eval ascites   - Hepatology recommending paracentesis

## 2024-09-03 NOTE — PROGRESS NOTE ADULT - SUBJECTIVE AND OBJECTIVE BOX
Interval Events:   no acute events  patient feels much better and back to baseline without abdominal pain  exam unchanged  Na down to 125    Hospital Medications:  acetaminophen     Tablet .. 650 milliGRAM(s) Oral every 6 hours PRN  albuterol    90 MICROgram(s) HFA Inhaler 2 Puff(s) Inhalation every 6 hours PRN  aluminum hydroxide/magnesium hydroxide/simethicone Suspension 30 milliLiter(s) Oral every 4 hours PRN  amLODIPine   Tablet 10 milliGRAM(s) Oral daily  bisacodyl Suppository 10 milliGRAM(s) Rectal daily PRN  bisacodyl Suppository 10 milliGRAM(s) Rectal once  carvedilol 6.25 milliGRAM(s) Oral every 12 hours  cefTRIAXone   IVPB 1000 milliGRAM(s) IV Intermittent every 24 hours  clarithromycin 500 milliGRAM(s) Oral two times a day  dextrose 5%. 1000 milliLiter(s) IV Continuous <Continuous>  dextrose 5%. 1000 milliLiter(s) IV Continuous <Continuous>  dextrose 50% Injectable 25 Gram(s) IV Push once  dextrose 50% Injectable 12.5 Gram(s) IV Push once  dextrose 50% Injectable 25 Gram(s) IV Push once  dextrose Oral Gel 15 Gram(s) Oral once PRN  folic acid 1 milliGRAM(s) Oral daily  furosemide    Tablet 40 milliGRAM(s) Oral daily  glucagon  Injectable 1 milliGRAM(s) IntraMuscular once  hydrochlorothiazide 25 milliGRAM(s) Oral daily  insulin lispro (ADMELOG) corrective regimen sliding scale   SubCutaneous three times a day before meals  insulin lispro (ADMELOG) corrective regimen sliding scale   SubCutaneous at bedtime  lactobacillus acidophilus 1 Tablet(s) Oral daily  LORazepam   Injectable 2 milliGRAM(s) IV Push every 1 hour PRN  LORazepam   Injectable 2 milliGRAM(s) IV Push every 2 hours PRN  melatonin 3 milliGRAM(s) Oral at bedtime PRN  metroNIDAZOLE    Tablet 500 milliGRAM(s) Oral three times a day  multivitamin 1 Tablet(s) Oral daily  ondansetron Injectable 4 milliGRAM(s) IV Push every 8 hours PRN  pantoprazole    Tablet 40 milliGRAM(s) Oral before breakfast  polyethylene glycol 3350 17 Gram(s) Oral daily  senna 2 Tablet(s) Oral at bedtime  thiamine 100 milliGRAM(s) Oral daily    ROS: See above.    PHYSICAL EXAM:   Vital Signs:  Vital Signs Last 24 Hrs  T(C): 36.8 (03 Sep 2024 08:30), Max: 36.8 (03 Sep 2024 08:30)  T(F): 98.3 (03 Sep 2024 08:30), Max: 98.3 (03 Sep 2024 08:30)  HR: 77 (03 Sep 2024 08:30) (62 - 82)  BP: 108/70 (03 Sep 2024 08:30) (108/70 - 124/75)  BP(mean): --  RR: 18 (03 Sep 2024 08:30) (16 - 18)  SpO2: 100% (03 Sep 2024 08:30) (99% - 100%)    Parameters below as of 03 Sep 2024 08:30  Patient On (Oxygen Delivery Method): room air    GENERAL:  NAD, resting comfortably in bed  HEENT:  sclera anicteric  RESPIRATORY:  Normal Effort  CARDIAC:  HDS  ABDOMEN:  Soft, non-tender, non-distended  SKIN:  Warm & Dry. No jaundice  NEURO:  Alert, conversant, no focal deficit, AOx3, no asterixis    LABS:                        13.4   11.97 )-----------( 258      ( 03 Sep 2024 03:40 )             38.3     Mean Cell Volume: 84.2 fL (09-03-24 @ 03:40)    09-03    125<L>  |  92<L>  |  12  ----------------------------<  220<H>  5.0   |  22  |  0.66    Ca    8.6      03 Sep 2024 03:40  Phos  3.9     09-03  Mg     1.80     09-03    TPro  8.6<H>  /  Alb  2.8<L>  /  TBili  2.9<H>  /  DBili  x   /  AST  122<H>  /  ALT  64<H>  /  AlkPhos  190<H>  09-03    LIVER FUNCTIONS - ( 03 Sep 2024 03:40 )  Alb: 2.8 g/dL / Pro: 8.6 g/dL / ALK PHOS: 190 U/L / ALT: 64 U/L / AST: 122 U/L / GGT: x           PT/INR - ( 02 Sep 2024 04:22 )   PT: 24.0 sec;   INR: 2.17 ratio

## 2024-09-03 NOTE — PROGRESS NOTE ADULT - SUBJECTIVE AND OBJECTIVE BOX
[facility] Division of Hospital Medicine  Alessandra Villalta MD  Pager (K-F, 9A-4B): [phone]  Other Times:  [phone]    Patient is a 44y old  Male who presents with a chief complaint of abdominal pain (03 Sep 2024 09:55)      SUBJECTIVE / OVERNIGHT EVENTS: reports no abdominal pain      MEDICATIONS  (STANDING):  amLODIPine   Tablet 10 milliGRAM(s) Oral daily  bisacodyl Suppository 10 milliGRAM(s) Rectal once  carvedilol 6.25 milliGRAM(s) Oral every 12 hours  cefTRIAXone   IVPB 1000 milliGRAM(s) IV Intermittent every 24 hours  clarithromycin 500 milliGRAM(s) Oral two times a day  dextrose 5%. 1000 milliLiter(s) (100 mL/Hr) IV Continuous <Continuous>  dextrose 5%. 1000 milliLiter(s) (50 mL/Hr) IV Continuous <Continuous>  dextrose 50% Injectable 12.5 Gram(s) IV Push once  dextrose 50% Injectable 25 Gram(s) IV Push once  dextrose 50% Injectable 25 Gram(s) IV Push once  folic acid 1 milliGRAM(s) Oral daily  furosemide    Tablet 40 milliGRAM(s) Oral daily  glucagon  Injectable 1 milliGRAM(s) IntraMuscular once  hydrochlorothiazide 25 milliGRAM(s) Oral daily  insulin lispro (ADMELOG) corrective regimen sliding scale   SubCutaneous three times a day before meals  insulin lispro (ADMELOG) corrective regimen sliding scale   SubCutaneous at bedtime  lactobacillus acidophilus 1 Tablet(s) Oral daily  metroNIDAZOLE    Tablet 500 milliGRAM(s) Oral three times a day  multivitamin 1 Tablet(s) Oral daily  pantoprazole    Tablet 40 milliGRAM(s) Oral before breakfast  polyethylene glycol 3350 17 Gram(s) Oral daily  senna 2 Tablet(s) Oral at bedtime  thiamine 100 milliGRAM(s) Oral daily    MEDICATIONS  (PRN):  acetaminophen     Tablet .. 650 milliGRAM(s) Oral every 6 hours PRN Temp greater or equal to 38C (100.4F), Mild Pain (1 - 3)  albuterol    90 MICROgram(s) HFA Inhaler 2 Puff(s) Inhalation every 6 hours PRN for shortness of breath and/or wheezing  aluminum hydroxide/magnesium hydroxide/simethicone Suspension 30 milliLiter(s) Oral every 4 hours PRN Dyspepsia  bisacodyl Suppository 10 milliGRAM(s) Rectal daily PRN Constipation  dextrose Oral Gel 15 Gram(s) Oral once PRN Blood Glucose LESS THAN 70 milliGRAM(s)/deciliter  LORazepam   Injectable 2 milliGRAM(s) IV Push every 1 hour PRN CIWA-Ar score 8 or greater  LORazepam   Injectable 2 milliGRAM(s) IV Push every 2 hours PRN CIWA-Ar score increase by 2 points and a total score of 7 or less  melatonin 3 milliGRAM(s) Oral at bedtime PRN Insomnia  ondansetron Injectable 4 milliGRAM(s) IV Push every 8 hours PRN Nausea and/or Vomiting      CAPILLARY BLOOD GLUCOSE      POCT Blood Glucose.: 218 mg/dL (03 Sep 2024 08:01)  POCT Blood Glucose.: 156 mg/dL (03 Sep 2024 06:57)  POCT Blood Glucose.: 207 mg/dL (02 Sep 2024 22:29)  POCT Blood Glucose.: 169 mg/dL (02 Sep 2024 16:49)  POCT Blood Glucose.: 183 mg/dL (02 Sep 2024 11:40)    I&O's Summary    03 Sep 2024 07:01  -  03 Sep 2024 10:41  --------------------------------------------------------  IN: 180 mL / OUT: 0 mL / NET: 180 mL        PHYSICAL EXAM:  Vital Signs Last 24 Hrs  T(C): 36.8 (03 Sep 2024 08:30), Max: 36.8 (03 Sep 2024 08:30)  T(F): 98.3 (03 Sep 2024 08:30), Max: 98.3 (03 Sep 2024 08:30)  HR: 77 (03 Sep 2024 08:30) (62 - 82)  BP: 108/70 (03 Sep 2024 08:30) (108/70 - 124/75)  BP(mean): --  RR: 18 (03 Sep 2024 08:30) (16 - 18)  SpO2: 100% (03 Sep 2024 08:30) (99% - 100%)    Parameters below as of 03 Sep 2024 08:30  Patient On (Oxygen Delivery Method): room air      CONSTITUTIONAL: NAD  EYES: PERRLA; conjunctiva and sclera clear  ENMT: Moist oral mucosa, no pharyngeal injection or exudates; normal dentition  NECK: Supple, no palpable masses; no thyromegaly  RESPIRATORY: Normal respiratory effort; lungs are clear to auscultation bilaterally  CARDIOVASCULAR: Regular rate and rhythm, normal S1 and S2, no murmur/rub/gallop; No lower extremity edema; Peripheral pulses are 2+ bilaterally  ABDOMEN: Nontender to palpation, normoactive bowel sounds, no rebound/guarding; No hepatosplenomegaly  MUSCULOSKELETAL:  Normal gait; no clubbing or cyanosis of digits; no joint swelling or tenderness to palpation  PSYCH: A+O to person, place, and time; affect appropriate  NEUROLOGY: CN 2-12 are intact and symmetric; no gross sensory deficits   SKIN: No rashes; no palpable lesions    LABS:                        13.4   11.97 )-----------( 258      ( 03 Sep 2024 03:40 )             38.3     09-03    125<L>  |  92<L>  |  12  ----------------------------<  220<H>  5.0   |  22  |  0.66    Ca    8.6      03 Sep 2024 03:40  Phos  3.9     09-03  Mg     1.80     09-03    TPro  8.6<H>  /  Alb  2.8<L>  /  TBili  2.9<H>  /  DBili  x   /  AST  122<H>  /  ALT  64<H>  /  AlkPhos  190<H>  09-03    PT/INR - ( 02 Sep 2024 04:22 )   PT: 24.0 sec;   INR: 2.17 ratio               Urinalysis Basic - ( 03 Sep 2024 03:40 )    Color: x / Appearance: x / SG: x / pH: x  Gluc: 220 mg/dL / Ketone: x  / Bili: x / Urobili: x   Blood: x / Protein: x / Nitrite: x   Leuk Esterase: x / RBC: x / WBC x   Sq Epi: x / Non Sq Epi: x / Bacteria: x          RADIOLOGY & ADDITIONAL TESTS:  Results Reviewed:   Imaging Personally Reviewed:  Electrocardiogram Personally Reviewed:    COORDINATION OF CARE:  Care Discussed with Consultants/Other Providers [Y/N]:  Prior or Outpatient Records Reviewed [Y/N]:   [facility] of Hospital Medicine  Alessandra Villalta MD  Pager (B-F, 3X-4V): [phone]  Other Times:  [phone]    Patient is a 44y old  Male who presents with a chief complaint of abdominal pain (03 Sep 2024 09:55)      SUBJECTIVE / OVERNIGHT EVENTS: reports no abdominal pain; eating breakfast      MEDICATIONS  (STANDING):  amLODIPine   Tablet 10 milliGRAM(s) Oral daily  bisacodyl Suppository 10 milliGRAM(s) Rectal once  carvedilol 6.25 milliGRAM(s) Oral every 12 hours  cefTRIAXone   IVPB 1000 milliGRAM(s) IV Intermittent every 24 hours  clarithromycin 500 milliGRAM(s) Oral two times a day  dextrose 5%. 1000 milliLiter(s) (100 mL/Hr) IV Continuous <Continuous>  dextrose 5%. 1000 milliLiter(s) (50 mL/Hr) IV Continuous <Continuous>  dextrose 50% Injectable 12.5 Gram(s) IV Push once  dextrose 50% Injectable 25 Gram(s) IV Push once  dextrose 50% Injectable 25 Gram(s) IV Push once  folic acid 1 milliGRAM(s) Oral daily  furosemide    Tablet 40 milliGRAM(s) Oral daily  glucagon  Injectable 1 milliGRAM(s) IntraMuscular once  hydrochlorothiazide 25 milliGRAM(s) Oral daily  insulin lispro (ADMELOG) corrective regimen sliding scale   SubCutaneous three times a day before meals  insulin lispro (ADMELOG) corrective regimen sliding scale   SubCutaneous at bedtime  lactobacillus acidophilus 1 Tablet(s) Oral daily  metroNIDAZOLE    Tablet 500 milliGRAM(s) Oral three times a day  multivitamin 1 Tablet(s) Oral daily  pantoprazole    Tablet 40 milliGRAM(s) Oral before breakfast  polyethylene glycol 3350 17 Gram(s) Oral daily  senna 2 Tablet(s) Oral at bedtime  thiamine 100 milliGRAM(s) Oral daily    MEDICATIONS  (PRN):  acetaminophen     Tablet .. 650 milliGRAM(s) Oral every 6 hours PRN Temp greater or equal to 38C (100.4F), Mild Pain (1 - 3)  albuterol    90 MICROgram(s) HFA Inhaler 2 Puff(s) Inhalation every 6 hours PRN for shortness of breath and/or wheezing  aluminum hydroxide/magnesium hydroxide/simethicone Suspension 30 milliLiter(s) Oral every 4 hours PRN Dyspepsia  bisacodyl Suppository 10 milliGRAM(s) Rectal daily PRN Constipation  dextrose Oral Gel 15 Gram(s) Oral once PRN Blood Glucose LESS THAN 70 milliGRAM(s)/deciliter  LORazepam   Injectable 2 milliGRAM(s) IV Push every 1 hour PRN CIWA-Ar score 8 or greater  LORazepam   Injectable 2 milliGRAM(s) IV Push every 2 hours PRN CIWA-Ar score increase by 2 points and a total score of 7 or less  melatonin 3 milliGRAM(s) Oral at bedtime PRN Insomnia  ondansetron Injectable 4 milliGRAM(s) IV Push every 8 hours PRN Nausea and/or Vomiting      CAPILLARY BLOOD GLUCOSE      POCT Blood Glucose.: 218 mg/dL (03 Sep 2024 08:01)  POCT Blood Glucose.: 156 mg/dL (03 Sep 2024 06:57)  POCT Blood Glucose.: 207 mg/dL (02 Sep 2024 22:29)  POCT Blood Glucose.: 169 mg/dL (02 Sep 2024 16:49)  POCT Blood Glucose.: 183 mg/dL (02 Sep 2024 11:40)    I&O's Summary    03 Sep 2024 07:01  -  03 Sep 2024 10:41  --------------------------------------------------------  IN: 180 mL / OUT: 0 mL / NET: 180 mL        PHYSICAL EXAM:  Vital Signs Last 24 Hrs  T(C): 36.8 (03 Sep 2024 08:30), Max: 36.8 (03 Sep 2024 08:30)  T(F): 98.3 (03 Sep 2024 08:30), Max: 98.3 (03 Sep 2024 08:30)  HR: 77 (03 Sep 2024 08:30) (62 - 82)  BP: 108/70 (03 Sep 2024 08:30) (108/70 - 124/75)  BP(mean): --  RR: 18 (03 Sep 2024 08:30) (16 - 18)  SpO2: 100% (03 Sep 2024 08:30) (99% - 100%)    Parameters below as of 03 Sep 2024 08:30  Patient On (Oxygen Delivery Method): room air      CONSTITUTIONAL: NAD  EYES: scleral icterus   NECK: Supple,   RESPIRATORY: Normal respiratory effort; lungs are clear to auscultation bilaterally  CARDIOVASCULAR: Regular rate and rhythm, normal S1 and S2, no murmur/rub/gallop; No lower extremity edema;  ABDOMEN: Nontender to palpation, normoactive bowel sounds, no rebound/guarding; No hepatosplenomegaly  PSYCH: A+O to person, place, and time; affect appropriate  NEUROLOGY: CN 2-12 are intact and symmetric; no gross sensory deficits   SKIN: No rashes; no palpable lesions    LABS:                        13.4   11.97 )-----------( 258      ( 03 Sep 2024 03:40 )             38.3     09-03    125<L>  |  92<L>  |  12  ----------------------------<  220<H>  5.0   |  22  |  0.66    Ca    8.6      03 Sep 2024 03:40  Phos  3.9     09-03  Mg     1.80     09-03    TPro  8.6<H>  /  Alb  2.8<L>  /  TBili  2.9<H>  /  DBili  x   /  AST  122<H>  /  ALT  64<H>  /  AlkPhos  190<H>  09-03    PT/INR - ( 02 Sep 2024 04:22 )   PT: 24.0 sec;   INR: 2.17 ratio               Urinalysis Basic - ( 03 Sep 2024 03:40 )    Color: x / Appearance: x / SG: x / pH: x  Gluc: 220 mg/dL / Ketone: x  / Bili: x / Urobili: x   Blood: x / Protein: x / Nitrite: x   Leuk Esterase: x / RBC: x / WBC x   Sq Epi: x / Non Sq Epi: x / Bacteria: x          RADIOLOGY & ADDITIONAL TESTS:  Results Reviewed:   Imaging Personally Reviewed:  Electrocardiogram Personally Reviewed:    COORDINATION OF CARE:  Care Discussed with Consultants/Other Providers [Y/N]:  Prior or Outpatient Records Reviewed [Y/N]:

## 2024-09-04 DIAGNOSIS — E87.1 HYPO-OSMOLALITY AND HYPONATREMIA: ICD-10-CM

## 2024-09-04 LAB
ALBUMIN SERPL ELPH-MCNC: 2.9 G/DL — LOW (ref 3.3–5)
ALP SERPL-CCNC: 187 U/L — HIGH (ref 40–120)
ALT FLD-CCNC: 70 U/L — HIGH (ref 4–41)
ANION GAP SERPL CALC-SCNC: 11 MMOL/L — SIGNIFICANT CHANGE UP (ref 7–14)
AST SERPL-CCNC: 122 U/L — HIGH (ref 4–40)
BILIRUB DIRECT SERPL-MCNC: 1.9 MG/DL — HIGH (ref 0–0.3)
BILIRUB INDIRECT FLD-MCNC: 1.1 MG/DL — HIGH (ref 0–1)
BILIRUB SERPL-MCNC: 3 MG/DL — HIGH (ref 0.2–1.2)
BUN SERPL-MCNC: 15 MG/DL — SIGNIFICANT CHANGE UP (ref 7–23)
CALCIUM SERPL-MCNC: 7.9 MG/DL — LOW (ref 8.4–10.5)
CHLORIDE SERPL-SCNC: 92 MMOL/L — LOW (ref 98–107)
CO2 SERPL-SCNC: 22 MMOL/L — SIGNIFICANT CHANGE UP (ref 22–31)
CREAT SERPL-MCNC: 0.76 MG/DL — SIGNIFICANT CHANGE UP (ref 0.5–1.3)
EGFR: 112 ML/MIN/1.73M2 — SIGNIFICANT CHANGE UP
GLUCOSE SERPL-MCNC: 163 MG/DL — HIGH (ref 70–99)
HCT VFR BLD CALC: 37.8 % — LOW (ref 39–50)
HGB BLD-MCNC: 13.4 G/DL — SIGNIFICANT CHANGE UP (ref 13–17)
INR BLD: 2.42 RATIO — HIGH (ref 0.85–1.18)
MAGNESIUM SERPL-MCNC: 2 MG/DL — SIGNIFICANT CHANGE UP (ref 1.6–2.6)
MCHC RBC-ENTMCNC: 29.5 PG — SIGNIFICANT CHANGE UP (ref 27–34)
MCHC RBC-ENTMCNC: 35.4 GM/DL — SIGNIFICANT CHANGE UP (ref 32–36)
MCV RBC AUTO: 83.1 FL — SIGNIFICANT CHANGE UP (ref 80–100)
NRBC # BLD: 0 /100 WBCS — SIGNIFICANT CHANGE UP (ref 0–0)
NRBC # FLD: 0 K/UL — SIGNIFICANT CHANGE UP (ref 0–0)
PHOSPHATE SERPL-MCNC: 4 MG/DL — SIGNIFICANT CHANGE UP (ref 2.5–4.5)
PLATELET # BLD AUTO: 287 K/UL — SIGNIFICANT CHANGE UP (ref 150–400)
POTASSIUM SERPL-MCNC: 4.8 MMOL/L — SIGNIFICANT CHANGE UP (ref 3.5–5.3)
POTASSIUM SERPL-SCNC: 4.8 MMOL/L — SIGNIFICANT CHANGE UP (ref 3.5–5.3)
PROT SERPL-MCNC: 9.1 G/DL — HIGH (ref 6–8.3)
PROTHROM AB SERPL-ACNC: 26.7 SEC — HIGH (ref 9.5–13)
RBC # BLD: 4.55 M/UL — SIGNIFICANT CHANGE UP (ref 4.2–5.8)
RBC # FLD: 19.4 % — HIGH (ref 10.3–14.5)
SODIUM SERPL-SCNC: 125 MMOL/L — LOW (ref 135–145)
WBC # BLD: 12.34 K/UL — HIGH (ref 3.8–10.5)
WBC # FLD AUTO: 12.34 K/UL — HIGH (ref 3.8–10.5)

## 2024-09-04 PROCEDURE — 99233 SBSQ HOSP IP/OBS HIGH 50: CPT

## 2024-09-04 RX ORDER — ALBUMIN (HUMAN) 5 G/20ML
50 SOLUTION INTRAVENOUS ONCE
Refills: 0 | Status: COMPLETED | OUTPATIENT
Start: 2024-09-04 | End: 2024-09-04

## 2024-09-04 RX ORDER — NYSTATIN 100000/G
1 CREAM (GRAM) TOPICAL
Refills: 0 | Status: DISCONTINUED | OUTPATIENT
Start: 2024-09-04 | End: 2024-09-09

## 2024-09-04 RX ORDER — LIDOCAINE/BENZALKONIUM/ALCOHOL
1 SOLUTION, NON-ORAL TOPICAL DAILY
Refills: 0 | Status: DISCONTINUED | OUTPATIENT
Start: 2024-09-04 | End: 2024-09-09

## 2024-09-04 RX ORDER — NYSTATIN 100000/G
1 CREAM (GRAM) TOPICAL
Refills: 0 | Status: DISCONTINUED | OUTPATIENT
Start: 2024-09-04 | End: 2024-09-04

## 2024-09-04 RX ORDER — INSULIN GLARGINE 100 [IU]/ML
4 INJECTION, SOLUTION SUBCUTANEOUS AT BEDTIME
Refills: 0 | Status: DISCONTINUED | OUTPATIENT
Start: 2024-09-04 | End: 2024-09-06

## 2024-09-04 RX ADMIN — Medication 1 PATCH: at 19:45

## 2024-09-04 RX ADMIN — Medication 1 MILLIGRAM(S): at 11:08

## 2024-09-04 RX ADMIN — CLARITHROMYCIN 500 MILLIGRAM(S): 250 TABLET ORAL at 05:07

## 2024-09-04 RX ADMIN — Medication 500 MILLIGRAM(S): at 11:09

## 2024-09-04 RX ADMIN — Medication 40 MILLIGRAM(S): at 05:08

## 2024-09-04 RX ADMIN — Medication 1: at 17:19

## 2024-09-04 RX ADMIN — Medication 1: at 21:53

## 2024-09-04 RX ADMIN — Medication 1 PATCH: at 07:34

## 2024-09-04 RX ADMIN — Medication 1 PATCH: at 01:59

## 2024-09-04 RX ADMIN — Medication 1 PATCH: at 23:00

## 2024-09-04 RX ADMIN — Medication 1 APPLICATION(S): at 17:19

## 2024-09-04 RX ADMIN — Medication 2 TABLET(S): at 21:51

## 2024-09-04 RX ADMIN — Medication 1: at 08:00

## 2024-09-04 RX ADMIN — Medication 40 MILLIGRAM(S): at 05:06

## 2024-09-04 RX ADMIN — Medication 30 MILLILITER(S): at 11:13

## 2024-09-04 RX ADMIN — ALBUMIN (HUMAN) 50 MILLILITER(S): 5 SOLUTION INTRAVENOUS at 17:31

## 2024-09-04 RX ADMIN — Medication 1 PATCH: at 12:16

## 2024-09-04 RX ADMIN — Medication 100 MILLIGRAM(S): at 11:08

## 2024-09-04 RX ADMIN — Medication 1 TABLET(S): at 11:09

## 2024-09-04 RX ADMIN — Medication 3: at 11:37

## 2024-09-04 RX ADMIN — Medication 100 MILLIGRAM(S): at 07:06

## 2024-09-04 RX ADMIN — Medication 500 MILLIGRAM(S): at 05:06

## 2024-09-04 RX ADMIN — CARVEDILOL 6.25 MILLIGRAM(S): 6.25 TABLET ORAL at 17:20

## 2024-09-04 RX ADMIN — CARVEDILOL 6.25 MILLIGRAM(S): 6.25 TABLET ORAL at 05:07

## 2024-09-04 RX ADMIN — Medication 2 PUFF(S): at 11:13

## 2024-09-04 RX ADMIN — AMLODIPINE BESYLATE 10 MILLIGRAM(S): 10 TABLET ORAL at 05:07

## 2024-09-04 RX ADMIN — INSULIN GLARGINE 4 UNIT(S): 100 INJECTION, SOLUTION SUBCUTANEOUS at 21:54

## 2024-09-04 RX ADMIN — Medication 1 TABLET(S): at 11:08

## 2024-09-04 RX ADMIN — Medication 1 PATCH: at 11:07

## 2024-09-04 NOTE — PROGRESS NOTE ADULT - SUBJECTIVE AND OBJECTIVE BOX
Hepatology Progress Note    Interval Events:   -Pt overall feeling well with no ongoing nausea vomiting or abd pain   -Pt unable to get tap given limited fluid pocket     Allergies:  amoxicillin (Angioedema)      Hospital Medications:  acetaminophen     Tablet .. 650 milliGRAM(s) Oral every 6 hours PRN  albuterol    90 MICROgram(s) HFA Inhaler 2 Puff(s) Inhalation every 6 hours PRN  aluminum hydroxide/magnesium hydroxide/simethicone Suspension 30 milliLiter(s) Oral every 4 hours PRN  amLODIPine   Tablet 10 milliGRAM(s) Oral daily  bisacodyl Suppository 10 milliGRAM(s) Rectal daily PRN  bisacodyl Suppository 10 milliGRAM(s) Rectal once  carvedilol 6.25 milliGRAM(s) Oral every 12 hours  clarithromycin 500 milliGRAM(s) Oral two times a day  dextrose 5%. 1000 milliLiter(s) IV Continuous <Continuous>  dextrose 5%. 1000 milliLiter(s) IV Continuous <Continuous>  dextrose 50% Injectable 25 Gram(s) IV Push once  dextrose 50% Injectable 12.5 Gram(s) IV Push once  dextrose 50% Injectable 25 Gram(s) IV Push once  dextrose Oral Gel 15 Gram(s) Oral once PRN  folic acid 1 milliGRAM(s) Oral daily  furosemide    Tablet 40 milliGRAM(s) Oral daily  glucagon  Injectable 1 milliGRAM(s) IntraMuscular once  hydrochlorothiazide 25 milliGRAM(s) Oral daily  insulin lispro (ADMELOG) corrective regimen sliding scale   SubCutaneous three times a day before meals  insulin lispro (ADMELOG) corrective regimen sliding scale   SubCutaneous at bedtime  lactobacillus acidophilus 1 Tablet(s) Oral daily  lidocaine   4% Patch 1 Patch Transdermal daily  LORazepam   Injectable 2 milliGRAM(s) IV Push every 2 hours PRN  LORazepam   Injectable 2 milliGRAM(s) IV Push every 1 hour PRN  melatonin 3 milliGRAM(s) Oral at bedtime PRN  metroNIDAZOLE    Tablet 500 milliGRAM(s) Oral three times a day  multivitamin 1 Tablet(s) Oral daily  ondansetron Injectable 4 milliGRAM(s) IV Push every 8 hours PRN  pantoprazole    Tablet 40 milliGRAM(s) Oral before breakfast  polyethylene glycol 3350 17 Gram(s) Oral daily  senna 2 Tablet(s) Oral at bedtime  thiamine 100 milliGRAM(s) Oral daily      ROS: 14 point ROS negative unless otherwise state in subjective    PHYSICAL EXAM:   Vital Signs:  Vital Signs Last 24 Hrs  T(C): 36.4 (04 Sep 2024 05:00), Max: 36.9 (04 Sep 2024 00:00)  T(F): 97.6 (04 Sep 2024 05:00), Max: 98.5 (04 Sep 2024 00:00)  HR: 75 (04 Sep 2024 05:00) (75 - 85)  BP: 115/80 (04 Sep 2024 05:00) (105/65 - 126/82)  BP(mean): --  RR: 18 (04 Sep 2024 05:00) (17 - 18)  SpO2: 99% (04 Sep 2024 05:00) (99% - 100%)    Parameters below as of 04 Sep 2024 05:00  Patient On (Oxygen Delivery Method): room air      Daily     Daily Weight in k.5 (04 Sep 2024 05:00)    GENERAL:  No acute distress  HEENT:  NCAT, no scleral icterus  CHEST: no resp distress  HEART:  RRR  ABDOMEN:  Soft, non-tender, non-distended, normoactive bowel sound  NEURO:  Alert and oriented x 3    LABS:                        13.4   12.34 )-----------( 287      ( 04 Sep 2024 04:55 )             37.8     Mean Cell Volume: 83.1 fL (24 @ 04:55)        125<L>  |  92<L>  |  15  ----------------------------<  163<H>  4.8   |  22  |  0.76    Ca    7.9<L>      04 Sep 2024 04:55  Phos  4.0       Mg     2.00         TPro  9.1<H>  /  Alb  2.9<L>  /  TBili  3.0<H>  /  DBili  1.9<H>  /  AST  122<H>  /  ALT  70<H>  /  AlkPhos  187<H>      LIVER FUNCTIONS - ( 04 Sep 2024 04:55 )  Alb: 2.9 g/dL / Pro: 9.1 g/dL / ALK PHOS: 187 U/L / ALT: 70 U/L / AST: 122 U/L / GGT: x           PT/INR - ( 04 Sep 2024 04:55 )   PT: 26.7 sec;   INR: 2.42 ratio           Urinalysis Basic - ( 04 Sep 2024 04:55 )    Color: x / Appearance: x / SG: x / pH: x  Gluc: 163 mg/dL / Ketone: x  / Bili: x / Urobili: x   Blood: x / Protein: x / Nitrite: x   Leuk Esterase: x / RBC: x / WBC x   Sq Epi: x / Non Sq Epi: x / Bacteria: x      Imaging:    < from: US Abdomen Limited (24 @ 11:51) >    IMPRESSION:    Small volume ascites in the right upper quadrant.    Incidentally noted cirrhosis.        < end of copied text >  < from: CT Abdomen and Pelvis w/ IV Cont (24 @ 03:04) >  IMPRESSION:    Interval decrease in abdominopelvic ascites from large volume to   mild/moderate.  Redemonstration of cirrhosis, indeterminate small hypodensities. Varices   indicative of portal hypertension.        --- End of Report ---    < end of copied text >

## 2024-09-04 NOTE — PROGRESS NOTE ADULT - PROBLEM SELECTOR PLAN 1
- Na 125  - fluid restrict and low Na diet  - discontinue HCTZ  - check urine lytes /cr/Urea  - albumin 50g x 1  - f/u hepatology recs

## 2024-09-04 NOTE — PROGRESS NOTE ADULT - SUBJECTIVE AND OBJECTIVE BOX
[facility] of Hospital Medicine  Alessandra Villalta MD  Pager (V-F, 2S-8D): [phone]  Other Times:  [phone]    Patient is a 44y old  Male who presents with a chief complaint of abdominal pain (03 Sep 2024 10:41)      SUBJECTIVE / OVERNIGHT EVENTS:  ADDITIONAL REVIEW OF SYSTEMS:    MEDICATIONS  (STANDING):  amLODIPine   Tablet 10 milliGRAM(s) Oral daily  bisacodyl Suppository 10 milliGRAM(s) Rectal once  carvedilol 6.25 milliGRAM(s) Oral every 12 hours  clarithromycin 500 milliGRAM(s) Oral two times a day  dextrose 5%. 1000 milliLiter(s) (100 mL/Hr) IV Continuous <Continuous>  dextrose 5%. 1000 milliLiter(s) (50 mL/Hr) IV Continuous <Continuous>  dextrose 50% Injectable 25 Gram(s) IV Push once  dextrose 50% Injectable 25 Gram(s) IV Push once  dextrose 50% Injectable 12.5 Gram(s) IV Push once  folic acid 1 milliGRAM(s) Oral daily  furosemide    Tablet 40 milliGRAM(s) Oral daily  glucagon  Injectable 1 milliGRAM(s) IntraMuscular once  hydrochlorothiazide 25 milliGRAM(s) Oral daily  insulin lispro (ADMELOG) corrective regimen sliding scale   SubCutaneous three times a day before meals  insulin lispro (ADMELOG) corrective regimen sliding scale   SubCutaneous at bedtime  lactobacillus acidophilus 1 Tablet(s) Oral daily  lidocaine   4% Patch 1 Patch Transdermal daily  metroNIDAZOLE    Tablet 500 milliGRAM(s) Oral three times a day  multivitamin 1 Tablet(s) Oral daily  pantoprazole    Tablet 40 milliGRAM(s) Oral before breakfast  polyethylene glycol 3350 17 Gram(s) Oral daily  senna 2 Tablet(s) Oral at bedtime  thiamine 100 milliGRAM(s) Oral daily    MEDICATIONS  (PRN):  acetaminophen     Tablet .. 650 milliGRAM(s) Oral every 6 hours PRN Temp greater or equal to 38C (100.4F), Mild Pain (1 - 3)  albuterol    90 MICROgram(s) HFA Inhaler 2 Puff(s) Inhalation every 6 hours PRN for shortness of breath and/or wheezing  aluminum hydroxide/magnesium hydroxide/simethicone Suspension 30 milliLiter(s) Oral every 4 hours PRN Dyspepsia  bisacodyl Suppository 10 milliGRAM(s) Rectal daily PRN Constipation  dextrose Oral Gel 15 Gram(s) Oral once PRN Blood Glucose LESS THAN 70 milliGRAM(s)/deciliter  LORazepam   Injectable 2 milliGRAM(s) IV Push every 1 hour PRN CIWA-Ar score 8 or greater  LORazepam   Injectable 2 milliGRAM(s) IV Push every 2 hours PRN CIWA-Ar score increase by 2 points and a total score of 7 or less  melatonin 3 milliGRAM(s) Oral at bedtime PRN Insomnia  ondansetron Injectable 4 milliGRAM(s) IV Push every 8 hours PRN Nausea and/or Vomiting      CAPILLARY BLOOD GLUCOSE      POCT Blood Glucose.: 175 mg/dL (04 Sep 2024 07:22)  POCT Blood Glucose.: 242 mg/dL (03 Sep 2024 21:46)  POCT Blood Glucose.: 179 mg/dL (03 Sep 2024 16:32)  POCT Blood Glucose.: 161 mg/dL (03 Sep 2024 12:40)    I&O's Summary    03 Sep 2024 07:01  -  04 Sep 2024 07:00  --------------------------------------------------------  IN: 540 mL / OUT: 0 mL / NET: 540 mL        PHYSICAL EXAM:  Vital Signs Last 24 Hrs  T(C): 36.4 (04 Sep 2024 05:00), Max: 36.9 (04 Sep 2024 00:00)  T(F): 97.6 (04 Sep 2024 05:00), Max: 98.5 (04 Sep 2024 00:00)  HR: 75 (04 Sep 2024 05:00) (75 - 85)  BP: 115/80 (04 Sep 2024 05:00) (105/65 - 126/82)  BP(mean): --  RR: 18 (04 Sep 2024 05:00) (17 - 18)  SpO2: 99% (04 Sep 2024 05:00) (99% - 100%)    Parameters below as of 04 Sep 2024 05:00  Patient On (Oxygen Delivery Method): room air      CONSTITUTIONAL: NAD  EYES: PERRLA; conjunctiva and sclera clear  ENMT: Moist oral mucosa, no pharyngeal injection or exudates; normal dentition  NECK: Supple, no palpable masses; no thyromegaly  RESPIRATORY: Normal respiratory effort; lungs are clear to auscultation bilaterally  CARDIOVASCULAR: Regular rate and rhythm, normal S1 and S2, no murmur/rub/gallop; No lower extremity edema; Peripheral pulses are 2+ bilaterally  ABDOMEN: Nontender to palpation, normoactive bowel sounds, no rebound/guarding; No hepatosplenomegaly  MUSCULOSKELETAL:  Normal gait; no clubbing or cyanosis of digits; no joint swelling or tenderness to palpation  PSYCH: A+O to person, place, and time; affect appropriate  NEUROLOGY: CN 2-12 are intact and symmetric; no gross sensory deficits   SKIN: No rashes; no palpable lesions    LABS:                        13.4   12.34 )-----------( 287      ( 04 Sep 2024 04:55 )             37.8     09-04    125<L>  |  92<L>  |  15  ----------------------------<  163<H>  4.8   |  22  |  0.76    Ca    7.9<L>      04 Sep 2024 04:55  Phos  4.0     09-04  Mg     2.00     09-04    TPro  9.1<H>  /  Alb  2.9<L>  /  TBili  3.0<H>  /  DBili  1.9<H>  /  AST  122<H>  /  ALT  70<H>  /  AlkPhos  187<H>  09-04    PT/INR - ( 04 Sep 2024 04:55 )   PT: 26.7 sec;   INR: 2.42 ratio               Urinalysis Basic - ( 04 Sep 2024 04:55 )    Color: x / Appearance: x / SG: x / pH: x  Gluc: 163 mg/dL / Ketone: x  / Bili: x / Urobili: x   Blood: x / Protein: x / Nitrite: x   Leuk Esterase: x / RBC: x / WBC x   Sq Epi: x / Non Sq Epi: x / Bacteria: x          RADIOLOGY & ADDITIONAL TESTS:  Results Reviewed:   Imaging Personally Reviewed:  Electrocardiogram Personally Reviewed:    COORDINATION OF CARE:  Care Discussed with Consultants/Other Providers [Y/N]:  Prior or Outpatient Records Reviewed [Y/N]:   [facility]  Alessandra Villalta MD  Pager (W-F, 9B-3A): [phone]  Other Times:  [phone]    Patient is a 44y old  Male who presents with a chief complaint of abdominal pain (03 Sep 2024 10:41)      SUBJECTIVE / OVERNIGHT EVENTS: eating well no N/V; sodium still low; abdominal pain resolved; FS above goal       MEDICATIONS  (STANDING):  amLODIPine   Tablet 10 milliGRAM(s) Oral daily  bisacodyl Suppository 10 milliGRAM(s) Rectal once  carvedilol 6.25 milliGRAM(s) Oral every 12 hours  clarithromycin 500 milliGRAM(s) Oral two times a day  dextrose 5%. 1000 milliLiter(s) (100 mL/Hr) IV Continuous <Continuous>  dextrose 5%. 1000 milliLiter(s) (50 mL/Hr) IV Continuous <Continuous>  dextrose 50% Injectable 25 Gram(s) IV Push once  dextrose 50% Injectable 25 Gram(s) IV Push once  dextrose 50% Injectable 12.5 Gram(s) IV Push once  folic acid 1 milliGRAM(s) Oral daily  furosemide    Tablet 40 milliGRAM(s) Oral daily  glucagon  Injectable 1 milliGRAM(s) IntraMuscular once  hydrochlorothiazide 25 milliGRAM(s) Oral daily  insulin lispro (ADMELOG) corrective regimen sliding scale   SubCutaneous three times a day before meals  insulin lispro (ADMELOG) corrective regimen sliding scale   SubCutaneous at bedtime  lactobacillus acidophilus 1 Tablet(s) Oral daily  lidocaine   4% Patch 1 Patch Transdermal daily  metroNIDAZOLE    Tablet 500 milliGRAM(s) Oral three times a day  multivitamin 1 Tablet(s) Oral daily  pantoprazole    Tablet 40 milliGRAM(s) Oral before breakfast  polyethylene glycol 3350 17 Gram(s) Oral daily  senna 2 Tablet(s) Oral at bedtime  thiamine 100 milliGRAM(s) Oral daily    MEDICATIONS  (PRN):  acetaminophen     Tablet .. 650 milliGRAM(s) Oral every 6 hours PRN Temp greater or equal to 38C (100.4F), Mild Pain (1 - 3)  albuterol    90 MICROgram(s) HFA Inhaler 2 Puff(s) Inhalation every 6 hours PRN for shortness of breath and/or wheezing  aluminum hydroxide/magnesium hydroxide/simethicone Suspension 30 milliLiter(s) Oral every 4 hours PRN Dyspepsia  bisacodyl Suppository 10 milliGRAM(s) Rectal daily PRN Constipation  dextrose Oral Gel 15 Gram(s) Oral once PRN Blood Glucose LESS THAN 70 milliGRAM(s)/deciliter  LORazepam   Injectable 2 milliGRAM(s) IV Push every 1 hour PRN CIWA-Ar score 8 or greater  LORazepam   Injectable 2 milliGRAM(s) IV Push every 2 hours PRN CIWA-Ar score increase by 2 points and a total score of 7 or less  melatonin 3 milliGRAM(s) Oral at bedtime PRN Insomnia  ondansetron Injectable 4 milliGRAM(s) IV Push every 8 hours PRN Nausea and/or Vomiting      CAPILLARY BLOOD GLUCOSE      POCT Blood Glucose.: 175 mg/dL (04 Sep 2024 07:22)  POCT Blood Glucose.: 242 mg/dL (03 Sep 2024 21:46)  POCT Blood Glucose.: 179 mg/dL (03 Sep 2024 16:32)  POCT Blood Glucose.: 161 mg/dL (03 Sep 2024 12:40)    I&O's Summary    03 Sep 2024 07:01  -  04 Sep 2024 07:00  --------------------------------------------------------  IN: 540 mL / OUT: 0 mL / NET: 540 mL        PHYSICAL EXAM:  Vital Signs Last 24 Hrs  T(C): 36.4 (04 Sep 2024 05:00), Max: 36.9 (04 Sep 2024 00:00)  T(F): 97.6 (04 Sep 2024 05:00), Max: 98.5 (04 Sep 2024 00:00)  HR: 75 (04 Sep 2024 05:00) (75 - 85)  BP: 115/80 (04 Sep 2024 05:00) (105/65 - 126/82)  BP(mean): --  RR: 18 (04 Sep 2024 05:00) (17 - 18)  SpO2: 99% (04 Sep 2024 05:00) (99% - 100%)    Parameters below as of 04 Sep 2024 05:00  Patient On (Oxygen Delivery Method): room air          LABS:                        13.4   12.34 )-----------( 287      ( 04 Sep 2024 04:55 )             37.8     09-04    125<L>  |  92<L>  |  15  ----------------------------<  163<H>  4.8   |  22  |  0.76    Ca    7.9<L>      04 Sep 2024 04:55  Phos  4.0     09-04  Mg     2.00     09-04    TPro  9.1<H>  /  Alb  2.9<L>  /  TBili  3.0<H>  /  DBili  1.9<H>  /  AST  122<H>  /  ALT  70<H>  /  AlkPhos  187<H>  09-04    PT/INR - ( 04 Sep 2024 04:55 )   PT: 26.7 sec;   INR: 2.42 ratio               Urinalysis Basic - ( 04 Sep 2024 04:55 )    Color: x / Appearance: x / SG: x / pH: x  Gluc: 163 mg/dL / Ketone: x  / Bili: x / Urobili: x   Blood: x / Protein: x / Nitrite: x   Leuk Esterase: x / RBC: x / WBC x   Sq Epi: x / Non Sq Epi: x / Bacteria: x          RADIOLOGY & ADDITIONAL TESTS:  Results Reviewed:   Imaging Personally Reviewed:  Electrocardiogram Personally Reviewed:    COORDINATION OF CARE:  Care Discussed with Consultants/Other Providers [Y/N]:  Prior or Outpatient Records Reviewed [Y/N]:

## 2024-09-04 NOTE — PROGRESS NOTE ADULT - PROBLEM SELECTOR PLAN 2
- On Jardiance 25mg QD and Janumet 50-1000mg QD at home. Currently holding.   - A1c 6.0% 8/21/24, now preDM.   - FS above goal add lantus 4 U   - CC diet.

## 2024-09-05 ENCOUNTER — TRANSCRIPTION ENCOUNTER (OUTPATIENT)
Age: 44
End: 2024-09-05

## 2024-09-05 LAB
ALBUMIN SERPL ELPH-MCNC: 2.9 G/DL — LOW (ref 3.3–5)
ALP SERPL-CCNC: 181 U/L — HIGH (ref 40–120)
ALT FLD-CCNC: 64 U/L — HIGH (ref 4–41)
ANION GAP SERPL CALC-SCNC: 11 MMOL/L — SIGNIFICANT CHANGE UP (ref 7–14)
ANION GAP SERPL CALC-SCNC: 12 MMOL/L — SIGNIFICANT CHANGE UP (ref 7–14)
AST SERPL-CCNC: 123 U/L — HIGH (ref 4–40)
BILIRUB SERPL-MCNC: 3 MG/DL — HIGH (ref 0.2–1.2)
BUN SERPL-MCNC: 13 MG/DL — SIGNIFICANT CHANGE UP (ref 7–23)
BUN SERPL-MCNC: 16 MG/DL — SIGNIFICANT CHANGE UP (ref 7–23)
CALCIUM SERPL-MCNC: 8.1 MG/DL — LOW (ref 8.4–10.5)
CALCIUM SERPL-MCNC: 8.4 MG/DL — SIGNIFICANT CHANGE UP (ref 8.4–10.5)
CHLORIDE SERPL-SCNC: 91 MMOL/L — LOW (ref 98–107)
CHLORIDE SERPL-SCNC: 93 MMOL/L — LOW (ref 98–107)
CO2 SERPL-SCNC: 21 MMOL/L — LOW (ref 22–31)
CO2 SERPL-SCNC: 22 MMOL/L — SIGNIFICANT CHANGE UP (ref 22–31)
CREAT ?TM UR-MCNC: 133 MG/DL — SIGNIFICANT CHANGE UP
CREAT SERPL-MCNC: 0.6 MG/DL — SIGNIFICANT CHANGE UP (ref 0.5–1.3)
CREAT SERPL-MCNC: 0.68 MG/DL — SIGNIFICANT CHANGE UP (ref 0.5–1.3)
EGFR: 116 ML/MIN/1.73M2 — SIGNIFICANT CHANGE UP
EGFR: 121 ML/MIN/1.73M2 — SIGNIFICANT CHANGE UP
GLUCOSE SERPL-MCNC: 212 MG/DL — HIGH (ref 70–99)
GLUCOSE SERPL-MCNC: 256 MG/DL — HIGH (ref 70–99)
HCT VFR BLD CALC: 38.3 % — LOW (ref 39–50)
HGB BLD-MCNC: 13.6 G/DL — SIGNIFICANT CHANGE UP (ref 13–17)
INR BLD: 2.34 RATIO — HIGH (ref 0.85–1.18)
MAGNESIUM SERPL-MCNC: 1.9 MG/DL — SIGNIFICANT CHANGE UP (ref 1.6–2.6)
MAGNESIUM SERPL-MCNC: 2 MG/DL — SIGNIFICANT CHANGE UP (ref 1.6–2.6)
MCHC RBC-ENTMCNC: 29.5 PG — SIGNIFICANT CHANGE UP (ref 27–34)
MCHC RBC-ENTMCNC: 35.5 GM/DL — SIGNIFICANT CHANGE UP (ref 32–36)
MCV RBC AUTO: 83.1 FL — SIGNIFICANT CHANGE UP (ref 80–100)
NRBC # BLD: 0 /100 WBCS — SIGNIFICANT CHANGE UP (ref 0–0)
NRBC # FLD: 0 K/UL — SIGNIFICANT CHANGE UP (ref 0–0)
OSMOLALITY UR: 624 MOSM/KG — SIGNIFICANT CHANGE UP (ref 50–1200)
PHOSPHATE SERPL-MCNC: 2.8 MG/DL — SIGNIFICANT CHANGE UP (ref 2.5–4.5)
PHOSPHATE SERPL-MCNC: 3 MG/DL — SIGNIFICANT CHANGE UP (ref 2.5–4.5)
PLATELET # BLD AUTO: 246 K/UL — SIGNIFICANT CHANGE UP (ref 150–400)
POTASSIUM SERPL-MCNC: 3.9 MMOL/L — SIGNIFICANT CHANGE UP (ref 3.5–5.3)
POTASSIUM SERPL-MCNC: 4.5 MMOL/L — SIGNIFICANT CHANGE UP (ref 3.5–5.3)
POTASSIUM SERPL-SCNC: 3.9 MMOL/L — SIGNIFICANT CHANGE UP (ref 3.5–5.3)
POTASSIUM SERPL-SCNC: 4.5 MMOL/L — SIGNIFICANT CHANGE UP (ref 3.5–5.3)
PROT SERPL-MCNC: 8.4 G/DL — HIGH (ref 6–8.3)
PROTHROM AB SERPL-ACNC: 25.6 SEC — HIGH (ref 9.5–13)
RBC # BLD: 4.61 M/UL — SIGNIFICANT CHANGE UP (ref 4.2–5.8)
RBC # FLD: 19.3 % — HIGH (ref 10.3–14.5)
SODIUM SERPL-SCNC: 124 MMOL/L — LOW (ref 135–145)
SODIUM SERPL-SCNC: 126 MMOL/L — LOW (ref 135–145)
SODIUM UR-SCNC: 35 MMOL/L — SIGNIFICANT CHANGE UP
UUN UR-MCNC: 999.3 MG/DL — SIGNIFICANT CHANGE UP
WBC # BLD: 11.74 K/UL — HIGH (ref 3.8–10.5)
WBC # FLD AUTO: 11.74 K/UL — HIGH (ref 3.8–10.5)

## 2024-09-05 PROCEDURE — 99222 1ST HOSP IP/OBS MODERATE 55: CPT

## 2024-09-05 PROCEDURE — 99233 SBSQ HOSP IP/OBS HIGH 50: CPT

## 2024-09-05 RX ORDER — SODIUM CHLORIDE 3 G/100ML
500 INJECTION, SOLUTION INTRAVENOUS
Refills: 0 | Status: DISCONTINUED | OUTPATIENT
Start: 2024-09-05 | End: 2024-09-06

## 2024-09-05 RX ORDER — FUROSEMIDE 40 MG
20 TABLET ORAL ONCE
Refills: 0 | Status: COMPLETED | OUTPATIENT
Start: 2024-09-05 | End: 2024-09-05

## 2024-09-05 RX ORDER — ALBUMIN (HUMAN) 5 G/20ML
50 SOLUTION INTRAVENOUS ONCE
Refills: 0 | Status: COMPLETED | OUTPATIENT
Start: 2024-09-05 | End: 2024-09-05

## 2024-09-05 RX ADMIN — CARVEDILOL 6.25 MILLIGRAM(S): 6.25 TABLET ORAL at 17:25

## 2024-09-05 RX ADMIN — Medication 1 TABLET(S): at 11:31

## 2024-09-05 RX ADMIN — ACETAMINOPHEN 650 MILLIGRAM(S): 325 TABLET ORAL at 17:42

## 2024-09-05 RX ADMIN — Medication 30 MILLILITER(S): at 17:42

## 2024-09-05 RX ADMIN — SODIUM CHLORIDE 50 MILLILITER(S): 3 INJECTION, SOLUTION INTRAVENOUS at 19:09

## 2024-09-05 RX ADMIN — Medication 3: at 08:15

## 2024-09-05 RX ADMIN — Medication 100 MILLIGRAM(S): at 11:31

## 2024-09-05 RX ADMIN — Medication 2 PUFF(S): at 17:39

## 2024-09-05 RX ADMIN — Medication 40 MILLIGRAM(S): at 05:28

## 2024-09-05 RX ADMIN — Medication 40 MILLIGRAM(S): at 05:27

## 2024-09-05 RX ADMIN — CARVEDILOL 6.25 MILLIGRAM(S): 6.25 TABLET ORAL at 05:27

## 2024-09-05 RX ADMIN — AMLODIPINE BESYLATE 10 MILLIGRAM(S): 10 TABLET ORAL at 05:27

## 2024-09-05 RX ADMIN — Medication 1 APPLICATION(S): at 05:28

## 2024-09-05 RX ADMIN — INSULIN GLARGINE 4 UNIT(S): 100 INJECTION, SOLUTION SUBCUTANEOUS at 21:45

## 2024-09-05 RX ADMIN — Medication 1: at 17:45

## 2024-09-05 RX ADMIN — Medication 1 APPLICATION(S): at 17:38

## 2024-09-05 RX ADMIN — Medication 2 UNIT(S): at 17:45

## 2024-09-05 RX ADMIN — POLYETHYLENE GLYCOL 3350 17 GRAM(S): 17 POWDER, FOR SOLUTION ORAL at 11:32

## 2024-09-05 RX ADMIN — Medication 1 MILLIGRAM(S): at 11:31

## 2024-09-05 RX ADMIN — Medication 20 MILLIGRAM(S): at 17:25

## 2024-09-05 RX ADMIN — Medication 2 TABLET(S): at 21:43

## 2024-09-05 RX ADMIN — ALBUMIN (HUMAN) 50 MILLILITER(S): 5 SOLUTION INTRAVENOUS at 17:27

## 2024-09-05 RX ADMIN — ACETAMINOPHEN 650 MILLIGRAM(S): 325 TABLET ORAL at 18:42

## 2024-09-05 NOTE — PROGRESS NOTE ADULT - PROBLEM SELECTOR PLAN 1
- fluid restrict and low Na diet  - discontinue HCTZ/lasix   - FeUrea <35%; FeNA<1% inaccurate as on diuretic   - nephrology recommend HTS with lasix IV x 1 and IV albumin

## 2024-09-05 NOTE — DISCHARGE NOTE PROVIDER - NSDCFUADDAPPT_GEN_ALL_CORE_FT
Follow up with PCP within 1-2 weeks of discharge. If you are in need of a general medicine physician and post-discharge medical follow-up for further care/recommendations you may contact the [facility] for an appointment at [phone]. YOU NEED A REPEAT BMP (BASIC METABOLIC PANEL IN 3 DAYS)     Follow up with the nephrologist within 1-2 weeks of discharge. If you do not have a kidney doctor, you may follow up at [facility] at [address] [place] [address], 2nd floor, [place] [address]. Call [phone] for an appointment. YOU NEED A REPEAT BMP (BASIC METABOLIC PANEL IN 3 DAYS).     Follow up with hepatology clinic. Please call [phone] to schedule an appointment. ([facility] at [address] [place] [address]). You should see Dr. Tejeda in the hepatology clinic.  Follow up with PCP within 1-2 weeks of discharge. If you are in need of a general medicine physician and post-discharge medical follow-up for further care/recommendations you may contact the [facility] for an appointment at [phone]. PLEASE FLUID RESTRICT - NO MORE THAN 1L A DAY. YOU NEED A REPEAT BMP (BASIC METABOLIC PANEL IN 3 DAYS)     Follow up with the nephrologist within 1-2 weeks of discharge. If you do not have a kidney doctor, you may follow up at [facility] at [address] [place] [address], 2nd floor, [place] [address]. Call [phone] for an appointment. YOU NEED A REPEAT BMP (BASIC METABOLIC PANEL IN 3 DAYS).     Follow up with hepatology clinic. Please call [phone] to schedule an appointment. ([facility] at [address] [place] [address]). You should see Dr. Tejeda in the hepatology clinic.

## 2024-09-05 NOTE — CONSULT NOTE ADULT - ATTENDING COMMENTS
Hyponatremia    Albumin as above  HTS with 1.5% saline and can give lasix 20mg IV x 1  Monitor labs and Is/Os

## 2024-09-05 NOTE — DISCHARGE NOTE PROVIDER - ATTENDING ATTESTATION STATEMENT
----- Message from Bella De La Torre sent at 5/3/2022 10:25 AM CDT -----  Type:  Patient Returning Call    Who Called:  Suze  Who Left Message for Patient:  Kelli  Does the patient know what this is regarding?:  birthcontrol  Best Call Back Number:  [phone]  Additional Information:  thank you    
LVM to notify patient, order for device and authorization placed, once rec'd will contact to schedule appt  
I have personally seen and examined the patient. I have collaborated with and supervised the

## 2024-09-05 NOTE — PROGRESS NOTE ADULT - SUBJECTIVE AND OBJECTIVE BOX
[facility] Division of Hospital Medicine  Alessandra Villalta MD  Pager (U-F, 6N-2I): [phone]  Other Times:  [phone]    Patient is a 44y old  Male who presents with a chief complaint of abdominal pain (05 Sep 2024 12:20)      SUBJECTIVE / OVERNIGHT EVENTS: Pt in NAD no acute events ON; denies N/V abdominal pain; FS elevated       MEDICATIONS  (STANDING):  amLODIPine   Tablet 10 milliGRAM(s) Oral daily  carvedilol 6.25 milliGRAM(s) Oral every 12 hours  dextrose 5%. 1000 milliLiter(s) (50 mL/Hr) IV Continuous <Continuous>  dextrose 5%. 1000 milliLiter(s) (100 mL/Hr) IV Continuous <Continuous>  dextrose 50% Injectable 25 Gram(s) IV Push once  dextrose 50% Injectable 12.5 Gram(s) IV Push once  dextrose 50% Injectable 25 Gram(s) IV Push once  folic acid 1 milliGRAM(s) Oral daily  furosemide    Tablet 40 milliGRAM(s) Oral daily  glucagon  Injectable 1 milliGRAM(s) IntraMuscular once  insulin glargine Injectable (LANTUS) 4 Unit(s) SubCutaneous at bedtime  insulin lispro (ADMELOG) corrective regimen sliding scale   SubCutaneous at bedtime  insulin lispro (ADMELOG) corrective regimen sliding scale   SubCutaneous three times a day before meals  lactobacillus acidophilus 1 Tablet(s) Oral daily  lidocaine   4% Patch 1 Patch Transdermal daily  multivitamin 1 Tablet(s) Oral daily  nystatin Cream 1 Application(s) Topical two times a day  pantoprazole    Tablet 40 milliGRAM(s) Oral before breakfast  polyethylene glycol 3350 17 Gram(s) Oral daily  senna 2 Tablet(s) Oral at bedtime  thiamine 100 milliGRAM(s) Oral daily    MEDICATIONS  (PRN):  acetaminophen     Tablet .. 650 milliGRAM(s) Oral every 6 hours PRN Temp greater or equal to 38C (100.4F), Mild Pain (1 - 3)  albuterol    90 MICROgram(s) HFA Inhaler 2 Puff(s) Inhalation every 6 hours PRN for shortness of breath and/or wheezing  aluminum hydroxide/magnesium hydroxide/simethicone Suspension 30 milliLiter(s) Oral every 4 hours PRN Dyspepsia  bisacodyl Suppository 10 milliGRAM(s) Rectal daily PRN Constipation  dextrose Oral Gel 15 Gram(s) Oral once PRN Blood Glucose LESS THAN 70 milliGRAM(s)/deciliter  LORazepam   Injectable 2 milliGRAM(s) IV Push every 1 hour PRN CIWA-Ar score 8 or greater  LORazepam   Injectable 2 milliGRAM(s) IV Push every 2 hours PRN CIWA-Ar score increase by 2 points and a total score of 7 or less  melatonin 3 milliGRAM(s) Oral at bedtime PRN Insomnia  ondansetron Injectable 4 milliGRAM(s) IV Push every 8 hours PRN Nausea and/or Vomiting      CAPILLARY BLOOD GLUCOSE      POCT Blood Glucose.: 212 mg/dL (05 Sep 2024 11:59)  POCT Blood Glucose.: 258 mg/dL (05 Sep 2024 08:14)  POCT Blood Glucose.: 226 mg/dL (05 Sep 2024 07:02)  POCT Blood Glucose.: 268 mg/dL (04 Sep 2024 21:49)  POCT Blood Glucose.: 176 mg/dL (04 Sep 2024 17:18)    I&O's Summary    04 Sep 2024 07:01  -  05 Sep 2024 07:00  --------------------------------------------------------  IN: 300 mL / OUT: 0 mL / NET: 300 mL        PHYSICAL EXAM:  Vital Signs Last 24 Hrs  T(C): 37.1 (05 Sep 2024 12:30), Max: 37.1 (05 Sep 2024 12:30)  T(F): 98.8 (05 Sep 2024 12:30), Max: 98.8 (05 Sep 2024 12:30)  HR: 79 (05 Sep 2024 12:30) (77 - 83)  BP: 123/81 (05 Sep 2024 12:30) (123/81 - 131/89)  BP(mean): --  RR: 16 (05 Sep 2024 12:30) (16 - 18)  SpO2: 99% (05 Sep 2024 12:30) (98% - 100%)    Parameters below as of 05 Sep 2024 12:30  Patient On (Oxygen Delivery Method): room air    CONSTITUTIONAL: NAD  EYES: scleral icterus   NECK: Supple,   RESPIRATORY: Normal respiratory effort; lungs are clear to auscultation bilaterally  CARDIOVASCULAR: Regular rate and rhythm, normal S1 and S2, no murmur/rub/gallop; No lower extremity edema;  ABDOMEN: Nontender to palpation, normoactive bowel sounds, no rebound/guarding; No hepatosplenomegaly  PSYCH: A+O to person, place, and time; affect appropriate  NEUROLOGY: CN 2-12 are intact and symmetric; no gross sensory deficits   SKIN: No rashes; no palpable lesions      LABS:                        13.6   11.74 )-----------( 246      ( 05 Sep 2024 05:22 )             38.3     09-05    124<L>  |  91<L>  |  16  ----------------------------<  256<H>  4.5   |  22  |  0.60    Ca    8.4      05 Sep 2024 05:22  Phos  3.0     09-05  Mg     2.00     09-05    TPro  8.4<H>  /  Alb  2.9<L>  /  TBili  3.0<H>  /  DBili  x   /  AST  123<H>  /  ALT  64<H>  /  AlkPhos  181<H>  09-05    PT/INR - ( 05 Sep 2024 05:22 )   PT: 25.6 sec;   INR: 2.34 ratio               Urinalysis Basic - ( 05 Sep 2024 05:22 )    Color: x / Appearance: x / SG: x / pH: x  Gluc: 256 mg/dL / Ketone: x  / Bili: x / Urobili: x   Blood: x / Protein: x / Nitrite: x   Leuk Esterase: x / RBC: x / WBC x   Sq Epi: x / Non Sq Epi: x / Bacteria: x          RADIOLOGY & ADDITIONAL TESTS:  Results Reviewed:   Imaging Personally Reviewed:  Electrocardiogram Personally Reviewed:    COORDINATION OF CARE:  Care Discussed with Consultants/Other Providers [Y/N]:  Prior or Outpatient Records Reviewed [Y/N]:

## 2024-09-05 NOTE — DISCHARGE NOTE PROVIDER - NSDCQMSTROKE_NEU_ALL_CORE
· History of severe aortic stenosis  · Previous Echo (5/2021): Ef 55% with Grade 1 diastolic dysfunction and Severe Aortic stenosis  · Repeat ECHO demonstrates EF 60% with critical AS  · Monitor I/O's and volume status  · Daily weights     · Ambulatory referral for cardiac surgery placed No

## 2024-09-05 NOTE — CONSULT NOTE ADULT - ASSESSMENT
Mr. 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     #Decompensated etiology cirrhosis c/b ascites, EV  Pt with recently diagnosed cirrhosis, suspected to be in the setting EtOH given concomitant alcohol use disorder w/ hx of withdrawal Pt was diagnosed by Dr. Oakley though presented outpatient in the setting of new ascites.   MELD 3.0 score = 25  HE: none on exam  Varices: grade 2 esophageal varices noted on EGD 6/20/2024, no banding, no hx of variceal hemorrhage, on Coreg 3.125  Ascites: home dose lasix 40-spironolactone 100 QD, s/p therapeutic para 8/22 - 3.5L, diagnostic para 8/20, SAAG > 1.1, and protein 1.6, unclear if taking meds, improved to mild/moderate on CT   HCC: 1.1 cm R hepatic lobe lesion from outpatient CT, not seen in CT A/P in patient. Will need outpatient MRI abdomen.    #H Pylori Infection  On appropriate therapy     Recommendations:  - Treatment of UTI per primary   - Low sodium diet  - Stop losartan due risk of MELITA/ascites in cirrhosis   - Increase Coreg to 6.25  - Continue lasix 40. Stop Aldactone 2/2 hyponatremia  - Check PETH level  - Continue H pylori treatment. Will need repeat testing as an outpatient for eradication   - F/u Dr. Tejeda as scheduled on 9/10  - Trend clinical symptoms, CBC, PT/INR, CMP while inpatient      Ernesto Naylor MD  Gastroenterology/Hepatology Fellow  Available via [employer] Teams  Pager: [phone]    On Weekdays after 5 PM to 8AM and Weekends/Holidays (All day)  For non-urgent consults, please email [email or web address]  For urgent consults, please contact on call GI team.  See Amion schedule ([facility]), [address] system - [phone] ([facility]), or call hospital  ([facility])  
Patient with hyponatremia.

## 2024-09-05 NOTE — DISCHARGE NOTE PROVIDER - NSDCMRMEDTOKEN_GEN_ALL_CORE_FT
Albuterol (Eqv-Ventolin HFA) 90 mcg/inh inhalation aerosol: 2 puff(s) inhaled every 6 hours as needed for  shortness of breath and/or wheezing  Aldactone 100 mg oral tablet: 1 tab(s) orally once a day  amLODIPine 10 mg oral tablet: 1 tab(s) orally once a day  carvedilol 3.125 mg oral tablet: 1 tab(s) orally 2 times a day  clarithromycin 500 mg oral tablet: 1 tab(s) orally 2 times a day  furosemide 40 mg oral tablet: 1 tab(s) orally once a day  hydroCHLOROthiazide 25 mg oral tablet: 1 tab(s) orally once a day  Janumet 50 mg-1000 mg oral tablet: 1 tab(s) orally once a day  Jardiance 25 mg oral tablet: 1 tab(s) orally once a day (at bedtime)  lactobacillus acidophilus oral capsule: 1 cap(s) orally once a day  losartan 100 mg oral tablet: 1 tab(s) orally once a day  metroNIDAZOLE 500 mg oral tablet: 1 tab(s) orally 3 times a day  pantoprazole 40 mg oral delayed release tablet: 1 tab(s) orally once a day (before a meal)   Albuterol (Eqv-Ventolin HFA) 90 mcg/inh inhalation aerosol: 2 puff(s) inhaled every 6 hours as needed for  shortness of breath and/or wheezing  amLODIPine 10 mg oral tablet: 1 tab(s) orally once a day  bisacodyl 10 mg rectal suppository: 1 suppository(ies) rectal once a day As needed Constipation  carvedilol 3.125 mg oral tablet: 1 tab(s) orally 2 times a day  clarithromycin 500 mg oral tablet: 1 tab(s) orally 2 times a day  folic acid 1 mg oral tablet: 1 tab(s) orally once a day  Janumet 50 mg-1000 mg oral tablet: 1 tab(s) orally once a day  Jardiance 25 mg oral tablet: 1 tab(s) orally once a day (at bedtime)  lactobacillus acidophilus oral capsule: 1 cap(s) orally once a day  metroNIDAZOLE 500 mg oral tablet: 1 tab(s) orally 3 times a day  Multiple Vitamins oral tablet: 1 tab(s) orally once a day  pantoprazole 40 mg oral delayed release tablet: 1 tab(s) orally once a day (before a meal)  polyethylene glycol 3350 oral powder for reconstitution: 17 gram(s) orally once a day  senna leaf extract oral tablet: 2 tab(s) orally once a day (at bedtime)  thiamine 100 mg oral tablet: 1 tab(s) orally once a day   Albuterol (Eqv-Ventolin HFA) 90 mcg/inh inhalation aerosol: 2 puff(s) inhaled every 6 hours as needed for  shortness of breath and/or wheezing  amLODIPine 10 mg oral tablet: 1 tab(s) orally once a day  bisacodyl 10 mg rectal suppository: 1 suppository(ies) rectal once a day As needed Constipation  carvedilol 6.25 mg oral tablet: 1 tab(s) orally every 12 hours  folic acid 1 mg oral tablet: 1 tab(s) orally once a day  Janumet 50 mg-1000 mg oral tablet: 1 tab(s) orally once a day  Jardiance 25 mg oral tablet: 1 tab(s) orally once a day (at bedtime)  lactobacillus acidophilus oral capsule: 1 cap(s) orally once a day  Multiple Vitamins oral tablet: 1 tab(s) orally once a day  pantoprazole 40 mg oral delayed release tablet: 1 tab(s) orally once a day (before a meal)  polyethylene glycol 3350 oral powder for reconstitution: 17 gram(s) orally once a day  senna leaf extract oral tablet: 2 tab(s) orally once a day (at bedtime)  thiamine 100 mg oral tablet: 1 tab(s) orally once a day

## 2024-09-05 NOTE — PROGRESS NOTE ADULT - PROBLEM SELECTOR PLAN 2
- On Jardiance 25mg QD and Janumet 50-1000mg QD at home. Currently holding.   - A1c 6.0% 8/21/24, well controlled on home meds   - FS above goal   - lantus started 9/5 4 U; added ademalog 2 U SQ QAC   - CC diet.

## 2024-09-05 NOTE — DISCHARGE NOTE PROVIDER - HOSPITAL COURSE
45 yo M with PMH of H pylori (currently on treatment), gastritis, alcoholic liver cirrhosis, asthma, psoriasis, preDM, HTN presenting to hospital with lower abdominal pain. Hepatology consulted for possible decompensated cirrhosis. recommended paracentesis US with small ascites and w/o safe pocket. UA+ treated with ceftriaxone and abdominal pain improved. course was also complicated by hyponatremia HCTZ and lasix stopped and IV albumin given. Continued treatment for H pylori to complete 14 days. Nephrology consulted and recommended HTS and IV albumin with IV lasix         Discharge diagnosis     UTI  Hyponatremia   cirrhosis  Diabetes   45 yo M with PMH of H pylori (currently on treatment), gastritis, alcoholic liver cirrhosis, asthma, psoriasis, preDM, HTN presenting to hospital with lower abdominal pain. Hepatology consulted for possible decompensated cirrhosis. recommended paracentesis US with small ascites and w/o safe pocket. UA+ treated with ceftriaxone and abdominal pain improved. course was also complicated by hyponatremia HCTZ and lasix stopped and IV albumin given w/o improvement. Nephrology consulted and recommended HTS and IV albumin with IV lasix Completed treatment for H pylori to complete 14 days. repeat H pylori stool antigen neg.          Discharge diagnosis     UTI  Hyponatremia   cirrhosis  Diabetes   43 yo M with PMH of H pylori (s/p triple therapy completed 9/4), gastritis, alcoholic liver cirrhosis, asthma, psoriasis, preDM, HTN presenting to hospital with lower abdominal pain. Hepatology consulted for possible decompensated cirrhosis. Recommended paracentesis US with small ascites and w/o safe pocket. CT A/P showed interval decrease in abdominopelvic ascites from large volume to mild/moderate. Redemonstration of cirrhosis, indeterminate small hypodensities. Varices indicative of portal hypertension. Patient was found to have UTI UCx + GBS treated with ceftriaxone and abdominal pain improved. Course was also complicated by hyponatremia HCTZ and lasix stopped and IV albumin given w/o improvement. Nephrology consulted and recommended HTS and IV albumin with IV lasix. Completed treatment for H pylori to complete 14 days. repeat H pylori stool antigen neg.     Discharge diagnosis     UTI  Hyponatremia   cirrhosis  Diabetes

## 2024-09-05 NOTE — PROGRESS NOTE ADULT - SUBJECTIVE AND OBJECTIVE BOX
Hepatology Progress Note    Interval Events:   -Pt given 50 g of albumin yesterday although still with Na of 125  -Pt overall feeling well with no ongoing nausea, vomiting or abd pain     Allergies:  amoxicillin (Angioedema)      Hospital Medications:  acetaminophen     Tablet .. 650 milliGRAM(s) Oral every 6 hours PRN  albuterol    90 MICROgram(s) HFA Inhaler 2 Puff(s) Inhalation every 6 hours PRN  aluminum hydroxide/magnesium hydroxide/simethicone Suspension 30 milliLiter(s) Oral every 4 hours PRN  amLODIPine   Tablet 10 milliGRAM(s) Oral daily  bisacodyl Suppository 10 milliGRAM(s) Rectal daily PRN  carvedilol 6.25 milliGRAM(s) Oral every 12 hours  dextrose 5%. 1000 milliLiter(s) IV Continuous <Continuous>  dextrose 5%. 1000 milliLiter(s) IV Continuous <Continuous>  dextrose 50% Injectable 25 Gram(s) IV Push once  dextrose 50% Injectable 12.5 Gram(s) IV Push once  dextrose 50% Injectable 25 Gram(s) IV Push once  dextrose Oral Gel 15 Gram(s) Oral once PRN  folic acid 1 milliGRAM(s) Oral daily  furosemide    Tablet 40 milliGRAM(s) Oral daily  glucagon  Injectable 1 milliGRAM(s) IntraMuscular once  insulin glargine Injectable (LANTUS) 4 Unit(s) SubCutaneous at bedtime  insulin lispro (ADMELOG) corrective regimen sliding scale   SubCutaneous three times a day before meals  insulin lispro (ADMELOG) corrective regimen sliding scale   SubCutaneous at bedtime  lactobacillus acidophilus 1 Tablet(s) Oral daily  lidocaine   4% Patch 1 Patch Transdermal daily  LORazepam   Injectable 2 milliGRAM(s) IV Push every 1 hour PRN  LORazepam   Injectable 2 milliGRAM(s) IV Push every 2 hours PRN  melatonin 3 milliGRAM(s) Oral at bedtime PRN  multivitamin 1 Tablet(s) Oral daily  nystatin Cream 1 Application(s) Topical two times a day  ondansetron Injectable 4 milliGRAM(s) IV Push every 8 hours PRN  pantoprazole    Tablet 40 milliGRAM(s) Oral before breakfast  polyethylene glycol 3350 17 Gram(s) Oral daily  senna 2 Tablet(s) Oral at bedtime  thiamine 100 milliGRAM(s) Oral daily      ROS: 14 point ROS negative unless otherwise state in subjective    PHYSICAL EXAM:   Vital Signs:  Vital Signs Last 24 Hrs  T(C): 36.9 (05 Sep 2024 05:25), Max: 36.9 (05 Sep 2024 05:25)  T(F): 98.5 (05 Sep 2024 05:25), Max: 98.5 (05 Sep 2024 05:25)  HR: 83 (05 Sep 2024 05:25) (77 - 83)  BP: 126/77 (05 Sep 2024 05:25) (125/74 - 131/89)  BP(mean): --  RR: 18 (05 Sep 2024 05:25) (16 - 18)  SpO2: 98% (05 Sep 2024 05:25) (98% - 100%)    Parameters below as of 05 Sep 2024 05:25  Patient On (Oxygen Delivery Method): room air      GENERAL:  No acute distress  HEENT:  NCAT, no scleral icterus  CHEST: no resp distress  HEART:  RRR  ABDOMEN:  Soft, non-tender, non-distended, normoactive bowel sound  NEURO:  Alert and oriented x 3, no asterixis    LABS:                        13.6   11.74 )-----------( 246      ( 05 Sep 2024 05:22 )             38.3     Mean Cell Volume: 83.1 fL (09-05-24 @ 05:22)    09-05    124<L>  |  91<L>  |  16  ----------------------------<  256<H>  4.5   |  22  |  0.60    Ca    8.4      05 Sep 2024 05:22  Phos  3.0     09-05  Mg     2.00     09-05    TPro  8.4<H>  /  Alb  2.9<L>  /  TBili  3.0<H>  /  DBili  x   /  AST  123<H>  /  ALT  64<H>  /  AlkPhos  181<H>  09-05    LIVER FUNCTIONS - ( 05 Sep 2024 05:22 )  Alb: 2.9 g/dL / Pro: 8.4 g/dL / ALK PHOS: 181 U/L / ALT: 64 U/L / AST: 123 U/L / GGT: x           PT/INR - ( 05 Sep 2024 05:22 )   PT: 25.6 sec;   INR: 2.34 ratio           Urinalysis Basic - ( 05 Sep 2024 05:22 )    Color: x / Appearance: x / SG: x / pH: x  Gluc: 256 mg/dL / Ketone: x  / Bili: x / Urobili: x   Blood: x / Protein: x / Nitrite: x   Leuk Esterase: x / RBC: x / WBC x   Sq Epi: x / Non Sq Epi: x / Bacteria: x      Imaging:      < from: US Abdomen Limited (09.03.24 @ 11:51) >    IMPRESSION:    Small volume ascites in the right upper quadrant.    Incidentally noted cirrhosis.        < end of copied text >  < from: CT Abdomen and Pelvis w/ IV Cont (08.31.24 @ 03:04) >  IMPRESSION:    Interval decrease in abdominopelvic ascites from large volume to   mild/moderate.  Redemonstration of cirrhosis, indeterminate small hypodensities. Varices   indicative of portal hypertension.        --- End of Report ---

## 2024-09-05 NOTE — CONSULT NOTE ADULT - PROBLEM SELECTOR RECOMMENDATION 9
Patient with hyponatremia likely hypervolemic hyponatremia 2/2 decompensated ETOH-related cirrhosis. Today, Na corrected for hyperglycemia is 126 (reports baseline Na is 130). Angie 35, urine osmolality 624, and serum osmolality 268. Patient received IV albumin x1 on 9/4. Albumin today is 2.9. Recommend to continue fluid restriction to 1L. Recommend better glycemic control.     Recommend Patient with hyponatremia likely hypervolemic hyponatremia 2/2 decompensated ETOH-related cirrhosis. Today, Na corrected for hyperglycemia is 126 (reports baseline Na is 130). Angie 35, urine osmolality 624, and serum osmolality 268. Patient received IV albumin x1 on 9/4. Albumin today is 2.9. Recommend to continue fluid restriction to 1L. Recommend to give 1.5% hypertonic saline at 50ml/h x4h, give Lasix 20 IV, and IV albumin x1.

## 2024-09-05 NOTE — DISCHARGE NOTE PROVIDER - NSDCCPCAREPLAN_GEN_ALL_CORE_FT
PRINCIPAL DISCHARGE DIAGNOSIS  Diagnosis: Urinary tract infection  Assessment and Plan of Treatment: You were noted to have abdominal pain and treated for urinary tract infection and abdominal pain improved. Please f/u with PCP      SECONDARY DISCHARGE DIAGNOSES  Diagnosis: Hyponatremia  Assessment and Plan of Treatment: You were noted to have a low sodium and recieved fluids IB albumin and diuretics were held. please f/u with PCP for on going care    Diagnosis: Liver cirrhosis  Assessment and Plan of Treatment: You were seen by hepatology US did not show any ascites that could be safely tapped. Please f/u with Hepatology.    Diagnosis: Positive Helicobacter pylori serology  Assessment and Plan of Treatment: you completed 14 days of treatement and please f/u with Hepatology/GI     PRINCIPAL DISCHARGE DIAGNOSIS  Diagnosis: Urinary tract infection  Assessment and Plan of Treatment: You were noted to have abdominal pain and treated for urinary tract infection and abdominal pain improved. Please f/u with PCP      SECONDARY DISCHARGE DIAGNOSES  Diagnosis: Hyponatremia  Assessment and Plan of Treatment: You were noted to have a low sodium and recieved fluids IB albumin and diuretics were held. please f/u with PCP for repeat BMP within 3-5 days. Please continue fluid restriction less than 1L per day.    Diagnosis: Positive Helicobacter pylori serology  Assessment and Plan of Treatment: you completed 14 days of treatement and please f/u with Hepatology/GI. Retest H pylori antigen stool or breath test within 1 month.    Diagnosis: Liver cirrhosis  Assessment and Plan of Treatment: You were seen by hepatology US did not show any ascites that could be safely tapped. Please f/u with Hepatology as outpatient.    Diagnosis: DM (diabetes mellitus)  Assessment and Plan of Treatment: Resume home oral medications.     PRINCIPAL DISCHARGE DIAGNOSIS  Diagnosis: Urinary tract infection  Assessment and Plan of Treatment: You were noted to have abdominal pain and treated for urinary tract infection and abdominal pain improved. Please f/u with PCP      SECONDARY DISCHARGE DIAGNOSES  Diagnosis: Hyponatremia  Assessment and Plan of Treatment: You were noted to have a low sodium and recieved fluids IB albumin and diuretics were held. please f/u with PCP for repeat BMP within 3-5 days. Please continue fluid restriction less than 1L per day.    Diagnosis: Positive Helicobacter pylori serology  Assessment and Plan of Treatment: you completed 14 days of treatement and please f/u with Hepatology/GI. Retest H pylori antigen stool or breath test within 1 month.    Diagnosis: Liver cirrhosis  Assessment and Plan of Treatment: You were seen by hepatology US did not show any ascites that could be safely tapped. Please f/u with Hepatology as outpatient with Dr. Tejeda on 10/1.    Diagnosis: DM (diabetes mellitus)  Assessment and Plan of Treatment: Resume home oral medications.

## 2024-09-05 NOTE — DISCHARGE NOTE PROVIDER - ATTENDING DISCHARGE PHYSICAL EXAMINATION:
Vital Signs Last 24 Hrs  T(C): 36.9 (09 Sep 2024 05:45), Max: 36.9 (08 Sep 2024 21:20)  T(F): 98.4 (09 Sep 2024 05:45), Max: 98.5 (08 Sep 2024 21:20)  HR: 88 (09 Sep 2024 05:45) (85 - 89)  BP: 130/74 (09 Sep 2024 05:45) (124/69 - 133/77)  BP(mean): --  RR: 17 (09 Sep 2024 05:45) (17 - 18)  SpO2: 100% (09 Sep 2024 05:45) (100% - 100%)    Parameters below as of 09 Sep 2024 05:45  Patient On (Oxygen Delivery Method): room air        CONSTITUTIONAL: Well-groomed, in no apparent distress  EYES: No conjunctival or scleral injection, non-icteric;   ENMT: No external nasal lesions; MMM  NECK: Trachea midline without palpable neck mass; thyroid not enlarged and non-tender  RESPIRATORY: Breathing comfortably; no dullness to percussion; lungs CTA without wheeze/rhonchi/rales  CARDIOVASCULAR: +S1S2, RRR, no M/G/R; pedal pulses full and symmetric; no lower extremity edema  GASTROINTESTINAL: No palpable masses or tenderness, +BS throughout, no rebound/guarding; no hepatosplenomegaly; no hernia palpated  LYMPHATIC: No cervical LAD or tenderness  SKIN: No rashes or ulcers noted  NEUROLOGIC: CN II-XII intact; sensation intact in LEs b/l to light touch  PSYCHIATRIC: A+O x 3; mood and affect appropriate; appropriate insight and judgment

## 2024-09-06 LAB
ANION GAP SERPL CALC-SCNC: 12 MMOL/L — SIGNIFICANT CHANGE UP (ref 7–14)
ANION GAP SERPL CALC-SCNC: 9 MMOL/L — SIGNIFICANT CHANGE UP (ref 7–14)
BUN SERPL-MCNC: 11 MG/DL — SIGNIFICANT CHANGE UP (ref 7–23)
BUN SERPL-MCNC: 14 MG/DL — SIGNIFICANT CHANGE UP (ref 7–23)
CALCIUM SERPL-MCNC: 8.2 MG/DL — LOW (ref 8.4–10.5)
CALCIUM SERPL-MCNC: 8.3 MG/DL — LOW (ref 8.4–10.5)
CHLORIDE SERPL-SCNC: 93 MMOL/L — LOW (ref 98–107)
CHLORIDE SERPL-SCNC: 97 MMOL/L — LOW (ref 98–107)
CO2 SERPL-SCNC: 22 MMOL/L — SIGNIFICANT CHANGE UP (ref 22–31)
CO2 SERPL-SCNC: 22 MMOL/L — SIGNIFICANT CHANGE UP (ref 22–31)
CREAT SERPL-MCNC: 0.56 MG/DL — SIGNIFICANT CHANGE UP (ref 0.5–1.3)
CREAT SERPL-MCNC: 0.7 MG/DL — SIGNIFICANT CHANGE UP (ref 0.5–1.3)
EGFR: 115 ML/MIN/1.73M2 — SIGNIFICANT CHANGE UP
EGFR: 123 ML/MIN/1.73M2 — SIGNIFICANT CHANGE UP
GLUCOSE SERPL-MCNC: 214 MG/DL — HIGH (ref 70–99)
GLUCOSE SERPL-MCNC: 240 MG/DL — HIGH (ref 70–99)
H PYLORI AG STL QL: NEGATIVE — SIGNIFICANT CHANGE UP
HCT VFR BLD CALC: 36.2 % — LOW (ref 39–50)
HGB BLD-MCNC: 12.8 G/DL — LOW (ref 13–17)
MAGNESIUM SERPL-MCNC: 1.9 MG/DL — SIGNIFICANT CHANGE UP (ref 1.6–2.6)
MAGNESIUM SERPL-MCNC: 1.9 MG/DL — SIGNIFICANT CHANGE UP (ref 1.6–2.6)
MCHC RBC-ENTMCNC: 29.7 PG — SIGNIFICANT CHANGE UP (ref 27–34)
MCHC RBC-ENTMCNC: 35.4 GM/DL — SIGNIFICANT CHANGE UP (ref 32–36)
MCV RBC AUTO: 84 FL — SIGNIFICANT CHANGE UP (ref 80–100)
NRBC # BLD: 0 /100 WBCS — SIGNIFICANT CHANGE UP (ref 0–0)
NRBC # FLD: 0 K/UL — SIGNIFICANT CHANGE UP (ref 0–0)
PHOSPHATE SERPL-MCNC: 2.3 MG/DL — LOW (ref 2.5–4.5)
PHOSPHATE SERPL-MCNC: 2.8 MG/DL — SIGNIFICANT CHANGE UP (ref 2.5–4.5)
PLATELET # BLD AUTO: 256 K/UL — SIGNIFICANT CHANGE UP (ref 150–400)
POTASSIUM SERPL-MCNC: 4.5 MMOL/L — SIGNIFICANT CHANGE UP (ref 3.5–5.3)
POTASSIUM SERPL-MCNC: 4.6 MMOL/L — SIGNIFICANT CHANGE UP (ref 3.5–5.3)
POTASSIUM SERPL-SCNC: 4.5 MMOL/L — SIGNIFICANT CHANGE UP (ref 3.5–5.3)
POTASSIUM SERPL-SCNC: 4.6 MMOL/L — SIGNIFICANT CHANGE UP (ref 3.5–5.3)
RBC # BLD: 4.31 M/UL — SIGNIFICANT CHANGE UP (ref 4.2–5.8)
RBC # FLD: 19.2 % — HIGH (ref 10.3–14.5)
SODIUM SERPL-SCNC: 127 MMOL/L — LOW (ref 135–145)
SODIUM SERPL-SCNC: 128 MMOL/L — LOW (ref 135–145)
WBC # BLD: 12.1 K/UL — HIGH (ref 3.8–10.5)
WBC # FLD AUTO: 12.1 K/UL — HIGH (ref 3.8–10.5)

## 2024-09-06 PROCEDURE — 99232 SBSQ HOSP IP/OBS MODERATE 35: CPT | Mod: GC

## 2024-09-06 PROCEDURE — 99233 SBSQ HOSP IP/OBS HIGH 50: CPT

## 2024-09-06 RX ORDER — SODIUM CHLORIDE 3 G/100ML
500 INJECTION, SOLUTION INTRAVENOUS
Refills: 0 | Status: DISCONTINUED | OUTPATIENT
Start: 2024-09-06 | End: 2024-09-07

## 2024-09-06 RX ORDER — SODIUM PHOSPHATE, DIBASIC, ANHYDROUS, POTASSIUM PHOSPHATE, MONOBASIC, AND SODIUM PHOSPHATE, MONOBASIC, MONOHYDRATE 852; 155; 130 MG/1; MG/1; MG/1
1 TABLET, COATED ORAL ONCE
Refills: 0 | Status: COMPLETED | OUTPATIENT
Start: 2024-09-06 | End: 2024-09-06

## 2024-09-06 RX ORDER — INSULIN GLARGINE 100 [IU]/ML
5 INJECTION, SOLUTION SUBCUTANEOUS AT BEDTIME
Refills: 0 | Status: DISCONTINUED | OUTPATIENT
Start: 2024-09-06 | End: 2024-09-07

## 2024-09-06 RX ORDER — FUROSEMIDE 40 MG
20 TABLET ORAL ONCE
Refills: 0 | Status: COMPLETED | OUTPATIENT
Start: 2024-09-06 | End: 2024-09-06

## 2024-09-06 RX ADMIN — Medication 1 MILLIGRAM(S): at 12:05

## 2024-09-06 RX ADMIN — AMLODIPINE BESYLATE 10 MILLIGRAM(S): 10 TABLET ORAL at 05:06

## 2024-09-06 RX ADMIN — Medication 20 MILLIGRAM(S): at 15:02

## 2024-09-06 RX ADMIN — CARVEDILOL 6.25 MILLIGRAM(S): 6.25 TABLET ORAL at 17:22

## 2024-09-06 RX ADMIN — Medication 2 UNIT(S): at 07:48

## 2024-09-06 RX ADMIN — INSULIN GLARGINE 5 UNIT(S): 100 INJECTION, SOLUTION SUBCUTANEOUS at 22:27

## 2024-09-06 RX ADMIN — Medication 1: at 17:21

## 2024-09-06 RX ADMIN — Medication 2: at 07:47

## 2024-09-06 RX ADMIN — Medication 1: at 12:03

## 2024-09-06 RX ADMIN — SODIUM CHLORIDE 50 MILLILITER(S): 3 INJECTION, SOLUTION INTRAVENOUS at 15:05

## 2024-09-06 RX ADMIN — Medication 40 MILLIGRAM(S): at 06:12

## 2024-09-06 RX ADMIN — Medication 1 APPLICATION(S): at 17:22

## 2024-09-06 RX ADMIN — CARVEDILOL 6.25 MILLIGRAM(S): 6.25 TABLET ORAL at 05:06

## 2024-09-06 RX ADMIN — Medication 2 TABLET(S): at 22:26

## 2024-09-06 RX ADMIN — Medication 1 APPLICATION(S): at 05:07

## 2024-09-06 RX ADMIN — ACETAMINOPHEN 650 MILLIGRAM(S): 325 TABLET ORAL at 16:05

## 2024-09-06 RX ADMIN — Medication 2 UNIT(S): at 17:21

## 2024-09-06 RX ADMIN — SODIUM PHOSPHATE, DIBASIC, ANHYDROUS, POTASSIUM PHOSPHATE, MONOBASIC, AND SODIUM PHOSPHATE, MONOBASIC, MONOHYDRATE 1 PACKET(S): 852; 155; 130 TABLET, COATED ORAL at 23:33

## 2024-09-06 RX ADMIN — Medication 1 TABLET(S): at 12:05

## 2024-09-06 RX ADMIN — Medication 100 MILLIGRAM(S): at 12:05

## 2024-09-06 RX ADMIN — Medication 1 PATCH: at 12:04

## 2024-09-06 RX ADMIN — Medication 2 UNIT(S): at 12:04

## 2024-09-06 RX ADMIN — ACETAMINOPHEN 650 MILLIGRAM(S): 325 TABLET ORAL at 15:05

## 2024-09-06 RX ADMIN — Medication 1 PATCH: at 19:00

## 2024-09-06 NOTE — PROGRESS NOTE ADULT - SUBJECTIVE AND OBJECTIVE BOX
[facility] DIVISION OF KIDNEY DISEASES AND HYPERTENSION   FOLLOW UP NOTE    --------------------------------------------------------------------------------  Chief Complaint: hyponatremia    24 hour events/subjective: Pt. was seen and examined today. No acute overnight events. No acute complaints.       PAST HISTORY  --------------------------------------------------------------------------------  No significant changes to PMH, PSH, FHx, SHx, unless otherwise noted    ALLERGIES & MEDICATIONS  --------------------------------------------------------------------------------  Allergies    amoxicillin (Angioedema)    Intolerances      Standing Inpatient Medications  amLODIPine   Tablet 10 milliGRAM(s) Oral daily  carvedilol 6.25 milliGRAM(s) Oral every 12 hours  dextrose 5%. 1000 milliLiter(s) IV Continuous <Continuous>  dextrose 5%. 1000 milliLiter(s) IV Continuous <Continuous>  dextrose 50% Injectable 25 Gram(s) IV Push once  dextrose 50% Injectable 12.5 Gram(s) IV Push once  dextrose 50% Injectable 25 Gram(s) IV Push once  folic acid 1 milliGRAM(s) Oral daily  glucagon  Injectable 1 milliGRAM(s) IntraMuscular once  insulin glargine Injectable (LANTUS) 4 Unit(s) SubCutaneous at bedtime  insulin lispro (ADMELOG) corrective regimen sliding scale   SubCutaneous three times a day before meals  insulin lispro (ADMELOG) corrective regimen sliding scale   SubCutaneous at bedtime  insulin lispro Injectable (ADMELOG) 2 Unit(s) SubCutaneous three times a day before meals  lactobacillus acidophilus 1 Tablet(s) Oral daily  lidocaine   4% Patch 1 Patch Transdermal daily  multivitamin 1 Tablet(s) Oral daily  nystatin Cream 1 Application(s) Topical two times a day  pantoprazole    Tablet 40 milliGRAM(s) Oral before breakfast  polyethylene glycol 3350 17 Gram(s) Oral daily  senna 2 Tablet(s) Oral at bedtime  sodium chloride 1.5%. 500 milliLiter(s) IV Continuous <Continuous>  thiamine 100 milliGRAM(s) Oral daily    PRN Inpatient Medications  acetaminophen     Tablet .. 650 milliGRAM(s) Oral every 6 hours PRN  albuterol    90 MICROgram(s) HFA Inhaler 2 Puff(s) Inhalation every 6 hours PRN  aluminum hydroxide/magnesium hydroxide/simethicone Suspension 30 milliLiter(s) Oral every 4 hours PRN  bisacodyl Suppository 10 milliGRAM(s) Rectal daily PRN  dextrose Oral Gel 15 Gram(s) Oral once PRN  melatonin 3 milliGRAM(s) Oral at bedtime PRN  ondansetron Injectable 4 milliGRAM(s) IV Push every 8 hours PRN      REVIEW OF SYSTEMS  --------------------------------------------------------------------------------  Gen: No fevers/chills  Head/Eyes/Ears: No HA  Respiratory: No dyspnea, cough  CV: No chest pain  GI: +abdominal discomfort. No diarrhea  : No dysuria, hematuria  MSK: No  edema  Skin: No rashes  Heme: No easy bruising or bleeding    All other systems were reviewed and are negative, except as noted.    VITALS/PHYSICAL EXAM  --------------------------------------------------------------------------------  T(C): 36.8 (09-06-24 @ 05:02), Max: 37.1 (09-05-24 @ 12:30)  HR: 87 (09-06-24 @ 05:02) (75 - 88)  BP: 115/81 (09-06-24 @ 05:02) (110/76 - 132/78)  RR: 18 (09-06-24 @ 05:02) (16 - 18)  SpO2: 98% (09-06-24 @ 05:02) (98% - 100%)  Wt(kg): --          Physical Exam:  	Gen: NAD  	HEENT: +icteric  	Pulm: CTA b/l  	CV: S1S2+  	Abd: Soft, mildly distended, +BS, NTTP    	Ext: +trace LE edema B/L  	Neuro: Awake  	Skin: Warm and dry        LABS/STUDIES  --------------------------------------------------------------------------------              12.8   12.10 >-----------<  256      [09-06-24 @ 05:00]              36.2     127  |  93  |  14  ----------------------------<  214      [09-06-24 @ 05:00]  4.5   |  22  |  0.56        Ca     8.2     [09-06-24 @ 05:00]      Mg     1.90     [09-06-24 @ 05:00]      Phos  2.8     [09-06-24 @ 05:00]    TPro  8.4  /  Alb  2.9  /  TBili  3.0  /  DBili  x   /  AST  123  /  ALT  64  /  AlkPhos  181  [09-05-24 @ 05:22]    PT/INR: PT 25.6 , INR 2.34       [09-05-24 @ 05:22]      Creatinine Trend:  SCr 0.56 [09-06 @ 05:00]  SCr 0.68 [09-05 @ 22:34]  SCr 0.60 [09-05 @ 05:22]  SCr 0.76 [09-04 @ 04:55]  SCr 0.66 [09-03 @ 03:40]    Urinalysis - [09-06-24 @ 05:00]      Color  / Appearance  / SG  / pH       Gluc 214 / Ketone   / Bili  / Urobili        Blood  / Protein  / Leuk Est  / Nitrite       RBC  / WBC  / Hyaline  / Gran  / Sq Epi  / Non Sq Epi  / Bacteria     Urine Creatinine 133      [09-05-24 @ 06:23]  Urine Sodium 35      [09-05-24 @ 06:23]  Urine Urea Nitrogen 999.3      [09-05-24 @ 06:23]  Urine Osmolality 624      [09-05-24 @ 06:23]

## 2024-09-06 NOTE — PROGRESS NOTE ADULT - PROBLEM SELECTOR PLAN 1
Patient with hyponatremia likely hypervolemic hyponatremia 2/2 decompensated ETOH-related cirrhosis. Reports baseline Na is 130. Angie 35, urine osmolality 624, and serum osmolality 268. Patient received IV albumin x1 on 9/4. Received 1.5% hypertonic saline at 50ml/h x4h, give Lasix 20 IV, and IV albumin x1 on 9/5. Today, Na corrected for hyperglycemia is 129. Recommend to continue fluid restriction to 1L. Patient with hyponatremia likely hypervolemic hyponatremia 2/2 decompensated ETOH-related cirrhosis. Reports baseline Na is 130. Angie 35, urine osmolality 624, and serum osmolality 268. Patient received IV albumin x1 on 9/4. Received 1.5% hypertonic saline at 50ml/h x4h, give Lasix 20 IV, and IV albumin x1 on 9/5. Today, Na corrected for hyperglycemia is 129. Recommend to continue fluid restriction to 1L. Recommend 1.5% HTS at 50ml/h x4h and Lasix 20 IV x1.

## 2024-09-06 NOTE — PROGRESS NOTE ADULT - PROBLEM SELECTOR PLAN 2
- On Jardiance 25mg QD and Janumet 50-1000mg QD at home. Currently holding.   - A1c 6.0% 8/21/24, well controlled on home meds   - FS above goal   - lantus 5U; increase ademalog 3 U SQ QAC   - CC diet.

## 2024-09-06 NOTE — PROGRESS NOTE ADULT - ATTENDING COMMENTS
44M, decompensated alcohol-related cirrhosis (sees Dr. Tejeda) c/b ascites and non-bleeding varices, H pylori (currently on treatment) gastritis, asthma, psoriasis, preDM and HTN who is admitted with abdominal pain. Course: found to have a UTI. Pain resolved and was attributed to his UTI; mild MELITA, hyponatremia currently delaying discharge. Ascites now resolved/small on exam, is s/p LVP - no SBP, protein was 1.6 g/dL. Is off home diuretics. Nephrology following. R lobe liver lesion 1.1 cm will need outpatient MRI  - hyponatremia, improved/resolved ascites:     would give IV albumin 25%, 100 mL q8 hrs x 5 doses, then re-evaluate, hold IV NaCl as Na not critically low anymore and it will lead to reaccumulation of ascites, low salt diet, liberalize fluid intake  - no insurance, which will start on 10/01, has planned f/u with Dr. Tejeda then, but will need to determine early next week if he can be seen in the GI clinic or by nephrology, needs to be seen ~5 days after discharge  - avoid HCTZ and losartan in the future  - resumption of some Lasix/spironolactone and possibly Jardiance once Na improved 44M, decompensated alcohol-related cirrhosis (sees Dr. Tejeda) c/b ascites and non-bleeding varices, H pylori (currently on treatment) gastritis, asthma, psoriasis, preDM and HTN who is admitted with abdominal pain. Course: found to have a UTI. Pain resolved and was attributed to his UTI; mild MELITA, hyponatremia currently delaying discharge. Ascites now resolved/small on exam, is s/p LVP - no SBP, protein was 1.6 g/dL. Is off home diuretics. Nephrology following. R lobe liver lesion 1.1 cm will need outpatient MRI  - hyponatremia, improved/resolved ascites:     would give IV albumin 25%, 100 mL q8 hrs x 5 doses, then re-evaluate, hold IV NaCl as Na not critically low anymore and it will lead to reaccumulation of ascites, low salt diet, liberalize fluid intake  - no insurance, which will start on 10/01, has planned f/u with Dr. Tejeda then, but will need to determine early next week if he can be seen in the GI clinic or by nephrology, needs to be seen ~5 days after discharge  - avoid HCTZ and losartan in the future  - resumption of some Lasix/spironolactone and possibly Jardiance once Na improved    Discussed with hospitalist, Dr. Villalta.

## 2024-09-06 NOTE — PROGRESS NOTE ADULT - PROBLEM SELECTOR PLAN 1
- fluid restrict and low Na diet  - discontinue HCTZ/lasix   - FeUrea <35%; FeNA<1% inaccurate as on diuretic   - nephrology recommend HTS with lasix IV x 1 and IV albumin  - corrected Na 129  - case d/w hepatology attending recommend f/u within 5 days of discharge once Na 130 in regards to restarting diuretics

## 2024-09-06 NOTE — PROGRESS NOTE ADULT - SUBJECTIVE AND OBJECTIVE BOX
[facility] of [facility] Medicine  Alessandra Villalta MD  Pager (O-F, 0N-6S): [phone]  Other Times:  [phone]    Patient is a 44y old  Male who presents with a chief complaint of abdominal pain (06 Sep 2024 08:30)      SUBJECTIVE / OVERNIGHT EVENTS: Pt in NAD; no acute events; FS slightly above goal      MEDICATIONS  (STANDING):  amLODIPine   Tablet 10 milliGRAM(s) Oral daily  carvedilol 6.25 milliGRAM(s) Oral every 12 hours  dextrose 5%. 1000 milliLiter(s) (100 mL/Hr) IV Continuous <Continuous>  dextrose 5%. 1000 milliLiter(s) (50 mL/Hr) IV Continuous <Continuous>  dextrose 50% Injectable 25 Gram(s) IV Push once  dextrose 50% Injectable 25 Gram(s) IV Push once  dextrose 50% Injectable 12.5 Gram(s) IV Push once  folic acid 1 milliGRAM(s) Oral daily  glucagon  Injectable 1 milliGRAM(s) IntraMuscular once  insulin glargine Injectable (LANTUS) 4 Unit(s) SubCutaneous at bedtime  insulin lispro (ADMELOG) corrective regimen sliding scale   SubCutaneous at bedtime  insulin lispro (ADMELOG) corrective regimen sliding scale   SubCutaneous three times a day before meals  insulin lispro Injectable (ADMELOG) 2 Unit(s) SubCutaneous three times a day before meals  lactobacillus acidophilus 1 Tablet(s) Oral daily  lidocaine   4% Patch 1 Patch Transdermal daily  multivitamin 1 Tablet(s) Oral daily  nystatin Cream 1 Application(s) Topical two times a day  pantoprazole    Tablet 40 milliGRAM(s) Oral before breakfast  polyethylene glycol 3350 17 Gram(s) Oral daily  senna 2 Tablet(s) Oral at bedtime  sodium chloride 1.5%. 500 milliLiter(s) (50 mL/Hr) IV Continuous <Continuous>  sodium chloride 1.5%. 500 milliLiter(s) (50 mL/Hr) IV Continuous <Continuous>  thiamine 100 milliGRAM(s) Oral daily    MEDICATIONS  (PRN):  acetaminophen     Tablet .. 650 milliGRAM(s) Oral every 6 hours PRN Temp greater or equal to 38C (100.4F), Mild Pain (1 - 3)  albuterol    90 MICROgram(s) HFA Inhaler 2 Puff(s) Inhalation every 6 hours PRN for shortness of breath and/or wheezing  aluminum hydroxide/magnesium hydroxide/simethicone Suspension 30 milliLiter(s) Oral every 4 hours PRN Dyspepsia  bisacodyl Suppository 10 milliGRAM(s) Rectal daily PRN Constipation  dextrose Oral Gel 15 Gram(s) Oral once PRN Blood Glucose LESS THAN 70 milliGRAM(s)/deciliter  melatonin 3 milliGRAM(s) Oral at bedtime PRN Insomnia  ondansetron Injectable 4 milliGRAM(s) IV Push every 8 hours PRN Nausea and/or Vomiting      CAPILLARY BLOOD GLUCOSE      POCT Blood Glucose.: 163 mg/dL (06 Sep 2024 16:59)  POCT Blood Glucose.: 180 mg/dL (06 Sep 2024 11:43)  POCT Blood Glucose.: 217 mg/dL (06 Sep 2024 06:52)  POCT Blood Glucose.: 231 mg/dL (05 Sep 2024 21:41)  POCT Blood Glucose.: 181 mg/dL (05 Sep 2024 17:44)    I&O's Summary      PHYSICAL EXAM:  Vital Signs Last 24 Hrs  T(C): 37 (06 Sep 2024 15:00), Max: 37 (06 Sep 2024 15:00)  T(F): 98.6 (06 Sep 2024 15:00), Max: 98.6 (06 Sep 2024 15:00)  HR: 86 (06 Sep 2024 15:00) (75 - 88)  BP: 128/74 (06 Sep 2024 15:00) (110/76 - 132/78)  BP(mean): --  RR: 17 (06 Sep 2024 15:00) (16 - 18)  SpO2: 100% (06 Sep 2024 15:00) (98% - 100%)    Parameters below as of 06 Sep 2024 15:00  Patient On (Oxygen Delivery Method): room air      CONSTITUTIONAL: NAD  EYES: scleral icterus   NECK: Supple,   RESPIRATORY: Normal respiratory effort; lungs are clear to auscultation bilaterally  CARDIOVASCULAR: Regular rate and rhythm, normal S1 and S2, no murmur/rub/gallop; No lower extremity edema;  ABDOMEN: Nontender to palpation, normoactive bowel sounds, no rebound/guarding; No hepatosplenomegaly  PSYCH: A+O to person, place, and time; affect appropriate  NEUROLOGY: CN 2-12 are intact and symmetric; no gross sensory deficits       LABS:                        12.8   12.10 )-----------( 256      ( 06 Sep 2024 05:00 )             36.2     09-06    127<L>  |  93<L>  |  14  ----------------------------<  214<H>  4.5   |  22  |  0.56    Ca    8.2<L>      06 Sep 2024 05:00  Phos  2.8     09-06  Mg     1.90     09-06    TPro  8.4<H>  /  Alb  2.9<L>  /  TBili  3.0<H>  /  DBili  x   /  AST  123<H>  /  ALT  64<H>  /  AlkPhos  181<H>  09-05    PT/INR - ( 05 Sep 2024 05:22 )   PT: 25.6 sec;   INR: 2.34 ratio               Urinalysis Basic - ( 06 Sep 2024 05:00 )    Color: x / Appearance: x / SG: x / pH: x  Gluc: 214 mg/dL / Ketone: x  / Bili: x / Urobili: x   Blood: x / Protein: x / Nitrite: x   Leuk Esterase: x / RBC: x / WBC x   Sq Epi: x / Non Sq Epi: x / Bacteria: x          RADIOLOGY & ADDITIONAL TESTS:  Results Reviewed:   Imaging Personally Reviewed:  Electrocardiogram Personally Reviewed:    COORDINATION OF CARE:  Care Discussed with Consultants/Other Providers [Y/N]:  Prior or Outpatient Records Reviewed [Y/N]:

## 2024-09-07 LAB
ANION GAP SERPL CALC-SCNC: 10 MMOL/L — SIGNIFICANT CHANGE UP (ref 7–14)
BUN SERPL-MCNC: 11 MG/DL — SIGNIFICANT CHANGE UP (ref 7–23)
CALCIUM SERPL-MCNC: 7.8 MG/DL — LOW (ref 8.4–10.5)
CHLORIDE SERPL-SCNC: 96 MMOL/L — LOW (ref 98–107)
CO2 SERPL-SCNC: 20 MMOL/L — LOW (ref 22–31)
CREAT SERPL-MCNC: 0.57 MG/DL — SIGNIFICANT CHANGE UP (ref 0.5–1.3)
EGFR: 122 ML/MIN/1.73M2 — SIGNIFICANT CHANGE UP
GLUCOSE SERPL-MCNC: 248 MG/DL — HIGH (ref 70–99)
HCT VFR BLD CALC: 34.7 % — LOW (ref 39–50)
HGB BLD-MCNC: 12.6 G/DL — LOW (ref 13–17)
MAGNESIUM SERPL-MCNC: 2 MG/DL — SIGNIFICANT CHANGE UP (ref 1.6–2.6)
MCHC RBC-ENTMCNC: 30.6 PG — SIGNIFICANT CHANGE UP (ref 27–34)
MCHC RBC-ENTMCNC: 36.3 GM/DL — HIGH (ref 32–36)
MCV RBC AUTO: 84.2 FL — SIGNIFICANT CHANGE UP (ref 80–100)
NRBC # BLD: 0 /100 WBCS — SIGNIFICANT CHANGE UP (ref 0–0)
NRBC # FLD: 0 K/UL — SIGNIFICANT CHANGE UP (ref 0–0)
PHOSPHATE SERPL-MCNC: 2.5 MG/DL — SIGNIFICANT CHANGE UP (ref 2.5–4.5)
PLATELET # BLD AUTO: 233 K/UL — SIGNIFICANT CHANGE UP (ref 150–400)
POTASSIUM SERPL-MCNC: 4.5 MMOL/L — SIGNIFICANT CHANGE UP (ref 3.5–5.3)
POTASSIUM SERPL-SCNC: 4.5 MMOL/L — SIGNIFICANT CHANGE UP (ref 3.5–5.3)
RBC # BLD: 4.12 M/UL — LOW (ref 4.2–5.8)
RBC # FLD: 19.7 % — HIGH (ref 10.3–14.5)
SODIUM SERPL-SCNC: 126 MMOL/L — LOW (ref 135–145)
WBC # BLD: 13.37 K/UL — HIGH (ref 3.8–10.5)
WBC # FLD AUTO: 13.37 K/UL — HIGH (ref 3.8–10.5)

## 2024-09-07 PROCEDURE — 99233 SBSQ HOSP IP/OBS HIGH 50: CPT

## 2024-09-07 RX ORDER — DIPHENHYDRAMINE HCL 50 MG
25 CAPSULE ORAL ONCE
Refills: 0 | Status: COMPLETED | OUTPATIENT
Start: 2024-09-07 | End: 2024-09-07

## 2024-09-07 RX ORDER — ALBUMIN (HUMAN) 5 G/20ML
50 SOLUTION INTRAVENOUS EVERY 8 HOURS
Refills: 0 | Status: COMPLETED | OUTPATIENT
Start: 2024-09-07 | End: 2024-09-09

## 2024-09-07 RX ORDER — INSULIN GLARGINE 100 [IU]/ML
6 INJECTION, SOLUTION SUBCUTANEOUS AT BEDTIME
Refills: 0 | Status: DISCONTINUED | OUTPATIENT
Start: 2024-09-07 | End: 2024-09-08

## 2024-09-07 RX ADMIN — Medication 40 MILLIGRAM(S): at 05:16

## 2024-09-07 RX ADMIN — Medication 1 TABLET(S): at 11:25

## 2024-09-07 RX ADMIN — ACETAMINOPHEN 650 MILLIGRAM(S): 325 TABLET ORAL at 21:37

## 2024-09-07 RX ADMIN — Medication 30 MILLILITER(S): at 13:09

## 2024-09-07 RX ADMIN — Medication 1 APPLICATION(S): at 05:16

## 2024-09-07 RX ADMIN — CARVEDILOL 6.25 MILLIGRAM(S): 6.25 TABLET ORAL at 17:10

## 2024-09-07 RX ADMIN — Medication 4 UNIT(S): at 17:10

## 2024-09-07 RX ADMIN — Medication 3 UNIT(S): at 11:27

## 2024-09-07 RX ADMIN — INSULIN GLARGINE 6 UNIT(S): 100 INJECTION, SOLUTION SUBCUTANEOUS at 21:39

## 2024-09-07 RX ADMIN — Medication 1 TABLET(S): at 11:26

## 2024-09-07 RX ADMIN — Medication 100 MILLIGRAM(S): at 11:25

## 2024-09-07 RX ADMIN — Medication 1 PATCH: at 00:00

## 2024-09-07 RX ADMIN — Medication 1 APPLICATION(S): at 17:12

## 2024-09-07 RX ADMIN — Medication 1: at 07:58

## 2024-09-07 RX ADMIN — CARVEDILOL 6.25 MILLIGRAM(S): 6.25 TABLET ORAL at 05:16

## 2024-09-07 RX ADMIN — ACETAMINOPHEN 650 MILLIGRAM(S): 325 TABLET ORAL at 13:08

## 2024-09-07 RX ADMIN — Medication 3 UNIT(S): at 07:59

## 2024-09-07 RX ADMIN — Medication 1 MILLIGRAM(S): at 11:25

## 2024-09-07 RX ADMIN — Medication 25 MILLIGRAM(S): at 21:36

## 2024-09-07 RX ADMIN — ALBUMIN (HUMAN) 50 MILLILITER(S): 5 SOLUTION INTRAVENOUS at 21:57

## 2024-09-07 RX ADMIN — Medication 2: at 17:09

## 2024-09-07 RX ADMIN — Medication 2 TABLET(S): at 21:37

## 2024-09-07 RX ADMIN — AMLODIPINE BESYLATE 10 MILLIGRAM(S): 10 TABLET ORAL at 05:16

## 2024-09-07 RX ADMIN — Medication 2: at 11:26

## 2024-09-07 NOTE — PROGRESS NOTE ADULT - PROBLEM SELECTOR PLAN 2
- On Jardiance 25mg QD and Janumet 50-1000mg QD at home. Currently holding.   - A1c 6.0% 8/21/24, well controlled on home meds   - FS above goal   - lantus 6U; increase ademalog 4 U SQ QAC   - CC diet.

## 2024-09-07 NOTE — PROGRESS NOTE ADULT - PROBLEM SELECTOR PLAN 1
- fluid restrict and low Na diet  - discontinue HCTZ/lasix   - FeNA<1% inaccurate as on diuretic; FeUrea <35%  - s/p  HTS with lasix IV x 1 and IV albumin  - corrected Na 129--128 today  - case d/w hepatology attending recommend f/u within 5 days of discharge once Na 130 in regards to restarting diuretics will need to set up follow up with [facility] vs [facility]  - f/u nephro recs - fluid restrict and low Na diet  - discontinue HCTZ/lasix   - FeNA<1% inaccurate as on diuretic; FeUrea <35%  - s/p  HTS with lasix IV x 1 and IV albumin  - corrected Na 129--128 today  - case d/w hepatology attending recommend f/u within 5 days of discharge once Na 130 in regards to restarting diuretics will need to set up follow up with [facility] clinic vs [facility]  - albumin q8 x 5 doses   - f/u nephro recs

## 2024-09-07 NOTE — PROGRESS NOTE ADULT - SUBJECTIVE AND OBJECTIVE BOX
[facility] Medicine  Alessandra Villalta MD  Pager (U-F, 4D-7D): [phone]  Other Times:  [phone]    Patient is a 44y old  Male who presents with a chief complaint of abdominal pain (06 Sep 2024 17:11)      SUBJECTIVE / OVERNIGHT EVENTS: Pt in NAD feels well not in any distress       MEDICATIONS  (STANDING):  amLODIPine   Tablet 10 milliGRAM(s) Oral daily  carvedilol 6.25 milliGRAM(s) Oral every 12 hours  dextrose 5%. 1000 milliLiter(s) (100 mL/Hr) IV Continuous <Continuous>  dextrose 5%. 1000 milliLiter(s) (50 mL/Hr) IV Continuous <Continuous>  dextrose 50% Injectable 25 Gram(s) IV Push once  dextrose 50% Injectable 25 Gram(s) IV Push once  dextrose 50% Injectable 12.5 Gram(s) IV Push once  folic acid 1 milliGRAM(s) Oral daily  glucagon  Injectable 1 milliGRAM(s) IntraMuscular once  insulin glargine Injectable (LANTUS) 5 Unit(s) SubCutaneous at bedtime  insulin lispro (ADMELOG) corrective regimen sliding scale   SubCutaneous at bedtime  insulin lispro (ADMELOG) corrective regimen sliding scale   SubCutaneous three times a day before meals  insulin lispro Injectable (ADMELOG) 3 Unit(s) SubCutaneous three times a day before meals  lactobacillus acidophilus 1 Tablet(s) Oral daily  lidocaine   4% Patch 1 Patch Transdermal daily  multivitamin 1 Tablet(s) Oral daily  nystatin Cream 1 Application(s) Topical two times a day  pantoprazole    Tablet 40 milliGRAM(s) Oral before breakfast  polyethylene glycol 3350 17 Gram(s) Oral daily  senna 2 Tablet(s) Oral at bedtime  Sodium Chloride 2% 500 milliLiter(s) 500 milliLiter(s) (40 mL/Hr) IV Continuous <Continuous>  thiamine 100 milliGRAM(s) Oral daily    MEDICATIONS  (PRN):  acetaminophen     Tablet .. 650 milliGRAM(s) Oral every 6 hours PRN Temp greater or equal to 38C (100.4F), Mild Pain (1 - 3)  albuterol    90 MICROgram(s) HFA Inhaler 2 Puff(s) Inhalation every 6 hours PRN for shortness of breath and/or wheezing  aluminum hydroxide/magnesium hydroxide/simethicone Suspension 30 milliLiter(s) Oral every 4 hours PRN Dyspepsia  bisacodyl Suppository 10 milliGRAM(s) Rectal daily PRN Constipation  dextrose Oral Gel 15 Gram(s) Oral once PRN Blood Glucose LESS THAN 70 milliGRAM(s)/deciliter  melatonin 3 milliGRAM(s) Oral at bedtime PRN Insomnia  ondansetron Injectable 4 milliGRAM(s) IV Push every 8 hours PRN Nausea and/or Vomiting      CAPILLARY BLOOD GLUCOSE      POCT Blood Glucose.: 237 mg/dL (07 Sep 2024 11:24)  POCT Blood Glucose.: 159 mg/dL (07 Sep 2024 07:11)  POCT Blood Glucose.: 213 mg/dL (06 Sep 2024 22:26)  POCT Blood Glucose.: 163 mg/dL (06 Sep 2024 16:59)    I&O's Summary      PHYSICAL EXAM:  Vital Signs Last 24 Hrs  T(C): 36.8 (07 Sep 2024 11:20), Max: 37 (06 Sep 2024 15:00)  T(F): 98.2 (07 Sep 2024 11:20), Max: 98.6 (06 Sep 2024 15:00)  HR: 78 (07 Sep 2024 11:20) (78 - 86)  BP: 122/80 (07 Sep 2024 11:20) (120/82 - 129/89)  BP(mean): --  RR: 17 (07 Sep 2024 11:20) (17 - 18)  SpO2: 99% (07 Sep 2024 11:20) (99% - 100%)    Parameters below as of 07 Sep 2024 11:20  Patient On (Oxygen Delivery Method): room air    CONSTITUTIONAL: NAD  EYES: scleral icterus   NECK: Supple,   RESPIRATORY: Normal respiratory effort; lungs are clear to auscultation bilaterally  CARDIOVASCULAR: Regular rate and rhythm, normal S1 and S2, no murmur/rub/gallop; No lower extremity edema;  ABDOMEN: Nontender to palpation, normoactive bowel sounds, no rebound/guarding; No hepatosplenomegaly  PSYCH: A+O to person, place, and time; affect appropriate  NEUROLOGY: CN 2-12 are intact and symmetric; no gross sensory       LABS:                        12.6   13.37 )-----------( 233      ( 07 Sep 2024 03:51 )             34.7     09-07    126<L>  |  96<L>  |  11  ----------------------------<  248<H>  4.5   |  20<L>  |  0.57    Ca    7.8<L>      07 Sep 2024 03:51  Phos  2.5     09-07  Mg     2.00     09-07            Urinalysis Basic - ( 07 Sep 2024 03:51 )    Color: x / Appearance: x / SG: x / pH: x  Gluc: 248 mg/dL / Ketone: x  / Bili: x / Urobili: x   Blood: x / Protein: x / Nitrite: x   Leuk Esterase: x / RBC: x / WBC x   Sq Epi: x / Non Sq Epi: x / Bacteria: x          RADIOLOGY & ADDITIONAL TESTS:  Results Reviewed:   Imaging Personally Reviewed:  Electrocardiogram Personally Reviewed:    COORDINATION OF CARE:  Care Discussed with Consultants/Other Providers [Y/N]:  Prior or Outpatient Records Reviewed [Y/N]:

## 2024-09-08 LAB
ALBUMIN SERPL ELPH-MCNC: 2.6 G/DL — LOW (ref 3.3–5)
ALP SERPL-CCNC: 176 U/L — HIGH (ref 40–120)
ALT FLD-CCNC: 60 U/L — HIGH (ref 4–41)
ANION GAP SERPL CALC-SCNC: 12 MMOL/L — SIGNIFICANT CHANGE UP (ref 7–14)
APTT BLD: 38.7 SEC — HIGH (ref 24.5–35.6)
AST SERPL-CCNC: 112 U/L — HIGH (ref 4–40)
BASOPHILS # BLD AUTO: 0.09 K/UL — SIGNIFICANT CHANGE UP (ref 0–0.2)
BASOPHILS NFR BLD AUTO: 0.7 % — SIGNIFICANT CHANGE UP (ref 0–2)
BILIRUB DIRECT SERPL-MCNC: 1.6 MG/DL — HIGH (ref 0–0.3)
BILIRUB INDIRECT FLD-MCNC: 1.3 MG/DL — HIGH (ref 0–1)
BILIRUB SERPL-MCNC: 2.9 MG/DL — HIGH (ref 0.2–1.2)
BUN SERPL-MCNC: 9 MG/DL — SIGNIFICANT CHANGE UP (ref 7–23)
CALCIUM SERPL-MCNC: 7.9 MG/DL — LOW (ref 8.4–10.5)
CHLORIDE SERPL-SCNC: 100 MMOL/L — SIGNIFICANT CHANGE UP (ref 98–107)
CO2 SERPL-SCNC: 18 MMOL/L — LOW (ref 22–31)
CREAT SERPL-MCNC: 0.6 MG/DL — SIGNIFICANT CHANGE UP (ref 0.5–1.3)
EGFR: 121 ML/MIN/1.73M2 — SIGNIFICANT CHANGE UP
EOSINOPHIL # BLD AUTO: 0.59 K/UL — HIGH (ref 0–0.5)
EOSINOPHIL NFR BLD AUTO: 4.6 % — SIGNIFICANT CHANGE UP (ref 0–6)
GLUCOSE SERPL-MCNC: 146 MG/DL — HIGH (ref 70–99)
HCT VFR BLD CALC: 34.1 % — LOW (ref 39–50)
HGB BLD-MCNC: 12.2 G/DL — LOW (ref 13–17)
IANC: 9.37 K/UL — HIGH (ref 1.8–7.4)
IMM GRANULOCYTES NFR BLD AUTO: 0.9 % — SIGNIFICANT CHANGE UP (ref 0–0.9)
INR BLD: 2.15 RATIO — HIGH (ref 0.85–1.18)
LYMPHOCYTES # BLD AUTO: 1.21 K/UL — SIGNIFICANT CHANGE UP (ref 1–3.3)
LYMPHOCYTES # BLD AUTO: 9.5 % — LOW (ref 13–44)
MAGNESIUM SERPL-MCNC: 1.9 MG/DL — SIGNIFICANT CHANGE UP (ref 1.6–2.6)
MCHC RBC-ENTMCNC: 29.8 PG — SIGNIFICANT CHANGE UP (ref 27–34)
MCHC RBC-ENTMCNC: 35.8 GM/DL — SIGNIFICANT CHANGE UP (ref 32–36)
MCV RBC AUTO: 83.4 FL — SIGNIFICANT CHANGE UP (ref 80–100)
MONOCYTES # BLD AUTO: 1.4 K/UL — HIGH (ref 0–0.9)
MONOCYTES NFR BLD AUTO: 11 % — SIGNIFICANT CHANGE UP (ref 2–14)
NEUTROPHILS # BLD AUTO: 9.37 K/UL — HIGH (ref 1.8–7.4)
NEUTROPHILS NFR BLD AUTO: 73.3 % — SIGNIFICANT CHANGE UP (ref 43–77)
NRBC # BLD: 0 /100 WBCS — SIGNIFICANT CHANGE UP (ref 0–0)
NRBC # FLD: 0 K/UL — SIGNIFICANT CHANGE UP (ref 0–0)
PHOSPHATE SERPL-MCNC: 2.7 MG/DL — SIGNIFICANT CHANGE UP (ref 2.5–4.5)
PLATELET # BLD AUTO: 236 K/UL — SIGNIFICANT CHANGE UP (ref 150–400)
POTASSIUM SERPL-MCNC: 4.6 MMOL/L — SIGNIFICANT CHANGE UP (ref 3.5–5.3)
POTASSIUM SERPL-SCNC: 4.6 MMOL/L — SIGNIFICANT CHANGE UP (ref 3.5–5.3)
PROT SERPL-MCNC: 7 G/DL — SIGNIFICANT CHANGE UP (ref 6–8.3)
PROTHROM AB SERPL-ACNC: 23.8 SEC — HIGH (ref 9.5–13)
RBC # BLD: 4.09 M/UL — LOW (ref 4.2–5.8)
RBC # FLD: 20 % — HIGH (ref 10.3–14.5)
SODIUM SERPL-SCNC: 130 MMOL/L — LOW (ref 135–145)
WBC # BLD: 12.77 K/UL — HIGH (ref 3.8–10.5)
WBC # FLD AUTO: 12.77 K/UL — HIGH (ref 3.8–10.5)

## 2024-09-08 PROCEDURE — 99232 SBSQ HOSP IP/OBS MODERATE 35: CPT

## 2024-09-08 RX ORDER — INSULIN GLARGINE 100 [IU]/ML
8 INJECTION, SOLUTION SUBCUTANEOUS AT BEDTIME
Refills: 0 | Status: DISCONTINUED | OUTPATIENT
Start: 2024-09-08 | End: 2024-09-09

## 2024-09-08 RX ADMIN — ALBUMIN (HUMAN) 50 MILLILITER(S): 5 SOLUTION INTRAVENOUS at 05:39

## 2024-09-08 RX ADMIN — Medication 2: at 17:05

## 2024-09-08 RX ADMIN — Medication 2 TABLET(S): at 21:25

## 2024-09-08 RX ADMIN — CARVEDILOL 6.25 MILLIGRAM(S): 6.25 TABLET ORAL at 17:05

## 2024-09-08 RX ADMIN — Medication 30 MILLILITER(S): at 22:18

## 2024-09-08 RX ADMIN — Medication 2: at 11:49

## 2024-09-08 RX ADMIN — Medication 4 UNIT(S): at 11:49

## 2024-09-08 RX ADMIN — Medication 4 UNIT(S): at 08:06

## 2024-09-08 RX ADMIN — Medication 1 APPLICATION(S): at 06:44

## 2024-09-08 RX ADMIN — CARVEDILOL 6.25 MILLIGRAM(S): 6.25 TABLET ORAL at 05:43

## 2024-09-08 RX ADMIN — Medication 1 TABLET(S): at 14:02

## 2024-09-08 RX ADMIN — Medication 1 PATCH: at 14:01

## 2024-09-08 RX ADMIN — POLYETHYLENE GLYCOL 3350 17 GRAM(S): 17 POWDER, FOR SOLUTION ORAL at 14:03

## 2024-09-08 RX ADMIN — Medication 1 MILLIGRAM(S): at 14:02

## 2024-09-08 RX ADMIN — ALBUMIN (HUMAN) 50 MILLILITER(S): 5 SOLUTION INTRAVENOUS at 14:05

## 2024-09-08 RX ADMIN — Medication 1 TABLET(S): at 14:03

## 2024-09-08 RX ADMIN — Medication 5 UNIT(S): at 17:06

## 2024-09-08 RX ADMIN — ACETAMINOPHEN 650 MILLIGRAM(S): 325 TABLET ORAL at 17:16

## 2024-09-08 RX ADMIN — Medication 40 MILLIGRAM(S): at 05:43

## 2024-09-08 RX ADMIN — ALBUMIN (HUMAN) 50 MILLILITER(S): 5 SOLUTION INTRAVENOUS at 21:53

## 2024-09-08 RX ADMIN — Medication 1: at 08:04

## 2024-09-08 RX ADMIN — Medication 100 MILLIGRAM(S): at 14:02

## 2024-09-08 RX ADMIN — AMLODIPINE BESYLATE 10 MILLIGRAM(S): 10 TABLET ORAL at 05:42

## 2024-09-08 RX ADMIN — INSULIN GLARGINE 8 UNIT(S): 100 INJECTION, SOLUTION SUBCUTANEOUS at 21:25

## 2024-09-08 RX ADMIN — Medication 1 APPLICATION(S): at 17:07

## 2024-09-08 NOTE — PROGRESS NOTE ADULT - PROBLEM SELECTOR PROBLEM 4
Positive Helicobacter pylori serology

## 2024-09-08 NOTE — PROGRESS NOTE ADULT - PROBLEM SELECTOR PLAN 1
- fluid restrict and low Na diet  - discontinue HCTZ/lasix   - FeNA<1% inaccurate as on diuretic; FeUrea <35%  - s/p  HTS with lasix IV x 1 and IV albumin  - corrected Na 129--128 today  - case d/w hepatology attending recommend f/u within 5 days of discharge once Na 130 in regards to restarting diuretics will need to set up follow up with [facility] clinic vs [facility]  - albumin q8 x 5 doses end in AM  - no further HTS   - Na 130

## 2024-09-08 NOTE — PROGRESS NOTE ADULT - PROBLEM SELECTOR PROBLEM 1
Hyponatremia
Urinary tract infection
Urinary tract infection
Hyponatremia
Urinary tract infection

## 2024-09-08 NOTE — PROGRESS NOTE ADULT - PROBLEM SELECTOR PLAN 7
- Per prior admission, pt drinks 1 boxed wine/day. Now willing to stop drinking d/t dx of cirrhosis.   - No current withdrawal symptoms.   - Folic acid, thiamine, MV. (s/p high dose thiamine ~ 1 week ago)   - Will place on CIWA for monitoring. No meds ordered since no withdrawal; initiate if any sxns noted.
- Per prior admission, pt drinks 1 boxed wine/day. Now willing to stop drinking d/t dx of cirrhosis.   - Folic acid, thiamine, MV. (s/p high dose thiamine ~ 1 week ago)  - No current withdrawal symptoms.
-BP as above     # Asthma: mild, c/w Albuterol PRN
-BP as above     # Asthma: mild, c/w Albuterol PRN
- On Amlodipine 10mg QD and Losartan 100mg QD at home, continuing.   - Also continuing above meds for cirrhosis which will affect bp: Spironolactone 100mg QD, Furosemide 40mg QD and Carvedilol 3.125mg BID. Since pt stopped these meds d/t not feeling well, monitor bps while on these meds. He may have had symptomatic low bps at home which may have made him self d/c meds. Adjust regimen if needed.     # Asthma: mild, c/w Albuterol PRN
- Per prior admission, pt drinks 1 boxed wine/day. Now willing to stop drinking d/t dx of cirrhosis.   - Folic acid, thiamine, MV. (s/p high dose thiamine ~ 1 week ago)  - No current withdrawal symptoms.
- Per prior admission, pt drinks 1 boxed wine/day. Now willing to stop drinking d/t dx of cirrhosis.   - No current withdrawal symptoms.   - Folic acid, thiamine, MV. (s/p high dose thiamine ~ 1 week ago)   - Will place on CIWA for monitoring. No meds ordered since no withdrawal; initiate if any sxns noted.
- Per prior admission, pt drinks 1 boxed wine/day. Now willing to stop drinking d/t dx of cirrhosis.   - Folic acid, thiamine, MV. (s/p high dose thiamine ~ 1 week ago)  - No current withdrawal symptoms.

## 2024-09-08 NOTE — PROGRESS NOTE ADULT - PROBLEM SELECTOR PROBLEM 7
Alcohol dependence
Hypertension
Alcohol dependence
Hypertension
Hypertension
Alcohol dependence

## 2024-09-08 NOTE — PROGRESS NOTE ADULT - PROBLEM SELECTOR PLAN 6
- On Jardiance 25mg QD and Janumet 50-1000mg QD at home. Currently holding.   - A1c 6.0% 8/21/24, now preDM.   - ISS low dose, adjust as needed.   - CC diet.
- Abdominal distension, + sclereal icterus on physical exam. CT showing improvement in ascites. +Portal hypertension.   - Pt needed paracentesis during prior admission. Ammonia also high at 87 during prior admission.   - MELD 3.0 25.   - Hepatology followed during prior admission and pt was advised otupt f/u. Per pt his insurance is not accepted and he will have to f/u w/ another hepatologist 10/2024.   - He was also started on Coreg 3.125mg BID, Lasix 40mg PO QD, and Spironolactone 100mg QD during last admission. Self d/sawyer above bc of "not feeling well."   - per Hepatology, cont lasix,d/c'ed  dc spironolactone   - c/w losartan. inc coreg to 6.25mg bid.   - Trend daily LFTs, INR. Ammonia level 77
- Abdominal distension, + sclereal icterus on physical exam. CT showing improvement in ascites. +Portal hypertension.   - Pt needed paracentesis during prior admission. Ammonia also high at 87 during prior admission.   - MELD 3.0 25.   - Hepatology followed during prior admission and pt was advised otupt f/u. Per pt his insurance is not accepted and he will have to f/u w/ another hepatologist 10/2024.   - He was also started on Coreg 3.125mg BID, Lasix 40mg PO QD, and Spironolactone 100mg QD during last admission. Self d/sawyer above bc of "not feeling well."   - per Hepatology, cont lasix,d/c'ed  dc spironolactone   - c/w losartan. inc coreg to 6.25mg bid.   - Trend daily LFTs, INR. Ammonia level 77
- Abdominal distension, + sclereal icterus on physical exam. CT showing improvement in ascites. +Portal hypertension.   - Pt needed paracentesis during prior admission. Ammonia also high at 87 during prior admission.   - MELD 3.0 25.   - Hepatology followed during prior admission and pt was advised otupt f/u. Per pt his insurance is not accepted and he will have to f/u w/ another hepatologist 10/2024.   - He was also started on Coreg 3.125mg BID, Lasix 40mg PO QD, and Spironolactone 100mg QD during last admission. Self d/sawyer above bc of "not feeling well."   - per Hepatology, cont lasix, dc spironolactone and losartan. inc coreg to 6.25mg bid.   - Trend daily LFTs, INR. Ammonia level 77
- On Jardiance 25mg QD and Janumet 50-1000mg QD at home. Currently holding.   - A1c 6.0% 8/21/24, now preDM.   - ISS low dose, adjust as needed.   - CC diet.
- Abdominal distension, + sclereal icterus on physical exam. CT showing improvement in ascites. +Portal hypertension.   - Pt needed paracentesis during prior admission. Ammonia also high at 87 during prior admission.   - MELD 3.0 25.   - Hepatology followed during prior admission and pt was advised otupt f/u. Per pt his insurance is not accepted and he will have to f/u w/ another hepatologist 10/2024.   - He was also started on Coreg 3.125mg BID, Lasix 40mg PO QD, and Spironolactone 100mg QD during last admission. Self d/sawyer above bc of "not feeling well."   - per Hepatology, cont lasix, dc spironolactone and losartan. inc coreg to 6.25mg bid.   - Trend daily LFTs, INR. Ammonia level 77
- On Jardiance 25mg QD and Janumet 50-1000mg QD at home. Currently holding.   - A1c 6.0% 8/21/24, now preDM.   - ISS low dose, adjust as needed.   - CC diet.
- Abdominal distension, + sclereal icterus on physical exam. CT showing improvement in ascites. +Portal hypertension.   - Pt needed paracentesis during prior admission. Ammonia also high at 87 during prior admission.   - MELD 3.0 25.   - Hepatology followed during prior admission and pt was advised otupt f/u. Per pt his insurance is not accepted and he will have to f/u w/ another hepatologist 10/2024.   - He was also started on Coreg 3.125mg BID, Lasix 40mg PO QD, and Spironolactone 100mg QD during last admission. Self d/sawyer above bc of "not feeling well."   - per Hepatology, cont lasix,d/c'ed  dc spironolactone   - c/w losartan. and coreg to 6.25mg bid.   - Trend daily LFTs, INR. Ammonia level 77

## 2024-09-08 NOTE — PROGRESS NOTE ADULT - PROBLEM SELECTOR PLAN 3
- Presented with lower abdominal pain.   - UA pos for UTI   - Ucx with group B strep   - s/p ceftriaxone   - abdominal pain improved
- Presented with lower abdominal pain.   - UA pos for UTI   - Ucx with group B strep   - s/p ceftriaxone   - abdominal pain improved
- Abdominal distension, + sclereal icterus on physical exam. CT showing improvement in ascites. +Portal hypertension.   - Pt needed paracentesis during prior admission. Ammonia also high at 87 during prior admission.   - MELD 3.0 25.   - Hepatology followed during prior admission and pt was advised otupt f/u. Per pt his insurance is not accepted and he will have to f/u w/ another hepatologist 10/2024.   - He was also started on Coreg 3.125mg BID, Lasix 40mg PO QD, and Spironolactone 100mg QD during last admission. Self d/sawyer above bc of "not feeling well."   - per Hepatology, cont lasix, dc spironolactone and losartan. inc coreg to 6.25mg bid.   - Trend daily LFTs, INR. Ammonia level 77
- Abdominal distension, + sclereal icterus on physical exam. CT showing improvement in ascites. +Portal hypertension.   - Pt needed paracentesis during prior admission. Ammonia also high at 87 during prior admission.   - MELD 3.0 25.   - Hepatology followed during prior admission and pt was advised otupt f/u. Per pt his insurance is not accepted and he will have to f/u w/ another hepatologist 10/2024.   - He was also started on Coreg 3.125mg BID, Lasix 40mg PO QD, and Spironolactone 100mg QD during last admission.   - Self d/sawyer above bc of "not feeling well." Above resume and GI emailed.  - Trend daily LFTs, INR. Ammonia level 77
- Presented with lower abdominal pain.   - UA pos for UTI   - Ucx with group B strep   - s/p ceftriaxone   - abdominal pain improved
- Abdominal distension, + sclereal icterus on physical exam. CT showing improvement in ascites. +Portal hypertension.   - Pt needed paracentesis during prior admission. Ammonia also high at 87 during prior admission.   - MELD 3.0 25.   - Hepatology followed during prior admission and pt was advised otupt f/u. Per pt his insurance is not accepted and he will have to f/u w/ another hepatologist 10/2024.   - He was also started on Coreg 3.125mg BID, Lasix 40mg PO QD, and Spironolactone 100mg QD during last admission. Self d/sawyer above bc of "not feeling well."   - per Hepatology, cont lasix, dc spironolactone and losartan. inc coreg to 6.25mg bid.   - Trend daily LFTs, INR. Ammonia level 77
- Presented with lower abdominal pain.   - UA pos for UTI   - Ucx with group B strep   - s/p ceftriaxone   - abdominal pain improved
- Presented with lower abdominal pain.   - UA pos for UTI   - Ucx with group B strep   - s/p ceftriaxone   - abdominal pain improved

## 2024-09-08 NOTE — PROGRESS NOTE ADULT - PROBLEM SELECTOR PLAN 5
- Per prior admission, pt drinks 1 boxed wine/day. Now willing to stop drinking d/t dx of cirrhosis.   - No current withdrawal symptoms.   - Folic acid, thiamine, MV. (s/p high dose thiamine ~ 1 week ago)   - Will place on CIWA for monitoring. No meds ordered since no withdrawal; initiate if any sxns noted.
- CT A/P w/ IV contrast 8/31/24: Interval decrease in abdominopelvic ascites from large volume to mild/moderate. Redemonstration of cirrhosis, indeterminate small hypodensities. Varices indicative of portal hypertension.  - Also with recent discharge for EAEC colitis.   - Also during prior admissions, notes state pt was to go for outpt c-scope 8/31 to assess for IBD/microscopic colitis.   - US eval ascites- minimal ascites; unable to get adequate pocket for paracentesis   - improved with treating UTI
- Per prior admission, pt drinks 1 boxed wine/day. Now willing to stop drinking d/t dx of cirrhosis.   - No current withdrawal symptoms.   - Folic acid, thiamine, MV. (s/p high dose thiamine ~ 1 week ago)   - Will place on CIWA for monitoring. No meds ordered since no withdrawal; initiate if any sxns noted.
- Per prior admission, pt drinks 1 boxed wine/day. Now willing to stop drinking d/t dx of cirrhosis.   - No current withdrawal symptoms.   - Folic acid, thiamine, MV. (s/p high dose thiamine ~ 1 week ago)   - Will place on CIWA for monitoring. No meds ordered since no withdrawal; initiate if any sxns noted.
- CT A/P w/ IV contrast 8/31/24: Interval decrease in abdominopelvic ascites from large volume to mild/moderate. Redemonstration of cirrhosis, indeterminate small hypodensities. Varices indicative of portal hypertension.  - Also with recent discharge for EAEC colitis.   - Also during prior admissions, notes state pt was to go for outpt c-scope 8/31 to assess for IBD/microscopic colitis.   - US eval ascites- minimal ascites; unable to get adequate pocket for paracentesis   - improved with treating UTI
- CT A/P w/ IV contrast 8/31/24: Interval decrease in abdominopelvic ascites from large volume to mild/moderate. Redemonstration of cirrhosis, indeterminate small hypodensities. Varices indicative of portal hypertension.  - Also with recent discharge for EAEC colitis.   - Also during prior admissions, notes state pt was to go for outpt c-scope 8/31 to assess for IBD/microscopic colitis.   - US eval ascites- minimal ascites; unable to get adequate pocket for paracentesis   - improved with treating UTI

## 2024-09-08 NOTE — PROGRESS NOTE ADULT - PROBLEM SELECTOR PLAN 9
Code: Full   Diet: DASH, CC, low sodium   DVT ppx: thrombocytopenia and elevated INR d/t cirrhosis; SCDs
Code: Full   Diet: DASH, CC, low sodium   DVT ppx: SCDs; encourage ambulation
Code: Full   Diet: DASH, CC, low sodium   DVT ppx: thrombocytopenia and elevated INR d/t cirrhosis; SCDs

## 2024-09-08 NOTE — PROGRESS NOTE ADULT - PROBLEM SELECTOR PLAN 4
- GI note in past: "Diagnosed w/ stool antigen on 7/8, was prescribed clarithromycin, flagyl on 8/5 but pt had not taken abx until 8/15, but stopped on 8/18 w/ onset of diarrhea. While gastric and duodenal biopsies were negative for h pylori, per ACG guidelines, patient still requires full treatment."  - On clarithromycin triple therapy: Clarithromycin 500mg BID, Metronidazole 500mg TID, and Pantoprazole 40mg QD. Would need 14 days total. completed 9/4. will discontinue antibiotics   -  h pylori ag test neg
- GI note in past: "Diagnosed w/ stool antigen on 7/8, was prescribed clarithromycin, flagyl on 8/5 but pt had not taken abx until 8/15, but stopped on 8/18 w/ onset of diarrhea. While gastric and duodenal biopsies were negative for h pylori, per ACG guidelines, patient still requires full treatment."  - On clarithromycin triple therapy: Clarithromycin 500mg BID, Metronidazole 500mg TID, and Pantoprazole 40mg QD. Would need 14 days total. Was resumed 8/20/24.   - Also pt was accidentally taking Clarithromycin 500mg QD instead of BID. Pls f/u with GI r/e this as well, and if treatment should be prolonged d/t this.
- GI note in past: "Diagnosed w/ stool antigen on 7/8, was prescribed clarithromycin, flagyl on 8/5 but pt had not taken abx until 8/15, but stopped on 8/18 w/ onset of diarrhea. While gastric and duodenal biopsies were negative for h pylori, per ACG guidelines, patient still requires full treatment."  - On clarithromycin triple therapy: Clarithromycin 500mg BID, Metronidazole 500mg TID, and Pantoprazole 40mg QD. Would need 14 days total. completed 9/4. will discontinue antibiotics   -  h pylori ag test neg
- GI note in past: "Diagnosed w/ stool antigen on 7/8, was prescribed clarithromycin, flagyl on 8/5 but pt had not taken abx until 8/15, but stopped on 8/18 w/ onset of diarrhea. While gastric and duodenal biopsies were negative for h pylori, per ACG guidelines, patient still requires full treatment."  - On clarithromycin triple therapy: Clarithromycin 500mg BID, Metronidazole 500mg TID, and Pantoprazole 40mg QD. Would need 14 days total. completed 9/4. will discontinue antibiotics   -  h pylori ag test neg
- GI note in past: "Diagnosed w/ stool antigen on 7/8, was prescribed clarithromycin, flagyl on 8/5 but pt had not taken abx until 8/15, but stopped on 8/18 w/ onset of diarrhea. While gastric and duodenal biopsies were negative for h pylori, per ACG guidelines, patient still requires full treatment."  - On clarithromycin triple therapy: Clarithromycin 500mg BID, Metronidazole 500mg TID, and Pantoprazole 40mg QD. Would need 14 days total. Was resumed 8/20/24.   - will need fu h pylori ag test post treatment
- GI note in past: "Diagnosed w/ stool antigen on 7/8, was prescribed clarithromycin, flagyl on 8/5 but pt had not taken abx until 8/15, but stopped on 8/18 w/ onset of diarrhea. While gastric and duodenal biopsies were negative for h pylori, per ACG guidelines, patient still requires full treatment."  - On clarithromycin triple therapy: Clarithromycin 500mg BID, Metronidazole 500mg TID, and Pantoprazole 40mg QD. Would need 14 days total. completed 9/4. will discontinue antibiotics   -  h pylori ag test ordered
- GI note in past: "Diagnosed w/ stool antigen on 7/8, was prescribed clarithromycin, flagyl on 8/5 but pt had not taken abx until 8/15, but stopped on 8/18 w/ onset of diarrhea. While gastric and duodenal biopsies were negative for h pylori, per ACG guidelines, patient still requires full treatment."  - On clarithromycin triple therapy: Clarithromycin 500mg BID, Metronidazole 500mg TID, and Pantoprazole 40mg QD. Would need 14 days total. Was resumed 8/20/24.   - will need fu h pylori ag test post treatment
- GI note in past: "Diagnosed w/ stool antigen on 7/8, was prescribed clarithromycin, flagyl on 8/5 but pt had not taken abx until 8/15, but stopped on 8/18 w/ onset of diarrhea. While gastric and duodenal biopsies were negative for h pylori, per ACG guidelines, patient still requires full treatment."  - On clarithromycin triple therapy: Clarithromycin 500mg BID, Metronidazole 500mg TID, and Pantoprazole 40mg QD. Would need 14 days total. completed 9/4. will discontinue antibiotics   -  h pylori ag test ordered

## 2024-09-08 NOTE — PROGRESS NOTE ADULT - PROBLEM SELECTOR PLAN 8
Code: Full   Diet: DASH, CC, low sodium   DVT ppx: thrombocytopenia and elevated INR d/t cirrhosis; SCDs
-c/w coreg and norvasc     # Asthma: mild, c/w Albuterol PRN
-c/w coreg and norvasc     # Asthma: mild, c/w Albuterol PRN
Code: Full   Diet: DASH, CC, low sodium   DVT ppx: thrombocytopenia and elevated INR d/t cirrhosis; SCDs
-c/w coreg and norvasc     # Asthma: mild, c/w Albuterol PRN
-c/w coreg and norvasc     # Asthma: mild, c/w Albuterol PRN
Code: Full   Diet: DASH, CC, low sodium   DVT ppx: thrombocytopenia and elevated INR d/t cirrhosis; SCDs
-c/w coreg and norvasc     # Asthma: mild, c/w Albuterol PRN

## 2024-09-08 NOTE — PROGRESS NOTE ADULT - PROBLEM SELECTOR PROBLEM 2
Type 2 diabetes mellitus
Type 2 diabetes mellitus
Abdominal pain
Type 2 diabetes mellitus
Type 2 diabetes mellitus
Abdominal pain
Abdominal pain
Type 2 diabetes mellitus

## 2024-09-08 NOTE — PROGRESS NOTE ADULT - PROBLEM SELECTOR PROBLEM 8
Prophylactic measure
Hypertension
Prophylactic measure
Hypertension
Prophylactic measure
Hypertension

## 2024-09-08 NOTE — PROGRESS NOTE ADULT - PROBLEM SELECTOR PROBLEM 5
Abdominal pain
Alcohol dependence
Abdominal pain
Alcohol dependence
Alcohol dependence
Abdominal pain

## 2024-09-08 NOTE — PROGRESS NOTE ADULT - PROBLEM SELECTOR PROBLEM 3
Urinary tract infection
Liver cirrhosis
Liver cirrhosis
Urinary tract infection
Liver cirrhosis
Urinary tract infection

## 2024-09-08 NOTE — PROGRESS NOTE ADULT - PROBLEM SELECTOR PROBLEM 6
Liver cirrhosis
Type 2 diabetes mellitus
Type 2 diabetes mellitus
Liver cirrhosis
Type 2 diabetes mellitus
Liver cirrhosis

## 2024-09-08 NOTE — PROGRESS NOTE ADULT - SUBJECTIVE AND OBJECTIVE BOX
[facility] Division of Hospital Medicine  Alessandra Villalta MD  Pager (G-F, [phone]): [phone]  Other Times:  [phone]    Patient is a 44y old  Male who presents with a chief complaint of abdominal pain (07 Sep 2024 11:00)      SUBJECTIVE / OVERNIGHT EVENTS: lying flat wife at bedside; feels well no pain     MEDICATIONS  (STANDING):  albumin human 25% IVPB 50 milliLiter(s) IV Intermittent every 8 hours  amLODIPine   Tablet 10 milliGRAM(s) Oral daily  carvedilol 6.25 milliGRAM(s) Oral every 12 hours  dextrose 5%. 1000 milliLiter(s) (50 mL/Hr) IV Continuous <Continuous>  dextrose 5%. 1000 milliLiter(s) (100 mL/Hr) IV Continuous <Continuous>  dextrose 50% Injectable 25 Gram(s) IV Push once  dextrose 50% Injectable 25 Gram(s) IV Push once  dextrose 50% Injectable 12.5 Gram(s) IV Push once  folic acid 1 milliGRAM(s) Oral daily  glucagon  Injectable 1 milliGRAM(s) IntraMuscular once  insulin glargine Injectable (LANTUS) 6 Unit(s) SubCutaneous at bedtime  insulin lispro (ADMELOG) corrective regimen sliding scale   SubCutaneous at bedtime  insulin lispro (ADMELOG) corrective regimen sliding scale   SubCutaneous three times a day before meals  insulin lispro Injectable (ADMELOG) 4 Unit(s) SubCutaneous three times a day before meals  lactobacillus acidophilus 1 Tablet(s) Oral daily  lidocaine   4% Patch 1 Patch Transdermal daily  multivitamin 1 Tablet(s) Oral daily  nystatin Cream 1 Application(s) Topical two times a day  pantoprazole    Tablet 40 milliGRAM(s) Oral before breakfast  polyethylene glycol 3350 17 Gram(s) Oral daily  senna 2 Tablet(s) Oral at bedtime  Sodium Chloride 2% 500 milliLiter(s) 500 milliLiter(s) (40 mL/Hr) IV Continuous <Continuous>  thiamine 100 milliGRAM(s) Oral daily    MEDICATIONS  (PRN):  acetaminophen     Tablet .. 650 milliGRAM(s) Oral every 6 hours PRN Temp greater or equal to 38C (100.4F), Mild Pain (1 - 3)  albuterol    90 MICROgram(s) HFA Inhaler 2 Puff(s) Inhalation every 6 hours PRN for shortness of breath and/or wheezing  aluminum hydroxide/magnesium hydroxide/simethicone Suspension 30 milliLiter(s) Oral every 4 hours PRN Dyspepsia  bisacodyl Suppository 10 milliGRAM(s) Rectal daily PRN Constipation  dextrose Oral Gel 15 Gram(s) Oral once PRN Blood Glucose LESS THAN 70 milliGRAM(s)/deciliter  melatonin 3 milliGRAM(s) Oral at bedtime PRN Insomnia  ondansetron Injectable 4 milliGRAM(s) IV Push every 8 hours PRN Nausea and/or Vomiting      CAPILLARY BLOOD GLUCOSE      POCT Blood Glucose.: 206 mg/dL (08 Sep 2024 11:48)  POCT Blood Glucose.: 200 mg/dL (08 Sep 2024 08:02)  POCT Blood Glucose.: 177 mg/dL (07 Sep 2024 21:32)  POCT Blood Glucose.: 213 mg/dL (07 Sep 2024 16:41)    I&O's Summary      PHYSICAL EXAM:  Vital Signs Last 24 Hrs  T(C): 36.6 (08 Sep 2024 12:57), Max: 36.7 (07 Sep 2024 17:08)  T(F): 97.8 (08 Sep 2024 12:57), Max: 98 (07 Sep 2024 17:08)  HR: 80 (08 Sep 2024 12:57) (75 - 83)  BP: 122/77 (08 Sep 2024 12:57) (117/78 - 128/82)  BP(mean): --  RR: 18 (08 Sep 2024 12:57) (18 - 18)  SpO2: 100% (08 Sep 2024 12:57) (96% - 100%)    Parameters below as of 08 Sep 2024 12:57  Patient On (Oxygen Delivery Method): room air    CONSTITUTIONAL: NAD  EYES: scleral icterus   NECK: Supple,   RESPIRATORY: Normal respiratory effort; lungs are clear to auscultation bilaterally  CARDIOVASCULAR: Regular rate and rhythm, normal S1 and S2, no murmur/rub/gallop; No lower extremity edema;  ABDOMEN: Nontender to palpation, normoactive bowel sounds, no rebound/guarding;   PSYCH: A+O to person, place, and time; affect appropriate  NEUROLOGY: CN 2-12 are intact and symmetric; no gross sensory       LABS:                        12.2   12.77 )-----------( 236      ( 08 Sep 2024 04:45 )             34.1     09-08    130<L>  |  100  |  9   ----------------------------<  146<H>  4.6   |  18<L>  |  0.60    Ca    7.9<L>      08 Sep 2024 04:45  Phos  2.7     09-08  Mg     1.90     09-08    TPro  7.0  /  Alb  2.6<L>  /  TBili  2.9<H>  /  DBili  1.6<H>  /  AST  112<H>  /  ALT  60<H>  /  AlkPhos  176<H>  09-08    PT/INR - ( 08 Sep 2024 04:45 )   PT: 23.8 sec;   INR: 2.15 ratio         PTT - ( 08 Sep 2024 04:45 )  PTT:38.7 sec      Urinalysis Basic - ( 08 Sep 2024 04:45 )    Color: x / Appearance: x / SG: x / pH: x  Gluc: 146 mg/dL / Ketone: x  / Bili: x / Urobili: x   Blood: x / Protein: x / Nitrite: x   Leuk Esterase: x / RBC: x / WBC x   Sq Epi: x / Non Sq Epi: x / Bacteria: x          RADIOLOGY & ADDITIONAL TESTS:  Results Reviewed:   Imaging Personally Reviewed:  Electrocardiogram Personally Reviewed:    COORDINATION OF CARE:  Care Discussed with Consultants/Other Providers [Y/N]:  Prior or Outpatient Records Reviewed [Y/N]:

## 2024-09-08 NOTE — PROGRESS NOTE ADULT - PROBLEM SELECTOR PLAN 2
- On Jardiance 25mg QD and Janumet 50-1000mg QD at home. Currently holding.   - A1c 6.0% 8/21/24, well controlled on home meds   - FS above goal   - lantus 8U; increase ademalog 5 U SQ QAC   - CC diet.

## 2024-09-09 ENCOUNTER — TRANSCRIPTION ENCOUNTER (OUTPATIENT)
Age: 44
End: 2024-09-09

## 2024-09-09 VITALS
SYSTOLIC BLOOD PRESSURE: 141 MMHG | TEMPERATURE: 98 F | RESPIRATION RATE: 18 BRPM | DIASTOLIC BLOOD PRESSURE: 83 MMHG | HEART RATE: 76 BPM | OXYGEN SATURATION: 100 %

## 2024-09-09 LAB
ALBUMIN SERPL ELPH-MCNC: 3.2 G/DL — LOW (ref 3.3–5)
ALP SERPL-CCNC: 183 U/L — HIGH (ref 40–120)
ALT FLD-CCNC: 62 U/L — HIGH (ref 4–41)
ANION GAP SERPL CALC-SCNC: 9 MMOL/L — SIGNIFICANT CHANGE UP (ref 7–14)
AST SERPL-CCNC: 100 U/L — HIGH (ref 4–40)
BASOPHILS # BLD AUTO: 0.1 K/UL — SIGNIFICANT CHANGE UP (ref 0–0.2)
BASOPHILS NFR BLD AUTO: 0.7 % — SIGNIFICANT CHANGE UP (ref 0–2)
BILIRUB DIRECT SERPL-MCNC: 2.1 MG/DL — HIGH (ref 0–0.3)
BILIRUB INDIRECT FLD-MCNC: 1.3 MG/DL — HIGH (ref 0–1)
BILIRUB SERPL-MCNC: 3.4 MG/DL — HIGH (ref 0.2–1.2)
BUN SERPL-MCNC: 8 MG/DL — SIGNIFICANT CHANGE UP (ref 7–23)
CALCIUM SERPL-MCNC: 9 MG/DL — SIGNIFICANT CHANGE UP (ref 8.4–10.5)
CHLORIDE SERPL-SCNC: 98 MMOL/L — SIGNIFICANT CHANGE UP (ref 98–107)
CO2 SERPL-SCNC: 22 MMOL/L — SIGNIFICANT CHANGE UP (ref 22–31)
CREAT SERPL-MCNC: 0.54 MG/DL — SIGNIFICANT CHANGE UP (ref 0.5–1.3)
EGFR: 124 ML/MIN/1.73M2 — SIGNIFICANT CHANGE UP
EOSINOPHIL # BLD AUTO: 0.59 K/UL — HIGH (ref 0–0.5)
EOSINOPHIL NFR BLD AUTO: 4.4 % — SIGNIFICANT CHANGE UP (ref 0–6)
GLUCOSE BLDC GLUCOMTR-MCNC: 211 MG/DL — HIGH (ref 70–99)
GLUCOSE SERPL-MCNC: 173 MG/DL — HIGH (ref 70–99)
HCT VFR BLD CALC: 35.5 % — LOW (ref 39–50)
HGB BLD-MCNC: 12.9 G/DL — LOW (ref 13–17)
IANC: 10.36 K/UL — HIGH (ref 1.8–7.4)
IMM GRANULOCYTES NFR BLD AUTO: 0.8 % — SIGNIFICANT CHANGE UP (ref 0–0.9)
LYMPHOCYTES # BLD AUTO: 1.16 K/UL — SIGNIFICANT CHANGE UP (ref 1–3.3)
LYMPHOCYTES # BLD AUTO: 8.6 % — LOW (ref 13–44)
MAGNESIUM SERPL-MCNC: 1.9 MG/DL — SIGNIFICANT CHANGE UP (ref 1.6–2.6)
MCHC RBC-ENTMCNC: 30.4 PG — SIGNIFICANT CHANGE UP (ref 27–34)
MCHC RBC-ENTMCNC: 36.3 GM/DL — HIGH (ref 32–36)
MCV RBC AUTO: 83.7 FL — SIGNIFICANT CHANGE UP (ref 80–100)
MONOCYTES # BLD AUTO: 1.21 K/UL — HIGH (ref 0–0.9)
MONOCYTES NFR BLD AUTO: 8.9 % — SIGNIFICANT CHANGE UP (ref 2–14)
NEUTROPHILS # BLD AUTO: 10.36 K/UL — HIGH (ref 1.8–7.4)
NEUTROPHILS NFR BLD AUTO: 76.6 % — SIGNIFICANT CHANGE UP (ref 43–77)
NRBC # BLD: 0 /100 WBCS — SIGNIFICANT CHANGE UP (ref 0–0)
NRBC # FLD: 0 K/UL — SIGNIFICANT CHANGE UP (ref 0–0)
PHOSPHATE SERPL-MCNC: 2.9 MG/DL — SIGNIFICANT CHANGE UP (ref 2.5–4.5)
PLATELET # BLD AUTO: 228 K/UL — SIGNIFICANT CHANGE UP (ref 150–400)
POTASSIUM SERPL-MCNC: 4.2 MMOL/L — SIGNIFICANT CHANGE UP (ref 3.5–5.3)
POTASSIUM SERPL-SCNC: 4.2 MMOL/L — SIGNIFICANT CHANGE UP (ref 3.5–5.3)
PROT SERPL-MCNC: 7.9 G/DL — SIGNIFICANT CHANGE UP (ref 6–8.3)
RBC # BLD: 4.24 M/UL — SIGNIFICANT CHANGE UP (ref 4.2–5.8)
RBC # FLD: 20 % — HIGH (ref 10.3–14.5)
SODIUM SERPL-SCNC: 129 MMOL/L — LOW (ref 135–145)
WBC # BLD: 13.53 K/UL — HIGH (ref 3.8–10.5)
WBC # FLD AUTO: 13.53 K/UL — HIGH (ref 3.8–10.5)

## 2024-09-09 PROCEDURE — 99239 HOSP IP/OBS DSCHRG MGMT >30: CPT

## 2024-09-09 RX ORDER — CARVEDILOL 6.25 MG/1
1 TABLET ORAL
Qty: 60 | Refills: 0
Start: 2024-09-09 | End: 2024-10-08

## 2024-09-09 RX ORDER — INSULIN GLARGINE 100 [IU]/ML
11 INJECTION, SOLUTION SUBCUTANEOUS AT BEDTIME
Refills: 0 | Status: DISCONTINUED | OUTPATIENT
Start: 2024-09-09 | End: 2024-09-09

## 2024-09-09 RX ORDER — SENNA 187 MG
2 TABLET ORAL
Qty: 0 | Refills: 0 | DISCHARGE
Start: 2024-09-09

## 2024-09-09 RX ORDER — FOLIC ACID 1 MG
1 TABLET ORAL
Qty: 0 | Refills: 0 | DISCHARGE
Start: 2024-09-09

## 2024-09-09 RX ORDER — POLYETHYLENE GLYCOL 3350 17 G/17G
17 POWDER, FOR SOLUTION ORAL
Qty: 0 | Refills: 0 | DISCHARGE
Start: 2024-09-09

## 2024-09-09 RX ORDER — THIAMINE HCL 250 MG
1 TABLET ORAL
Qty: 0 | Refills: 0 | DISCHARGE
Start: 2024-09-09

## 2024-09-09 RX ADMIN — ACETAMINOPHEN 650 MILLIGRAM(S): 325 TABLET ORAL at 16:48

## 2024-09-09 RX ADMIN — CARVEDILOL 6.25 MILLIGRAM(S): 6.25 TABLET ORAL at 05:52

## 2024-09-09 RX ADMIN — Medication 1 TABLET(S): at 11:53

## 2024-09-09 RX ADMIN — AMLODIPINE BESYLATE 10 MILLIGRAM(S): 10 TABLET ORAL at 05:52

## 2024-09-09 RX ADMIN — Medication 5 UNIT(S): at 08:01

## 2024-09-09 RX ADMIN — Medication 40 MILLIGRAM(S): at 05:52

## 2024-09-09 RX ADMIN — Medication 2: at 11:54

## 2024-09-09 RX ADMIN — Medication 2: at 08:00

## 2024-09-09 RX ADMIN — Medication 6 UNIT(S): at 11:55

## 2024-09-09 RX ADMIN — Medication 1 MILLIGRAM(S): at 11:53

## 2024-09-09 RX ADMIN — POLYETHYLENE GLYCOL 3350 17 GRAM(S): 17 POWDER, FOR SOLUTION ORAL at 11:52

## 2024-09-09 RX ADMIN — ALBUMIN (HUMAN) 50 MILLILITER(S): 5 SOLUTION INTRAVENOUS at 07:23

## 2024-09-09 RX ADMIN — Medication 100 MILLIGRAM(S): at 11:53

## 2024-09-09 RX ADMIN — Medication 1 PATCH: at 11:51

## 2024-09-09 RX ADMIN — Medication 1 APPLICATION(S): at 05:52

## 2024-09-09 NOTE — PROGRESS NOTE ADULT - ASSESSMENT
Mr. 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     #Decompensated etiology cirrhosis c/b ascites, EV  Pt with recently diagnosed cirrhosis, suspected to be in the setting EtOH given concomitant alcohol use disorder w/ hx of withdrawal Pt was diagnosed by Dr. Oakley though presented outpatient in the setting of new ascites. Plan to follow up with Dr. Tejeda on 10/1 after authorization of new insurance   MELD 3.0 score = 25  -Volume: s/p therapeutic para 8/22 - 3.5L, diagnostic para 8/20, SAAG > 1.1, and protein 1.6, unclear if taking meds, improved to mild/moderate on CT this admission. Small ascites around the liver unable to be tapped. Pt with ongoing hyponatremia with mild MELITA noted on labs today. Would home dose lasix 40-spironolactone 100 QD. Would give 50 g of 25% albumin today to help with the above   Infection: management of UTI per primary team  -EV:grade 2 esophageal varices noted on EGD 6/20/2024, no banding, no hx of variceal hemorrhage, on Coreg 6.25 mg BID  -HE: none on exam  -HCC: 1.1 cm R hepatic lobe lesion from outpatient CT, not seen in CT A/P in patient. Will need outpatient MRI abdomen.    #Hyponatremia, 125  #Mild MELITA    Pt with ongoing hyponatremia with mild MELITA noted on labs today. Would home dose lasix 40-spironolactone 100 QD. Would give 50 g of 25% albumin today to help with the above     #H Pylori Infection  On appropriate therapy     Recommendations:  - Treatment of UTI per primary   - Please give 50 g of 25% albumin  - Hold diuretics  - Low Na diet and 1.5L fluid restriction   - Continue Coreg to 6.25 mg BID   - Follow up PETH level  - Continue H pylori treatment. Will need repeat testing as an outpatient for eradication   - Trend clinical symptoms, CBC, PT/INR, CMP    All recommendations are tentative until note is attested by attending.     
Mr. 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     #Decompensated etiology cirrhosis c/b ascites, EV  Pt with recently diagnosed cirrhosis, suspected to be in the setting EtOH given concomitant alcohol use disorder w/ hx of withdrawal Pt was diagnosed by Dr. Oakley though presented outpatient in the setting of new ascites. Plan to follow up with Dr. Tejeda on 10/1 after authorization of new insurance   MELD 3.0 score = 25  -Volume: s/p therapeutic para 8/22 - 3.5L, diagnostic para 8/20, SAAG > 1.1, and protein 1.6, unclear if taking meds, improved to mild/moderate on CT this admission. Small ascites around the liver unable to be tapped. s/p 50 g of 25% albumin on 9/4 with overall imrpovement in Cr back to baseline although still with ongoing hypontaremia. Would consult nephrology to help with the above.   Infection: management of UTI per primary team  -EV:grade 2 esophageal varices noted on EGD 6/20/2024, no banding, no hx of variceal hemorrhage, on Coreg 6.25 mg BID  -HE: none on exam  -HCC: 1.1 cm R hepatic lobe lesion from outpatient CT, not seen in CT A/P in patient. Will need outpatient MRI abdomen.    #Hyponatremia, 125  #Mild MELITA (resolved)   s/p 50 g of 25% albumin on 9/4 with overall imrpovement in Cr back to baseline although still with ongoing hypontaremia. Would consult nephrology to help with the above.       #H Pylori Infection  On appropriate therapy     Recommendations:  - Treatment of UTI per primary   - Please consult nephrology to help with management of ongoing hyponatremia (once Na>130 ok to d/c home from hepatology perspective)  - Hold diuretics and continue low Na diet and 1.5L fluid restriction   - Continue Coreg to 6.25 mg BID   - Follow up PETH level  - Continue H pylori treatment. Will need repeat testing as an outpatient for eradication   - Trend clinical symptoms, CBC, PT/INR, CMP    All recommendations are tentative until note is attested by attending.   
43 yo M with PMH of H pylori (currently on treatment), gastritis, alcoholic liver cirrhosis, asthma, psoriasis, preDM, HTN presenting to hospital with lower abdominal pain. Diagnosed with UTI. Also continuing treatment for h pylori and cirrhosis. 
Mr. 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     #Decompensated etiology cirrhosis c/b ascites, EV  Pt with recently diagnosed cirrhosis, suspected to be in the setting EtOH given concomitant alcohol use disorder w/ hx of withdrawal Pt was diagnosed by Dr. Oakley though presented outpatient in the setting of new ascites. Plan to follow up with Dr. Tejeda on 10/1 after authorization of new insurance   MELD 3.0 score = 25  -Volume: s/p therapeutic para 8/22 - 3.5L, diagnostic para 8/20, SAAG > 1.1, and protein 1.6, unclear if taking meds, improved to mild/moderate on CT this admission. Small ascites around the liver unable to be tapped. s/p 50 g of 25% albumin on 9/4 with overall imrpovement in Cr back to baseline although still with ongoing hypontaremia. Would consult nephrology to help with the above.   Infection: management of UTI per primary team  -EV:grade 2 esophageal varices noted on EGD 6/20/2024, no banding, no hx of variceal hemorrhage, on Coreg 6.25 mg BID  -HE: none on exam  -HCC: 1.1 cm R hepatic lobe lesion from outpatient CT, not seen in CT A/P in patient. Will need outpatient MRI abdomen.    #Hyponatremia, 127  #Mild MELITA (resolved)   s/p 50 g of 25% albumin on 9/4 with overall imrpovement in Cr back to baseline although still with ongoing hypontaremia. Pt given albumin, Lasix and HTS with overall improvement to 127. Once Na>130, ok to d/c home from hepatology perspective      #H Pylori Infection  Completed a 14 day course. Will need repeat testing in 1 month to confirm eradiocation    Recommendations:  - Treatment of UTI per primary   - Follow up nephrology recs re: hyponatremia (Once Na>130, ok to d/c home from hepatology perspective)  - Hold diuretics and continue low Na diet and 1.5L fluid restriction   - Continue Coreg to 6.25 mg BID   - Follow up PETH level  - Repeat H. pylori stool or breath testing in 1 month to confirm eradication   - Trend clinical symptoms, CBC, PT/INR, CMP  - Pt to follow up with Dr. Tejeda in on 10/1 for further management of liver disease    All recommendations are tentative until note is attested by attending.     
Mr. 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     #Decompensated etiology cirrhosis c/b ascites, EV  Pt with recently diagnosed cirrhosis, suspected to be in the setting EtOH given concomitant alcohol use disorder w/ hx of withdrawal Pt was diagnosed by Dr. Oakley though presented outpatient in the setting of new ascites. Plan to follow up with Dr. Tejeda on 10/1 after authorization of new insurance   MELD 3.0 score = 25  -Volume: s/p therapeutic para 8/22 - 3.5L, diagnostic para 8/20, SAAG > 1.1, and protein 1.6, unclear if taking meds, improved to mild/moderate on CT this admission. Small ascites around the liver unable to be tapped.Na improved today. Would d/c home today off diuretics   -Infection: s/p course of abx with reoslution ofUTI  -EV:grade 2 esophageal varices noted on EGD 6/20/2024, no banding, no hx of variceal hemorrhage, on Coreg 6.25 mg BID  -HE: none on exam  -HCC: 1.1 cm R hepatic lobe lesion from outpatient CT, not seen in CT A/P in patient. Will need outpatient MRI abdomen.    #Hyponatremia, 129  #Mild MELITA (resolved)   s/p 50 g of 25% albumin on 9/4 with overall imrpovement in Cr back to baseline although still with ongoing hypontaremia. Pt given albumin, Lasix and HTS with overall improvement to 129-130      #H Pylori Infection  Completed a 14 day course. Will need repeat testing in 1 month to confirm eradiocation    Recommendations:  -Ok to d/c home today from hepatology perspective   -Please discharge home OFF diuretics and have him repeat BMP in 3-5 days with PCP  - Follow up PETH level  - Repeat H. pylori stool or breath testing in 1 month to confirm eradication   - Pt to follow up with Dr. Tejeda in on 10/1 for further management of liver disease    Hepatology will plan to sign off at this time. Please call back with any follow up questions or concerns.  All recommendations are tentative until note is attested by attending.       
43 yo M with PMH of H pylori (currently on treatment), gastritis, alcoholic liver cirrhosis, asthma, psoriasis, preDM, HTN presenting to hospital with lower abdominal pain. Diagnosed with UTI. Also continuing treatment for h pylori and cirrhosis. 
Mr. 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     #Decompensated etiology cirrhosis c/b ascites, EV  Pt with recently diagnosed cirrhosis, suspected to be in the setting EtOH given concomitant alcohol use disorder w/ hx of withdrawal Pt was diagnosed by Dr. Oakley though presented outpatient in the setting of new ascites.   MELD 3.0 score = 25  HE: none on exam  Varices: grade 2 esophageal varices noted on EGD 6/20/2024, no banding, no hx of variceal hemorrhage, on Coreg 3.125  Ascites: home dose lasix 40-spironolactone 100 QD, s/p therapeutic para 8/22 - 3.5L, diagnostic para 8/20, SAAG > 1.1, and protein 1.6, unclear if taking meds, improved to mild/moderate on CT   HCC: 1.1 cm R hepatic lobe lesion from outpatient CT, not seen in CT A/P in patient. Will need outpatient MRI abdomen.    #Hyponatremia, 125    #H Pylori Infection  On appropriate therapy     Recommendations:  - Treatment of UTI per primary   - Hold diuretics  - Perform diagnostic/therapeutic paracentesis   - Low sodium diet  - Coreg to 6.25  - Check PETH level  - Continue H pylori treatment. Will need repeat testing as an outpatient for eradication   - F/u Dr. Tejeda as scheduled on 9/10  - Trend clinical symptoms, CBC, PT/INR, CMP    Ernesto Naylor MD  Gastroenterology/Hepatology Fellow  Available via [employer] Teams  Pager: [phone]    On Weekdays after 5 PM to 8AM and Weekends/Holidays (All day)  For non-urgent consults, please email [email or web address]  For urgent consults, please contact on call GI team.  See Amion schedule ([facility]), [employer] system - [phone] ([facility]), or call hospital  ([facility] [place])  
Patient with hyponatremia.
45 yo M with PMH of H pylori (currently on treatment), gastritis, alcoholic liver cirrhosis, asthma, psoriasis, preDM, HTN presenting to hospital with lower abdominal pain. Diagnosed with UTI. Also continuing treatment for h pylori and cirrhosis. 
43 yo M with PMH of H pylori (currently on treatment), gastritis, alcoholic liver cirrhosis, asthma, psoriasis, preDM, HTN presenting to hospital with lower abdominal pain. Diagnosed with UTI. Also continuing treatment for h pylori and cirrhosis. 
45 yo M with PMH of H pylori (currently on treatment), gastritis, alcoholic liver cirrhosis, asthma, psoriasis, preDM, HTN presenting to hospital with lower abdominal pain. Diagnosed with UTI. Also continuing treatment for h pylori and cirrhosis. 
45 yo M with PMH of H pylori (currently on treatment), gastritis, alcoholic liver cirrhosis, asthma, psoriasis, preDM, HTN presenting to hospital with lower abdominal pain. Diagnosed with UTI. Also continuing treatment for h pylori and cirrhosis.

## 2024-09-09 NOTE — DIETITIAN INITIAL EVALUATION ADULT - PERTINENT LABORATORY DATA
09-09    129<L>  |  98  |  8   ----------------------------<  173<H>  4.2   |  22  |  0.54    Ca    9.0      09 Sep 2024 05:33  Phos  2.9     09-09  Mg     1.90     09-09    TPro  7.9  /  Alb  3.2<L>  /  TBili  3.4<H>  /  DBili  2.1<H>  /  AST  100<H>  /  ALT  62<H>  /  AlkPhos  183<H>  09-09  POCT Blood Glucose.: 226 mg/dL (09-09-24 @ 07:27)  A1C with Estimated Average Glucose Result: 6.0 % (08-21-24 @ 05:25)  A1C with Estimated Average Glucose Result: 6.7 % (12-08-23 @ 01:09)

## 2024-09-09 NOTE — PROGRESS NOTE ADULT - SUBJECTIVE AND OBJECTIVE BOX
Hepatology Progress Note    Interval Events:   Pt overall feeling well with no ongoing nausea, vomiting or abd pain     Allergies:  amoxicillin (Angioedema)      Hospital Medications:  acetaminophen     Tablet .. 650 milliGRAM(s) Oral every 6 hours PRN  albuterol    90 MICROgram(s) HFA Inhaler 2 Puff(s) Inhalation every 6 hours PRN  aluminum hydroxide/magnesium hydroxide/simethicone Suspension 30 milliLiter(s) Oral every 4 hours PRN  amLODIPine   Tablet 10 milliGRAM(s) Oral daily  bisacodyl Suppository 10 milliGRAM(s) Rectal daily PRN  carvedilol 6.25 milliGRAM(s) Oral every 12 hours  dextrose 5%. 1000 milliLiter(s) IV Continuous <Continuous>  dextrose 5%. 1000 milliLiter(s) IV Continuous <Continuous>  dextrose 50% Injectable 25 Gram(s) IV Push once  dextrose 50% Injectable 25 Gram(s) IV Push once  dextrose 50% Injectable 12.5 Gram(s) IV Push once  dextrose Oral Gel 15 Gram(s) Oral once PRN  folic acid 1 milliGRAM(s) Oral daily  glucagon  Injectable 1 milliGRAM(s) IntraMuscular once  insulin glargine Injectable (LANTUS) 8 Unit(s) SubCutaneous at bedtime  insulin lispro (ADMELOG) corrective regimen sliding scale   SubCutaneous at bedtime  insulin lispro (ADMELOG) corrective regimen sliding scale   SubCutaneous three times a day before meals  insulin lispro Injectable (ADMELOG) 5 Unit(s) SubCutaneous three times a day before meals  lactobacillus acidophilus 1 Tablet(s) Oral daily  lidocaine   4% Patch 1 Patch Transdermal daily  melatonin 3 milliGRAM(s) Oral at bedtime PRN  multivitamin 1 Tablet(s) Oral daily  nystatin Cream 1 Application(s) Topical two times a day  ondansetron Injectable 4 milliGRAM(s) IV Push every 8 hours PRN  pantoprazole    Tablet 40 milliGRAM(s) Oral before breakfast  polyethylene glycol 3350 17 Gram(s) Oral daily  senna 2 Tablet(s) Oral at bedtime  Sodium Chloride 2% 500 milliLiter(s) 500 milliLiter(s) IV Continuous <Continuous>  thiamine 100 milliGRAM(s) Oral daily      ROS: 14 point ROS negative unless otherwise state in subjective    PHYSICAL EXAM:   Vital Signs:  Vital Signs Last 24 Hrs  T(C): 36.9 (09 Sep 2024 05:45), Max: 36.9 (08 Sep 2024 21:20)  T(F): 98.4 (09 Sep 2024 05:45), Max: 98.5 (08 Sep 2024 21:20)  HR: 88 (09 Sep 2024 05:45) (80 - 89)  BP: 130/74 (09 Sep 2024 05:45) (122/77 - 133/77)  BP(mean): --  RR: 17 (09 Sep 2024 05:45) (17 - 18)  SpO2: 100% (09 Sep 2024 05:45) (100% - 100%)    Parameters below as of 09 Sep 2024 05:45  Patient On (Oxygen Delivery Method): room air      GENERAL:  No acute distress  HEENT:  NCAT, no scleral icterus  CHEST: no resp distress  HEART:  RRR  ABDOMEN:  Soft, non-tender, non-distended, normoactive bowel sound  NEURO:  Alert and oriented x 3    LABS:                        12.9   13.53 )-----------( 228      ( 09 Sep 2024 05:33 )             35.5     Mean Cell Volume: 83.7 fL (09-09-24 @ 05:33)    09-09    129<L>  |  98  |  8   ----------------------------<  173<H>  4.2   |  22  |  0.54    Ca    9.0      09 Sep 2024 05:33  Phos  2.9     09-09  Mg     1.90     09-09    TPro  7.9  /  Alb  3.2<L>  /  TBili  3.4<H>  /  DBili  2.1<H>  /  AST  100<H>  /  ALT  62<H>  /  AlkPhos  183<H>  09-09    LIVER FUNCTIONS - ( 09 Sep 2024 05:33 )  Alb: 3.2 g/dL / Pro: 7.9 g/dL / ALK PHOS: 183 U/L / ALT: 62 U/L / AST: 100 U/L / GGT: x           PT/INR - ( 08 Sep 2024 04:45 )   PT: 23.8 sec;   INR: 2.15 ratio         PTT - ( 08 Sep 2024 04:45 )  PTT:38.7 sec  Urinalysis Basic - ( 09 Sep 2024 05:33 )    Color: x / Appearance: x / SG: x / pH: x  Gluc: 173 mg/dL / Ketone: x  / Bili: x / Urobili: x   Blood: x / Protein: x / Nitrite: x   Leuk Esterase: x / RBC: x / WBC x   Sq Epi: x / Non Sq Epi: x / Bacteria: x      Imaging:  < from: US Abdomen Limited (09.03.24 @ 11:51) >  IMPRESSION:    Small volume ascites in the right upper quadrant.    Incidentally noted cirrhosis.          < end of copied text >

## 2024-09-09 NOTE — DIETITIAN INITIAL EVALUATION ADULT - ORAL INTAKE PTA/DIET HISTORY
Pt reports usually good po intake but c/o diarrhea x2 days and resulted in wt. loss of 12#/6% in past 1 week. UBW- 198#. Pt stopped alcohol consumption for past one month now due to liver cirrhosis. Pt follows CHO consistent diet pta.

## 2024-09-09 NOTE — DIETITIAN INITIAL EVALUATION ADULT - NS FNS DIET ORDER
Diet, Regular:   Consistent Carbohydrate {Evening Snack} (CSTCHOSN)  DASH/TLC {Sodium & Cholesterol Restricted} (DASH)  1000mL Fluid Restriction ([id number])  Low Sodium (09-03-24 @ 12:33) [Active]

## 2024-09-09 NOTE — PROGRESS NOTE ADULT - PROVIDER SPECIALTY LIST ADULT
Hepatology
Hospitalist
Hepatology
Hepatology
Nephrology
Hepatology
Hepatology
Hospitalist

## 2024-09-09 NOTE — DISCHARGE NOTE NURSING/CASE MANAGEMENT/SOCIAL WORK - PATIENT PORTAL LINK FT
You can access the FollowMyHealth Patient Portal offered by [facility] by registering at the following website: [email or web address]. By joining [facility]’s FollowMyHealth portal, you will also be able to view your health information using other applications (apps) compatible with our system.

## 2024-09-09 NOTE — DIETITIAN INITIAL EVALUATION ADULT - PERTINENT MEDS FT
MEDICATIONS  (STANDING):  amLODIPine   Tablet 10 milliGRAM(s) Oral daily  carvedilol 6.25 milliGRAM(s) Oral every 12 hours  dextrose 5%. 1000 milliLiter(s) (100 mL/Hr) IV Continuous <Continuous>  dextrose 5%. 1000 milliLiter(s) (50 mL/Hr) IV Continuous <Continuous>  dextrose 50% Injectable 25 Gram(s) IV Push once  dextrose 50% Injectable 25 Gram(s) IV Push once  dextrose 50% Injectable 12.5 Gram(s) IV Push once  folic acid 1 milliGRAM(s) Oral daily  glucagon  Injectable 1 milliGRAM(s) IntraMuscular once  insulin glargine Injectable (LANTUS) 11 Unit(s) SubCutaneous at bedtime  insulin lispro (ADMELOG) corrective regimen sliding scale   SubCutaneous three times a day before meals  insulin lispro (ADMELOG) corrective regimen sliding scale   SubCutaneous at bedtime  insulin lispro Injectable (ADMELOG) 6 Unit(s) SubCutaneous three times a day before meals  lactobacillus acidophilus 1 Tablet(s) Oral daily  lidocaine   4% Patch 1 Patch Transdermal daily  multivitamin 1 Tablet(s) Oral daily  nystatin Cream 1 Application(s) Topical two times a day  pantoprazole    Tablet 40 milliGRAM(s) Oral before breakfast  polyethylene glycol 3350 17 Gram(s) Oral daily  senna 2 Tablet(s) Oral at bedtime  Sodium Chloride 2% 500 milliLiter(s) 500 milliLiter(s) (40 mL/Hr) IV Continuous <Continuous>  thiamine 100 milliGRAM(s) Oral daily    MEDICATIONS  (PRN):  acetaminophen     Tablet .. 650 milliGRAM(s) Oral every 6 hours PRN Temp greater or equal to 38C (100.4F), Mild Pain (1 - 3)  albuterol    90 MICROgram(s) HFA Inhaler 2 Puff(s) Inhalation every 6 hours PRN for shortness of breath and/or wheezing  aluminum hydroxide/magnesium hydroxide/simethicone Suspension 30 milliLiter(s) Oral every 4 hours PRN Dyspepsia  bisacodyl Suppository 10 milliGRAM(s) Rectal daily PRN Constipation  dextrose Oral Gel 15 Gram(s) Oral once PRN Blood Glucose LESS THAN 70 milliGRAM(s)/deciliter  melatonin 3 milliGRAM(s) Oral at bedtime PRN Insomnia  ondansetron Injectable 4 milliGRAM(s) IV Push every 8 hours PRN Nausea and/or Vomiting

## 2024-09-09 NOTE — CHART NOTE - NSCHARTNOTEFT_GEN_A_CORE
Invasive Procedure Team (IPT) consulted for diagnostic/therapeutic paracentesis.    Indications for Diagnostic Paracentesis:  - Consistent with specialty society guidelines, ruling out Bacterial Peritonitis (in particular, SBP) in any admitted patient with ascites (even if asymptomatic), particularly in those with c/f sepsis  - Identifying etiology for new-onset ascites    Indications for Therapeutic Paracentesis:  - Tense ascites associated with abdominal discomfort, urinary retention, respiratory distress, or other evidence of end-organ compromise that is likely 2/2 the ascites itself (and not another etiology)  - Large ascites refractory to medical management (i.e. diuretics, dialysis)    Absolute Contraindications:  - DIC  - Acute Abdomen  - Cellulitis    Relative Contraindications:  - Coagulopathy (INR > 2.0 per [facility] IPT policy, but specific to performing proceduralist; refer to final recommendations below)  - Severe Thrombocytopenia (Platelet < 20-50, discuss with team/refer to final recommendations below)  - Pregnancy (discuss with team/refer to final recommendations below)  - Cellulitis/Surgical Scarring/Vasculature or Hematoma overlying only safe bedside access site  - MELITA (specifically for therapeutic paracenteses; related to already poor intravascular perfusion. Risk of MELITA increases by 1.5x per 1L of ascites fluid removed).    DVT/VTE General Guidelines (discuss with team/refer to final recommendations)    - Therapeutic Paracentesis       - Prophylactic AC          - Subcutaneous heparin should be held the morning of the procedure (5AM) and can be resumed after procedure          - DOACS (apixaban, rivaroxaban) and dabigatran should be held 12 hours prior to procedure and can be restarted 4 hours post procedure       - Therapeutic AC          - Heparin gtt should be held 4 hours prior to procedure (5AM), or may proceed in aPTT < 35 and may be started after procedure          - Therapeutic lovenox should be held 24 hours prior to procedure and restarted 24 hours post procedure          - Coumadin should be held until  < 2.0 (Therapeutic Paracentesis). Compromised synthetic liver function warrants a case-by-case evaluation as patient may require reversal agents (i.e., Vitamin K)       - Antiplatelet Agents          - Aspirin 81 is okay to continue          - P2Y12 receptor blockers (Clopidogrel, Ticagrelor, Prasugrel) should be held 5-7 days prior to procedure (discuss with team)    - Diagnostic Paracentesis       - Can continue prophylactic/therapeutic AC. Will discuss with primary team if patient is at a very high risk of bleeding    ASSESSMENT/RECOMMENDATIONS  - The patient HAS been screened at bedside to evaluate for a drainable pocket but NO safe pocket seen, will defer attempting diagnostic or therapeutic paracentesis at this time  - Please reconsult as necessary if patient develops new/worsening symptoms of shortness of breath, abdominal pain, significant distension, etc.    José Miguel Rodríguez MD  EM/IM PGY-1
Na level noted to be 129 today. Spoke with attending who clears patient for discharge, but needs to fluid restrict to 1L per day.

## 2024-09-09 NOTE — DIETITIAN INITIAL EVALUATION ADULT - OTHER INFO
44 year old male with decompensated alcohol-related cirrhosis c/b ascites and non-bleeding varices, H pylori (currently on treatment) gastritis, asthma, psoriasis, preDM and HTN who is admitted with abdominal pain 2/2 UTI.     Pt seen and reports improved po intake now and diarrhea resolved. No GI distress (nausea/vomiting/diarrhea/constipation.) at present. Pt with wt. loss of 12#/6% in past 1 week. UBW- 198#. Noted skin with ecchymosis, no edema per nursing flow sheet. Labs reviewed. A1c- 6.0% (8/21/24). Reviewed CHO consistent diet with 1000ml fluid restriction and pt interested to comply to it.

## 2024-09-09 NOTE — DIETITIAN INITIAL EVALUATION ADULT - PROBLEM SELECTOR PLAN 5
- Per prior admission, pt drinks 1 boxed wine/day. Now willing to stop drinking d/t dx of cirrhosis.   - No current withdrawal symptoms.   - Folic acid, thiamine, MV. (s/p high dose thiamine ~ 1 week ago)   - Will place on CIWA for monitoring. No meds ordered since no withdrawal; initiate if any sxns noted.

## 2024-09-09 NOTE — DISCHARGE NOTE NURSING/CASE MANAGEMENT/SOCIAL WORK - NSDCFUADDAPPT_GEN_ALL_CORE_FT
Follow up with PCP within 1-2 weeks of discharge. If you are in need of a general medicine physician and post-discharge medical follow-up for further care/recommendations you may contact the [facility] for an appointment at [phone]. PLEASE FLUID RESTRICT - NO MORE THAN 1L A DAY. YOU NEED A REPEAT BMP (BASIC METABOLIC PANEL IN 3 DAYS)     Follow up with the nephrologist within 1-2 weeks of discharge. If you do not have a kidney doctor, you may follow up at [facility] at [address] [place] [address], [address]nd floor, [place] [address]. Call [phone] for an appointment. YOU NEED A REPEAT BMP (BASIC METABOLIC PANEL IN 3 DAYS).     Follow up with hepatology clinic. Please call [phone] to schedule an appointment. ([facility] at [address] [place] [address]). You should see Dr. Tejeda in the hepatology clinic.

## 2024-09-10 ENCOUNTER — APPOINTMENT (OUTPATIENT)
Dept: HEPATOLOGY | Facility: CLINIC | Age: 44
End: 2024-09-10

## 2024-09-10 LAB
PETH 16:0/18:1: >400 NG/ML — HIGH
PETH 16:0/18:2: >400 NG/ML — HIGH
PETH COMMENTS: SIGNIFICANT CHANGE UP

## 2024-10-22 ENCOUNTER — APPOINTMENT (OUTPATIENT)
Dept: HEPATOLOGY | Facility: CLINIC | Age: 44
End: 2024-10-22
Payer: COMMERCIAL

## 2024-10-22 VITALS
SYSTOLIC BLOOD PRESSURE: 137 MMHG | DIASTOLIC BLOOD PRESSURE: 87 MMHG | HEIGHT: 70 IN | BODY MASS INDEX: 28.35 KG/M2 | HEART RATE: 82 BPM | TEMPERATURE: 98.3 F | RESPIRATION RATE: 16 BRPM | OXYGEN SATURATION: 98 % | WEIGHT: 198 LBS

## 2024-10-22 DIAGNOSIS — K70.31 ALCOHOLIC CIRRHOSIS OF LIVER WITH ASCITES: ICD-10-CM

## 2024-10-22 DIAGNOSIS — K76.6 PORTAL HYPERTENSION: ICD-10-CM

## 2024-10-22 PROCEDURE — 99215 OFFICE O/P EST HI 40 MIN: CPT

## 2024-10-24 ENCOUNTER — NON-APPOINTMENT (OUTPATIENT)
Age: 44
End: 2024-10-24

## 2024-10-24 LAB
AFP-TM SERPL-MCNC: 7.4 NG/ML
ALBUMIN SERPL ELPH-MCNC: 3.5 G/DL
ALP BLD-CCNC: 152 U/L
ALT SERPL-CCNC: 41 U/L
ANION GAP SERPL CALC-SCNC: 16 MMOL/L
AST SERPL-CCNC: 64 U/L
BILIRUB SERPL-MCNC: 1.4 MG/DL
BUN SERPL-MCNC: 9 MG/DL
CALCIUM SERPL-MCNC: 9.6 MG/DL
CHLORIDE SERPL-SCNC: 99 MMOL/L
CO2 SERPL-SCNC: 22 MMOL/L
CREAT SERPL-MCNC: 0.86 MG/DL
EGFR: 108 ML/MIN/1.73M2
ESTIMATED AVERAGE GLUCOSE: 146 MG/DL
GLUCOSE SERPL-MCNC: 154 MG/DL
HBA1C MFR BLD HPLC: 6.7 %
HCT VFR BLD CALC: 41.8 %
HGB BLD-MCNC: 13.8 G/DL
INR PPP: 1.51 RATIO
MCHC RBC-ENTMCNC: 29 PG
MCHC RBC-ENTMCNC: 33 GM/DL
MCV RBC AUTO: 87.8 FL
PLATELET # BLD AUTO: 215 K/UL
POTASSIUM SERPL-SCNC: 4.5 MMOL/L
PROT SERPL-MCNC: 9 G/DL
PT BLD: 17.8 SEC
RBC # BLD: 4.76 M/UL
RBC # FLD: 15.3 %
SODIUM SERPL-SCNC: 136 MMOL/L
TSH SERPL-ACNC: 1.15 UIU/ML
WBC # FLD AUTO: 8.52 K/UL

## 2024-10-28 ENCOUNTER — NON-APPOINTMENT (OUTPATIENT)
Age: 44
End: 2024-10-28

## 2024-10-28 LAB
PETH 16:0/18:1: NORMAL
PETH 16:0/18:2: NEGATIVE NG/ML
PETH COMMENTS: NORMAL

## 2024-10-31 NOTE — CONSULT NOTE ADULT - SUBJECTIVE AND OBJECTIVE BOX
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 She had multiple thromboembolic events in 2021 despite adherent anticoagulation use (though warfarin was perpetually low) and there are concerns for chronic thromboembolic disease but did not have pulmonary HTN on a November 2021 cath. She is maintained on chronic PO opioids and twice-weekly infusion visits (since 1/24/22) but has been able to be maintained on this regimen and has stayed out of the ED most of the time with even rarer admissions for most of 2023. In 2024, her ED visits have increased somewhat but admissions remain rare.    Interval History:  Jennifer is seen today for routine hematology follow-up.     She recently saw Dr. Duncan on 10/21/24 at which time her oral oxycodone prescription was reduced to 10 tabs/week. Following this visit she has been treated for sickle cell pain in the ED 4 times over the past 10 days. She feels discouraged by this as it has been a goal for her to not only reduce her home oxycodone use but also minimize utilization of the ED. She recalls events leading to a few of her ED visits. Once she was experiencing nausea, vomiting and abdominal pain which led to her seeking care. A separate visit was triggered by back pain which she experienced when helping clean out the house in preparation for a likely move that she and her family will be making in the next few months.    She is doing well with her mom and sister and acknowledges expected challenges within both of these 
relationships. She remains busy caring for her infant niece and her nephew. She admits this distracts her and often forces her to push through her pain.     She is still dating her boyfriend and states this is going well.    Her EGD is scheduled next week. Her mom has agreed to drive her to and from this appointment. Transportation was previously a barrier in completing this procedure.          Sickle Cell Disease Comprehensive Checklist  Bone Health/Avascular Necrosis Screening/Symptoms (each visit): no new concerns today  Leg Ulcer evaluation (every visit): nothing to note  Hypertension (every visit):stable none for several months  Last pulmonary evaluation (asthma, AMAN, pulm HTN): 9/28/22, due for follow-up  Stroke/silent cerebral infarct Hx (Y/N): Yes TIA ~2014, first event ~age 1 with full stroke and R sided weakness  Last PCP Visit: 9/30/24 with Dr. Case  Vaccines:  PCV13: 5/13/19  Pneumovax (PPSV23): 3/04, 10/09, 7/12/19, 10/2024, due in 2029  Menactra: 4/2010, 9/2015 (MCV done 8/16/21)  MenB: 9/16/15, 5/13/19 (due in 2024)  Influenza: 10/11/24  Audiology (chelation): done 2/27/24    Comparison to Previous Audiogram dated 6/6/2022:     Pure Tone Thresholds 250-8000 Hz:    RIGHT: stable    LEFT: stable     High Frequency Audiometry 9,000-16,000Hz:     RIGHT: stable    LEFT: stable     Word Recognition Score:    RIGHT: stable    LEFT: stable    Plan last reviewed with patient: 10/2/23    Patient background: 23 yo F, enjoys movies and kids though there are times where she does not really want to talk to people. Does not have a lot of social support at home.     Sickle Cell Disease History  Primary Hematologist Team: Jose Rafale Duncan  PCP: none  Genotype: SS  Acute Pain Crisis Treatment: (limiting IV)   ER   Dilaudid 1 mg IV q1h up to 3 doses  Toradol 30 mg IV x1   Maintenance IV fluids with LR  Other: Zofran 8 mg IV PRN nausea  Inpatient:  PCA Dilaudid 1 hr q30 minutes, no basal rate  Toradol 30 mg x6h x 4 
Chief Complaint:  abdominal pain    HPI:    Mr. Whitney is a 44 year old male with decompensated alcohol-related cirrhosis c/b ascites and non-bleeding varices, H pylori (currently on treatment) gastritis, asthma, psoriasis, preDM and HTN who presented with lower abdominal pain and urinary symptoms. He was found to have a UTI, treated with antibiotics and is feeling better.     Patient was recently discharged from the hospital 8/23 for EAEC diarrhea. He was evaluated by Hepatology s/p paracentesis, started on Aldactone, Furosemide, and Carvedilol with Hepatology follow-up on 9/10/24.     At present, he denies abdominal pain, nausea, vomiting, abdominal distension, confusion, change in bowel habits or signs of bleeding. He reports his last drink was at a party 8 days ago. He currently lives with his family including his wife and works as a  for the [employer]. Patient reports being motivated to abstain from alcohol to prevent progression of liver disease. His new insurance will become active on 10/1.     Allergies:  amoxicillin (Angioedema)      Hospital Medications:  acetaminophen     Tablet .. 650 milliGRAM(s) Oral every 6 hours PRN  albuterol    90 MICROgram(s) HFA Inhaler 2 Puff(s) Inhalation every 6 hours PRN  aluminum hydroxide/magnesium hydroxide/simethicone Suspension 30 milliLiter(s) Oral every 4 hours PRN  amLODIPine   Tablet 10 milliGRAM(s) Oral daily  carvedilol 3.125 milliGRAM(s) Oral every 12 hours  cefTRIAXone   IVPB 1000 milliGRAM(s) IV Intermittent every 24 hours  clarithromycin 500 milliGRAM(s) Oral two times a day  dextrose 5%. 1000 milliLiter(s) IV Continuous <Continuous>  dextrose 5%. 1000 milliLiter(s) IV Continuous <Continuous>  dextrose 50% Injectable 25 Gram(s) IV Push once  dextrose 50% Injectable 25 Gram(s) IV Push once  dextrose 50% Injectable 12.5 Gram(s) IV Push once  dextrose Oral Gel 15 Gram(s) Oral once PRN  folic acid 1 milliGRAM(s) Oral daily  furosemide    Tablet 40 milliGRAM(s) Oral daily  glucagon  Injectable 1 milliGRAM(s) IntraMuscular once  hydrochlorothiazide 25 milliGRAM(s) Oral daily  insulin lispro (ADMELOG) corrective regimen sliding scale   SubCutaneous three times a day before meals  insulin lispro (ADMELOG) corrective regimen sliding scale   SubCutaneous at bedtime  lactobacillus acidophilus 1 Tablet(s) Oral daily  LORazepam   Injectable 2 milliGRAM(s) IV Push every 1 hour PRN  LORazepam   Injectable 2 milliGRAM(s) IV Push every 2 hours PRN  losartan 100 milliGRAM(s) Oral daily  melatonin 3 milliGRAM(s) Oral at bedtime PRN  metroNIDAZOLE    Tablet 500 milliGRAM(s) Oral three times a day  multivitamin 1 Tablet(s) Oral daily  ondansetron Injectable 4 milliGRAM(s) IV Push every 8 hours PRN  pantoprazole    Tablet 40 milliGRAM(s) Oral before breakfast  spironolactone 100 milliGRAM(s) Oral daily  thiamine 100 milliGRAM(s) Oral daily      PMHX/PSHX:  Hypertension    Diabetes    Cirrhosis    Alcohol use    Asthma    Psoriasis    No significant past surgical history    Family history:  No family history of cardiovascular disease    Social History:   No alcohol or smoking    ROS:   See HPI    PHYSICAL EXAM:   GENERAL:  NAD, resting comfortably in bed  HEENT:  Sclera mildly icteric  RESPIRATORY:  Normal effort  CARDIAC:  HDS  ABDOMEN:  Soft, non-tender, mildly distended  EXTREMITIES:  No edema  SKIN:  Warm & Dry. No jaundice.   NEURO:  Alert, conversant, no focal deficit, A&Ox3, no asterixis    Vital Signs:  Vital Signs Last 24 Hrs  T(C): 36.7 (01 Sep 2024 09:45), Max: 37 (31 Aug 2024 22:01)  T(F): 98.1 (01 Sep 2024 09:45), Max: 98.6 (31 Aug 2024 22:01)  HR: 75 (01 Sep 2024 12:00) (75 - 90)  BP: 107/71 (01 Sep 2024 12:00) (101/69 - 139/87)  BP(mean): --  RR: 16 (01 Sep 2024 12:00) (16 - 18)  SpO2: 100% (01 Sep 2024 12:00) (100% - 100%)    Parameters below as of 01 Sep 2024 12:00  Patient On (Oxygen Delivery Method): room air      Daily Height in cm: 177.8 (01 Sep 2024 14:49)    Daily     LABS:                        13.7   10.37 )-----------( 225      ( 01 Sep 2024 06:30 )             38.9     Mean Cell Volume: 84.0 fL (09-01-24 @ 06:30)    09-01    127<L>  |  97<L>  |  10  ----------------------------<  108<H>  4.5   |  19<L>  |  0.59    Ca    8.5      01 Sep 2024 06:30  Phos  3.6     09-01  Mg     2.10     09-01    TPro  9.1<H>  /  Alb  2.9<L>  /  TBili  3.4<H>  /  DBili  x   /  AST  134<H>  /  ALT  61<H>  /  AlkPhos  177<H>  09-01    LIVER FUNCTIONS - ( 01 Sep 2024 06:30 )  Alb: 2.9 g/dL / Pro: 9.1 g/dL / ALK PHOS: 177 U/L / ALT: 61 U/L / AST: 134 U/L / GGT: x           PT/INR - ( 01 Sep 2024 06:30 )   PT: 23.6 sec;   INR: 2.15 ratio         PTT - ( 30 Aug 2024 23:43 )  PTT:41.1 sec  Urinalysis Basic - ( 01 Sep 2024 06:30 )    Color: x / Appearance: x / SG: x / pH: x  Gluc: 108 mg/dL / Ketone: x  / Bili: x / Urobili: x   Blood: x / Protein: x / Nitrite: x   Leuk Esterase: x / RBC: x / WBC x   Sq Epi: x / Non Sq Epi: x / Bacteria: x      Amylase Serum--      Lipase serum--       [id number]                          13.7   10.37 )-----------( 225      ( 01 Sep 2024 06:30 )             38.9                         14.2   10.96 )-----------( 259      ( 30 Aug 2024 23:38 )             41.3     Imaging:    < from: CT Abdomen and Pelvis w/ IV Cont (08.31.24 @ 03:04) >  FINDINGS:  LOWER CHEST: Within normal limits.    LIVER: Heterogeneous cirrhotic liver. A few small hypodensities are too   small to characterize.  BILE DUCTS: Normal caliber.  GALLBLADDER: Within normal limits.  SPLEEN: Within normal limits.  PANCREAS: Within normal limits.  ADRENALS: Within normal limits.  KIDNEYS/URETERS: Within normal limits.    BLADDER: Incompletely distended.  REPRODUCTIVE ORGANS: The gland is not enlarged.    BOWEL: No bowel obstruction.Appendix is unremarkable. The colon is   underdistended without significant fecal load. No convincing focal bowel   wall thickening or diverticulitis.  PERITONEUM/RETROPERITONEUM: Interval decrease in abdominal ascites from   large to mild-moderate.  VESSELS: Perigastric, perisplenic, paraesophageal varices. Recanalized   umbilical vein.  LYMPH NODES: No lymphadenopathy.  ABDOMINAL WALL: Within normal limits.  BONES: Within normal limits.    IMPRESSION:    Interval decrease in abdominopelvic ascites from large volume to   mild/moderate.  Redemonstration of cirrhosis, indeterminate small hypodensities. Varices   indicative of portal hypertension.    < end of copied text >    
hr  LR maintenance x 1-2 days  Other Medications: Zofran  ASA  Supportive Care: Docusate, Senna  Chronic Pain Medications:    Home regimen: oxycodone 15 mg p.o. q.4-6 hours p.r.n. breakthrough pain.  She also continues on Voltaren gel, and Zoloft among other medications.    -Also benefits from mental health visits, acupuncture  Baseline Hemoglobin: 7 g/dl without chronic transfusions  Hydroxyurea use: Yes  H/O blood transfusions: Yes, several (iron overload) Most recent 11/20/2021  H/O Transfusion Reactions: no  Antibodies:none  H/O Acute Chest Syndrome: Yes  Last episode:9/05/22 (previously 4/26/21, 10/2019)   ICU/intubation: not with 9/2022 admission  H/O Stroke: Yes (managed with chronic transfusions in the past, stopped late Spring 2020)  H/O VTE: Yes (2/2021)  H/O Cholecystectomy or Splenectomy: no  H/O Asthma, Pulm HTN, AVN, Leg Ulcers, Nephropathy, Retinopathy, etc: Iron overload, asthma, chronic lung disease, physical limitations from early stroke    ---------------------------------------  Jennifer Cervantes's Goals (reviewed today 10/31/24)    1-3 month goal:  Pursue living independently in an apartment    6 month goal:      12 month goal:      Disease-specific goal(s):  Decrease frequency of ED visits. Decrease opioid qty per weekly refill. She'd like to get down to 5 tablets per week       Current Outpatient Medications   Medication Sig Dispense Refill    albuterol (PROVENTIL) (2.5 MG/3ML) 0.083% neb solution Take 2 vials (5 mg) by nebulization every 6 hours as needed for shortness of breath or wheezing. 90 mL 3    aspirin (ASA) 81 MG chewable tablet Take 1 tablet (81 mg) by mouth 2 times daily 60 tablet 11    budesonide-formoterol (SYMBICORT) 160-4.5 MCG/ACT Inhaler Inhale 2 puffs twice daily plus 1-2 puffs as needed. May use up to 12 puffs per day. 20.4 g 11    deferasirox (JADENU) 360 MG tablet Take 4 tablets (1,440 mg) by mouth daily. 120 tablet 11    Deferiprone, Twice Daily, 1000 MG TABS Take 2,000 mg 
by mouth 2 times daily. 150 tablet 3    diphenhydrAMINE (BENADRYL) 50 MG capsule Take 1 capsule (50 mg) by mouth every 6 hours as needed for itching or allergies. Administer 12  hours pre - IV contrast injection and patient must have a . 1 capsule 0    EPINEPHrine (ANY BX GENERIC EQUIV) 0.3 MG/0.3ML injection 2-pack Inject 0.3 mLs (0.3 mg) into the muscle as needed for anaphylaxis. 1 each 1    gabapentin (NEURONTIN) 300 MG capsule Take 1 capsule (300 mg) by mouth 3 times daily. Take PO 1 pill in evening (300 mg) TID to start. If tolerated, increase by 300 mg in morning or lunch every few days, updating your doctor's office in time to get refills. The maximum dose is 900 mg TID. (Patient taking differently: Take 600 mg by mouth at bedtime. Take PO 1 pill in evening (300 mg) TID to start. If tolerated, increase by 300 mg in morning or lunch every few days, updating your doctor's office in time to get refills. The maximum dose is 900 mg TID.) 90 capsule 1    hydroxyurea (HYDREA) 500 MG capsule Take 6 capsules (3,000 mg) by mouth daily 180 capsule 11    ibuprofen (ADVIL/MOTRIN) 800 MG tablet Take 800 mg by mouth every 8 hours as needed for moderate pain      melatonin 5 MG tablet Take 1 tablet (5 mg) by mouth nightly as needed for sleep 30 tablet 1    methocarbamol (ROBAXIN) 750 MG tablet Take 1 tablet (750 mg) by mouth 4 times daily as needed for muscle spasms (during sickle pain crises. Okay to take scheduled while in pain). 60 tablet 1    methylPREDNISolone (MEDROL) 32 MG tablet Take 1 tablet (32 mg) by mouth daily. 2 hours prior to the procedure with IV contrast 1 tablet 0    naloxone (NARCAN) 4 MG/0.1ML nasal spray Spray 4 mg into one nostril alternating nostrils as needed for opioid reversal. every 2-3 minutes until assistance arrives 0.2 mL 0    ondansetron (ZOFRAN ODT) 8 MG ODT tab Take 1 tablet (8 mg) by mouth every 8 hours as needed for nausea 40 tablet 4    oxyCODONE IR (ROXICODONE) 10 MG tablet Take 1 
tablet (10 mg) by mouth every 6 hours as needed for severe pain or breakthrough pain. Goal 4 per day. Max 6 per day. 10 tablet 0    pantoprazole (PROTONIX) 40 MG EC tablet Take 1 tablet (40 mg) by mouth daily. 30 tablet 0     Past Medical History  Past Medical History:   Diagnosis Date    Anxiety     Bleeding disorder (H)     Blood clotting disorder (H)     Cerebral infarction (H) 2015    Cognitive developmental delay     low IQ. Please recognize when managing pain and planning with her    Depressive disorder     Hemiplegia and hemiparesis following cerebral infarction affecting right dominant side (H)     right hand contractures    Iron overload due to repeated red blood cell transfusions     Migraines     Multiple subsegmental pulmonary emboli without acute cor pulmonale (H) 02/01/2021    Oppositional defiant behavior     Presence of intrauterine contraceptive device 2/18/2020    Superficial venous thrombosis of arm, right 03/25/2021    Uncomplicated asthma      Past Surgical History:   Procedure Laterality Date    AS INSERT TUNNELED CV 2 CATH W/O PORT/PUMP      CHOLECYSTECTOMY      CV RIGHT HEART CATH MEASUREMENTS RECORDED N/A 11/18/2021    Procedure: Right Heart Cath;  Surgeon: Jackson Stauffer MD;  Location:  HEART CARD[facility] CATH LAB    INSERT PORT VASCULAR ACCESS Left 4/21/2021    Procedure: INSERTION, VASCULAR ACCESS PORT (NOT SURE ON SIDE UNTIL REMOVAL);  Surgeon: Rajan More MD;  Location: [facility] OR    IR CHEST PORT PLACEMENT > 5 YRS OF AGE  4/21/2021    IR CVC NON TUNNEL LINE REMOVAL  5/6/2021    IR CVC NON TUNNEL PLACEMENT > 5 YRS  04/07/2020    IR CVC NON TUNNEL PLACEMENT > 5 YRS  4/30/2021    IR CVC NON TUNNEL PLACEMENT > 5 YRS  9/7/2022    IR PORT REMOVAL LEFT  4/21/2021    REMOVE PORT VASCULAR ACCESS Left 4/21/2021    Procedure: REMOVAL, VASCULAR ACCESS PORT LEFT;  Surgeon: Rajan More MD;  Location: [facility] OR    REPAIR TENDON ELBOW Right 10/02/2019    Procedure: Right Elbow Flexor Lengthening, 
[facility] DIVISION OF KIDNEY DISEASES AND HYPERTENSION -- [phone]  -- INITIAL CONSULT NOTE  --------------------------------------------------------------------------------  HPI: 44M PMH decompensated ETOH-related cirrhosis c/b ascites and nonbleeding varices (requiring paracentesis in 8/2024 admission), HTN, H pylori gastritis (on active treatment), asthma, psoriasis, prediabetes. Presented with abdominal pain. Admitted for UTI and further management of cirrhosis. Nephrology consulted for hyponatremia. At the time of evaluation, Na is 126 (corrected for hyperglycemia). Patient does not follow a Nephrologist outpatient. Patient reports baseline Na is 130. Was discharged on lasix 40mg daily, spironolactone 100mg daily, and Coreg - however had not taken these medications in over 1 week due to "not feeling well."     Patient currently undergoing H Pylori treatement with clarithromycin, metronidazole, PPI. Other home meds: Jardiance, Janumet amlodipine, losartan. Patient received albumin x1 on 9/4. Endorses occasional abdominal discomfort described as fullness, but no pain. Denies dysuria or urinary retention. Currently on 1L fluid restriction.         PAST HISTORY  --------------------------------------------------------------------------------  PAST MEDICAL & SURGICAL HISTORY:  Hypertension      Diabetes      Cirrhosis      Alcohol use      Asthma      Psoriasis      No significant past surgical history        FAMILY HISTORY:  No family history of cardiovascular disease      PAST SOCIAL HISTORY:    ALLERGIES & MEDICATIONS  --------------------------------------------------------------------------------  Allergies    amoxicillin (Angioedema)    Intolerances      Standing Inpatient Medications  amLODIPine   Tablet 10 milliGRAM(s) Oral daily  carvedilol 6.25 milliGRAM(s) Oral every 12 hours  dextrose 5%. 1000 milliLiter(s) IV Continuous <Continuous>  dextrose 5%. 1000 milliLiter(s) IV Continuous <Continuous>  dextrose 50% Injectable 25 Gram(s) IV Push once  dextrose 50% Injectable 12.5 Gram(s) IV Push once  dextrose 50% Injectable 25 Gram(s) IV Push once  folic acid 1 milliGRAM(s) Oral daily  furosemide    Tablet 40 milliGRAM(s) Oral daily  glucagon  Injectable 1 milliGRAM(s) IntraMuscular once  insulin glargine Injectable (LANTUS) 4 Unit(s) SubCutaneous at bedtime  insulin lispro (ADMELOG) corrective regimen sliding scale   SubCutaneous three times a day before meals  insulin lispro (ADMELOG) corrective regimen sliding scale   SubCutaneous at bedtime  lactobacillus acidophilus 1 Tablet(s) Oral daily  lidocaine   4% Patch 1 Patch Transdermal daily  multivitamin 1 Tablet(s) Oral daily  nystatin Cream 1 Application(s) Topical two times a day  pantoprazole    Tablet 40 milliGRAM(s) Oral before breakfast  polyethylene glycol 3350 17 Gram(s) Oral daily  senna 2 Tablet(s) Oral at bedtime  thiamine 100 milliGRAM(s) Oral daily    PRN Inpatient Medications  acetaminophen     Tablet .. 650 milliGRAM(s) Oral every 6 hours PRN  albuterol    90 MICROgram(s) HFA Inhaler 2 Puff(s) Inhalation every 6 hours PRN  aluminum hydroxide/magnesium hydroxide/simethicone Suspension 30 milliLiter(s) Oral every 4 hours PRN  bisacodyl Suppository 10 milliGRAM(s) Rectal daily PRN  dextrose Oral Gel 15 Gram(s) Oral once PRN  LORazepam   Injectable 2 milliGRAM(s) IV Push every 1 hour PRN  LORazepam   Injectable 2 milliGRAM(s) IV Push every 2 hours PRN  melatonin 3 milliGRAM(s) Oral at bedtime PRN  ondansetron Injectable 4 milliGRAM(s) IV Push every 8 hours PRN      REVIEW OF SYSTEMS  --------------------------------------------------------------------------------  Gen: No fevers/chills  Head/Eyes/Ears: No HA  Respiratory: No dyspnea, cough  CV: No chest pain  GI: +abdominal discomfort. No diarrhea  : No dysuria, hematuria  MSK: No  edema  Skin: No rashes  Heme: No easy bruising or bleeding    All other systems were reviewed and are negative, except as noted.    VITALS/PHYSICAL EXAM  --------------------------------------------------------------------------------  T(C): 36.9 (09-05-24 @ 05:25), Max: 36.9 (09-05-24 @ 05:25)  HR: 83 (09-05-24 @ 05:25) (77 - 83)  BP: 126/77 (09-05-24 @ 05:25) (125/74 - 131/89)  RR: 18 (09-05-24 @ 05:25) (16 - 18)  SpO2: 98% (09-05-24 @ 05:25) (98% - 100%)  Wt(kg): --        09-04-24 @ 07:01  -  09-05-24 @ 07:00  --------------------------------------------------------  IN: 300 mL / OUT: 0 mL / NET: 300 mL        Physical Exam:  	Gen: NAD  	HEENT: +icteric  	Pulm: +bibasilar crackles   	CV: S1S2+  	Abd: Soft, mildly distended, +BS, NTTP    	Ext: +trace LE edema B/L  	Neuro: Awake  	Skin: Warm and dry      LABS/STUDIES  --------------------------------------------------------------------------------              13.6   11.74 >-----------<  246      [09-05-24 @ 05:22]              38.3     124  |  91  |  16  ----------------------------<  256      [09-05-24 @ 05:22]  4.5   |  22  |  0.60        Ca     8.4     [09-05-24 @ 05:22]      Mg     2.00     [09-05-24 @ 05:22]      Phos  3.0     [09-05-24 @ 05:22]    TPro  8.4  /  Alb  2.9  /  TBili  3.0  /  DBili  x   /  AST  123  /  ALT  64  /  AlkPhos  181  [09-05-24 @ 05:22]    PT/INR: PT 25.6 , INR 2.34       [09-05-24 @ 05:22]      Creatinine Trend:  SCr 0.60 [09-05 @ 05:22]  SCr 0.76 [09-04 @ 04:55]  SCr 0.66 [09-03 @ 03:40]  SCr 0.59 [09-02 @ 04:22]  SCr 0.59 [09-01 @ 06:30]    Urinalysis - [09-05-24 @ 05:22]      Color  / Appearance  / SG  / pH       Gluc 256 / Ketone   / Bili  / Urobili        Blood  / Protein  / Leuk Est  / Nitrite       RBC  / WBC  / Hyaline  / Gran  / Sq Epi  / Non Sq Epi  / Bacteria     Urine Creatinine 133      [09-05-24 @ 06:23]  Urine Sodium 35      [09-05-24 @ 06:23]  Urine Urea Nitrogen 999.3      [09-05-24 @ 06:23]  Urine Osmolality 624      [09-05-24 @ 06:23]    
Flexor Pronator Slide Of Wrist and Finger, Thumb Adductor Lengthening;  Surgeon: Anai Franco MD;  Location: [facility] OR    TONSILLECTOMY Bilateral 10/02/2019    Procedure: Bilateral Tonsillectomy;  Surgeon: Farhana Guy MD;  Location: [facility] OR    ZC BREAST REDUCTION (INCLUDES LIPO) TIER 3 Bilateral 04/23/2019     Allergies   Allergen Reactions    Contrast Dye Angioedema     Hives and breathing issues    Fish-Derived Products Hives    Seafood Hives    Adhesive Tape Hives     Primipore dressing causes hives    Gadolinium     Iodinated Contrast Media      Social History   Social History     Tobacco Use    Smoking status: Never     Passive exposure: Never    Smokeless tobacco: Never   Substance Use Topics    Alcohol use: Not Currently     Alcohol/week: 0.0 standard drinks of alcohol    Drug use: Never   Past medical history and social history were reviewed.    Physical Examination:  /85 (BP Location: Right arm, Patient Position: Sitting, Cuff Size: Adult Regular)   Pulse 110   Temp 99.2  F (37.3  C) (Oral)   Resp 14   Wt 71.7 kg (158 lb)   SpO2 96%   BMI 27.12 kg/m       Wt Readings from Last 10 Encounters:   10/31/24 71.7 kg (158 lb)   10/24/24 69.4 kg (153 lb)   10/23/24 70.8 kg (156 lb)   10/21/24 69.7 kg (153 lb 9.6 oz)   10/17/24 70.3 kg (155 lb)   10/17/24 70.9 kg (156 lb 3.2 oz)   10/12/24 70.3 kg (155 lb)   10/11/24 71 kg (156 lb 9.6 oz)   10/04/24 68.9 kg (152 lb)   09/26/24 69.4 kg (152 lb 14.4 oz)     General: Pleasant female, NAD  Eyes: EOMI, PERRL  Respiratory: Normal effort, no adventitious breath sounds  Ext: no peripheral edema  Neurologic: chronic contracture of right arm and wrist. Steady gait. A/O x 4.  Skin: No rashes, petechiae, or bruising noted on exposed skin.   Laboratory Data:  Recent Results (from the past 240 hours)   Comprehensive metabolic panel    Collection Time: 10/23/24 12:14 PM   Result Value Ref Range    Sodium 136 135 - 145 mmol/L    Potassium 4.2 3.4 - 
5.3 mmol/L    Carbon Dioxide (CO2) 25 22 - 29 mmol/L    Anion Gap 8 7 - 15 mmol/L    Urea Nitrogen 6.8 6.0 - 20.0 mg/dL    Creatinine 0.52 0.51 - 0.95 mg/dL    GFR Estimate >90 >60 mL/min/1.73m2    Calcium 9.3 8.8 - 10.4 mg/dL    Chloride 103 98 - 107 mmol/L    Glucose 103 (H) 70 - 99 mg/dL    Alkaline Phosphatase 63 40 - 150 U/L    AST 29 0 - 45 U/L    ALT 19 0 - 50 U/L    Protein Total 8.1 6.4 - 8.3 g/dL    Albumin 4.5 3.5 - 5.2 g/dL    Bilirubin Total 1.2 <=1.2 mg/dL   Reticulocyte count    Collection Time: 10/23/24 12:14 PM   Result Value Ref Range    % Reticulocyte 10.8 (H) 0.5 - 2.0 %    Absolute Reticulocyte 0.315 (H) 0.025 - 0.095 10e6/uL   Lipase    Collection Time: 10/23/24 12:14 PM   Result Value Ref Range    Lipase 24 13 - 60 U/L   HCG qualitative Blood    Collection Time: 10/23/24 12:14 PM   Result Value Ref Range    hCG Serum Qualitative Negative Negative   CBC with platelets and differential    Collection Time: 10/23/24 12:14 PM   Result Value Ref Range    WBC Count 10.2 4.0 - 11.0 10e3/uL    RBC Count 3.03 (L) 3.80 - 5.20 10e6/uL    Hemoglobin 8.6 (L) 11.7 - 15.7 g/dL    Hematocrit 25.7 (L) 35.0 - 47.0 %    MCV 85 78 - 100 fL    MCH 28.4 26.5 - 33.0 pg    MCHC 33.5 31.5 - 36.5 g/dL    RDW 21.6 (H) 10.0 - 15.0 %    Platelet Count 736 (H) 150 - 450 10e3/uL    % Neutrophils 86 %    % Lymphocytes 8 %    % Monocytes 5 %    % Eosinophils 0 %    % Basophils 1 %    % Immature Granulocytes 0 %    NRBCs per 100 WBC 1 (H) <1 /100    Absolute Neutrophils 8.8 (H) 1.6 - 8.3 10e3/uL    Absolute Lymphocytes 0.8 0.8 - 5.3 10e3/uL    Absolute Monocytes 0.6 0.0 - 1.3 10e3/uL    Absolute Eosinophils 0.0 0.0 - 0.7 10e3/uL    Absolute Basophils 0.1 0.0 - 0.2 10e3/uL    Absolute Immature Granulocytes 0.0 <=0.4 10e3/uL    Absolute NRBCs 0.1 10e3/uL   RBC and Platelet Morphology    Collection Time: 10/23/24 12:14 PM   Result Value Ref Range    RBC Morphology Confirmed RBC Indices     Platelet Assessment  Automated Count 
Confirmed. Platelet morphology is normal.     Automated Count Confirmed. Platelet morphology is normal.    Polychromasia Slight (A) None Seen    RBC Fragments Slight (A) None Seen    Sickle Cells Slight (A) None Seen    Target Cells Slight (A) None Seen   Lactic acid whole blood with 1x repeat in 2 hr when >2    Collection Time: 10/23/24 12:15 PM   Result Value Ref Range    Lactic Acid, Initial 1.1 0.7 - 2.0 mmol/L   EKG 12-lead, tracing only    Collection Time: 10/24/24 10:29 PM   Result Value Ref Range    Systolic Blood Pressure  mmHg    Diastolic Blood Pressure  mmHg    Ventricular Rate 72 BPM    Atrial Rate 72 BPM    UT Interval 154 ms    QRS Duration 76 ms     ms    QTc 442 ms    P Axis 78 degrees    R AXIS 46 degrees    T Axis 32 degrees    Interpretation ECG       Sinus rhythm  Normal ECG  Unconfirmed report - interpretation of this ECG is computer generated - see medical record for final interpretation    Confirmed by - EMERGENCY ROOM, PHYSICIAN ([id number]),  KRISHNA GAMBLE ([id number]) on 10/25/2024 3:35:37 PM     INR    Collection Time: 10/24/24 11:01 PM   Result Value Ref Range    INR 1.14 0.85 - 1.15   Comprehensive metabolic panel    Collection Time: 10/24/24 11:01 PM   Result Value Ref Range    Sodium 139 135 - 145 mmol/L    Potassium 3.7 3.4 - 5.3 mmol/L    Carbon Dioxide (CO2) 26 22 - 29 mmol/L    Anion Gap 7 7 - 15 mmol/L    Urea Nitrogen 8.9 6.0 - 20.0 mg/dL    Creatinine 0.70 0.51 - 0.95 mg/dL    GFR Estimate >90 >60 mL/min/1.73m2    Calcium 8.6 (L) 8.8 - 10.4 mg/dL    Chloride 106 98 - 107 mmol/L    Glucose 87 70 - 99 mg/dL    Alkaline Phosphatase 54 40 - 150 U/L    AST 30 0 - 45 U/L    ALT 22 0 - 50 U/L    Protein Total 7.0 6.4 - 8.3 g/dL    Albumin 4.0 3.5 - 5.2 g/dL    Bilirubin Total 1.4 (H) <=1.2 mg/dL   Lipase    Collection Time: 10/24/24 11:01 PM   Result Value Ref Range    Lipase 29 13 - 60 U/L   Troponin T, High Sensitivity    Collection Time: 10/24/24 11:01 PM   Result Value Ref Range 
   Troponin T, High Sensitivity <6 <=14 ng/L   HCG qualitative Blood    Collection Time: 10/24/24 11:01 PM   Result Value Ref Range    hCG Serum Qualitative Negative Negative   CBC with platelets and differential    Collection Time: 10/24/24 11:01 PM   Result Value Ref Range    WBC Count 11.8 (H) 4.0 - 11.0 10e3/uL    RBC Count 2.72 (L) 3.80 - 5.20 10e6/uL    Hemoglobin 8.0 (L) 11.7 - 15.7 g/dL    Hematocrit 23.7 (L) 35.0 - 47.0 %    MCV 87 78 - 100 fL    MCH 29.4 26.5 - 33.0 pg    MCHC 33.8 31.5 - 36.5 g/dL    RDW 21.1 (H) 10.0 - 15.0 %    Platelet Count 700 (H) 150 - 450 10e3/uL    % Neutrophils 66 %    % Lymphocytes 25 %    % Monocytes 6 %    % Eosinophils 2 %    % Basophils 2 %    % Immature Granulocytes 0 %    NRBCs per 100 WBC 0 <1 /100    Absolute Neutrophils 7.8 1.6 - 8.3 10e3/uL    Absolute Lymphocytes 2.9 0.8 - 5.3 10e3/uL    Absolute Monocytes 0.7 0.0 - 1.3 10e3/uL    Absolute Eosinophils 0.2 0.0 - 0.7 10e3/uL    Absolute Basophils 0.2 0.0 - 0.2 10e3/uL    Absolute Immature Granulocytes 0.0 <=0.4 10e3/uL    Absolute NRBCs 0.0 10e3/uL   Reticulocyte count    Collection Time: 10/24/24 11:01 PM   Result Value Ref Range    % Reticulocyte 7.7 (H) 0.5 - 2.0 %    Absolute Reticulocyte 0.232 (H) 0.025 - 0.095 10e6/uL   HCG quantitative pregnancy (blood)    Collection Time: 10/24/24 11:01 PM   Result Value Ref Range    hCG Quantitative <1 <5 mIU/mL   Basic metabolic panel    Collection Time: 10/27/24  3:40 AM   Result Value Ref Range    Sodium 141 135 - 145 mmol/L    Potassium 4.0 3.4 - 5.3 mmol/L    Chloride 106 98 - 107 mmol/L    Carbon Dioxide (CO2) 25 22 - 29 mmol/L    Anion Gap 10 7 - 15 mmol/L    Urea Nitrogen 8.1 6.0 - 20.0 mg/dL    Creatinine 0.53 0.51 - 0.95 mg/dL    GFR Estimate >90 >60 mL/min/1.73m2    Calcium 9.0 8.8 - 10.4 mg/dL    Glucose 94 70 - 99 mg/dL   Reticulocyte count    Collection Time: 10/27/24  3:40 AM   Result Value Ref Range    % Reticulocyte 5.4 (H) 0.5 - 2.0 %    Absolute Reticulocyte 
0.156 (H) 0.025 - 0.095 10e6/uL   CBC with platelets and differential    Collection Time: 10/27/24  3:40 AM   Result Value Ref Range    WBC Count 9.8 4.0 - 11.0 10e3/uL    RBC Count 2.94 (L) 3.80 - 5.20 10e6/uL    Hemoglobin 8.2 (L) 11.7 - 15.7 g/dL    Hematocrit 24.5 (L) 35.0 - 47.0 %    MCV 83 78 - 100 fL    MCH 27.9 26.5 - 33.0 pg    MCHC 33.5 31.5 - 36.5 g/dL    RDW 21.0 (H) 10.0 - 15.0 %    Platelet Count 711 (H) 150 - 450 10e3/uL    % Neutrophils 61 %    % Lymphocytes 24 %    % Monocytes 8 %    % Eosinophils 4 %    % Basophils 2 %    % Immature Granulocytes 1 %    NRBCs per 100 WBC 0 <1 /100    Absolute Neutrophils 6.0 1.6 - 8.3 10e3/uL    Absolute Lymphocytes 2.4 0.8 - 5.3 10e3/uL    Absolute Monocytes 0.7 0.0 - 1.3 10e3/uL    Absolute Eosinophils 0.4 0.0 - 0.7 10e3/uL    Absolute Basophils 0.2 0.0 - 0.2 10e3/uL    Absolute Immature Granulocytes 0.1 <=0.4 10e3/uL    Absolute NRBCs 0.0 10e3/uL   RBC and Platelet Morphology    Collection Time: 10/27/24  3:40 AM   Result Value Ref Range    RBC Morphology Confirmed RBC Indices     Platelet Assessment  Automated Count Confirmed. Platelet morphology is normal.     Automated Count Confirmed. Platelet morphology is normal.    Sickle Cells Slight (A) None Seen    Stomatocytes Slight (A) None Seen    Target Cells Slight (A) None Seen    Teardrop Cells Slight (A) None Seen   Comprehensive metabolic panel    Collection Time: 10/29/24  1:03 AM   Result Value Ref Range    Sodium 138 135 - 145 mmol/L    Potassium 3.9 3.4 - 5.3 mmol/L    Carbon Dioxide (CO2) 23 22 - 29 mmol/L    Anion Gap 12 7 - 15 mmol/L    Urea Nitrogen 8.0 6.0 - 20.0 mg/dL    Creatinine 0.59 0.51 - 0.95 mg/dL    GFR Estimate >90 >60 mL/min/1.73m2    Calcium 8.9 8.8 - 10.4 mg/dL    Chloride 103 98 - 107 mmol/L    Glucose 88 70 - 99 mg/dL    Alkaline Phosphatase 53 40 - 150 U/L    AST 33 0 - 45 U/L    ALT 19 0 - 50 U/L    Protein Total 7.0 6.4 - 8.3 g/dL    Albumin 4.2 3.5 - 5.2 g/dL    Bilirubin Total 1.7 
(H) <=1.2 mg/dL   Reticulocyte count    Collection Time: 10/29/24  1:03 AM   Result Value Ref Range    % Reticulocyte 7.3 (H) 0.5 - 2.0 %    Absolute Reticulocyte 0.181 (H) 0.025 - 0.095 10e6/uL   CBC with platelets and differential    Collection Time: 10/29/24  1:03 AM   Result Value Ref Range    WBC Count 11.4 (H) 4.0 - 11.0 10e3/uL    RBC Count 2.49 (L) 3.80 - 5.20 10e6/uL    Hemoglobin 7.0 (L) 11.7 - 15.7 g/dL    Hematocrit 21.0 (L) 35.0 - 47.0 %    MCV 84 78 - 100 fL    MCH 28.1 26.5 - 33.0 pg    MCHC 33.3 31.5 - 36.5 g/dL    RDW 22.4 (H) 10.0 - 15.0 %    Platelet Count 619 (H) 150 - 450 10e3/uL    % Neutrophils 63 %    % Lymphocytes 22 %    % Monocytes 7 %    % Eosinophils 6 %    % Basophils 2 %    % Immature Granulocytes 0 %    NRBCs per 100 WBC 0 <1 /100    Absolute Neutrophils 7.1 1.6 - 8.3 10e3/uL    Absolute Lymphocytes 2.5 0.8 - 5.3 10e3/uL    Absolute Monocytes 0.8 0.0 - 1.3 10e3/uL    Absolute Eosinophils 0.7 0.0 - 0.7 10e3/uL    Absolute Basophils 0.2 0.0 - 0.2 10e3/uL    Absolute Immature Granulocytes 0.0 <=0.4 10e3/uL    Absolute NRBCs 0.0 10e3/uL   RBC and Platelet Morphology    Collection Time: 10/29/24  1:03 AM   Result Value Ref Range    RBC Morphology Confirmed RBC Indices     Platelet Assessment  Automated Count Confirmed. Platelet morphology is normal.     Automated Count Confirmed. Platelet morphology is normal.    Elliptocytes Slight (A) None Seen    Sickle Cells Slight (A) None Seen   Comprehensive metabolic panel    Collection Time: 10/31/24  6:38 PM   Result Value Ref Range    Sodium 137 135 - 145 mmol/L    Potassium 3.9 3.4 - 5.3 mmol/L    Carbon Dioxide (CO2) 23 22 - 29 mmol/L    Anion Gap 11 7 - 15 mmol/L    Urea Nitrogen 6.6 6.0 - 20.0 mg/dL    Creatinine 0.52 0.51 - 0.95 mg/dL    GFR Estimate >90 >60 mL/min/1.73m2    Calcium 9.0 8.8 - 10.4 mg/dL    Chloride 103 98 - 107 mmol/L    Glucose 90 70 - 99 mg/dL    Alkaline Phosphatase 64 40 - 150 U/L    AST 35 0 - 45 U/L    ALT 22 0 - 50 
U/L    Protein Total 7.9 6.4 - 8.3 g/dL    Albumin 4.7 3.5 - 5.2 g/dL    Bilirubin Total 1.8 (H) <=1.2 mg/dL   Reticulocyte count    Collection Time: 10/31/24  6:38 PM   Result Value Ref Range    % Reticulocyte 10.9 (H) 0.5 - 2.0 %    Absolute Reticulocyte 0.144 (H) 0.025 - 0.095 10e6/uL   CBC with platelets and differential    Collection Time: 10/31/24  6:38 PM   Result Value Ref Range    WBC Count 10.5 4.0 - 11.0 10e3/uL    RBC Count 2.72 (L) 3.80 - 5.20 10e6/uL    Hemoglobin 7.9 (L) 11.7 - 15.7 g/dL    Hematocrit 23.2 (L) 35.0 - 47.0 %    MCV 85 78 - 100 fL    MCH 29.0 26.5 - 33.0 pg    MCHC 34.1 31.5 - 36.5 g/dL    RDW 22.2 (H) 10.0 - 15.0 %    Platelet Count 605 (H) 150 - 450 10e3/uL    % Neutrophils 68 %    % Lymphocytes 19 %    % Monocytes 6 %    % Eosinophils 4 %    % Basophils 2 %    % Immature Granulocytes 0 %    NRBCs per 100 WBC 1 (H) <1 /100    Absolute Neutrophils 7.2 1.6 - 8.3 10e3/uL    Absolute Lymphocytes 2.0 0.8 - 5.3 10e3/uL    Absolute Monocytes 0.6 0.0 - 1.3 10e3/uL    Absolute Eosinophils 0.4 0.0 - 0.7 10e3/uL    Absolute Basophils 0.2 0.0 - 0.2 10e3/uL    Absolute Immature Granulocytes 0.0 <=0.4 10e3/uL    Absolute NRBCs 0.1 10e3/uL     I reviewed the above labs today.    Assessment and Plan:  1. Sickle Cell HgbSS Disease  2. Acute pain crises  3. Chronic Pain  4. 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 History of CVA  7. Hearing loss  8. Nausea/vomiting       Jennifer is seen today for routine follow-up. She reports increased pain today and may pursue care in the ED following our visit. Her use of the ED has increased over the past few weeks although she is very aware of this and continues to make efforts to reduce ED visits. Her oxycodone prescription was recently decreased from 15 to 10 tablets/week. It is very apparent she is making an effort to meet her goals. She verbalizes clear understanding that if she increases use of IV opioids 
while decreasing oral opioid use, this negates efforts to reduce overall opioids.     She is due for CT Abdomen/Pelvis tomorrow as ordered by Dr. Case. and EGD for some intermittent abdominal pain, nausea and vomiting. I do agree we should proceed with both procedures as intermittent nausea and vomiting has been a precursor to pain flares and ED visits at times.    She has been encouraged her to try to extend the length of her prescriptions for greater than 1 week to extend the time windows although this wasn't discussed in detail today.     Deferiprone was added back to her iron chelation regimen. She had been off of this for a few months due to apparent confusion with the pharmacy over taking dual treatment with Jadenu. I ensured Jennifer we would keep track of this more closely in the future to ensure compliance. Her next ferrprabhu is due next month.       -------------------------------------  Plan:  -Continue Hydrea to 3000mg daily to help lessen frequency of sickle cell pain.   -Continue monthly crizanlizumab infusions  -Continue slow taper of oxycodone. Currently receiving oxycodone 10 mg every 4 hours PRN, qty 10.    -She can self-reduce infusion days/week and we will continue to keep the cap at 2/week for now.   -Continue infusion center visits limited to two times per week (Mondays and Fridays ideally although still needs to call to request). Continue diligent home management with current medications, heat, rest, compression, warm baths.   -If unable to manage at home can go to ED  with continued plan to use IV Dilaudid and take a break from ketamine (10/2/23). No PCA use and goal for any admission would still be to discharge by 5 days or less  -EGD for evaluation of ongoing n/v and abdominal pain, scheduled in November.  CT abdomen/pelvis also due as ordered by Dr. Case.  Complete stool studies for calprotectin and H. pylori.  -Continue metoclopramide 5 mg TID PRN  - Continue Jadenu for iron overload 
and deferiprone. Due for Ferriscan in November 2024.   -Continue aspirin BID  -Due at some point for Meningitis B booster.  -RTC with me in 1 month    Patricia Mantilla CNP    ---  50 minutes spent on the date of the encounter doing chart review, review of test results, interpretation of tests, patient visit, and documentation.    The longitudinal plan of care for the diagnosis(es)/condition(s) as documented were addressed during this visit. Due to the added complexity in care, I will continue to support Jennifer in the subsequent management and with ongoing continuity of care.

## 2024-12-05 NOTE — ED PROVIDER NOTE - OBJECTIVE STATEMENT
44 y,o. 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 pylori (In Sep 2024, s/p treatment) presenting with concerns of acute onset LGIB. Patient mentions that this past weekend given the holidays, started drinking alcohol again (previously abstained for 3 months). Drank for 3 days straight, last drink reportedly 2-3 days ago. Mentions that has not generally felt well so has missed his home medications for the last 2 days. Felt his abdomen getting more distended. Today, developed acute onset watery diarrhea. Shortly after developed dark red blood, no clots. Has >10 bowel movements w/out worsening or improvement if bleed noted. Given hx of cirrhosis decided to seek medical opinion. No recent travel nor sick contacts. Currently denies fevers, chills, CP, SOB, urinary symptoms. 44 y,o. 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 pylori (In Sep 2024, s/p treatment) presenting with concerns of acute onset LGIB. Patient mentions that this past weekend given the holidays, started drinking alcohol again (previously abstained for 3 months). Drank for 3 days straight, last drink reportedly 2-3 days ago. Mentions that has not generally felt well so has missed his home medications for the last 2 days. Felt his abdomen getting more distended. Today, developed acute onset watery diarrhea. Shortly after developed dark red blood, no clots. Has >10 bowel movements w/out worsening or improvement if bleed noted. Given hx of cirrhosis decided to seek medical opinion. No recent travel nor sick contacts. Currently denies fevers, chills, CP, SOB, urinary symptoms.    Attending/Eri: 43 yo M with alochol cirrhosis as described above, last drink two days ago, p/w 2-3 days of diarrhea descried as dark maroon, and increase abd girth. Denies, , SOB, palp, fever/chills, or abd pain. Noncompliant with his medications for the past two days,

## 2024-12-05 NOTE — ED ADULT TRIAGE NOTE - CHIEF COMPLAINT QUOTE
Pt c/o dark stool starting this morning. Phx HTN. DM2, liver cirrhosis, + jaundice note to sclera, Pt appears unwell in triage. Pt c/o dark stool starting this morning. Phx HTN. DM2, liver cirrhosis, + jaundice note to sclera, Pt appears unwell in triage.    Of Note Pt states he had been sober for 3 months but relapsed 3 days ago. +tremors

## 2024-12-05 NOTE — ED ADULT NURSE NOTE - CHIEF COMPLAINT QUOTE
Pt c/o dark stool starting this morning. Phx HTN. DM2, liver cirrhosis, + jaundice note to sclera, Pt appears unwell in triage.    Of Note Pt states he had been sober for 3 months but relapsed 3 days ago. +tremors

## 2024-12-05 NOTE — ED ADULT NURSE REASSESSMENT NOTE - NS ED NURSE REASSESS COMMENT FT1
Pt noted to be very lethargic with bleeding from the mouth MD at beside.  Stat CBC was drawn.  Pt is awake and responsive to all stimuli with slow reaction time.  Continuous Cardiac monitor in place with HR  of 124    BPM sinus tach and O2 sat of 100%  RA.    Pt made comfortable. All safety precautions maintained.

## 2024-12-05 NOTE — H&P ADULT - ASSESSMENT
ASSESSMENT/PLAN:      ===NEURO===  - AO x 3  - No active issues    ===CARDIAC===  #Hypertension    ===PULM===  - No active issues  - Will require intubation for endoscopy    ===RENAL===  - No active issues    ===GI===  #GI Bleed  - Octreotide drip  - Pantoprazole drip  - CTX 1g daily  - GI following, doing endoscopy once intubated    #Cirrhosis  - Decompensated  - Known esophageal varices    ===HEME/ONC===  #Anemia  - iso GI bleed  - Trend hemoglobin    ===ID===  #SBP PPx  - CTX    ===ENDO===  #T2DM    ===ICU Checklist===  - Code Status:  - Diet:  - Analgesia  - Sedation:  - DVT Ppx:  - Bowel Regimen:  - Insulin:   - Peripheral IVs:   - Central Line:  - A-line:   - Mckeon:  - MRSA PCR:  - Lacri-lube:  - Chlorhexidine cloths:   ASSESSMENT/PLAN:      ===NEURO===  #Encephalopathy  - iso bleed and cirrhosis  - Check ammonia   - Thiamine 500 x3d  - Folic Acid  - Multi-vitamin    ===CARDIAC===  #Shock  - Vasoplegic vs Hypovolemic  - On norepi  - Fluid resuscitation    #HTN  - Hold anti-htn meds iso shock    ===PULM===  #Intubated  - For airway protection  - F/u CXR  - F/u post-intubation ABG    ===RENAL===  - No active issues  - Mckeon, strict I/Os    ===GI===  #GI Bleed  - Octreotide drip  - Pantoprazole drip  - CTX 1g daily  - GI following, planning for endoscopy    #Cirrhosis  - Decompensated  - Known esophageal varices    ===HEME/ONC===  #Anemia  - iso GI bleed  - Trend hemoglobin  - Transfuse to Hgb>7    ===ID===  #SBP PPx  - CTX 1g qd  - F/u BCx, UCx    ===ENDO===  #T2DM  - ISS, FS q6h    ===ICU Checklist===  - Code Status: Full code  - Diet: None  - Analgesia: Fentanyl  - Sedation: Propofol  - DVT Ppx: SCDs  - Bowel Regimen:  - Insulin: ISS  - Peripheral IVs:   - Central Line: None  - A-line: None  - Mckeon: Yes  - MRSA PCR: Ordered  - Lacri-lube: Yes  - Chlorhexidine cloths: Yes

## 2024-12-05 NOTE — CONSULT NOTE ADULT - ASSESSMENT
44-year-old with past medical history of decompensated alcohol-related cirrhosis complicated by ascites and nonbleeding varices, H. pylori (s/p treatment), asthma, psoriasis, prediabetes, hypertension, and a recent admission August 31 through September 9 for lower abdominal pain and decompensated cirrhosis, now presenting with bright red blood per rectum and alcohol withdrawal.  Hepatology consulted for GI bleed.    #hematemesis  #BRBPR  Pt presented w/ BRBPR with neg CT angio. Initial Hb 9.6 from b/l 12.9 in September. Given hematemesis, c/f possible variceal bleed. Last EGD reportedly 6/2024 showing grade II EVs (Not banded). Has missed NSBB over past several days. If EGD neg, possible LGI source. Etiology of GI bleed unclear and will need further w/u for infection. ?PVT on U/S that was not see on CT scan here.   Recommendations:  -IV PPI or PPI BID  -Octreotide GGT  -Please start CTX for 5 day course given c/f UGIB in cirrhotic  -Full infectious w/u including UA, CXR, blood cxs, diagnostic tap  -NPO for EGD  -Please intubate; will plan for bedside EGD  -Given normal plts and INR 1.71, would not give additional blood products  -2 large bore IVs; transfuse to maintain Hb>7; avoid overtransfusion   -Hold home BP meds    #Hx decompensated EtOH cirrhosis  Presumed etiology of cirrhosis is alcohol given patient's history. Now w/ relapse in alcohol after being sober for many months. Current MELD 17.   V: hx of ascites, reportedly on lasix/aldactone at home. Small ascites on imaging here  I: Needs full infectious w/u   B: Hx grade II EVs, c/f GIB as above  E:No hx HE, lethargic on exam here  S: hx of 1.1cm right hepatic lobe lesion, not seen on repeat imaging.   Recommendations:  -Hold diuresis  -Hold NSBB   -Pending scope, can trial lactulose for AMS  -Full infectious w/u including UA, blood cultures, cxr, diagnostic tap  -Trend MELD labs: CBC, CMP, coags    Note incomplete until finalized by attending signature/attestation.    Carmen Herzog  GI/Hepatology Fellow PGY5    NON-URGENT CONSULTS:  Please email [email or web address]  AT NIGHT AND ON WEEKENDS:  Available on Microsoft Teams  [phone] (Long Range Pager)    For urgent consults, please contact on call GI team. See Amion schedule ([facility]) or [employer] paging system ([facility])

## 2024-12-05 NOTE — SBIRT NOTE ADULT - NSSBIRTALCPASSREFTXDET_GEN_A_CORE
Screening results were reviewed with the patient. Patient was provided educational materials on low-risk guidelines and substance use and health. Motivation and goals were discussed.  Screening results were reviewed with the patient. Patient was provided educational materials on low-risk guidelines and substance use and health. Motivation and goals were discussed. Patient was not provided with a referral to substance use treatment because Patient currently receiving other medical care. Patient's substance use will be addressed during their inpatient admission. Patient provided with Remote SBIRT information.

## 2024-12-05 NOTE — ED PROVIDER NOTE - PROGRESS NOTE DETAILS
Called to bedside to evaluate patient. Developed new episodes of hematemesis x3. Visualized, small but prominent amount of bright red blood seen. Patient appears more lethargic. Still Aox3. Currently protecting airway. Given new/worsening bleed, urgent GI consult for consideration of urgent scope. MICU at bedside, plan for admission to MICU for intubation for airway protection and urgent scope with GI.

## 2024-12-05 NOTE — ED PROVIDER NOTE - PHYSICAL EXAMINATION
T(C): 36.7 (12-05-24 @ 11:10), Max: 36.7 (12-05-24 @ 10:35)  HR: 117 (12-05-24 @ 11:10) (117 - 123)  BP: 151/89 (12-05-24 @ 11:10) (146/89 - 151/89)  RR: 20 (12-05-24 @ 11:10) (17 - 20)  SpO2: 100% (12-05-24 @ 11:10) (100% - 100%)    CONSTITUTIONAL: Well groomed, no apparent distress  EYES: PERRLA and symmetric, EOMI, No conjunctival or scleral injection, non-icteric  ENMT: Oral mucosa with moist membranes. Normal dentition; no pharyngeal injection or exudates  RESP: No respiratory distress, no use of accessory muscles; CTA b/l, no WRR  CV: RRR, +S1S2, no MRG; no JVD; no peripheral edema  GI: Soft, somewhat distended w/ fluid shifts, non tender to palpation, no rebound, no guarding; no palpable masses; no hepatosplenomegaly; no hernia palpated  LYMPH: No cervical LAD or tenderness; no axillary LAD or tenderness; no inguinal LAD or tenderness  SKIN: No rashes or ulcers noted; no subcutaneous nodules or induration palpable  NEURO: CN II-XII intact; normal reflexes in upper and lower extremities, sensation intact in upper and lower extremities b/l to light touch   PSYCH: Appropriate insight/judgment; A+O x 3, mood and affect appropriate, recent/remote memory intact    Noted w/ slight tremors, tongue fasciculations T(C): 36.7 (12-05-24 @ 11:10), Max: 36.7 (12-05-24 @ 10:35)  HR: 117 (12-05-24 @ 11:10) (117 - 123)  BP: 151/89 (12-05-24 @ 11:10) (146/89 - 151/89)  RR: 20 (12-05-24 @ 11:10) (17 - 20)  SpO2: 100% (12-05-24 @ 11:10) (100% - 100%)    CONSTITUTIONAL: Well groomed, no apparent distress  EYES: PERRLA and symmetric, EOMI, No conjunctival or scleral injection, non-icteric  ENMT: Oral mucosa with moist membranes. Normal dentition; no pharyngeal injection or exudates  RESP: No respiratory distress, no use of accessory muscles; CTA b/l, no WRR  CV: RRR, +S1S2, no MRG; no JVD; no peripheral edema  GI: Soft, somewhat distended w/ fluid shifts, non tender to palpation, no rebound, no guarding; no palpable masses; no hepatosplenomegaly; no hernia palpated  LYMPH: No cervical LAD or tenderness; no axillary LAD or tenderness; no inguinal LAD or tenderness  SKIN: No rashes or ulcers noted; no subcutaneous nodules or induration palpable  NEURO: CN II-XII intact; normal reflexes in upper and lower extremities, sensation intact in upper and lower extremities b/l to light touch   PSYCH: Appropriate insight/judgment; A+O x 3, mood and affect appropriate, recent/remote memory intact    Noted w/ slight tremors, tongue fasciculations    Attending/Eri: NAD; PERRL/EOMI, non-icterus, supple, no MANDO, no JVD, RRR, CTAB; Abd-soft, +abd distention, +fluid shift, nontender, no guarding or rebound, no HSM; no LE edema, A&Ox3, nonfocal; Skin-warm/dry

## 2024-12-05 NOTE — ED ADULT NURSE NOTE - NSFALLUNIVINTERV_ED_ALL_ED
Bed/Stretcher in lowest position, wheels locked, appropriate side rails in place/Call bell, personal items and telephone in reach/Instruct patient to call for assistance before getting out of bed/chair/stretcher/Non-slip footwear applied when patient is off stretcher/[place] to call system/Physically safe environment - no spills, clutter or unnecessary equipment/Purposeful proactive rounding/Room/bathroom lighting operational, light cord in reach

## 2024-12-05 NOTE — ED ADULT NURSE NOTE - EXTENSIONS OF SELF_ADULT
tip in SVC/chest radiograph/depth of insertion/fluoroscopy/line adjusted to depth of insertion/line in appropriate postion None

## 2024-12-05 NOTE — H&P ADULT - NSHPLABSRESULTS_GEN_ALL_CORE
LABS:                      8.9    10.85 )-----------( 186      ( 05 Dec 2024 16:14 )             26.3     12-05    135  |  98  |  16  ----------------------------<  176[H]  5.2   |  16[L]  |  0.64    Ca    9.1      05 Dec 2024 11:55  Phos  4.1     12-05  Mg     1.70     12-05    TPro  7.4  /  Alb  3.1[L]  /  TBili  2.4[H]  /  DBili  x   /  AST  79[H]  /  ALT  41  /  AlkPhos  149[H]  12-05    LIVER FUNCTIONS - ( 05 Dec 2024 11:55 )  Alb: 3.1 g/dL / Pro: 7.4 g/dL / ALK PHOS: 149 U/L / ALT: 41 U/L / AST: 79 U/L / GGT: x           PT/INR - ( 05 Dec 2024 11:55 )   PT: 19.7 sec;   INR: 1.71 ratio    PTT - ( 05 Dec 2024 11:55 )  PTT:33.2 sec    Urinalysis Basic - ( 05 Dec 2024 11:55 )  Color: x / Appearance: x / SG: x / pH: x  Gluc: 176 mg/dL / Ketone: x  / Bili: x / Urobili: x   Blood: x / Protein: x / Nitrite: x   Leuk Esterase: x / RBC: x / WBC x   Sq Epi: x / Non Sq Epi: x / Bacteria: x    RADIOLOGY

## 2024-12-05 NOTE — ED ADULT NURSE REASSESSMENT NOTE - NS ED NURSE REASSESS COMMENT FT1
Received pt after US, alert and oriented x4. C/o nausea, ABD pain and headache with mild tremor.  Pt received ativan for withdrawal symptoms at this time. pt is calm and resting comfortably in the stretcher.  Pt made comfortable. All safety precautions maintained.

## 2024-12-05 NOTE — ED PROVIDER NOTE - CLINICAL SUMMARY MEDICAL DECISION MAKING FREE TEXT BOX
44 y,o. 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 pylori (In Sep 2024, s/p treatment) presenting with concerns of acute onset LGIB. Given recent binge episode, concerns for worsening decompensated cirrhosis. Pt currently hemodynamically stable, tachy in 120s, fatigued and lightheaded. Will obtain CBC, CMP, T/S, coags. CT A/P w/ IV contrast to r/o active extravasation. US abdomen ordered to r/o portal vein thrombosis. Given cirrhosis and GIB, Protonix 40mg BID, CTX 1g for SBP ppx. Dispo pending w/u. Patient with mild alc withdrawal symptoms, CIWA 4-6, will give Ativan x1 and monitor for further withdrawal symptoms. 44 y,o. 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 pylori (In Sep 2024, s/p treatment) presenting with concerns of acute onset LGIB. Given recent binge episode, concerns for worsening decompensated cirrhosis. Pt currently hemodynamically stable, tachy in 120s, fatigued and lightheaded. Will obtain CBC, CMP, T/S, coags. CT A/P w/ IV contrast to r/o active extravasation. US abdomen ordered to r/o portal vein thrombosis. Given cirrhosis and GIB, Protonix 40mg BID, CTX 1g for SBP ppx. Dispo pending w/u. Patient with mild alc withdrawal symptoms, CIWA 4-6, will give Ativan x1 and monitor for further withdrawal symptoms.  Relevant EMR reviewed

## 2024-12-05 NOTE — ED ADULT NURSE REASSESSMENT NOTE - NS ED NURSE REASSESS COMMENT FT1
Pt continues to have bloody emesis.  At this time pt is admitted to MICU for uuper GI bleed.  Report was given to Nurse Melba NICHOLAS.  Pt transportation. Protonix drip and Ocetride drip ordered and on going.  Continuous Cardiac monitor in place with HR of  116 BPM  sinus tach and O2 sat of 100%   RA. Pt continues to have bloody emesis.  At this time pt is admitted to MICU for uuper GI bleed.  Report was given to Nurse Melba NICHOLAS.  Pt transportation. Protonix drip ordered and on going. Ocetride pending from pharmacy. Continuous Cardiac monitor in place with HR of  116 BPM  sinus tach and O2 sat of 100%   RA.

## 2024-12-05 NOTE — H&P ADULT - NSHPREVIEWOFSYSTEMS_GEN_ALL_CORE
REVIEW OF SYSTEMS:  CONSTITUTIONAL: No fevers, chills, sick contacts, or unintended weight loss  EYES: No visual changes  ENT: No throat pain, rhinorrhea, or hearing loss   NECK: No pain or stiffness  RESPIRATORY: Some shortness of breath. No cough, wheezing, hemoptysis  CARDIOVASCULAR: No chest pain or palpitations  GASTROINTESTINAL: Some abdominal pain. Hematemesis today; Bloody diarrhea today.   GENITOURINARY: No dysuria, frequency or hematuria  NEUROLOGICAL: No numbness or weakness  SKIN: No itching, rashes, or bruises

## 2024-12-05 NOTE — ED ADULT NURSE REASSESSMENT NOTE - NS ED NURSE REASSESS COMMENT FT1
Pt is resting comfortably in the stretcher. No complaints made at this time. No withdrawal symptoms noted at this time.  Pt made comfortable. All safety precautions maintained.

## 2024-12-05 NOTE — H&P ADULT - HISTORY OF PRESENT ILLNESS
Jef Whitney is a 44 year old male with a history of alcohol use disorder, alcohol cirrhosis c/b esophageal varices, hypertension, T2DM, and H pylori (s/p treatment September 2024) who presented to [facility] 12/5 with dark stools beginning that morning. He had felt his stomach becoming more distended prior to presentation and then 12/5 AM began to have watery diarrhea, becoming dark red in character with over 10 bowel movements and no improvement in bleeding.     He had been sober for 3 months, however relapsed a few days prior, drinking for 3 days with his last drink being 2-3 days prior to presentation. He also missed his home medications for the preceding 2 days. He has never had withdrawal in the past. He has required paracentesis before, last in August. After the paracentesis he was also admitted for abdominal infection, UTI, and H pylori. He has no recent travel nor sick contacts. He is fatigued and light-headed, without fevers, chills, CP, SOB, urinary symptoms.    On presentation, initial vitals were T 98F, /89, , 100%. Labs were significant for WBC 11.3, Hgb 9.6, TBili 2.4, and INR 1.71. Abdominal US showed cirrhosis, very poor flow in portal venous system concerning for possible thrombosis, and moderate ascites in RUQ. CT Abdomen Pelvis x/ contrast was also performed showing cirrhosis with portal hypertension and no active GI bleed.     While in the ED he vomitted blood and syncopized. Concern was for decompensated cirrhosis with unknown source of GI bleed. He was given 1g CTX, 50mcg Octreotide and Pantoprazole 40mg twice. He was also having mild withdrawal symptoms, CIWA 4-6, and was given Ativan 2mg IV.     GI was consulted and determined he should have endoscopy while intubated. He was started on octreotide and pantoprazole drips.     He will be admitted to MICU for intubation in pursuit of endoscopy. Jef Whitney is a 44 year old male with a history of alcohol use disorder, alcohol cirrhosis c/b esophageal varices, hypertension, T2DM, and H pylori (s/p treatment September 2024) who presented to [facility] 12/5 with dark stools beginning that morning. He had felt his stomach becoming more distended prior to presentation and then 12/5 AM began to have watery diarrhea, becoming dark red in character with over 10 bowel movements and no improvement in bleeding.     He had been sober for 3 months, however relapsed a few days prior, drinking for 3 days with his last drink being 2-3 days prior to presentation. He also missed his home medications for the preceding 2 days. He has never had withdrawal in the past. He has required paracentesis before, last in August. After the paracentesis he was also admitted for abdominal infection, UTI, and H pylori. He has no recent travel nor sick contacts. He is fatigued and light-headed, without fevers, chills, CP, SOB, urinary symptoms.    On presentation, initial vitals were T 98F, /89, , 100%. Labs were significant for WBC 11.3, Hgb 9.6, TBili 2.4, and INR 1.71. Abdominal US showed cirrhosis, very poor flow in portal venous system concerning for possible thrombosis, and moderate ascites in RUQ. CT Abdomen Pelvis x/ contrast was also performed showing cirrhosis with portal hypertension and no active GI bleed.     While in the ED he vomited blood and syncopized. Concern was for decompensated cirrhosis with unknown source of GI bleed. He was given 1g CTX, 50mcg Octreotide and Pantoprazole 40mg twice. He was also having mild withdrawal symptoms, CIWA 4-6, and was given Ativan 2mg IV.     GI was consulted and determined he should have endoscopy while intubated. He was started on octreotide and pantoprazole drips.     He will be admitted to MICU for intubation in pursuit of endoscopy.

## 2024-12-05 NOTE — H&P ADULT - ATTENDING COMMENTS
Jef Whitney is a 44 year old male with a history of alcohol use disorder, alcohol cirrhosis c/b esophageal varices, hypertension, T2DM, and H pylori (s/p treatment September 2024) who initially presented to [facility] 12/5 with bloody stools in the recent of recent alcohol ingestion.  He had an episode of hematemesis in the ED and will be admitted to MICU for further management.    Neuro  #Encephalopathy  -likely multifactorial and related to possible sepsis, hepatic encephalopathy  -will check ammonia, will plan to initiate lactulose/rifaximin once PO access obtained, may need rectal lactulose prior to obtaining oral access  -sedation with fentanyl/propofol while intubated for EGD  #ETOH use  -thiamine high dose, multivitamin, folic acid,  -monitor for withdrawals, consider phenobarb loading if withdrawal signs/symptoms develop    Cardiovascular  #Shock  -likely vasoplegic in setting of sedation, also some possible component of hypovolemia given recent bloody stools/hematemesis and small/collapsible IVC on POCUS  -vasopressors to maintain MAP >65  -will check EKG, troponin    Respiratory  -intubated for airway protection during EGD, will obtain post intubation gas, CXR    GI  #UGIB  #Cirrhosis c/b esophageal varices  -patient w/history of esophageal varices, now w/hematemesis concerning for GI bleed, CTA w/o active GI bleed  -ceftriaxone, octreotide, PPI gtt  -GI to scope at bedside, will f/u with results  -transfuse to maintain Hgb>7    Hem-Onc  #Acute blood loss anemia  -likely due to UGIB  -GI to scope as above  -transfuse to maintain Hgb >7  -SCD for DVT prophylaxis  -trend coags,     ID  -full infectious work up with blood/urine cultures  -ceftriaxone for variceal bleed    Renal  -Cr appears near baseline, no major electrolytes abnormalities  -will place booker, strict Is/Os    Endo  #T2DM  -fingersticks q6, SSI PRN    DVT prophylaxis: SCDs  Code Status: Full Code

## 2024-12-05 NOTE — ED ADULT NURSE NOTE - OBJECTIVE STATEMENT
Resident/Fellow Awake and alert , his wife is at his bedside. Patient c/o rectal bleeding today. IV placed., labs sent , presently resting in no distress.

## 2024-12-06 NOTE — PROCEDURE NOTE - NSCHLORHEXIDINEBATH_GEN_A_CORE
2% wipes/To be discontinued when line removed

## 2024-12-06 NOTE — CONSULT NOTE ADULT - ATTENDING COMMENTS
44M ETOH Cirrhosis presenting with hematemesis  Ongoing ETOH use  CT scan with EV noted  DDX included EV vs GV vs PUD    PLan for EGD today  Admit to ICU and intubate  IV PPI Octreotide and Empiric Abx  NPO    MELD > 15  Obtain updated ECHO in case patient would need emergent TIPS
Pt. with MELITA, severe hyperkalemia and metabolic acidosis. Scr elevated at 2.02 and serum potassium elevated at 6.6 today. Assessment and plan for MELITA, hyperkalemia and metabolic acidosis as outlined above. Pt. initiated on CRRT earlier today in MICU (after right IJ non-tunneled HD catheter placement). Pt. tolerating CRRT at time of rounds. BP being maintained on IV vasopressors. Monitor labs/serum potassium closely. Avoid any potential nephrotoxins. Dose medications as per eGFR/CRRT. Overall prognosis guarded.

## 2024-12-06 NOTE — PROCEDURE NOTE - NSPROCDETAILS_GEN_ALL_CORE
guidewire recovered/lumen(s) aspirated and flushed/sterile dressing applied/sterile technique, catheter placed/ultrasound guidance with use of sterile gel and probe cove
location identified, draped/prepped, sterile technique used, needle inserted/introduced/positive blood return obtained via catheter/connected to a pressurized flush line/sutured in place/hemostasis with direct pressure, dressing applied/Seldinger technique/all materials/supplies accounted for at end of procedure
guidewire recovered/lumen(s) aspirated and flushed/sterile dressing applied/sterile technique, catheter placed/ultrasound guidance with use of sterile gel and probe cove
patient pre-oxygenated, tube inserted, placement confirmed

## 2024-12-06 NOTE — PROGRESS NOTE ADULT - SUBJECTIVE AND OBJECTIVE BOX
INTERVAL HPI/OVERNIGHT EVENTS:    HPI:    SUBJECTIVE: Patient seen and examined at bedside.     CONSTITUTIONAL: No weakness, fevers or chills  EYES/ENT: No visual changes;  No vertigo or throat pain   NECK: No pain or stiffness  RESPIRATORY: No cough, wheezing, hemoptysis; No shortness of breath  CARDIOVASCULAR: No chest pain or palpitations  GASTROINTESTINAL: No abdominal or epigastric pain. No nausea, vomiting, or hematemesis; No diarrhea or constipation. No melena or hematochezia.  GENITOURINARY: No dysuria, frequency or hematuria  NEUROLOGICAL: No numbness or weakness  SKIN: No itching, rashes    OBJECTIVE:    VITAL SIGNS:  ICU Vital Signs Last 24 Hrs  T(C): 36.7 (06 Dec 2024 04:00), Max: 37 (05 Dec 2024 14:36)  T(F): 98.1 (06 Dec 2024 04:00), Max: 98.6 (05 Dec 2024 14:36)  HR: 116 (06 Dec 2024 07:58) (88 - 129)  BP: 123/66 (06 Dec 2024 05:00) (72/54 - 151/89)  BP(mean): 82 (06 Dec 2024 05:00) (43 - 96)  ABP: 132/66 (06 Dec 2024 07:30) (105/56 - 138/71)  ABP(mean): 82 (06 Dec 2024 07:30) (68 - 87)  RR: 20 (06 Dec 2024 07:00) (16 - 32)  SpO2: 100% (06 Dec 2024 07:58) (90% - 100%)    O2 Parameters below as of 06 Dec 2024 07:00  Patient On (Oxygen Delivery Method): ventilator, AC    O2 Concentration (%): 40      Mode: AC/ CMV (Assist Control/ Continuous Mandatory Ventilation), RR (machine): 20, TV (machine): 400, FiO2: 50, PEEP: 8, ITime: 1, MAP: 10, PC: 85, PIP: 23    12-05 @ 07:01  -  12-06 @ 07:00  --------------------------------------------------------  IN: 4307.9 mL / OUT: 400 mL / NET: 3907.9 mL      CAPILLARY BLOOD GLUCOSE      POCT Blood Glucose.: 345 mg/dL (06 Dec 2024 07:31)      PHYSICAL EXAM:    General: NAD  HEENT: NC/AT; PERRL, clear conjunctiva  Neck: supple  Respiratory: CTA b/l  Cardiovascular: +S1/S2; RRR  Abdomen: soft, NT/ND; +BS x4  Extremities: WWP, 2+ peripheral pulses b/l; no LE edema  Skin: normal color and turgor; no rash  Neurological:    MEDICATIONS:  MEDICATIONS  (STANDING):  acetylcysteine IVPB 4.8 Gram(s) IV Intermittent once  acetylcysteine IVPB 14 Gram(s) IV Intermittent every 24 hours  albumin human  5% IVPB 250 milliLiter(s) IV Intermittent once  chlorhexidine 0.12% Liquid 15 milliLiter(s) Oral Mucosa every 12 hours  chlorhexidine 2% Cloths 1 Application(s) Topical <User Schedule>  CRRT Treatment    <Continuous>  dextrose 5%. 1000 milliLiter(s) (50 mL/Hr) IV Continuous <Continuous>  dextrose 5%. 1000 milliLiter(s) (100 mL/Hr) IV Continuous <Continuous>  dextrose 50% Injectable 25 Gram(s) IV Push once  dextrose 50% Injectable 12.5 Gram(s) IV Push once  dextrose 50% Injectable 25 Gram(s) IV Push once  fentaNYL   Infusion. 2 MICROgram(s)/kG/Hr (19.1 mL/Hr) IV Continuous <Continuous>  folic acid Injectable 1 milliGRAM(s) IV Push daily  glucagon  Injectable 1 milliGRAM(s) IntraMuscular once  insulin lispro (ADMELOG) corrective regimen sliding scale   SubCutaneous every 6 hours  lactulose Retention Enema 200 Gram(s) Rectal once  meropenem  IVPB 1000 milliGRAM(s) IV Intermittent every 8 hours  norepinephrine Infusion 0.8 MICROgram(s)/kG/Min (71.5 mL/Hr) IV Continuous <Continuous>  octreotide  Infusion 50 MICROgram(s)/Hr (10 mL/Hr) IV Continuous <Continuous>  pantoprazole Infusion 8 mG/Hr (10 mL/Hr) IV Continuous <Continuous>  petrolatum Ophthalmic Ointment 1 Application(s) Both EYES two times a day  PrismaSATE Dialysate BK 0 / 3.5 5000 milliLiter(s) (1400 mL/Hr) CRRT <Continuous>  PrismaSOL Filtration BGK 0 / 2.5 5000 milliLiter(s) (1200 mL/Hr) CRRT <Continuous>  PrismaSOL Filtration BGK 0 / 2.5 5000 milliLiter(s) (200 mL/Hr) CRRT <Continuous>  propofol Infusion 30 MICROgram(s)/kG/Min (17.2 mL/Hr) IV Continuous <Continuous>  propofol Injectable 20 milliGRAM(s) IV Push once  sodium bicarbonate  Infusion 0.236 mEq/kG/Hr (150 mL/Hr) IV Continuous <Continuous>  thiamine IVPB 500 milliGRAM(s) IV Intermittent daily  vancomycin  IVPB      vancomycin  IVPB 750 milliGRAM(s) IV Intermittent every 12 hours  vasopressin Infusion 0.04 Unit(s)/Min (6 mL/Hr) IV Continuous <Continuous>    MEDICATIONS  (PRN):  dextrose Oral Gel 15 Gram(s) Oral once PRN Blood Glucose LESS THAN 70 milliGRAM(s)/deciliter      ALLERGIES:  Allergies    amoxicillin (Angioedema)    Intolerances        LABS:                        6.9    26.26 )-----------( 136      ( 06 Dec 2024 04:10 )             20.7     12-06    135  |  98  |  30[H]  ----------------------------<  197[H]  6.6[HH]   |  16[L]  |  2.05[H]    Ca    8.1[L]      06 Dec 2024 04:10  Phos  7.1     12-06  Mg     1.80     12-06    TPro  5.3[L]  /  Alb  2.1[L]  /  TBili  3.3[H]  /  DBili  x   /  AST  1173[H]  /  ALT  415[H]  /  AlkPhos  116  12-06    PT/INR - ( 06 Dec 2024 01:20 )   PT: 30.0 sec;   INR: 2.54 ratio         PTT - ( 06 Dec 2024 01:20 )  PTT:25.5 sec  Urinalysis Basic - ( 06 Dec 2024 04:10 )    Color: x / Appearance: x / SG: x / pH: x  Gluc: 197 mg/dL / Ketone: x  / Bili: x / Urobili: x   Blood: x / Protein: x / Nitrite: x   Leuk Esterase: x / RBC: x / WBC x   Sq Epi: x / Non Sq Epi: x / Bacteria: x        RADIOLOGY & ADDITIONAL TESTS: Reviewed. INTERVAL HPI/OVERNIGHT EVENTS: Intubated for EGD patient hypotensive during scope.  Recieved 1 U PRBC 1L LR.  EGD showed bleeding varices band x 6.  Hyperkalemia to 7.8 cocktail x 3 given.  Placed on bicarb gtt.  Increasing pressors requiring vasopressin and levophed.  Decision made to place on CRRT shiley placed.     HPI: 44 year old male with a history of alcohol use disorder, alcohol cirrhosis c/b esophageal varices, hypertension, T2DM, and H pylori (s/p treatment September 2024) who presented to [facility] 12/5 with dark stools beginning that morning. He had felt his stomach becoming more distended prior to presentation and then 12/5 AM began to have watery diarrhea, becoming dark red in character with over 10 bowel movements and no improvement in bleeding.     He had been sober for 3 months, however relapsed a few days prior, drinking for 3 days with his last drink being 2-3 days prior to presentation. He also missed his home medications for the preceding 2 days. He has never had withdrawal in the past. He has required paracentesis before, last in August. After the paracentesis he was also admitted for abdominal infection, UTI, and H pylori. He has no recent travel nor sick contacts. He is fatigued and light-headed, without fevers, chills, CP, SOB, urinary symptoms.    On presentation, initial vitals were T 98F, /89, , 100%. Labs were significant for WBC 11.3, Hgb 9.6, TBili 2.4, and INR 1.71. Abdominal US showed cirrhosis, very poor flow in portal venous system concerning for possible thrombosis, and moderate ascites in RUQ. CT Abdomen Pelvis x/ contrast was also performed showing cirrhosis with portal hypertension and no active GI bleed.     While in the ED he vomited blood and syncopized. Concern was for decompensated cirrhosis with unknown source of GI bleed. He was given 1g CTX, 50mcg Octreotide and Pantoprazole 40mg twice. He was also having mild withdrawal symptoms, CIWA 4-6, and was given Ativan 2mg IV.     GI was consulted and determined he should have endoscopy while intubated. He was started on octreotide and pantoprazole drips.     He will be admitted to MICU for intubation in pursuit of endoscopy.      SUBJECTIVE: Patient seen and examined at bedside.     Unable to assess as patient intubated/ sedated     OBJECTIVE:    VITAL SIGNS:  ICU Vital Signs Last 24 Hrs  T(C): 36.7 (06 Dec 2024 04:00), Max: 37 (05 Dec 2024 14:36)  T(F): 98.1 (06 Dec 2024 04:00), Max: 98.6 (05 Dec 2024 14:36)  HR: 116 (06 Dec 2024 07:58) (88 - 129)  BP: 123/66 (06 Dec 2024 05:00) (72/54 - 151/89)  BP(mean): 82 (06 Dec 2024 05:00) (43 - 96)  ABP: 132/66 (06 Dec 2024 07:30) (105/56 - 138/71)  ABP(mean): 82 (06 Dec 2024 07:30) (68 - 87)  RR: 20 (06 Dec 2024 07:00) (16 - 32)  SpO2: 100% (06 Dec 2024 07:58) (90% - 100%)    O2 Parameters below as of 06 Dec 2024 07:00  Patient On (Oxygen Delivery Method): ventilator, AC    O2 Concentration (%): 40      Mode: AC/ CMV (Assist Control/ Continuous Mandatory Ventilation), RR (machine): 20, TV (machine): 400, FiO2: 50, PEEP: 8, ITime: 1, MAP: 10, PC: 85, PIP: 23    12-05 @ 07:01  -  12-06 @ 07:00  --------------------------------------------------------  IN: 4307.9 mL / OUT: 400 mL / NET: 3907.9 mL      CAPILLARY BLOOD GLUCOSE      POCT Blood Glucose.: 345 mg/dL (06 Dec 2024 07:31)      PHYSICAL EXAM:    General: NAD  HEENT: NC/AT; PERRL, clear conjunctiva  Neck: supple  Respiratory: CTA b/l  Cardiovascular: +S1/S2; tachycardic   Abdomen: distended abdomen  +BS x4  Extremities: WWP, 2+ peripheral pulses b/l; no LE edema  Skin: normal color and turgor; no rash  Neurological: intubated/ sedated     MEDICATIONS:  MEDICATIONS  (STANDING):  acetylcysteine IVPB 4.8 Gram(s) IV Intermittent once  acetylcysteine IVPB 14 Gram(s) IV Intermittent every 24 hours  albumin human  5% IVPB 250 milliLiter(s) IV Intermittent once  chlorhexidine 0.12% Liquid 15 milliLiter(s) Oral Mucosa every 12 hours  chlorhexidine 2% Cloths 1 Application(s) Topical <User Schedule>  CRRT Treatment    <Continuous>  dextrose 5%. 1000 milliLiter(s) (50 mL/Hr) IV Continuous <Continuous>  dextrose 5%. 1000 milliLiter(s) (100 mL/Hr) IV Continuous <Continuous>  dextrose 50% Injectable 25 Gram(s) IV Push once  dextrose 50% Injectable 12.5 Gram(s) IV Push once  dextrose 50% Injectable 25 Gram(s) IV Push once  fentaNYL   Infusion. 2 MICROgram(s)/kG/Hr (19.1 mL/Hr) IV Continuous <Continuous>  folic acid Injectable 1 milliGRAM(s) IV Push daily  glucagon  Injectable 1 milliGRAM(s) IntraMuscular once  insulin lispro (ADMELOG) corrective regimen sliding scale   SubCutaneous every 6 hours  lactulose Retention Enema 200 Gram(s) Rectal once  meropenem  IVPB 1000 milliGRAM(s) IV Intermittent every 8 hours  norepinephrine Infusion 0.8 MICROgram(s)/kG/Min (71.5 mL/Hr) IV Continuous <Continuous>  octreotide  Infusion 50 MICROgram(s)/Hr (10 mL/Hr) IV Continuous <Continuous>  pantoprazole Infusion 8 mG/Hr (10 mL/Hr) IV Continuous <Continuous>  petrolatum Ophthalmic Ointment 1 Application(s) Both EYES two times a day  PrismaSATE Dialysate BK 0 / 3.5 5000 milliLiter(s) (1400 mL/Hr) CRRT <Continuous>  PrismaSOL Filtration BGK 0 / 2.5 5000 milliLiter(s) (1200 mL/Hr) CRRT <Continuous>  PrismaSOL Filtration BGK 0 / 2.5 5000 milliLiter(s) (200 mL/Hr) CRRT <Continuous>  propofol Infusion 30 MICROgram(s)/kG/Min (17.2 mL/Hr) IV Continuous <Continuous>  propofol Injectable 20 milliGRAM(s) IV Push once  sodium bicarbonate  Infusion 0.236 mEq/kG/Hr (150 mL/Hr) IV Continuous <Continuous>  thiamine IVPB 500 milliGRAM(s) IV Intermittent daily  vancomycin  IVPB      vancomycin  IVPB 750 milliGRAM(s) IV Intermittent every 12 hours  vasopressin Infusion 0.04 Unit(s)/Min (6 mL/Hr) IV Continuous <Continuous>    MEDICATIONS  (PRN):  dextrose Oral Gel 15 Gram(s) Oral once PRN Blood Glucose LESS THAN 70 milliGRAM(s)/deciliter      ALLERGIES:  Allergies    amoxicillin (Angioedema)    Intolerances        LABS:                        6.9    26.26 )-----------( 136      ( 06 Dec 2024 04:10 )             20.7     12-06    135  |  98  |  30[H]  ----------------------------<  197[H]  6.6[HH]   |  16[L]  |  2.05[H]    Ca    8.1[L]      06 Dec 2024 04:10  Phos  7.1     12-06  Mg     1.80     12-06    TPro  5.3[L]  /  Alb  2.1[L]  /  TBili  3.3[H]  /  DBili  x   /  AST  1173[H]  /  ALT  415[H]  /  AlkPhos  116  12-06    PT/INR - ( 06 Dec 2024 01:20 )   PT: 30.0 sec;   INR: 2.54 ratio         PTT - ( 06 Dec 2024 01:20 )  PTT:25.5 sec  Urinalysis Basic - ( 06 Dec 2024 04:10 )    Color: x / Appearance: x / SG: x / pH: x  Gluc: 197 mg/dL / Ketone: x  / Bili: x / Urobili: x   Blood: x / Protein: x / Nitrite: x   Leuk Esterase: x / RBC: x / WBC x   Sq Epi: x / Non Sq Epi: x / Bacteria: x        RADIOLOGY & ADDITIONAL TESTS: Reviewed.

## 2024-12-06 NOTE — CONSULT NOTE ADULT - PROBLEM SELECTOR RECOMMENDATION 9
Pt with MELITA iso WIL and hemorrhagic/septic shock, requiring multiple IV vasopressors. [facility] KEITH/Edita reviewed. Baseline Scr 0.5-0.8. Last Scr WNL at 0.86 on 10/22/24. On ED labs Scr WNL at 0.64 and increased to 1.4 later that day (12/5). Scr elevated/increased further to 2.05 today (12/6). UA performed on 12/6 w/ trace proteinuria, w/o microscopic hematuria. CT ab/pelvis w/ IV contrast (12/5) w/o hydronephrosis. Pt with decreasing UOP despite Pt with hyperkalemia in the setting of MELITA. On admission serum potassium WNL at 5.2 (12/5). Serum potassium peaked to 7.8 (mild hemolysis) earlier today (12/6). Received two rounds of insulin/d50/calcium gluconate/bicarb. Rpt serum potassium elevated/improved 6.6 today. Recommend to continue medical management with IV insulin/D50, calcium gluconate. HD consent obtained and placed in chart. Plan to initiate CRRT, orders placed. Obtain EKG. Monitor serum potassium. Pt with hyperkalemia iso renal failure. On admission serum potassium WNL at 5.2 (12/5). Serum potassium elevated/increased to 7.8 (mild hemolysis) earlier today (12/6). Received two rounds of insulin/d50/calcium gluconate/bicarb. Rpt serum potassium elevated/improved to 6.6 today. Recommend to continue medical management with IV insulin/D50, calcium gluconate. HD consent obtained and placed in chart. Due to persistent hyperkalemia, worsening renal failure and hemodynamic instability plan to initiate CRRT. CRRT orders placed. Obtain EKG. Monitor serum potassium. Pt w/ MELITA iso WIL and hemorrhagic/septic shock, requiring multiple IV vasopressors. [facility] KEITH/Edita reviewed. Baseline Scr 0.5-0.8. Last Scr WNL at 0.86 on 10/22/24. On ED labs Scr WNL at 0.64 and increased to 1.4 later that day (12/5). Scr elevated/increased further to 2.05 today (12/6). UA performed on 12/6 w/ trace proteinuria, w/o microscopic hematuria. CT ab/pelvis w/ IV contrast (12/5) w/o hydronephrosis. Pt with decreasing UOP despite diuretic challenge. Plan to initiate CRRT (as seen below). Recommend to check urine sodium, urine creatinine and renal US. Monitor labs and urine output. Avoid nephrotoxins. Dose medications as per CRRT.

## 2024-12-06 NOTE — PROCEDURE NOTE - NSASSISTBY_GEN_A_CORE
"[facility]   Physical Therapy Initial Evaluation      Name: Parisa Dimas  Clinic Number: [id number]    Therapy Diagnosis:   Encounter Diagnoses   Name Primary?    Chronic right-sided low back pain with right-sided sciatica     Decreased strength of lower extremity     Impaired functional mobility, balance, gait, and endurance         Physician: Rose Mary Mabry NP    Physician Orders: PT Eval and Treat low back right leg  Medical Diagnosis from Referral: M54.41 (ICD-10-CM) - Lumbago with sciatica, right side G89.29 (ICD-10-CM) - Other chronic pain  Evaluation Date: 11/27/2023  Authorization Period Expiration: 1/12/2024  Plan of Care Expiration: 1/31/2023  Progress Note Due: 10th visit  Date of Surgery: NA  Visit # / Visits authorized: 1/ 1   FOTO: 1/ 3    Precautions: Standard     Time In: 10:01 AM  Time Out: 10:59 AM  Total Billable Time: 58 minutes    Subjective     Date of onset: Chronic >2 years     History of current condition - Parisa reports to the clinic with complaints of low back pain, right sided leg pain that began over 2 years ago and without mechanism of injury. Patient reports that she can feel a "jolt" and feels like she can go down, while other times it does not bother her at all. She reports radiating symptoms into the right leg which stops around her calf but mainly her symptoms are located at her anterior thigh. She reports having this pain before that comes every so many months but recently has been occurring more frequently (almost every day). She reports that when the jolting pain occurs she has to hang onto something and then it will "work itself out". She states this pain can last for 1 minute while other times stay around as a dull pain.     Falls: No falls     Imaging: See imaging    Prior Therapy: Yes - shoulder  Social History: lives with their spouse  Occupation: Retired  Prior Level of Function: independent with activities of daily living/instrumental "
The risks, benefits and alternatives of RLE surgery have been  discussed to include but are not limited to: decrease in vision, loss of eye, infection, dry eye, bleeding, retinal detachment, need for more surgery.
activities of daily living   Current Level of Function: Difficulty with household chores, walking, rotating, and sit to stands    Pain:  Current 4/10, worst 8/10, best 2/10   Location: midline back , buttocks , and upper legs   Description: Sharp and Shooting  Aggravating Factors: Walking, sit to stands, and rotating   Easing Factors: pain medication, heating pad, and rest    Patients goals: Improve walking      Medical History:   Past Medical History:   Diagnosis Date    AR (allergic rhinitis)     AR (allergic rhinitis)     Atrial fibrillation 12/27/2022    Carotid stenosis     50% RCA    Closed 4-part fracture of proximal humerus, left, initial encounter 3/7/2022    Colon polyp 10/2014    Diabetes mellitus     Diabetes mellitus, type 2     Fatty liver     GERD (gastroesophageal reflux disease)     Heart murmur 1/27/2022    HLD (hyperlipidemia)     HTN (hypertension)     Impingement syndrome of left shoulder     Joint pain     Sleep apnea 2018    Stricture of artery 3/25/2021     Surgical History:   Parisa Dimas  has a past surgical history that includes Skin biopsy (10yrs.) and Reverse total shoulder arthroplasty (Left, 3/7/2022).    Medications:   Parisa has a current medication list which includes the following prescription(s): apixaban, ascorbic acid (vitamin c), aspirin, b complex vitamins, blood sugar diagnostic, blood-glucose meter, celecoxib, cetirizine, coenzyme q10, hydrochlorothiazide, l.acid/l.casei/b.bif/b.tori/fos, lancets, lovastatin, magnesium, metformin, multivitamin, omega-3s/dha/epa/fish oil, omeprazole, OPW TEST CLAIM - DO NOT FILL, valsartan, verapamil, vitamin d, vitamin e, and zinc sulfate.    Allergies:   Review of patient's allergies indicates:  No Known Allergies     Objective      Observation: Patient ambulates to clinic independently with antalgic gait pattern with decreased step and stride length on the right.      Posture: Forward head and rounded shoulders     Lumbar Range of 
Motion: * Denotes Pain    ROM   Flexion *WNL   Extension *WNL   Left Side Bending WNL   Right Side Bending *WNL      Lower Extremity Strength  Right LE   Left LE     Hip Ext 3+/5 Hip Ext 3+/5   Hip ABD 3/5 Hip ABD  3+/5   Hip IR 3+/5 Hip IR 4/5   Hip ER 3+/5 Hip IR 4/5   Hip ADD (seated) 5/5 Hip ADD (seated) 5/5   Hip FLEX 3+/5 Hip FLEX 3+/5   Knee FLEX 3+/5 Knee FLEX 4/5   Knee EXT 4/5 Knee EXT 4/5   Plantar flexion (seated) 4/5 Plantar flexion (seated) 4/5   Dorsiflexion 4/5 Dorsiflexion 4/5     Special Tests:   FABERs: Pos Right   EDIN: Pos Right  Hip Scour: Pain into flexion Right    Neural Tension Testing:  SLR: L (Neg); R (Neg)  Slump Test: L (Neg); R (Pos)     Sensation: Intact bilaterally      Red flag screen:               B/B changes: Neg              Clonus: Neg              Babinski: Neg     Endurance Assessment:    Evaluation   Timed Up and Go 10 seconds   30 sec STS  9x with bilateral upper extremity assist      2 Minute Walk Test 262 feet with 1 episode of back pain     Table: Population Norms for TUG    Age  Average TUG    60 - 69 years  8.1 seconds    70 - 79 years  9.2 seconds    80 - 99 years  11.3 seconds       Intake Outcome Measure for FOTO Lumbar Spine Survey    Therapist reviewed FOTO scores for Parisa Dimas on 11/27/2023.   FOTO report - see Media section or FOTO account episode details.    Intake Score: 63         Treatment     Total Treatment time (time-based codes) separate from Evaluation: 31 minutes     Parisa received the treatments listed below:      therapeutic exercises: to develop strength, endurance, ROM, flexibility, posture, and core stabilization for 00 minutes including:     manual therapy techniques: Joint mobilizations and Manual traction were applied to the: lumbar/thoracic spine for 8 minutes, including:   Grade I-IV thoracic PA mobilizations  Grade I-IV lumbar PA mobilizations    neuromuscular re-education activities: to improve Balance, Coordination, Kinesthetic, Sense, 
Proprioception, Posture, and Motor Control for 23 minutes, including:   Education on activity modification, plan of care, home exercise program, and functional anatomy  DKTC and LTR 5' each  Straight leg raise 1 x 10 bilateral  Seated and Supine sciatic nerve glides 20x    therapeutic activities: to improve functional performance for 00 minutes, including:     Patient Education and Home Exercises     Education provided:   - Activity modification  - Plan of care  - Home exercise program  - Functional Anatomy    Written Home Exercises Provided: yes. Exercises were reviewed and Parsia was able to demonstrate them prior to the end of the session.  Parisa demonstrated good  understanding of the education provided. See EMR under Patient Instructions for exercises provided during therapy sessions.    Assessment     Parisa is a 68 y.o. female referred to outpatient Physical Therapy with a medical diagnosis of M54.41 (ICD-10-CM) - Lumbago with sciatica, right side G89.29 (ICD-10-CM) - Other chronic pain. Patient presents with painful lumbar range of motion, gross lower extremity strength, and poor abdominal and proximal hip motor control. Patient is functionally limited with prolonged standing and walking tasks, transfers, and rotational movements. Patient likely experiencing lumbar and hip pathology; therefore will reassess symptoms at subsequent sessions. Patient prognosis is Good. Patient will benefit from skilled outpatient Physical Therapy to address the deficits stated above and in the chart below, provide patient /family education, and to maximize patientt's level of independence.     Plan of care discussed with patient: Yes  Patient's spiritual, cultural and educational needs considered and patient is agreeable to the plan of care and goals as stated below:     Anticipated Barriers for therapy: chronicity of symptoms     Medical Necessity is demonstrated by the following  History  Co-morbidities and personal factors 
that may impact the plan of care [] LOW: no personal factors / co-morbidities  [] MODERATE: 1-2 personal factors / co-morbidities  [x] HIGH: 3+ personal factors / co-morbidities    Moderate / High Support Documentation:   Co-morbidities affecting plan of care: See past medical history    Personal Factors:   lifestyle     Examination  Body Structures and Functions, activity limitations and participation restrictions that may impact the plan of care [] LOW: addressing 1-2 elements  [] MODERATE: 3+ elements  [x] HIGH: 4+ elements (please support below)    Moderate / High Support Documentation: range of motion, strength, motor control, transfers, gait, mobility      Clinical Presentation [] LOW: stable  [x] MODERATE: Evolving  [] HIGH: Unstable     Decision Making/ Complexity Score: moderate       Goals:  Short Term Goals: 4 weeks   1. Patient will reduce maximal pain rating to < 3/10 pain to facilitate ability to sleep through the night and recover from PT interventions.  2. Patient will be able to stand/ambulate for at least 10 minutes with < 3/10 pain to improve standing/walking tolerance.   3. Patient will be able to lift at least 5-10# with < 3/10 pain to improve lifting mechanics for work related tasks.    Long Term Goals: 8-10 weeks   1. Patient will improve 2 minute walk test by at least 40 feet indicating a clinically significant change in function.   2. Patient will demonstrate > 4/5 lower quarter strength to facilitate transfers from sit to stand from various surfaces without restriction.  3. Patient will improve 30 second sit to stand to at least 10x for improvement with transfer tasks.    4. Patient will improve FOTO intake score to at least 66 indicating a clinically significant change in function.     Plan     Plan of care Certification: 11/27/2023 to 1/31/2023.    Outpatient Physical Therapy 2 times weekly for 8-10 weeks to include the following interventions: Cervical/Lumbar Traction, Gait Training, 
Manual Therapy, Moist Heat/ Ice, Neuromuscular Re-ed, Patient Education, Self Care, Therapeutic Activities, and Therapeutic Exercise.     Emilia Olivera PT, DPT        Physician's Signature: _________________________________________ Date: ________________    
Attending
Admission

## 2024-12-06 NOTE — DIETITIAN INITIAL EVALUATION ADULT - PERTINENT MEDS FT
MEDICATIONS  (STANDING):  chlorhexidine 0.12% Liquid 15 milliLiter(s) Oral Mucosa every 12 hours  chlorhexidine 2% Cloths 1 Application(s) Topical <User Schedule>  CRRT Treatment    <Continuous>  dextrose 5%. 1000 milliLiter(s) (50 mL/Hr) IV Continuous <Continuous>  dextrose 5%. 1000 milliLiter(s) (100 mL/Hr) IV Continuous <Continuous>  dextrose 50% Injectable 25 Gram(s) IV Push once  dextrose 50% Injectable 12.5 Gram(s) IV Push once  dextrose 50% Injectable 25 Gram(s) IV Push once  fentaNYL   Infusion. 3 MICROgram(s)/kG/Hr (28.6 mL/Hr) IV Continuous <Continuous>  folic acid Injectable 1 milliGRAM(s) IV Push daily  glucagon  Injectable 1 milliGRAM(s) IntraMuscular once  insulin lispro (ADMELOG) corrective regimen sliding scale   SubCutaneous every 6 hours  lactulose Retention Enema 200 Gram(s) Rectal once  meropenem  IVPB 1000 milliGRAM(s) IV Intermittent every 8 hours  norepinephrine Infusion 0.8 MICROgram(s)/kG/Min (71.5 mL/Hr) IV Continuous <Continuous>  octreotide  Infusion 50 MICROgram(s)/Hr (10 mL/Hr) IV Continuous <Continuous>  pantoprazole Infusion 8 mG/Hr (10 mL/Hr) IV Continuous <Continuous>  petrolatum Ophthalmic Ointment 1 Application(s) Both EYES two times a day  PrismaSATE Dialysate BK 0 / 3.5 5000 milliLiter(s) (1400 mL/Hr) CRRT <Continuous>  PrismaSOL Filtration BGK 0 / 2.5 5000 milliLiter(s) (1200 mL/Hr) CRRT <Continuous>  PrismaSOL Filtration BGK 0 / 2.5 5000 milliLiter(s) (200 mL/Hr) CRRT <Continuous>  propofol Infusion 30 MICROgram(s)/kG/Min (17.2 mL/Hr) IV Continuous <Continuous>  propofol Injectable 20 milliGRAM(s) IV Push once  thiamine IVPB 500 milliGRAM(s) IV Intermittent daily  vancomycin  IVPB      vancomycin  IVPB 750 milliGRAM(s) IV Intermittent every 12 hours  vasopressin Infusion 0.04 Unit(s)/Min (6 mL/Hr) IV Continuous <Continuous>    MEDICATIONS  (PRN):  dextrose Oral Gel 15 Gram(s) Oral once PRN Blood Glucose LESS THAN 70 milliGRAM(s)/deciliter

## 2024-12-06 NOTE — DIETITIAN INITIAL EVALUATION ADULT - OTHER INFO
Pt. remains intubated/sedated.  ~755 KCals to be provided by Propofol over 24hrs @ current rate..    Spoke with attending physician and PA.    Pt. to remain NPO @ this time.

## 2024-12-06 NOTE — DIETITIAN INITIAL EVALUATION ADULT - PERTINENT LABORATORY DATA
12-06    135  |  96[L]  |  33[H]  ----------------------------<  294[H]  5.3   |  20[L]  |  1.92[H]    Ca    7.9[L]      06 Dec 2024 10:30  Phos  5.5     12-06  Mg     1.80     12-06    CAPILLARY BLOOD GLUCOSE      POCT Blood Glucose.: 288 mg/dL (06 Dec 2024 11:49)  POCT Blood Glucose.: 329 mg/dL (06 Dec 2024 10:27)  POCT Blood Glucose.: 352 mg/dL (06 Dec 2024 10:07)  POCT Blood Glucose.: 345 mg/dL (06 Dec 2024 07:31)  POCT Blood Glucose.: 200 mg/dL (06 Dec 2024 06:50)  POCT Blood Glucose.: 209 mg/dL (06 Dec 2024 04:14)  POCT Blood Glucose.: 197 mg/dL (06 Dec 2024 02:39)  POCT Blood Glucose.: 241 mg/dL (06 Dec 2024 01:41)  POCT Blood Glucose.: 160 mg/dL (06 Dec 2024 01:15)  POCT Blood Glucose.: 152 mg/dL (06 Dec 2024 00:41)    A1C with Estimated Average Glucose Result: 6.1 % (12-06-24 @ 04:10)  A1C with Estimated Average Glucose Result: 6.0 % (08-21-24 @ 05:25)  A1C with Estimated Average Glucose Result: 6.7 % (12-08-23 @ 01:09)

## 2024-12-06 NOTE — CONSULT NOTE ADULT - PROBLEM SELECTOR RECOMMENDATION 2
Pt w/ MELITA iso WIL and hemorrhagic/septic shock, requiring multiple IV vasopressors. [facility] KEITH/Edita reviewed. Baseline Scr 0.5-0.8. Last Scr WNL at 0.86 on 10/22/24. On ED labs Scr WNL at 0.64 and increased to 1.4 later that day (12/5). Scr elevated/increased further to 2.05 today (12/6). UA performed on 12/6 w/ trace proteinuria, w/o microscopic hematuria. CT ab/pelvis w/ IV contrast (12/5) w/o hydronephrosis. Pt with decreasing UOP despite diuretic challenge. Plan to initiate CRRT as above. Recommend to check urine sodium, urine creatinine and renal US. Monitor labs and urine output. Avoid nephrotoxins. Dose medications as per CRRT. Pt with hyperkalemia iso MELITA. On admission serum potassium WNL at 5.2 (12/5). Serum potassium elevated/increased to 7.8 (mild hemolysis) earlier today (12/6). Received two rounds of insulin/D50/calcium gluconate/bicarb. Rpt serum potassium elevated/improved to 6.6 today. Recommend to continue medical management with IV insulin/D50, calcium gluconate. HD consent obtained and placed in chart. Due to persistent hyperkalemia, worsening kidney failure and hemodynamic instability plan to initiate CRRT in MICU. CRRT orders placed. Obtain EKG. Monitor serum potassium.

## 2024-12-06 NOTE — PROGRESS NOTE ADULT - SUBJECTIVE AND OBJECTIVE BOX
Chief Complaint:  Patient is a 44y old  Male who presents with a chief complaint of GI Bleed (06 Dec 2024 08:22)      Interval Events:   -egd yesterday with banding  -persistent bleeding overnight    Allergies:  amoxicillin (Angioedema)      Hospital Medications:  chlorhexidine 0.12% Liquid 15 milliLiter(s) Oral Mucosa every 12 hours  chlorhexidine 2% Cloths 1 Application(s) Topical <User Schedule>  CRRT Treatment    <Continuous>  dextrose 5%. 1000 milliLiter(s) IV Continuous <Continuous>  dextrose 5%. 1000 milliLiter(s) IV Continuous <Continuous>  dextrose 50% Injectable 25 Gram(s) IV Push once  dextrose 50% Injectable 12.5 Gram(s) IV Push once  dextrose 50% Injectable 25 Gram(s) IV Push once  dextrose Oral Gel 15 Gram(s) Oral once PRN  fentaNYL   Infusion. 3 MICROgram(s)/kG/Hr IV Continuous <Continuous>  folic acid Injectable 1 milliGRAM(s) IV Push daily  glucagon  Injectable 1 milliGRAM(s) IntraMuscular once  insulin lispro (ADMELOG) corrective regimen sliding scale   SubCutaneous every 6 hours  lactulose Retention Enema 200 Gram(s) Rectal once  meropenem  IVPB 1000 milliGRAM(s) IV Intermittent every 8 hours  norepinephrine Infusion 0.8 MICROgram(s)/kG/Min IV Continuous <Continuous>  octreotide  Infusion 50 MICROgram(s)/Hr IV Continuous <Continuous>  pantoprazole Infusion 8 mG/Hr IV Continuous <Continuous>  petrolatum Ophthalmic Ointment 1 Application(s) Both EYES two times a day  PrismaSATE Dialysate BK 0 / 3.5 5000 milliLiter(s) CRRT <Continuous>  PrismaSOL Filtration BGK 0 / 2.5 5000 milliLiter(s) CRRT <Continuous>  PrismaSOL Filtration BGK 0 / 2.5 5000 milliLiter(s) CRRT <Continuous>  propofol Infusion 30 MICROgram(s)/kG/Min IV Continuous <Continuous>  propofol Injectable 20 milliGRAM(s) IV Push once  thiamine IVPB 500 milliGRAM(s) IV Intermittent daily  vancomycin  IVPB      vancomycin  IVPB 750 milliGRAM(s) IV Intermittent every 12 hours  vasopressin Infusion 0.04 Unit(s)/Min IV Continuous <Continuous>        PHYSICAL EXAM:   Vital Signs:  Vital Signs Last 24 Hrs  T(C): 36.7 (06 Dec 2024 04:00), Max: 37 (05 Dec 2024 14:36)  T(F): 98.1 (06 Dec 2024 04:00), Max: 98.6 (05 Dec 2024 14:36)  HR: 98 (06 Dec 2024 14:00) (88 - 129)  BP: 119/64 (06 Dec 2024 08:00) (72/54 - 148/74)  BP(mean): 81 (06 Dec 2024 08:00) (43 - 96)  RR: 24 (06 Dec 2024 14:00) (13 - 32)  SpO2: 100% (06 Dec 2024 14:00) (90% - 100%)    Parameters below as of 06 Dec 2024 14:00  Patient On (Oxygen Delivery Method): ventilator    O2 Concentration (%): 40  Daily Height in cm: 177.8 (05 Dec 2024 19:00)    Daily Weight in k.2 (06 Dec 2024 04:00)    GENERAL:  intubated, sedated  HEENT:  NCAT, no scleral icterus  CHEST: no resp distress  HEART:  RRR  ABDOMEN:  Soft, non-tender, non-distended  EXTREMITIES:  No cyanosis, clubbing, or edema  SKIN:  No rash/erythema/ecchymoses/petechiae/wounds/abscess/warm/dry  NEURO:  sedated     LABS:                        6.7    18.98 )-----------( 96       ( 06 Dec 2024 10:30 )             19.4     Mean Cell Volume: 84.7 fL (-24 @ 10:30)    12    135  |  96[L]  |  33[H]  ----------------------------<  294[H]  5.3   |  20[L]  |  1.92[H]    Ca    7.9[L]      06 Dec 2024 10:30  Phos  5.5     12-  Mg     1.80     12-    TPro  5.4[L]  /  Alb  2.3[L]  /  TBili  3.0[H]  /  DBili  x   /  AST  2924[H]  /  ALT  957[H]  /  AlkPhos  134[H]  12-06    LIVER FUNCTIONS - ( 06 Dec 2024 10:30 )  Alb: 2.3 g/dL / Pro: 5.4 g/dL / ALK PHOS: 134 U/L / ALT: 957 U/L / AST: 2924 U/L / GGT: x           PT/INR - ( 06 Dec 2024 10:30 )   PT: 30.8 sec;   INR: 2.61 ratio         PTT - ( 06 Dec 2024 10:30 )  PTT:36.4 sec  Urinalysis Basic - ( 06 Dec 2024 13:10 )    Color: Dark Yellow / Appearance: Cloudy / S.032 / pH: x  Gluc: x / Ketone: 15 mg/dL  / Bili: Negative / Urobili: 0.2 mg/dL   Blood: x / Protein: 30 mg/dL / Nitrite: Negative   Leuk Esterase: Negative / RBC: x / WBC x   Sq Epi: x / Non Sq Epi: x / Bacteria: x      Amylase Serum--      Lipase serum--       [id number]  Amylase Serum--      Lipase serum--       [id number]        Imaging:  < from: Upper Endoscopy (24 @ 20:02) >  Findings:       Four columns of grade III varices were found in the lower third of the        esophagus, one with red dada sign. Two double bands and 4 single bands        were successfully placed across 4 variceal columns. There was deflation        of varices at end of procedure.       Large blood clot was found in the gastric fundus. Lavage and watts net of        the area was performed, resulting in incomplete clearance with continued        poor visualization.       Moderate portal hypertensive gastropathy was found in theentire        examined stomach.       Old blood was found in the duodenal bulb.    < end of copied text >

## 2024-12-06 NOTE — DIETITIAN INITIAL EVALUATION ADULT - REASON FOR ADMISSION
43 y/o M w Hx of ETOH use d/o, ETOH cirrhosis c/b esophageal varices, HTN, DM2.  Admitted for upper GI bleed & intubated for airway protection.  Intubated.  S/p EGD with findings of large varices, s/p  banding.  Also with MELITA requiring CRRT.

## 2024-12-06 NOTE — CONSULT NOTE ADULT - ASSESSMENT
Pt with MELITA, and hyperkalemia.  Pt with MELITA, hyperkalemia and AGMA.  Pt. with MELITA, hyperkalemia and AGMA.

## 2024-12-06 NOTE — CONSULT NOTE ADULT - PROBLEM SELECTOR RECOMMENDATION 3
Pt with AGMA iso renal failure and lactic acidosis. On admission SCO2 16 (12/5). SCO2 decreased to 11, venous pH 7.25 and lactate 12.1. Started on sodium bicarb infusion and SCO2 low/improved to 16 and venous pH WNL 7.35 today (12/6). Can discontinued sodium bicarb infusion once on CRRT. Monitor labs. Pt with AGMA iso renal failure and lactic acidosis. On admission SCO2 low at 16 (12/5). SCO2 decreased further to 11, venous pH low 7.25 and lactate elevated at 12.1. Started on sodium bicarb infusion and SCO2 low/improved to 16 and venous pH WNL at 7.35 today (12/6). Can discontinued sodium bicarb infusion once on CRRT. Monitor labs.    If you have any questions, please feel free to contact me  Kassidy Penny  Nephrology Fellow  [facility]/Page [phone]  (After 5pm or on weekends please page the on-call fellow) Pt with AGMA iso MELITA and lactic acidosis. On admission SCO2 low at 16 (12/5). SCO2 decreased further to 11, venous pH low 7.25 and lactate elevated at 12.1. Started on sodium bicarb infusion and SCO2 low/improved to 16 and venous pH was 7.35 today (12/6). Can discontinue sodium bicarb infusion after CRRT initiated. Monitor labs.    If you have any questions, please feel free to contact me  Kassidy Penny  Nephrology Fellow  [facility]/Page [phone]  (After 5pm or on weekends please page the on-call fellow)

## 2024-12-06 NOTE — CONSULT NOTE ADULT - SUBJECTIVE AND OBJECTIVE BOX
[facility] DIVISION OF KIDNEY DISEASES AND HYPERTENSION -- [phone]  -- INITIAL CONSULT NOTE  --------------------------------------------------------------------------------  HPI: 43yo M w/ PMH of HTN, pre-DM, decompensated alcohol-related cirrhosis c/b ascites and nonbleeding varices, asthma, and psoriasis p/w BRBPR and ETOH withdrawal. Nephrology consulted for MELITA and hyperkalemia.     [facility] KEITH/Edita reviewed. Baseline Scr 0.5-0.8. Last Scr WNL at 0.86 on 10/22/24. On ED labs Scr WNL at 0.64 and increased to 1.4 later that day (12/5). Scr elevated/increased to 2.05 today (12/6). Labs also signficant for persistent hyperkalemia, serum potassium peaked to 7.8 on 12/6 but hemolyzed sample. After several rounds of medical management serum potassium eleavted/improved to 6.6 on 12/6. UA performed on 12/6 w/ trace proteinruira, w/o microscopic hematuria. CT ab/pelvis w/ IV contrast (12/5) w/o hydronephrosis.     Pt seen and examined in the MICU. Pt intubated/sedated and on multiple IV vassopressors. Unable to obtain ROS/history.    PAST HISTORY  --------------------------------------------------------------------------------  PAST MEDICAL & SURGICAL HISTORY:  Hypertension  Diabetes  Cirrhosis  Alcohol use  Asthma  Psoriasis    No significant past surgical history    FAMILY HISTORY:  No family history of cardiovascular disease    PAST SOCIAL HISTORY: n/a    ALLERGIES & MEDICATIONS  --------------------------------------------------------------------------------  Allergies    amoxicillin (Angioedema)    Intolerances    Standing Inpatient Medications  acetylcysteine IVPB 14 Gram(s) IV Intermittent once  acetylcysteine IVPB 4.8 Gram(s) IV Intermittent once  acetylcysteine IVPB 14 Gram(s) IV Intermittent every 24 hours  albumin human  5% IVPB 250 milliLiter(s) IV Intermittent once  cefTRIAXone   IVPB 1000 milliGRAM(s) IV Intermittent every 24 hours  chlorhexidine 0.12% Liquid 15 milliLiter(s) Oral Mucosa every 12 hours  chlorhexidine 2% Cloths 1 Application(s) Topical <User Schedule>  dextrose 50% Injectable 50 milliLiter(s) IV Push once  fentaNYL   Infusion. 2 MICROgram(s)/kG/Hr IV Continuous <Continuous>  folic acid Injectable 1 milliGRAM(s) IV Push daily  furosemide   Injectable 40 milliGRAM(s) IV Push once  insulin regular  human recombinant 10 Unit(s) IV Push once  lactated ringers Bolus 1000 milliLiter(s) IV Bolus once  lactulose Retention Enema 200 Gram(s) Rectal once  meropenem  IVPB 1000 milliGRAM(s) IV Intermittent every 24 hours  norepinephrine Infusion 0.4 MICROgram(s)/kG/Min IV Continuous <Continuous>  octreotide  Infusion 50 MICROgram(s)/Hr IV Continuous <Continuous>  pantoprazole Infusion 8 mG/Hr IV Continuous <Continuous>  petrolatum Ophthalmic Ointment 1 Application(s) Both EYES two times a day  propofol Infusion 30 MICROgram(s)/kG/Min IV Continuous <Continuous>  propofol Injectable 20 milliGRAM(s) IV Push once  sodium bicarbonate  Infusion 0.118 mEq/kG/Hr IV Continuous <Continuous>  thiamine IVPB 500 milliGRAM(s) IV Intermittent daily  vancomycin  IVPB 1500 milliGRAM(s) IV Intermittent once  vasopressin Infusion 0.04 Unit(s)/Min IV Continuous <Continuous>    PRN Inpatient Medications    REVIEW OF SYSTEMS  --------------------------------------------------------------------------------  Unable to obtain as pt intubated/sedated.     VITALS/PHYSICAL EXAM  --------------------------------------------------------------------------------  T(C): 36.8 (12-06-24 @ 00:00), Max: 37 (12-05-24 @ 14:36)  HR: 107 (12-06-24 @ 05:00) (88 - 129)  BP: 123/66 (12-06-24 @ 05:00) (72/54 - 151/89)  RR: 21 (12-06-24 @ 05:00) (16 - 32)  SpO2: 100% (12-06-24 @ 05:00) (90% - 100%)  Wt(kg): --  Height (cm): 177.8 (12-05-24 @ 19:00)  Weight (kg): 95.3 (12-05-24 @ 19:00)  BMI (kg/m2): 30.1 (12-05-24 @ 19:00)  BSA (m2): 2.13 (12-05-24 @ 19:00)    12-05-24 @ 07:01  -  12-06-24 @ 05:44  --------------------------------------------------------  IN: 3095.3 mL / OUT: 300 mL / NET: 2795.3 mL    Physical Exam:  	Gen: Ill appearing, sedated   	HEENT: +ETT  	Pulm: CTA B/L  	CV: S1S2  	Abd: Soft, +BS   	Ext: No LE edema B/L              : +booker catheter with ~300cc clear urine in bag   	Neuro: Sedated   	Skin: Warm and dry  	Vascular access: none for dialysis     LABS/STUDIES  --------------------------------------------------------------------------------              6.9    26.26 >-----------<  136      [12-06-24 @ 04:10]              20.7     135  |  98  |  30  ----------------------------<  197      [12-06-24 @ 04:10]  6.6   |  16  |  2.05        Ca     8.1     [12-06-24 @ 04:10]      Mg     1.80     [12-06-24 @ 04:10]      Phos  7.1     [12-06-24 @ 04:10]    TPro  5.3  /  Alb  2.1  /  TBili  3.3  /  DBili  x   /  AST  1173  /  ALT  415  /  AlkPhos  116  [12-06-24 @ 03:37]    PT/INR: PT 30.0 , INR 2.54       [12-06-24 @ 01:20]  PTT: 25.5       [12-06-24 @ 01:20]    Creatinine Trend:  SCr 2.05 [12-06 @ 04:10]  SCr 1.97 [12-06 @ 03:37]  SCr 1.73 [12-06 @ 01:20]  SCr 1.40 [12-05 @ 23:39]  SCr 0.64 [12-05 @ 11:55]   [facility] DIVISION OF KIDNEY DISEASES AND HYPERTENSION -- [phone]  -- INITIAL CONSULT NOTE  --------------------------------------------------------------------------------  HPI: 43yo M w/ PMH of HTN, pre-DM, decompensated alcohol-related cirrhosis c/b ascites and nonbleeding varices, asthma, and psoriasis p/w BRBPR and ETOH withdrawal. Nephrology consulted for MELITA, hyperkalemia and AGMA.    [facility] KEITH/Edita reviewed. Baseline Scr 0.5-0.8. Last Scr WNL at 0.86 on 10/22/24. On ED labs Scr WNL at 0.64 and increased to 1.4 later that day (12/5). Scr elevated/increased to 2.05 today (12/6). Labs also significant for AGMA and persistent hyperkalemia. Serum potassium peaked to 7.8 on 12/6 but hemolyzed sample. After several rounds of medical management serum potassium elevated/improved to 6.6 (12/6). UA performed on 12/6 w/ trace proteinuria, w/o microscopic hematuria. CT ab/pelvis w/ IV contrast (12/5) w/o hydronephrosis.     Pt seen and examined in the MICU. Pt intubated/sedated and on multiple IV vasopressors. Unable to obtain ROS/history.    PAST HISTORY  --------------------------------------------------------------------------------  PAST MEDICAL & SURGICAL HISTORY:  Hypertension  Diabetes  Cirrhosis  Alcohol use  Asthma  Psoriasis    No significant past surgical history    FAMILY HISTORY:  No family history of cardiovascular disease    PAST SOCIAL HISTORY: n/a    ALLERGIES & MEDICATIONS  --------------------------------------------------------------------------------  Allergies    amoxicillin (Angioedema)    Intolerances    Standing Inpatient Medications  acetylcysteine IVPB 14 Gram(s) IV Intermittent once  acetylcysteine IVPB 4.8 Gram(s) IV Intermittent once  acetylcysteine IVPB 14 Gram(s) IV Intermittent every 24 hours  albumin human  5% IVPB 250 milliLiter(s) IV Intermittent once  cefTRIAXone   IVPB 1000 milliGRAM(s) IV Intermittent every 24 hours  chlorhexidine 0.12% Liquid 15 milliLiter(s) Oral Mucosa every 12 hours  chlorhexidine 2% Cloths 1 Application(s) Topical <User Schedule>  dextrose 50% Injectable 50 milliLiter(s) IV Push once  fentaNYL   Infusion. 2 MICROgram(s)/kG/Hr IV Continuous <Continuous>  folic acid Injectable 1 milliGRAM(s) IV Push daily  furosemide   Injectable 40 milliGRAM(s) IV Push once  insulin regular  human recombinant 10 Unit(s) IV Push once  lactated ringers Bolus 1000 milliLiter(s) IV Bolus once  lactulose Retention Enema 200 Gram(s) Rectal once  meropenem  IVPB 1000 milliGRAM(s) IV Intermittent every 24 hours  norepinephrine Infusion 0.4 MICROgram(s)/kG/Min IV Continuous <Continuous>  octreotide  Infusion 50 MICROgram(s)/Hr IV Continuous <Continuous>  pantoprazole Infusion 8 mG/Hr IV Continuous <Continuous>  petrolatum Ophthalmic Ointment 1 Application(s) Both EYES two times a day  propofol Infusion 30 MICROgram(s)/kG/Min IV Continuous <Continuous>  propofol Injectable 20 milliGRAM(s) IV Push once  sodium bicarbonate  Infusion 0.118 mEq/kG/Hr IV Continuous <Continuous>  thiamine IVPB 500 milliGRAM(s) IV Intermittent daily  vancomycin  IVPB 1500 milliGRAM(s) IV Intermittent once  vasopressin Infusion 0.04 Unit(s)/Min IV Continuous <Continuous>    PRN Inpatient Medications    REVIEW OF SYSTEMS  --------------------------------------------------------------------------------  Unable to obtain as pt intubated/sedated.     VITALS/PHYSICAL EXAM  --------------------------------------------------------------------------------  T(C): 36.8 (12-06-24 @ 00:00), Max: 37 (12-05-24 @ 14:36)  HR: 107 (12-06-24 @ 05:00) (88 - 129)  BP: 123/66 (12-06-24 @ 05:00) (72/54 - 151/89)  RR: 21 (12-06-24 @ 05:00) (16 - 32)  SpO2: 100% (12-06-24 @ 05:00) (90% - 100%)  Wt(kg): --  Height (cm): 177.8 (12-05-24 @ 19:00)  Weight (kg): 95.3 (12-05-24 @ 19:00)  BMI (kg/m2): 30.1 (12-05-24 @ 19:00)  BSA (m2): 2.13 (12-05-24 @ 19:00)    12-05-24 @ 07:01  -  12-06-24 @ 05:44  --------------------------------------------------------  IN: 3095.3 mL / OUT: 300 mL / NET: 2795.3 mL    Physical Exam:  	Gen: Ill appearing, sedated   	HEENT: +ETT  	Pulm: CTA B/L  	CV: S1S2  	Abd: Soft, +BS   	Ext: No LE edema B/L              : +booker catheter with ~300cc clear urine in bag   	Neuro: Sedated   	Skin: Warm and dry  	Vascular access: none for dialysis     LABS/STUDIES  --------------------------------------------------------------------------------              6.9    26.26 >-----------<  136      [12-06-24 @ 04:10]              20.7     135  |  98  |  30  ----------------------------<  197      [12-06-24 @ 04:10]  6.6   |  16  |  2.05        Ca     8.1     [12-06-24 @ 04:10]      Mg     1.80     [12-06-24 @ 04:10]      Phos  7.1     [12-06-24 @ 04:10]    TPro  5.3  /  Alb  2.1  /  TBili  3.3  /  DBili  x   /  AST  1173  /  ALT  415  /  AlkPhos  116  [12-06-24 @ 03:37]    PT/INR: PT 30.0 , INR 2.54       [12-06-24 @ 01:20]  PTT: 25.5       [12-06-24 @ 01:20]    Creatinine Trend:  SCr 2.05 [12-06 @ 04:10]  SCr 1.97 [12-06 @ 03:37]  SCr 1.73 [12-06 @ 01:20]  SCr 1.40 [12-05 @ 23:39]  SCr 0.64 [12-05 @ 11:55]   [facility] DIVISION OF KIDNEY DISEASES AND HYPERTENSION -- [phone]  -- INITIAL CONSULT NOTE  --------------------------------------------------------------------------------  HPI: 45yo M w/ PMH of HTN, pre-DM, decompensated alcohol-related cirrhosis c/b ascites and nonbleeding varices, asthma, and psoriasis p/w BRBPR and ETOH withdrawal. Underwent EGD with banding of esophageal varacies on 12/5. Nephrology consulted for MELITA, hyperkalemia and AGMA.    [facility] KEITH/Edita reviewed. Baseline Scr 0.5-0.8. Last Scr WNL at 0.86 on 10/22/24. On ED labs Scr WNL at 0.64 and increased to 1.4 later that day (12/5). Scr elevated/increased to 2.05 today (12/6). Labs also significant elevated serum potassium and low SCO2. Serum potassium peaked to 7.8 on 12/6 but hemolyzed sample. After several rounds of medical management serum potassium elevated/improved to 6.6 (12/6). SCO2 decreased to 11, venous pH 7.25 and lactate 12.1. Started on sodium bicarb infusion and SCO2 low/improved to 16 and venous pH WNL 7.35 today (12/6). UA performed on 12/6 w/ trace proteinuria, w/o microscopic hematuria. CT ab/pelvis w/ IV contrast (12/5) w/o hydronephrosis.     Pt seen and examined in the MICU. Pt intubated/sedated and on multiple IV vasopressors. Unable to obtain ROS/history.    PAST HISTORY  --------------------------------------------------------------------------------  PAST MEDICAL & SURGICAL HISTORY:  Hypertension  Diabetes  Cirrhosis  Alcohol use  Asthma  Psoriasis    No significant past surgical history    FAMILY HISTORY:  No family history of cardiovascular disease    PAST SOCIAL HISTORY: n/a    ALLERGIES & MEDICATIONS  --------------------------------------------------------------------------------  Allergies    amoxicillin (Angioedema)    Intolerances    Standing Inpatient Medications  acetylcysteine IVPB 14 Gram(s) IV Intermittent once  acetylcysteine IVPB 4.8 Gram(s) IV Intermittent once  acetylcysteine IVPB 14 Gram(s) IV Intermittent every 24 hours  albumin human  5% IVPB 250 milliLiter(s) IV Intermittent once  cefTRIAXone   IVPB 1000 milliGRAM(s) IV Intermittent every 24 hours  chlorhexidine 0.12% Liquid 15 milliLiter(s) Oral Mucosa every 12 hours  chlorhexidine 2% Cloths 1 Application(s) Topical <User Schedule>  dextrose 50% Injectable 50 milliLiter(s) IV Push once  fentaNYL   Infusion. 2 MICROgram(s)/kG/Hr IV Continuous <Continuous>  folic acid Injectable 1 milliGRAM(s) IV Push daily  furosemide   Injectable 40 milliGRAM(s) IV Push once  insulin regular  human recombinant 10 Unit(s) IV Push once  lactated ringers Bolus 1000 milliLiter(s) IV Bolus once  lactulose Retention Enema 200 Gram(s) Rectal once  meropenem  IVPB 1000 milliGRAM(s) IV Intermittent every 24 hours  norepinephrine Infusion 0.4 MICROgram(s)/kG/Min IV Continuous <Continuous>  octreotide  Infusion 50 MICROgram(s)/Hr IV Continuous <Continuous>  pantoprazole Infusion 8 mG/Hr IV Continuous <Continuous>  petrolatum Ophthalmic Ointment 1 Application(s) Both EYES two times a day  propofol Infusion 30 MICROgram(s)/kG/Min IV Continuous <Continuous>  propofol Injectable 20 milliGRAM(s) IV Push once  sodium bicarbonate  Infusion 0.118 mEq/kG/Hr IV Continuous <Continuous>  thiamine IVPB 500 milliGRAM(s) IV Intermittent daily  vancomycin  IVPB 1500 milliGRAM(s) IV Intermittent once  vasopressin Infusion 0.04 Unit(s)/Min IV Continuous <Continuous>    PRN Inpatient Medications    REVIEW OF SYSTEMS  --------------------------------------------------------------------------------  Unable to obtain as pt intubated/sedated.     VITALS/PHYSICAL EXAM  --------------------------------------------------------------------------------  T(C): 36.8 (12-06-24 @ 00:00), Max: 37 (12-05-24 @ 14:36)  HR: 107 (12-06-24 @ 05:00) (88 - 129)  BP: 123/66 (12-06-24 @ 05:00) (72/54 - 151/89)  RR: 21 (12-06-24 @ 05:00) (16 - 32)  SpO2: 100% (12-06-24 @ 05:00) (90% - 100%)  Wt(kg): --  Height (cm): 177.8 (12-05-24 @ 19:00)  Weight (kg): 95.3 (12-05-24 @ 19:00)  BMI (kg/m2): 30.1 (12-05-24 @ 19:00)  BSA (m2): 2.13 (12-05-24 @ 19:00)    12-05-24 @ 07:01  -  12-06-24 @ 05:44  --------------------------------------------------------  IN: 3095.3 mL / OUT: 300 mL / NET: 2795.3 mL    Physical Exam:  	Gen: Ill appearing, sedated   	HEENT: +ETT  	Pulm: CTA B/L  	CV: S1S2  	Abd: Soft, +BS   	Ext: No LE edema B/L              : +booker catheter with ~300cc clear urine in bag   	Neuro: Sedated   	Skin: Warm and dry  	Vascular access: none for dialysis     LABS/STUDIES  --------------------------------------------------------------------------------              6.9    26.26 >-----------<  136      [12-06-24 @ 04:10]              20.7     135  |  98  |  30  ----------------------------<  197      [12-06-24 @ 04:10]  6.6   |  16  |  2.05        Ca     8.1     [12-06-24 @ 04:10]      Mg     1.80     [12-06-24 @ 04:10]      Phos  7.1     [12-06-24 @ 04:10]    TPro  5.3  /  Alb  2.1  /  TBili  3.3  /  DBili  x   /  AST  1173  /  ALT  415  /  AlkPhos  116  [12-06-24 @ 03:37]    PT/INR: PT 30.0 , INR 2.54       [12-06-24 @ 01:20]  PTT: 25.5       [12-06-24 @ 01:20]    Creatinine Trend:  SCr 2.05 [12-06 @ 04:10]  SCr 1.97 [12-06 @ 03:37]  SCr 1.73 [12-06 @ 01:20]  SCr 1.40 [12-05 @ 23:39]  SCr 0.64 [12-05 @ 11:55]   [facility] DIVISION OF KIDNEY DISEASES AND HYPERTENSION -- [phone]  -- INITIAL CONSULT NOTE  --------------------------------------------------------------------------------  HPI: 45yo M w/ PMH of HTN, pre-DM, decompensated alcohol-related cirrhosis c/b ascites and nonbleeding varices, asthma, and psoriasis p/w BRBPR and ETOH withdrawal. Underwent EGD with banding of esophageal varices on 12/5. Nephrology consulted for MELITA, hyperkalemia and AGMA.    [facility] KEITH/Edita reviewed. Baseline Scr 0.5-0.8. Last Scr WNL at 0.86 on 10/22/24. On ED labs Scr WNL at 0.64 and increased to 1.4 later that day (12/5). Scr elevated/increased to 2.05 today (12/6). Labs also significant elevated serum potassium and low SCO2. Serum potassium peaked to 7.8 on 12/6 but hemolyzed sample. After several rounds of medical management serum potassium elevated/improved to 6.6 (12/6). SCO2 decreased to 11, venous pH 7.25 and lactate 12.1. Started on sodium bicarb infusion and SCO2 low/improved to 16 and venous pH WNL 7.35 today (12/6). UA performed on 12/6 w/ trace proteinuria, w/o microscopic hematuria. CT ab/pelvis w/ IV contrast (12/5) w/o hydronephrosis.     Pt seen and examined in the MICU. Pt intubated/sedated and on multiple IV vasopressors. Unable to obtain ROS/history.    PAST HISTORY  --------------------------------------------------------------------------------  PAST MEDICAL & SURGICAL HISTORY:  Hypertension  Diabetes  Cirrhosis  Alcohol use  Asthma  Psoriasis    No significant past surgical history    FAMILY HISTORY:  No family history of cardiovascular disease    PAST SOCIAL HISTORY: n/a    ALLERGIES & MEDICATIONS  --------------------------------------------------------------------------------  Allergies    amoxicillin (Angioedema)    Intolerances    Standing Inpatient Medications  acetylcysteine IVPB 14 Gram(s) IV Intermittent once  acetylcysteine IVPB 4.8 Gram(s) IV Intermittent once  acetylcysteine IVPB 14 Gram(s) IV Intermittent every 24 hours  albumin human  5% IVPB 250 milliLiter(s) IV Intermittent once  cefTRIAXone   IVPB 1000 milliGRAM(s) IV Intermittent every 24 hours  chlorhexidine 0.12% Liquid 15 milliLiter(s) Oral Mucosa every 12 hours  chlorhexidine 2% Cloths 1 Application(s) Topical <User Schedule>  dextrose 50% Injectable 50 milliLiter(s) IV Push once  fentaNYL   Infusion. 2 MICROgram(s)/kG/Hr IV Continuous <Continuous>  folic acid Injectable 1 milliGRAM(s) IV Push daily  furosemide   Injectable 40 milliGRAM(s) IV Push once  insulin regular  human recombinant 10 Unit(s) IV Push once  lactated ringers Bolus 1000 milliLiter(s) IV Bolus once  lactulose Retention Enema 200 Gram(s) Rectal once  meropenem  IVPB 1000 milliGRAM(s) IV Intermittent every 24 hours  norepinephrine Infusion 0.4 MICROgram(s)/kG/Min IV Continuous <Continuous>  octreotide  Infusion 50 MICROgram(s)/Hr IV Continuous <Continuous>  pantoprazole Infusion 8 mG/Hr IV Continuous <Continuous>  petrolatum Ophthalmic Ointment 1 Application(s) Both EYES two times a day  propofol Infusion 30 MICROgram(s)/kG/Min IV Continuous <Continuous>  propofol Injectable 20 milliGRAM(s) IV Push once  sodium bicarbonate  Infusion 0.118 mEq/kG/Hr IV Continuous <Continuous>  thiamine IVPB 500 milliGRAM(s) IV Intermittent daily  vancomycin  IVPB 1500 milliGRAM(s) IV Intermittent once  vasopressin Infusion 0.04 Unit(s)/Min IV Continuous <Continuous>    PRN Inpatient Medications    REVIEW OF SYSTEMS  --------------------------------------------------------------------------------  Unable to obtain as pt intubated/sedated.     VITALS/PHYSICAL EXAM  --------------------------------------------------------------------------------  T(C): 36.8 (12-06-24 @ 00:00), Max: 37 (12-05-24 @ 14:36)  HR: 107 (12-06-24 @ 05:00) (88 - 129)  BP: 123/66 (12-06-24 @ 05:00) (72/54 - 151/89)  RR: 21 (12-06-24 @ 05:00) (16 - 32)  SpO2: 100% (12-06-24 @ 05:00) (90% - 100%)  Wt(kg): --  Height (cm): 177.8 (12-05-24 @ 19:00)  Weight (kg): 95.3 (12-05-24 @ 19:00)  BMI (kg/m2): 30.1 (12-05-24 @ 19:00)  BSA (m2): 2.13 (12-05-24 @ 19:00)    12-05-24 @ 07:01  -  12-06-24 @ 05:44  --------------------------------------------------------  IN: 3095.3 mL / OUT: 300 mL / NET: 2795.3 mL    Physical Exam:  	Gen: Ill appearing, sedated   	HEENT: +ETT  	Pulm: CTA B/L  	CV: S1S2  	Abd: Soft, +BS   	Ext: No LE edema B/L              : +Mckeon catheter with ~300cc clear urine in bag   	Neuro: Sedated   	Skin: Warm and dry  	Vascular access: none for dialysis     LABS/STUDIES  --------------------------------------------------------------------------------              6.9    26.26 >-----------<  136      [12-06-24 @ 04:10]              20.7     135  |  98  |  30  ----------------------------<  197      [12-06-24 @ 04:10]  6.6   |  16  |  2.05        Ca     8.1     [12-06-24 @ 04:10]      Mg     1.80     [12-06-24 @ 04:10]      Phos  7.1     [12-06-24 @ 04:10]    TPro  5.3  /  Alb  2.1  /  TBili  3.3  /  DBili  x   /  AST  1173  /  ALT  415  /  AlkPhos  116  [12-06-24 @ 03:37]    Creatinine Trend:  SCr 2.05 [12-06 @ 04:10]  SCr 1.97 [12-06 @ 03:37]  SCr 1.73 [12-06 @ 01:20]  SCr 1.40 [12-05 @ 23:39]  SCr 0.64 [12-05 @ 11:55]

## 2024-12-06 NOTE — PROGRESS NOTE ADULT - ASSESSMENT
ASSESSMENT/PLAN:      ===NEURO===  #Encephalopathy  - iso bleed and cirrhosis  - Check ammonia   - Thiamine 500 x3d  - Folic Acid  - Multi-vitamin    ===CARDIAC===  #Shock  - Vasoplegic vs Hypovolemic  - On norepi  - Fluid resuscitation    #HTN  - Hold anti-htn meds iso shock    ===PULM===  #Intubated  - For airway protection  - F/u CXR  - F/u post-intubation ABG    ===RENAL===  - No active issues  - Mckeon, strict I/Os    ===GI===  #GI Bleed  - Octreotide drip  - Pantoprazole drip  - CTX 1g daily  - GI following, planning for endoscopy    #Cirrhosis  - Decompensated  - Known esophageal varices    ===HEME/ONC===  #Anemia  - iso GI bleed  - Trend hemoglobin  - Transfuse to Hgb>7    ===ID===  #SBP PPx  - CTX 1g qd  - F/u BCx, UCx    ===ENDO===  #T2DM  - ISS, FS q6h    ===ICU Checklist===  - Code Status: Full code  - Diet: None  - Analgesia: Fentanyl  - Sedation: Propofol  - DVT Ppx: SCDs  - Bowel Regimen:  - Insulin: ISS  - Peripheral IVs:   - Central Line: None  - A-line: None  - Mckeon: Yes  - MRSA PCR: Ordered  - Lacri-lube: Yes  - Chlorhexidine cloths: Yes   ASSESSMENT/PLAN:44 year old male with a history of alcohol use disorder, alcohol cirrhosis c/b esophageal varices, hypertension, T2DM, and H pylori (s/p treatment September 2024) who presented to [facility] 12/5 with dark stools accepted to MICU for hypovolemic shock in setting of GIB- intubated for airway protection in setting of UGIB.        ===NEURO===  #Encephalopathy: Toxic metabolic in setting of GIB and elevated ammonia poss infectious etiology   #Intubated/ sedated  - on propofol/ fentanyl gtt  - ammonia elevated 155-> 198 cont to trend   - unable to give PO lactulose in setting of no GI access due to varies- cont to trend should come down with CRRT- consider lactulose enema   - Thiamine 500 x3d and Folic Acid    ===CARDIAC===  #Shock-  Hypovolemic in setting of UGIB vs vasoplegic   #Peaked T waves in setting of hyperkalemia   - on levophed and vasopressin - MAP goal 70-75 in setting of possible hepatorenal syndrome   - Fluid resuscitation with blood s/p 4 total units PRBC, 1 L LR, 250 albumin x 1   - bedside TTE hyperdynamic LV official TTE ordered   - s/p calcium gluc x 2 for cardiac stability     #HTN  - Hold anti-htn meds iso shock    ===PULM===  #Intubated- for airway protection in setting of UGIB   - MV 24/420/8/40  -  CXR clear      ===RENAL===  #MELITA likely ATN in setting of sepsis  #Metabolic Acidosis with lactic acidosis   #Hyperkalemia   #Hepatorenal?  - Cr at baseline .5-> 2.05   - K as high as 7.8 no hemolyzed- received multiple cocktails with insulin/ dextrose/ calcium/ bicarb and lasix 40 x 1   - Booker placed strict Is and Os  - CRRT initiated  12/6 net even   - trend labs q 6 while on CRRT (CBC, CMP, ABG, PTT)   - nephro following  - cont bicarb gtt   - patient appears volume down on exam received fluid back via blood, 1L LR and albumin x 1   - MAP goal 70-75 in setting of possible hepatorenal     ===GI===  #GI Bleed  - Octreotide drip  - Pantoprazole drip  - s/p CTX   - GI following  - s/p EGD 12/5- 4 large columns of grade III esophageal varices with oozing. One column with red dada sign. Successful placement of 6 bands across 4 columns (two varices double banded) with incomplete eradication of varices.  - repeat EGD 12/6- 2 more varices banded   - goal H/H > 7 s/p 4 u PRBC total/ 1 FFP  -NPO- no PO access       #Cirrhosis: hx of G2 varices seen on scope 6/2024   #Hyperammonemia   - MELD score 17   - US: cirrhosis with portal HTN no flow visualized in portal system due to slow flow vs thrombus   - CT with IV contrast: small ascites cirrhosis with portal HTN   - s/p NAC gtt x 2 doses- no need to cont as per hepatology   - Hepatology following   - unable to give rifaximin/ lactulose no PO access- NO NGT AS PER HEPATOLOGY   - CRRT to help with removal of ammonia       ===HEME/ONC===  #Anemia- iso GI bleed  - Baseline H/H 13s 9.6/29.1  - Transfuse to Hgb>7  - s/p 4u PRBC/ 1 FFP  - CBC/ Coags q 6, active type and screen   - SCDs     ===ID===  #Septic shock possible SBP vs UTI  - patient with ?puss during booker insertion   -s/p CTX 1g qd   - cont meropenem/ vanco (12/6 -) CRRT dosing in setting of worsening shock   - F/u BCx, UA, UCx  - Chlamydia/ Gonorrhea ordered F/U   -F/U MRSA  - no safe pocket for paracentesis at this time     ===ENDO===  #T2DM on Jariance   - ISS, FS q6h  - s/p multipe doses of insulin/ dextrose for hyperkalemia treatment     ===ICU Checklist===  - Code Status: Full code  - Lines: RIJ shiley 12/6 LIJ TLC 12/6 Axillary A line 12/6 Booker 12/6- coude   - DVT Ppx: SCDs

## 2024-12-06 NOTE — PRE PROCEDURE NOTE - PRE PROCEDURE EVALUATION
Pre-Endoscopy Evaluation    Attending Physician:  Dr. Vleazquez    Procedure: EGD     Indication for Procedure: Upper Gi bleed     PAST MEDICAL & SURGICAL HISTORY:  Hypertension      Diabetes      Cirrhosis      Alcohol use      Asthma      Psoriasis      No significant past surgical history          Allergies    amoxicillin (Angioedema)    Intolerances        Medications: MEDICATIONS  (STANDING):  acetylcysteine IVPB 14 Gram(s) IV Intermittent every 24 hours  chlorhexidine 0.12% Liquid 15 milliLiter(s) Oral Mucosa every 12 hours  chlorhexidine 2% Cloths 1 Application(s) Topical <User Schedule>  CRRT Treatment    <Continuous>  dextrose 5%. 1000 milliLiter(s) (50 mL/Hr) IV Continuous <Continuous>  dextrose 5%. 1000 milliLiter(s) (100 mL/Hr) IV Continuous <Continuous>  dextrose 50% Injectable 25 Gram(s) IV Push once  dextrose 50% Injectable 12.5 Gram(s) IV Push once  dextrose 50% Injectable 25 Gram(s) IV Push once  erythromycin   IVPB 250 milliGRAM(s) IV Intermittent once  fentaNYL   Infusion. 2 MICROgram(s)/kG/Hr (19.1 mL/Hr) IV Continuous <Continuous>  folic acid Injectable 1 milliGRAM(s) IV Push daily  glucagon  Injectable 1 milliGRAM(s) IntraMuscular once  insulin lispro (ADMELOG) corrective regimen sliding scale   SubCutaneous every 6 hours  lactulose Retention Enema 200 Gram(s) Rectal once  meropenem  IVPB 1000 milliGRAM(s) IV Intermittent every 8 hours  norepinephrine Infusion 0.8 MICROgram(s)/kG/Min (71.5 mL/Hr) IV Continuous <Continuous>  octreotide  Infusion 50 MICROgram(s)/Hr (10 mL/Hr) IV Continuous <Continuous>  pantoprazole Infusion 8 mG/Hr (10 mL/Hr) IV Continuous <Continuous>  petrolatum Ophthalmic Ointment 1 Application(s) Both EYES two times a day  PrismaSATE Dialysate BK 0 / 3.5 5000 milliLiter(s) (1400 mL/Hr) CRRT <Continuous>  PrismaSOL Filtration BGK 0 / 2.5 5000 milliLiter(s) (1200 mL/Hr) CRRT <Continuous>  PrismaSOL Filtration BGK 0 / 2.5 5000 milliLiter(s) (200 mL/Hr) CRRT <Continuous>  propofol Infusion 30 MICROgram(s)/kG/Min (17.2 mL/Hr) IV Continuous <Continuous>  propofol Injectable 20 milliGRAM(s) IV Push once  sodium bicarbonate  Infusion 0.236 mEq/kG/Hr (150 mL/Hr) IV Continuous <Continuous>  thiamine IVPB 500 milliGRAM(s) IV Intermittent daily  vancomycin  IVPB      vancomycin  IVPB 750 milliGRAM(s) IV Intermittent every 12 hours  vasopressin Infusion 0.04 Unit(s)/Min (6 mL/Hr) IV Continuous <Continuous>    MEDICATIONS  (PRN):  dextrose Oral Gel 15 Gram(s) Oral once PRN Blood Glucose LESS THAN 70 milliGRAM(s)/deciliter      Pertinent lab data:                        6.9    26.26 )-----------( 136      ( 06 Dec 2024 04:10 )             20.7     12-    135  |  98  |  30[H]  ----------------------------<  197[H]  6.6[HH]   |  16[L]  |  2.05[H]    Ca    8.1[L]      06 Dec 2024 04:10  Phos  7.1     12-  Mg     1.80         TPro  5.3[L]  /  Alb  2.1[L]  /  TBili  3.3[H]  /  DBili  x   /  AST  1173[H]  /  ALT  415[H]  /  AlkPhos  116  12-    PT/INR - ( 06 Dec 2024 01:20 )   PT: 30.0 sec;   INR: 2.54 ratio         PTT - ( 06 Dec 2024 01:20 )  PTT:25.5 sec            Physical Examination:  Daily Height in cm: 177.8 (05 Dec 2024 19:00)    Daily Weight in k.2 (06 Dec 2024 04:00)  Vital Signs Last 24 Hrs  T(C): 36.7 (06 Dec 2024 04:00), Max: 37 (05 Dec 2024 14:36)  T(F): 98.1 (06 Dec 2024 04:00), Max: 98.6 (05 Dec 2024 14:36)  HR: 103 (06 Dec 2024 11:00) (88 - 129)  BP: 119/64 (06 Dec 2024 08:00) (72/54 - 151/89)  BP(mean): 81 (06 Dec 2024 08:00) (43 - 96)  RR: 21 (06 Dec 2024 11:00) (14 - 32)  SpO2: 100% (06 Dec 2024 11:00) (90% - 100%)    Parameters below as of 06 Dec 2024 11:00  Patient On (Oxygen Delivery Method): ventilator    O2 Concentration (%): 40  GENERAL: no acute distress  NEURO: alert  HEENT: NCAT, no conjunctival pallor appreciated  CHEST: no respiratory distress, no accessory muscle use  CARDIAC: regular rate, +S1/S2  ABDOMEN: soft, nontender, no rebound or guarding  EXTREMITIES: warm, well perfused  SKIN: no lesions noted    Comments:    ASA Class: I []  II []  III []  IV []    The patient is a suitable candidate for the planned procedure unless box checked [ ]  No, explain:

## 2024-12-06 NOTE — PROCEDURE NOTE - NSINFORMCONSENT_GEN_A_CORE
This was an emergent procedure.
This was an emergent procedure.
Benefits, risks, and possible complications of procedure explained to patient/caregiver who verbalized understanding and gave written consent.
This was an emergent procedure.

## 2024-12-06 NOTE — PROCEDURE NOTE - NSINDICATIONS_GEN_A_CORE
arterial puncture to obtain ABG's/critical patient/monitoring purposes
dialysis/CRRT
critical illness/emergency venous access/volume resuscitation
pending scope for GIB/airway protection/critical patient

## 2024-12-06 NOTE — PROGRESS NOTE ADULT - ASSESSMENT
44-year-old with past medical history of decompensated alcohol-related cirrhosis complicated by ascites and nonbleeding varices, H. pylori (s/p treatment), asthma, psoriasis, prediabetes, hypertension, and a recent admission August 31 through September 9 for lower abdominal pain and decompensated cirrhosis, now presenting with bright red blood per rectum and alcohol withdrawal.  Hepatology consulted for GI bleed.    #hematemesis  #BRBPR  Pt presented w/ BRBPR with neg CT angio. Initial Hb 9.6 from b/l 12.9 in September. Given hematemesis, c/f possible variceal bleed. Last EGD reportedly 6/2024 showing grade II EVs (Not banded). Has missed NSBB over past several days.  S/P EGD 12/5 with large varices, stigmata of recent bleeding. Banded. Also with large blood clots in fundus, unable to be cleared.     Recommendations:  -repeat EGD today  -c/w octreotide GGT  -c/w CTX for 5 day course given c/f UGIB in cirrhotic  -Full infectious w/u including UA, CXR, blood cxs, diagnostic tap  -NPO  -2 large bore IVs; transfuse to maintain Hb>7; avoid overtransfusion   -Hold home BP meds  -please obtain TTE in case patient requires rescue tips     #Hx decompensated EtOH cirrhosis  Presumed etiology of cirrhosis is alcohol given patient's history. Now w/ relapse in alcohol after being sober for many months. Current MELD 38.   V: hx of ascites, reportedly on lasix/aldactone at home. Small ascites on imaging here  I: Needs full infectious w/u   B: Hx grade II EVs, c/f GIB as above  E:No hx HE, lethargic on exam here prior to intubation  S: hx of 1.1cm right hepatic lobe lesion, not seen on repeat imaging.     Recommendations:  -Hold diuresis  -Hold NSBB   -Full infectious w/u including UA, blood cultures, cxr, diagnostic tap  -Trend MELD labs: CBC, CMP, coags    Note incomplete until finalized by attending signature/attestation.    Bryanna Thao  GI/Hepatology Fellow    MONDAY-FRIDAY 8AM-5PM:  Pager# [phone] ([facility]) or [phone] ([facility])    NON-URGENT CONSULTS:  Please email [email or web address]  AT NIGHT AND ON WEEKENDS:  Contact on-call GI fellow from 5pm-8am and on weekends/holidays

## 2024-12-07 NOTE — PRE PROCEDURE NOTE - PRE PROCEDURE EVALUATION
------------------------------------------------------------  Interventional Radiology Pre-Procedure Note  ------------------------------------------------------------    Indication: 44y Male with pmh of decompensated ETOH cirrhosis complicated by ascites and non bleeding varices, H.Pylori, who presented with hematemesis, s/p endoscopy and banding x2. Patient has had increasing pressor requirements. IR is consulted for TIPS creation. The high risk of the procedure was discussed with the team who is aware. Plan for TIPS creation.     Past Medical History:  Hypertension    Diabetes    Cirrhosis    Alcohol use    Asthma    Psoriasis        Allergies: amoxicillin (Angioedema)      Medications:    cefTRIAXone   IVPB: 100 mL/Hr IV Intermittent (12-05-24 @ 13:33)  erythromycin   IVPB: 250 mL/Hr IV Intermittent (12-06-24 @ 11:03)  furosemide   Injectable: 40 milliGRAM(s) IV Push (12-06-24 @ 04:30)  meropenem  IVPB: 100 mL/Hr IV Intermittent (12-07-24 @ 06:05)  norepinephrine Infusion: 71.5 mL/Hr IV Continuous (12-05-24 @ 19:50)  norepinephrine Infusion: 71.5 mL/Hr IV Continuous (12-06-24 @ 19:17)  vancomycin  IVPB: 300 mL/Hr IV Intermittent (12-06-24 @ 07:54)  vancomycin  IVPB: 250 mL/Hr IV Intermittent (12-06-24 @ 17:09)  vancomycin  IVPB: 250 mL/Hr IV Intermittent (12-07-24 @ 04:53)      Vital Signs:   T(F): 97.2 (08:00), Max: 97.2 (08:00)  HR: 130 (11:45)  BP: --  RR: 24 (11:00)  SpO2: 100% (11:00)    Labs:           9.5  23.64)-----(87     (12-07-24 @ 06:55)         25.8     134 | 97 | 25  --------------------< 132     (12-07-24 @ 09:11)  4.3 | 19 | 1.62       PT: 39.3[H] 12-07-24 @ 02:35  aPTT: 40.0[H] 12-07-24 @ 02:35   INR: 3.43[H] 12-07-24 @ 02:35    Imaging: CT abdomen and pelvis 12/5/24 was reviewed    Consent: Risks/benefits/alternatives were explained and informed written consent was obtained.     Procedure Plan: Plan for TIPS creation and embolization of esophageal varices today.      ------------------------------------------------------------  Interventional Radiology Pre-Procedure Note  ------------------------------------------------------------    Indication: 44y Male with pmh of decompensated ETOH cirrhosis complicated by ascites and non bleeding varices, H.Pylori, who presented with hematemesis, s/p endoscopy and banding x2. Patient has had increasing pressor requirements. IR is consulted for TIPS creation. The high risk of the procedure was discussed with the team who is aware. Plan for TIPS creation.     Past Medical History:  Hypertension    Diabetes    Cirrhosis    Alcohol use    Asthma    Psoriasis        Allergies: amoxicillin (Angioedema)      Medications:    cefTRIAXone   IVPB: 100 mL/Hr IV Intermittent (12-05-24 @ 13:33)  erythromycin   IVPB: 250 mL/Hr IV Intermittent (12-06-24 @ 11:03)  furosemide   Injectable: 40 milliGRAM(s) IV Push (12-06-24 @ 04:30)  meropenem  IVPB: 100 mL/Hr IV Intermittent (12-07-24 @ 06:05)  norepinephrine Infusion: 71.5 mL/Hr IV Continuous (12-05-24 @ 19:50)  norepinephrine Infusion: 71.5 mL/Hr IV Continuous (12-06-24 @ 19:17)  vancomycin  IVPB: 300 mL/Hr IV Intermittent (12-06-24 @ 07:54)  vancomycin  IVPB: 250 mL/Hr IV Intermittent (12-06-24 @ 17:09)  vancomycin  IVPB: 250 mL/Hr IV Intermittent (12-07-24 @ 04:53)      Vital Signs:   T(F): 97.2 (08:00), Max: 97.2 (08:00)  HR: 130 (11:45)  BP: --  RR: 24 (11:00)  SpO2: 100% (11:00)    Labs:           9.5  23.64)-----(87     (12-07-24 @ 06:55)         25.8     134 | 97 | 25  --------------------< 132     (12-07-24 @ 09:11)  4.3 | 19 | 1.62       PT: 39.3[H] 12-07-24 @ 02:35  aPTT: 40.0[H] 12-07-24 @ 02:35   INR: 3.43[H] 12-07-24 @ 02:35    Imaging: CT abdomen and pelvis 12/5/24 was reviewed    Procedure Plan: Plan for TIPS creation and embolization of esophageal varices today.

## 2024-12-07 NOTE — PRE PROCEDURE NOTE - GENERAL PROCEDURE NAME
Upper Endoscopy
General
Transjugular Intrahepatic Portosystemic Shunt creation and embolization of esophageal/gastric varices

## 2024-12-07 NOTE — PROGRESS NOTE ADULT - CRITICAL CARE ATTENDING COMMENT
Agree with above. Seen and examined with team on rounds. Agree with history and physical as documented above. Critically ill with multiorgan failure. Severe ETOH related liver disease now with decompensated cirrhosis and shock liver in the setting of an acute GI bleed. Had 2 EGD's with banding done, continues to have clots in rectum and rising lactate. Will give Vit K and more blood today. In renal failure on CRRT not making urine currently. Close follow up of electrolytes and labs. On high dose vasopressors for shock state, on empiric abx as well. Supportive care. Family at the bedside and updated. Will call IR for possible emergent TIPS today, discussed with GI as well. Overall poor prognosis for functional recovery given multiple organs, MELD and extent of shock liver.
Pt is a 44M with MHx EtOH disorder (last drink reportedly 3 days PTA) with decompensated EtOH cirrhosis c/b esophageal varices and ascites, hx H-pylori, psoriasis, and HTN presenting to [facility] on 12/5/24 with decompensated liver cirrhosis and active UGIB 2/2 esophageal varices further c/b hemorrhagic/hypovolemic shock admitted to MICU requiring emergent ETT intubation/MV and vasopressor support now with hepatic encephalopathy, shock liver, and ARF initiated on CRRT (12/6).     Pt p/w acute AMS multifactorial 2/2 hepatic encephalopathy +/- EtOH withdrawal further c/b hypovolemia with shock. Currently remains sedated with propofol + fentanyl, can add versed if indicated. Sedation vacation once more stable. Thiamine + folic acid added. Propofol in place 2/2 concern for possible withdrawal period, will have to monitor closer once sedation vacation is attempted.    Pt with worsening hypovolemic shock 2/2 active GIB with Hgb drop ~6, initially only partially responsive to pRBC transfusion. Will c/w resuscitative efforts at 5:1:1. Bedside POCUS indicative of hyperdynamic LV with no evidence of obstructive or cardiogenic component.     Pt s/p ETT intubation/MV 2/2 emergent EGD. Now on ARDSNet Lung Protective ventilation strategy. ABG appropriate on current settings. CXR reviewed. Minimal O2 settings. Aspiration precautions.     Pt p/w acute decompensated liver cirrhosis c/b esophageal bleeding i/s/o high portal pressures. EGD overnight s/p 4 bands, will repeat EGD today. May need TIPS, discussed with IR. Protonix and octreotide drip in place. Ascites trace, no tappable pocket. Empiric abx added, initially with ceftriaxone now broadened to meropenem 2/2 clinical decompensation and concern for associated UTI. Hepatology following, discussed at bedside. Ammonia elevated i/s/o hepatic encephalopathy. No indication for lactulose 2/2 CRRT.     Pt with ARF i/s/o vasoplegic shock c/b AGMA and hyperkalemia initiated on CRRT this morning. Pt initially requiring NaHCO3 drip, now off 2/2 improvement in acidosis. Will c/w aggressive fluid/blood product resuscitation. Pt with bloody BMs. Strict I/Os, BUN:Cr trend. Will target slightly higher MAP 2/2 concern for hepatorenal syndrome. Pt with purulent urine on arrival, cx sent and abx initiated.    Pt critically ill requiring ICU level of care. DVT ppx SCDs. Pt full code. Wife at bedside, plan of care discussed at length.

## 2024-12-07 NOTE — INITIAL ORGAN DONATION REFERRAL - NSORGANDONATIONCLINICALTRIGGER_GEN_ALL_CORE
[place] Coma Scale is less than or equal to 5/Family discussion withdrawal of life-sustaining therapies is anticipated

## 2024-12-07 NOTE — PROGRESS NOTE ADULT - SUBJECTIVE AND OBJECTIVE BOX
[facility] Division of Kidney Diseases & Hypertension  FOLLOW UP NOTE  [phone]--------------------------------------------------------------------------------    Chief Complaint: MELITA, hyperkalemia, and AGMA    24 hour events/subjective: Patient seen and examined in the MICU. Tolerating CRRT with no complications. Unable to obtain ROS due to clinical status.    PAST HISTORY  --------------------------------------------------------------------------------  No significant changes to PMH, PSH, FHx, SHx, unless otherwise noted    ALLERGIES & MEDICATIONS  --------------------------------------------------------------------------------  Allergies  amoxicillin (Angioedema)    Intolerances    Standing Inpatient Medications  chlorhexidine 0.12% Liquid 15 milliLiter(s) Oral Mucosa every 12 hours  chlorhexidine 2% Cloths 1 Application(s) Topical <User Schedule>  CRRT Treatment    <Continuous>  dextrose 5%. 1000 milliLiter(s) IV Continuous <Continuous>  dextrose 5%. 1000 milliLiter(s) IV Continuous <Continuous>  dextrose 50% Injectable 25 Gram(s) IV Push once  dextrose 50% Injectable 12.5 Gram(s) IV Push once  dextrose 50% Injectable 25 Gram(s) IV Push once  fentaNYL   Infusion. 3 MICROgram(s)/kG/Hr IV Continuous <Continuous>  folic acid Injectable 1 milliGRAM(s) IV Push daily  glucagon  Injectable 1 milliGRAM(s) IntraMuscular once  insulin lispro (ADMELOG) corrective regimen sliding scale   SubCutaneous every 6 hours  meropenem  IVPB 1000 milliGRAM(s) IV Intermittent every 8 hours  norepinephrine Infusion 0.8 MICROgram(s)/kG/Min IV Continuous <Continuous>  octreotide  Infusion 50 MICROgram(s)/Hr IV Continuous <Continuous>  pantoprazole Infusion 8 mG/Hr IV Continuous <Continuous>  petrolatum Ophthalmic Ointment 1 Application(s) Both EYES two times a day  phytonadione  IVPB 10 milliGRAM(s) IV Intermittent daily  PrismaSATE Dialysate BK 0 / 3.5 5000 milliLiter(s) CRRT <Continuous>  PrismaSOL Filtration BGK 0 / 2.5 5000 milliLiter(s) CRRT <Continuous>  PrismaSOL Filtration BGK 0 / 2.5 5000 milliLiter(s) CRRT <Continuous>  propofol Infusion 30 MICROgram(s)/kG/Min IV Continuous <Continuous>  propofol Injectable 20 milliGRAM(s) IV Push once  thiamine IVPB 500 milliGRAM(s) IV Intermittent daily  vancomycin  IVPB 750 milliGRAM(s) IV Intermittent every 12 hours  vasopressin Infusion 0.04 Unit(s)/Min IV Continuous <Continuous>    PRN Inpatient Medications  dextrose Oral Gel 15 Gram(s) Oral once PRN    REVIEW OF SYSTEMS  --------------------------------------------------------------------------------  Unable to obtain ROS due to clinical status.    VITALS/PHYSICAL EXAM  --------------------------------------------------------------------------------  T(C): 36.2 (12-07-24 @ 08:00), Max: 36.2 (12-07-24 @ 08:00)  HR: 127 (12-07-24 @ 09:00) (89 - 127)  BP: --  RR: 24 (12-07-24 @ 09:00) (13 - 24)  SpO2: 100% (12-07-24 @ 09:00) (100% - 100%)  Wt(kg): --  Height (cm): 177.8 (12-05-24 @ 19:00)  Weight (kg): 95.3 (12-05-24 @ 19:00)  BMI (kg/m2): 30.1 (12-05-24 @ 19:00)  BSA (m2): 2.13 (12-05-24 @ 19:00)    12-06-24 @ 07:01  -  12-07-24 @ 07:00  --------------------------------------------------------  IN: 7071.3 mL / OUT: 5389 mL / NET: 1682.3 mL    12-07-24 @ 07:01  -  12-07-24 @ 10:25  --------------------------------------------------------  IN: 1233.4 mL / OUT: 146 mL / NET: 1087.4 mL    Physical Exam:  Gen: Ill appearing, sedated  HEENT: +ETT  Pulm: CTA B/L anteriorly  CV: Tachycardic, S1S2  Abd: +BS, soft, nondistended  Extremities: No bilateral LE edema  Neuro: Sedated  Skin: Warm  Vascular access: R-IJ non-tunneled dialysis catheter    LABS/STUDIES  --------------------------------------------------------------------------------              9.5    23.64 >-----------<  87       [12-07-24 @ 06:55]              25.8     134  |  97  |  25  ----------------------------<  132      [12-07-24 @ 09:11]  4.3   |  19  |  1.62        Ca     8.0     [12-07-24 @ 09:11]      Mg     1.70     [12-07-24 @ 02:35]      Phos  4.9     [12-07-24 @ 02:35]    Creatinine Trend:  SCr 1.62 [12-07 @ 09:11]  SCr 1.47 [12-07 @ 02:35]  SCr 1.73 [12-06 @ 20:25]  SCr 1.95 [12-06 @ 14:25]  SCr 1.92 [12-06 @ 10:30]

## 2024-12-07 NOTE — PROGRESS NOTE ADULT - ASSESSMENT
ASSESSMENT/PLAN:44 year old male with a history of alcohol use disorder, alcohol cirrhosis c/b esophageal varices, hypertension, T2DM, and H pylori (s/p treatment September 2024) who presented to [facility] 12/5 with dark stools accepted to MICU for hypovolemic shock in setting of GIB- intubated for airway protection in setting of UGIB.        ===NEURO===  #Encephalopathy: Toxic metabolic in setting of GIB and elevated ammonia poss infectious etiology   #Intubated/ sedated  - on propofol/ fentanyl gtt  - ammonia elevated 155-> 198 cont to trend   - unable to give PO lactulose in setting of no GI access due to varies- cont to trend should come down with CRRT- consider lactulose enema   - Thiamine 500 x3d and Folic Acid    ===CARDIAC===  #Shock-  Hypovolemic in setting of UGIB vs vasoplegic   #Peaked T waves in setting of hyperkalemia   - on levophed and vasopressin - MAP goal 70-75 in setting of possible hepatorenal syndrome   - Fluid resuscitation with blood s/p 4 total units PRBC, 1 L LR, 250 albumin x 1   - bedside TTE hyperdynamic LV official TTE ordered   - s/p calcium gluc x 2 for cardiac stability     #HTN  - Hold anti-htn meds iso shock    ===PULM===  #Intubated- for airway protection in setting of UGIB   - MV 24/420/8/40  -  CXR clear      ===RENAL===  #MELITA likely ATN in setting of sepsis  #Metabolic Acidosis with lactic acidosis   #Hyperkalemia   #Hepatorenal?  - Cr at baseline .5-> 2.05   - K as high as 7.8 no hemolyzed- received multiple cocktails with insulin/ dextrose/ calcium/ bicarb and lasix 40 x 1   - Booker placed strict Is and Os  - CRRT initiated  12/6 net even   - trend labs q 6 while on CRRT (CBC, CMP, ABG, PTT)   - nephro following  - cont bicarb gtt   - patient appears volume down on exam received fluid back via blood, 1L LR and albumin x 1   - MAP goal 70-75 in setting of possible hepatorenal     ===GI===  #GI Bleed  - Octreotide drip  - Pantoprazole drip  - s/p CTX   - GI following  - s/p EGD 12/5- 4 large columns of grade III esophageal varices with oozing. One column with red dada sign. Successful placement of 6 bands across 4 columns (two varices double banded) with incomplete eradication of varices.  - repeat EGD 12/6- 2 more varices banded   - goal H/H > 7 s/p 4 u PRBC total/ 1 FFP  -NPO- no PO access       #Cirrhosis: hx of G2 varices seen on scope 6/2024   #Hyperammonemia   - MELD score 17   - US: cirrhosis with portal HTN no flow visualized in portal system due to slow flow vs thrombus   - CT with IV contrast: small ascites cirrhosis with portal HTN   - s/p NAC gtt x 2 doses- no need to cont as per hepatology   - Hepatology following   - unable to give rifaximin/ lactulose no PO access- NO NGT AS PER HEPATOLOGY   - CRRT to help with removal of ammonia       ===HEME/ONC===  #Anemia- iso GI bleed  - Baseline H/H 13s 9.6/29.1  - Transfuse to Hgb>7  - s/p 4u PRBC/ 1 FFP  - CBC/ Coags q 6, active type and screen   - SCDs     ===ID===  #Septic shock possible SBP vs UTI  - patient with ?puss during booker insertion   -s/p CTX 1g qd   - cont meropenem/ vanco (12/6 -) CRRT dosing in setting of worsening shock   - F/u BCx, UA, UCx  - Chlamydia/ Gonorrhea ordered F/U   -F/U MRSA  - no safe pocket for paracentesis at this time     ===ENDO===  #T2DM on Jariance   - ISS, FS q6h  - s/p multipe doses of insulin/ dextrose for hyperkalemia treatment     ===ICU Checklist===  - Code Status: Full code  - Lines: RIJ shiley 12/6 LIJ TLC 12/6 Axillary A line 12/6 Booker 12/6- coude   - DVT Ppx: SCDs     ASSESSMENT/PLAN:44 year old male with a history of alcohol use disorder, alcohol cirrhosis c/b esophageal varices, hypertension, T2DM, and H pylori (s/p treatment September 2024) who presented to [facility] 12/5 with dark stools accepted to MICU for hypovolemic shock in setting of GIB- intubated for airway protection in setting of UGIB.        ===NEURO===  #Encephalopathy: Toxic metabolic in setting of GIB and elevated ammonia poss infectious etiology   #Intubated/ sedated  - off fent gtt, on propofol  - ammonia elevated 155>198>120 cont to trend   - unable to give PO lactulose in setting of no GI access due to varies- cont to trend should come down with CRRT  - Thiamine 500 x3d and Folic Acid    ===CARDIAC===  #Shock-  Hypovolemic in setting of UGIB vs vasoplegic   #Peaked T waves in setting of hyperkalemia   - on levophed and vasopressin , requirements escalating overnight into this morning  - Continue fluid resuscitation with blood, s/p 6 total units PRBC, and LR/abumin boluses  - bedside pocus hyperdynamic LV   - official TTE:  Technically very difficult study.the left ventricular systolic function appears grossly normal. RV not well visualized. Structurally normal mitral valve with normal leaflet excursion. There is mitral valve thickening of the anterior and posterior leaflets. There is calcification of the mitral valve annulus. Mitral valve leaflets are diffusely calcified. There is trace mitral regurgitation.  - s/p calcium gluc x 2 for cardiac stability     #HTN  - Hold anti-htn meds iso shock    ===PULM===  #Intubated- for airway protection in setting of UGIB   - MV 28/420/5/40%  -  CXR clear  - monitor ABG      ===RENAL===  #MELITA likely ATN in setting of sepsis  #Metabolic Acidosis with lactic acidosis   #Hyperkalemia   #Hepatorenal?  - Cr at baseline .5-> 2.05   - K as high as 7.8 no hemolyzed- received multiple cocktails with insulin/ dextrose/ calcium/ bicarb and lasix 40 x 1   - Booker placed strict Is and Os  - CRRT initiated  12/6, maintaining net even   - trend labs q 6 while on CRRT (CBC, CMP, ABG, PTT)   - nephro following  - s/p bicarb gtt   - patient appears volume down on exam, continue resuscitation with blood/fluids   - MAP goal 70-75 in setting of possible hepatorenal     ===GI===  #GI Bleed  - Octreotide drip  - Pantoprazole drip  - s/p CTX   - GI following  - s/p EGD 12/5- 4 large columns of grade III esophageal varices with oozing. One column with red dada sign. Successful placement of 6 bands across 4 columns (two varices double banded) with incomplete eradication of varices.  - repeat EGD 12/6- 2 more varices banded   - goal H/H > 7 s/p 4 u PRBC total/ 1 FFP  -NPO- no PO access       #Cirrhosis: hx of G2 varices seen on scope 6/2024   #Hyperammonemia   - MELD score 17   - US: cirrhosis with portal HTN no flow visualized in portal system due to slow flow vs thrombus   - CT with IV contrast: small ascites cirrhosis with portal HTN   - s/p NAC gtt x 2 doses- no need to cont as per hepatology   - Hepatology following   - unable to give rifaximin/ lactulose no PO access- NO NGT AS PER HEPATOLOGY   - CRRT to help with removal of ammonia       ===HEME/ONC===  #Anemia- iso GI bleed  - Baseline H/H 13s 9.6/29.1  - Transfuse to Hgb>7  - s/p 4u PRBC/ 1 FFP  - CBC/ Coags q 6, active type and screen   - SCDs     ===ID===  #Septic shock possible SBP vs UTI  - patient with ?puss during booker insertion   -s/p CTX 1g qd   - cont meropenem/ vanco (12/6 -) CRRT dosing in setting of worsening shock   - F/u BCx, UA, UCx  - Chlamydia/ Gonorrhea ordered F/U   -F/U MRSA  - no safe pocket for paracentesis at this time     ===ENDO===  #T2DM on Jariance   - ISS, FS q6h  - s/p multipe doses of insulin/ dextrose for hyperkalemia treatment     ===ICU Checklist===  - Code Status: Full code  - Lines: RIJ shiley 12/6 LIJ TLC 12/6 Axillary A line 12/6 Booker 12/6- coude   - DVT Ppx: SCDs     ASSESSMENT/PLAN:44 year old male with a history of alcohol use disorder, alcohol cirrhosis c/b esophageal varices, hypertension, T2DM, and H pylori (s/p treatment September 2024) who presented to [facility] 12/5 with dark stools accepted to MICU for hypovolemic shock in setting of GIB- intubated for airway protection in setting of UGIB.        ===NEURO===  #Encephalopathy: Toxic metabolic in setting of GIB and elevated ammonia poss infectious etiology   #Intubated/ sedated  - off fent gtt, on propofol  - ammonia elevated 155>198>120 cont to trend   - unable to give PO lactulose in setting of no GI access due to varies- cont to trend should come down with CRRT  - Thiamine 500 x3d and Folic Acid    ===CARDIAC===  #Shock-  Hypovolemic in setting of UGIB vs vasoplegic   #Peaked T waves in setting of hyperkalemia   - on levophed and vasopressin , requirements escalating overnight into this morning  - Continue fluid resuscitation with blood, s/p 6 total units PRBC, and LR/abumin boluses  - bedside pocus hyperdynamic LV   - official TTE:  Technically very difficult study.the left ventricular systolic function appears grossly normal. RV not well visualized. Structurally normal mitral valve with normal leaflet excursion. There is mitral valve thickening of the anterior and posterior leaflets. There is calcification of the mitral valve annulus. Mitral valve leaflets are diffusely calcified. There is trace mitral regurgitation.  - s/p calcium gluc x 2 for cardiac stability     #HTN  - Hold anti-htn meds iso shock    ===PULM===  #Intubated- for airway protection in setting of UGIB   - MV 28/420/5/40%  -  CXR clear  - monitor ABG      ===RENAL===  #MELITA likely ATN in setting of sepsis  #Metabolic Acidosis with lactic acidosis   #Hyperkalemia   #Hepatorenal?  - Cr at baseline .5-> 2.05   - K as high as 7.8 no hemolyzed- received multiple cocktails with insulin/ dextrose/ calcium/ bicarb and lasix 40 x 1   - Booker placed strict Is and Os  - CRRT initiated  12/6, maintaining net even   - trend labs q 6 while on CRRT (CBC, CMP, ABG, PTT)   - nephro following  - s/p bicarb gtt , will restart while off CRRT for IR procedure  - patient appears volume down on exam, continue resuscitation with blood/fluids     ===GI===  #GI Bleed  - cont Octreotide drip  - cont Pantoprazole drip  - s/p CTX   - GI following  - s/p EGD 12/5- 4 large columns of grade III esophageal varices with oozing. One column with red dada sign. Successful placement of 6 bands across 4 columns (two varices double banded) with incomplete eradication of varices.  - repeat EGD 12/6- 2 more varices banded   - goal H/H > 7 s/p 6 u PRBC total/ 1 FFP/1 plt/ 1 cryo  - NPO- no PO access   - IR consulted, plan for TIPS with possible embolization of esophageal varices. family understands pt is critically ill and as such procedure is high risk, they wish to proceed       #Cirrhosis: hx of G2 varices seen on scope 6/2024   #Hyperammonemia   - MELD score 29  - US: cirrhosis with portal HTN no flow visualized in portal system due to slow flow vs thrombus   - CT with IV contrast: small ascites cirrhosis with portal HTN   - s/p NAC gtt x 2 doses- no need to cont as per hepatology   - Hepatology following   - unable to give rifaximin/ lactulose no PO access- NO NGT AS PER HEPATOLOGY   - no safe pocket of ascites for paracentesis seen on pocus today  - CRRT to help with clearance of ammonia       ===HEME/ONC===  #Anemia- iso GI bleed  #thrombocytopenia  #Elevated INR  - Baseline H/H 13s, presented with H/H 9.6/29.1  - goal H/H > 7 , s/p 6 u PRBC total/ 1 FFP/1 plt/ 1 cryo  - IV Vitamin K ordered x5 days for elevated INR likely iso synthetic dysfunction  - CBC/ Coags q 6, active type and screen   - VTE PPX: SCDs     ===ID===  #Septic shock possible SBP vs UTI  - patient with ?puss during booker insertion   - s/p CTX 1g qd and vanco, continue empiric meropenem (12/6 -) CRRT dosing  - BCx, UCx- NGTD  - Chlamydia/ Gonorrhea ordered F/U   - MRSA pcr + staph, mupirocin ordered  - no safe pocket for paracentesis at this time     ===ENDO===  #T2DM on Jardiance   - ISS, FS q6h  - s/p multipe doses of insulin/ dextrose for hyperkalemia treatment     ===ICU Checklist===  - Code Status: Full code  - Lines: RIJ shiley 12/6 LIJ TLC 12/6 Axillary A line 12/6 Booker 12/6- coude   - DVT Ppx: SCDs      12/7- Pt is critically ill with poor overall prognosis given worsening shock and multi organ failure. plan is to attempt TIPS with IR today, pt high risk for further decompensation. wife is aware and wishes to proceed. pt remains full code.    ASSESSMENT/PLAN:44 year old male with a history of alcohol use disorder, alcohol cirrhosis c/b esophageal varices, hypertension, T2DM, and H pylori (s/p treatment September 2024) who presented to [facility] 12/5 with dark stools accepted to MICU for hypovolemic shock in setting of GIB- intubated for airway protection in setting of UGIB.        ===NEURO===  #Encephalopathy: Toxic metabolic in setting of GIB and elevated ammonia poss infectious etiology   #Intubated/ sedated  - off fent gtt, on propofol  - ammonia elevated 155>198>120 cont to trend   - unable to give PO lactulose in setting of no GI access due to varies- cont to trend should come down with CRRT  - Thiamine 500 x3d and Folic Acid    ===CARDIAC===  #Shock-  Hypovolemic in setting of UGIB vs vasoplegic   #Peaked T waves in setting of hyperkalemia   - on levophed 0.7 and vasopressin 0.04, requirements have been escalating   - Continue fluid resuscitation with blood, s/p 6 total units PRBC, and LR/abumin boluses  - bedside pocus hyperdynamic LV   - official TTE:  Technically very difficult study.the left ventricular systolic function appears grossly normal. RV not well visualized. Structurally normal mitral valve with normal leaflet excursion. There is mitral valve thickening of the anterior and posterior leaflets. There is calcification of the mitral valve annulus. Mitral valve leaflets are diffusely calcified. There is trace mitral regurgitation.  - s/p calcium gluc x 2 for cardiac stability     #HTN  - Hold anti-htn meds iso shock    ===PULM===  #Intubated- for airway protection in setting of UGIB   - MV 28/420/5/40%  -  CXR clear  - monitor ABG      ===RENAL===  #MELITA likely ATN in setting of sepsis  #Metabolic Acidosis with lactic acidosis   #Hyperkalemia   #Hepatorenal?  - Cr at baseline .5-> 2.05   - K as high as 7.8 no hemolyzed- received multiple cocktails with insulin/ dextrose/ calcium/ bicarb and lasix 40 x 1   - Booker placed strict Is and Os  - CRRT initiated  12/6, maintaining net even   - trend labs q 6 while on CRRT (CBC, CMP, ABG, PTT)   - nephro following  - s/p bicarb gtt , will restart while off CRRT for IR procedure  - patient appears volume down on exam, continue resuscitation with blood/fluids     ===GI===  #GI Bleed  - cont Octreotide drip  - cont Pantoprazole drip  - s/p CTX   - GI following  - s/p EGD 12/5- 4 large columns of grade III esophageal varices with oozing. One column with red dada sign. Successful placement of 6 bands across 4 columns (two varices double banded) with incomplete eradication of varices.  - repeat EGD 12/6- 2 more varices banded   - goal H/H > 7 s/p 6 u PRBC / 1 FFP/1 plt/ 1 cryo  - NPO- no PO access   - IR consulted for TIPS however given appropriate rise in Hb from 9>11 s/p 2u prbc, will hold off for now given lower suspicion for active hemorrhage, worsening shock may be to to multiorgan failure. IR attending discussed case w/ hepatology attending who is in agreement. pt remains with very high mortality risk. will reconsult IR if suspicious pt is bleeding again.       #Cirrhosis: hx of G2 varices seen on scope 6/2024   #Hyperammonemia   - MELD score 29  - US: cirrhosis with portal HTN no flow visualized in portal system due to slow flow vs thrombus   - CT with IV contrast: small ascites cirrhosis with portal HTN   - s/p NAC gtt x 2 doses- no need to cont as per hepatology   - Hepatology following   - unable to give rifaximin/ lactulose no PO access- NO NGT AS PER HEPATOLOGY   - no safe pocket of ascites for paracentesis seen on pocus today  - CRRT to help with clearance of ammonia       ===HEME/ONC===  #Anemia- iso GI bleed  #thrombocytopenia  #Elevated INR  - Baseline H/H 13s, presented with H/H 9.6/29.1  - goal H/H > 7 , s/p 6 u PRBC total/ 1 FFP/1 plt/ 1 cryo  - IV Vitamin K ordered x5 days for elevated INR likely iso synthetic dysfunction  - CBC/ Coags q 6, active type and screen   - VTE PPX: SCDs     ===ID===  #Septic shock possible SBP vs UTI  - patient with ?puss during booker insertion   - s/p CTX 1g qd and vanco, continue empiric meropenem (12/6 -) CRRT dosing  - BCx, UCx- NGTD  - Chlamydia/ Gonorrhea ordered F/U   - MRSA pcr + staph, mupirocin ordered  - no safe pocket for paracentesis at this time     ===ENDO===  #T2DM on Jardiance   - ISS, FS q6h  - s/p multipe doses of insulin/ dextrose for hyperkalemia treatment     ===ICU Checklist===  - Code Status: Full code  - Lines: JOJO romero 12/6 LIJ TLC 12/6 Axillary A line 12/6 Booker 12/6- coude   - DVT Ppx: SCDs      12/7- Pt is critically ill with poor overall prognosis given high MELD score, worsening shock and multi organ failure. had multiple conversation with wife at bedside today, she is aware he is at high risk for further decompensation including death, pt remains full code at this time. ASSESSMENT/PLAN:44 year old male with a history of alcohol use disorder, alcohol cirrhosis c/b esophageal varices, hypertension, T2DM, and H pylori (s/p treatment September 2024) who presented to [facility] 12/5 with dark stools accepted to MICU for hypovolemic shock in setting of GIB- intubated for airway protection in setting of UGIB.        ===NEURO===  #Encephalopathy: Toxic metabolic in setting of GIB and elevated ammonia poss infectious etiology   #Intubated/ sedated  - off fent gtt, on propofol  - ammonia elevated 155>198>120 cont to trend   - unable to give PO lactulose in setting of no GI access due to varies- cont to trend should come down with CRRT  - Thiamine 500 x3d and Folic Acid    ===CARDIAC===  #Shock-  Hypovolemic in setting of UGIB vs vasoplegic   #Peaked T waves in setting of hyperkalemia   - on levophed 0.7 and vasopressin 0.04, requirements have been escalating   - Continue fluid resuscitation with blood, s/p 6 total units PRBC, and LR/abumin boluses  - bedside pocus hyperdynamic LV   - official TTE:  Technically very difficult study.the left ventricular systolic function appears grossly normal. RV not well visualized. Structurally normal mitral valve with normal leaflet excursion. There is mitral valve thickening of the anterior and posterior leaflets. There is calcification of the mitral valve annulus. Mitral valve leaflets are diffusely calcified. There is trace mitral regurgitation.  - s/p calcium gluc x 2 for cardiac stability     #HTN  - Hold anti-htn meds iso shock    ===PULM===  #Intubated- for airway protection in setting of UGIB   - MV 28/420/5/40%  -  CXR clear  - monitor ABG      ===RENAL===  #MELITA likely ATN in setting of sepsis  #Metabolic Acidosis with lactic acidosis   #Hyperkalemia   #Hepatorenal?  - Cr at baseline .5-> 2.05   - K as high as 7.8 no hemolyzed- received multiple cocktails with insulin/ dextrose/ calcium/ bicarb and lasix 40 x 1   - Booker placed strict Is and Os  - CRRT initiated  12/6, maintaining net even   - trend labs q 6 while on CRRT (CBC, CMP, ABG, PTT)   - nephro following  - s/p bicarb gtt , will restart while off CRRT for IR procedure  - patient appears volume down on exam, continue resuscitation with blood/fluids     ===GI===  #GI Bleed  - cont Octreotide drip  - cont Pantoprazole drip  - s/p CTX   - GI following  - s/p EGD 12/5- 4 large columns of grade III esophageal varices with oozing. One column with red dada sign. Successful placement of 6 bands across 4 columns (two varices double banded) with incomplete eradication of varices.  - repeat EGD 12/6- 2 more varices banded   - goal H/H > 7 s/p 6 u PRBC / 1 FFP/1 plt/ 1 cryo  - NPO- no PO access   - IR consulted for TIPS however given appropriate rise in Hb from 9>11 s/p 2u prbc, will hold off for now given lower suspicion for active hemorrhage, worsening shock may be to to multiorgan failure. IR attending discussed case w/ hepatology attending who is in agreement. pt remains with very high mortality risk. will reconsult IR if suspicious pt is bleeding again.       #Cirrhosis: hx of G2 varices seen on scope 6/2024   #Hyperammonemia   - MELD score 29  - US: cirrhosis with portal HTN no flow visualized in portal system due to slow flow vs thrombus   - CT with IV contrast: small ascites cirrhosis with portal HTN   - s/p NAC gtt x 2 doses- no need to cont as per hepatology   - Hepatology following   - unable to give rifaximin/ lactulose no PO access- NO NGT AS PER HEPATOLOGY   - no safe pocket of ascites for paracentesis seen on pocus today  - CRRT to help with clearance of ammonia       ===HEME/ONC===  #Anemia- iso GI bleed  #thrombocytopenia  #Elevated INR  - Baseline H/H 13s, presented with H/H 9.6/29.1  - goal H/H > 7 , s/p 6 u PRBC total/ 1 FFP/1 plt/ 1 cryo  - IV Vitamin K ordered x5 days for elevated INR likely iso synthetic dysfunction  - CBC/ Coags q 6, active type and screen   - VTE PPX: SCDs     ===ID===  #Septic shock possible SBP vs UTI  - patient with ?puss during booker insertion   - s/p CTX 1g qd and vanco, continue empiric meropenem (12/6 -) CRRT dosing  - BCx, UCx- NGTD  - Chlamydia/ Gonorrhea ordered F/U   - MRSA pcr + staph, mupirocin ordered  - no safe pocket for paracentesis at this time     ===ENDO===  #T2DM on Jardiance   - ISS, FS q6h  - s/p multiple doses of insulin/ dextrose for hyperkalemia treatment     ===ICU Checklist===  - Code Status: DNR   - Lines: RIJ shiley 12/6 LIJ TLC 12/6 Axillary A line 12/6 Booker 12/6- coude   - DVT Ppx: SCDs      12/7- Pt is critically ill with poor overall prognosis given high MELD score, worsening shock and multi organ failure. had multiple conversation with wife at bedside today, she is aware he is at high risk for further decompensation including death, pt is on max medical therapy and chest compressions would unfortunately be futile, pt's wife in agreement, Pt is DNR, MOLST was filled and placed in chart.

## 2024-12-07 NOTE — PROGRESS NOTE ADULT - ASSESSMENT
44-year-old with past medical history of decompensated alcohol-related cirrhosis complicated by ascites and nonbleeding varices, H. pylori (s/p treatment), asthma, psoriasis, prediabetes, hypertension, and a recent admission August 31 through September 9 for lower abdominal pain and decompensated cirrhosis, now presenting with bright red blood per rectum and alcohol withdrawal.  Hepatology consulted for GI bleed.    #Decompensated cirrhosis w/ varices   Presumed etiology of cirrhosis is alcohol given patient's history. Now w/ relapse in alcohol after being sober for many months. Current MELD 41.   #hematemesis  #BRBPR  #Kidney failure, on CRRT    Pt presented w/ BRBPR with neg CT angio. Initial Hb 9.6 from b/l 12.9 in September. Given hematemesis, c/f possible variceal bleed. Last EGD reportedly 6/2024 showing grade II EVs (Not banded). Has missed NSBB over past several days.  S/P EGD 12/5 with large varices, stigmata of recent bleeding. Banded. Repeat EGD on 12/6 with more bands placed, stigmata of recent bleeding.     Increasing pressor requirements overnight with shock liver. Discussion between IR, MICU and hepatology re: TIPS, decision made that patient is too high risk (Meld 41) and not actively bleeding (stable Hgb, responded to 2u PRBC). Clinical decline more likely 2/2 multiorgan failure rather than active hemorrhage.     Recommendations:  -c/w octreotide GGT  -c/w abx  -2 large bore IVs; transfuse to maintain Hb>7; avoid overtransfusion   -CRRT per MICU team  -please give N-acetylcysteine (bolus, then ggt)    Note incomplete until finalized by attending signature/attestation.    Bryanna Thao  GI/Hepatology Fellow    MONDAY-FRIDAY 8AM-5PM:  Pager# [phone] ([facility]) or [phone] ([facility])    NON-URGENT CONSULTS:  Please email [email or web address]  AT NIGHT AND ON WEEKENDS:  Contact on-call GI fellow from 5pm-8am and on weekends/holidays

## 2024-12-07 NOTE — PROGRESS NOTE ADULT - PROBLEM SELECTOR PLAN 1
Pt w/ MELITA iso WIL and hemorrhagic/septic shock, requiring multiple IV vasopressors. [facility] KEITH/Edita reviewed. Baseline Scr 0.5-0.8. Last Scr WNL at 0.86 on 10/22/24. On ED labs Scr WNL at 0.64 and increased to 1.4 later that day (12/5). Scr elevated/increased further to 2.05 today (12/6). UA performed on 12/6 w/ trace proteinuria, w/o microscopic hematuria. CT ab/pelvis w/ IV contrast (12/5) w/o hydronephrosis. Pt with decreasing UOP despite diuretic challenge. Patient initiated on CRRT on 12/6/24 and tolerating treatment well. Recommend to check urine sodium, urine creatinine and renal US. Monitor labs and urine output. Avoid nephrotoxins. Dose medications as per CRRT. Pt w/ MELITA iso WIL and hemorrhagic/septic shock, requiring multiple IV vasopressors. [facility]. Baseline Scr 0.5-0.8. Last Scr WNL at 0.86 on 10/22/24. On ED labs Scr WNL at 0.64 and increased to 1.4 later that day (12/5). Scr elevated/increased further to 2.05 yesterday. UA performed on 12/6 w/ trace proteinuria, w/o microscopic hematuria. CT ab/pelvis w/ IV contrast (12/5) w/o hydronephrosis. Pt with decreasing UOP despite diuretic challenge. Patient initiated on CRRT on 12/6/24 and tolerating treatment well. Monitor labs and urine output. Avoid nephrotoxins. Dose medications as per CRRT.

## 2024-12-07 NOTE — PROGRESS NOTE ADULT - SUBJECTIVE AND OBJECTIVE BOX
Chief Complaint:  Patient is a 44y old  Male who presents with a chief complaint of GI Bleed (07 Dec 2024 10:25)      Interval Events:   -multiple episodes of melena and hematochezia overnight with increasing pressor requirements     Allergies:  amoxicillin (Angioedema)      Hospital Medications:  acetylcysteine IVPB 10 Gram(s) IV Intermittent once  chlorhexidine 0.12% Liquid 15 milliLiter(s) Oral Mucosa every 12 hours  chlorhexidine 2% Cloths 1 Application(s) Topical <User Schedule>  CRRT Treatment    <Continuous>  dextrose 5%. 1000 milliLiter(s) IV Continuous <Continuous>  dextrose 5%. 1000 milliLiter(s) IV Continuous <Continuous>  dextrose 50% Injectable 25 Gram(s) IV Push once  dextrose 50% Injectable 12.5 Gram(s) IV Push once  dextrose 50% Injectable 25 Gram(s) IV Push once  dextrose Oral Gel 15 Gram(s) Oral once PRN  folic acid Injectable 1 milliGRAM(s) IV Push daily  glucagon  Injectable 1 milliGRAM(s) IntraMuscular once  insulin lispro (ADMELOG) corrective regimen sliding scale   SubCutaneous every 6 hours  meropenem  IVPB 1000 milliGRAM(s) IV Intermittent every 8 hours  mupirocin 2% Ointment 1 Application(s) Topical every 12 hours  norepinephrine Infusion 0.8 MICROgram(s)/kG/Min IV Continuous <Continuous>  octreotide  Infusion 50 MICROgram(s)/Hr IV Continuous <Continuous>  pantoprazole Infusion 8 mG/Hr IV Continuous <Continuous>  petrolatum Ophthalmic Ointment 1 Application(s) Both EYES two times a day  phytonadione  IVPB 10 milliGRAM(s) IV Intermittent daily  PrismaSATE Dialysate BK 0 / 3.5 5000 milliLiter(s) CRRT <Continuous>  PrismaSOL Filtration BGK 0 / 2.5 5000 milliLiter(s) CRRT <Continuous>  PrismaSOL Filtration BGK 0 / 2.5 5000 milliLiter(s) CRRT <Continuous>  propofol Infusion 30 MICROgram(s)/kG/Min IV Continuous <Continuous>  propofol Injectable 20 milliGRAM(s) IV Push once  sodium bicarbonate  Infusion 0.118 mEq/kG/Hr IV Continuous <Continuous>  thiamine IVPB 500 milliGRAM(s) IV Intermittent daily  vasopressin Infusion 0.04 Unit(s)/Min IV Continuous <Continuous>        PHYSICAL EXAM:   Vital Signs:  Vital Signs Last 24 Hrs  T(C): 37.2 (07 Dec 2024 16:00), Max: 37.3 (07 Dec 2024 12:00)  T(F): 99 (07 Dec 2024 16:00), Max: 99.1 (07 Dec 2024 12:00)  HR: 130 (07 Dec 2024 16:45) (89 - 134)  BP: 85/53 (07 Dec 2024 13:04) (85/53 - 85/53)  BP(mean): --  RR: 28 (07 Dec 2024 16:00) (24 - 28)  SpO2: 94% (07 Dec 2024 16:00) (94% - 100%)    Parameters below as of 07 Dec 2024 16:00  Patient On (Oxygen Delivery Method): ventilator    O2 Concentration (%): 40  Daily     Daily Weight in k.1 (07 Dec 2024 04:00)    GENERAL:  intubated, sedated   HEENT:  NCAT, no scleral icterus  CHEST: no resp distress  HEART:  RRR  ABDOMEN:  Soft, non-tender, non-distended, normoactive bowel sounds, no masses  EXTREMITIES:  No cyanosis, clubbing, or edema  SKIN:  No rash/erythema/ecchymoses/petechiae/wounds/abscess/warm/dry  NEURO:  sedated    LABS:                        11.2   22.83 )-----------( 86       ( 07 Dec 2024 12:10 )             31.8     Mean Cell Volume: 84.1 fL (-24 @ 12:10)        132[L]  |  96[L]  |  24[H]  ----------------------------<  115[H]  4.5   |  18[L]  |  1.77[H]    Ca    8.1[L]      07 Dec 2024 12:10  Phos  4.9       Mg     1.70         TPro  6.1  /  Alb  2.6[L]  /  TBili  7.0[H]  /  DBili  x   /  AST  4637[H]  /  ALT  1784[H]  /  AlkPhos  289[H]  12    LIVER FUNCTIONS - ( 07 Dec 2024 12:10 )  Alb: 2.6 g/dL / Pro: 6.1 g/dL / ALK PHOS: 289 U/L / ALT: 1784 U/L / AST: 4637 U/L / GGT: x           PT/INR - ( 07 Dec 2024 12:10 )   PT: 39.4 sec;   INR: 3.44 ratio         PTT - ( 07 Dec 2024 12:10 )  PTT:40.3 sec  Urinalysis Basic - ( 07 Dec 2024 12:10 )    Color: x / Appearance: x / SG: x / pH: x  Gluc: 115 mg/dL / Ketone: x  / Bili: x / Urobili: x   Blood: x / Protein: x / Nitrite: x   Leuk Esterase: x / RBC: x / WBC x   Sq Epi: x / Non Sq Epi: x / Bacteria: x      Amylase Serum--      Lipase serum--       [id number]        Imaging:      < from: Upper Endoscopy (24 @ 10:46) >  Impression:          - Recently bleeding grade III esophageal varices. Banded.                       - Banded varices from yesterday, all intact.                       - Hematin in the gastric body.                       - Large clots in the gastric fundus.                       - Portal hypertensive gastropathy.                       - Old blood in the duodenal bulb and second portion of                        the duodenum.  Recommendation:      - Observe patient in ICU for ongoing care.                       -Continue with ceftriaxone, octreotide, IV PPI                       -Avoid NGT placement                 -Monitor Hgb, transfuse to keep Hgb >7                       -Please call IR for rescue TIPS if patient rebleeds    < end of copied text >

## 2024-12-07 NOTE — PROGRESS NOTE ADULT - ATTENDING COMMENTS
- alc cirrhosis, alcohol relapse, admitted with variceal bleed s/p band ligation x4 12/05  - recurrent bleed today, worse acute b blood  loss anemia  - hemorrhagic shock, intubated, sedated, on pressors  - hyperkalemia, requiring CRRT; also MELITA creatinine 2 mg/dL  - severe liver injury, MELD3.0 score 29, CTP B12     Plan:  - would benefit from emergent TIPS, but CRRT delaying transport to IR suite - discussed with IR, Dr. Barrett  - repeat EGD today
44-year-old with past medical history of decompensated alcohol-related cirrhosis complicated by ascites and EV, presented with acute variceal hemorrhage s/p EGD x 2 to control the bleed, last ETOH few days prior to presentation. He is in shock with multi-organ failure (liver, kidney, respiratory, vascular), broad spectrum IV ABX, after multidisciplinary discussion with ICU, IR, and hepatology - the patient is deemed to be too high risk for TIPS placement. Discussed with the pt's wife. Poor prognosis.
patient seen and evaluated critically ill mars complicated by hyperkalemia and acidosis requiring CRRT.  on 0 k bath and tolerating.  continue to monitor BMP.

## 2024-12-07 NOTE — PROGRESS NOTE ADULT - SUBJECTIVE AND OBJECTIVE BOX
INTERVAL HPI/OVERNIGHT EVENTS: levophed increased 0.25>0.4 overnight. h/h stable overnight. received 1 u plt and 1 u cryo. rectal lactulose not given due to clots per rectum.  ammonia decreased to 120.     HPI: 44 year old male with a history of alcohol use disorder, alcohol cirrhosis c/b esophageal varices, hypertension, T2DM, and H pylori (s/p treatment September 2024) who presented to [facility] 12/5 with dark stools beginning that morning. He had felt his stomach becoming more distended prior to presentation and then 12/5 AM began to have watery diarrhea, becoming dark red in character with over 10 bowel movements and no improvement in bleeding.     He had been sober for 3 months, however relapsed a few days prior, drinking for 3 days with his last drink being 2-3 days prior to presentation. He also missed his home medications for the preceding 2 days. He has never had withdrawal in the past. He has required paracentesis before, last in August. After the paracentesis he was also admitted for abdominal infection, UTI, and H pylori. He has no recent travel nor sick contacts. He is fatigued and light-headed, without fevers, chills, CP, SOB, urinary symptoms.    On presentation, initial vitals were T 98F, /89, , 100%. Labs were significant for WBC 11.3, Hgb 9.6, TBili 2.4, and INR 1.71. Abdominal US showed cirrhosis, very poor flow in portal venous system concerning for possible thrombosis, and moderate ascites in RUQ. CT Abdomen Pelvis x/ contrast was also performed showing cirrhosis with portal hypertension and no active GI bleed.     While in the ED he vomited blood and syncopized. Concern was for decompensated cirrhosis with unknown source of GI bleed. He was given 1g CTX, 50mcg Octreotide and Pantoprazole 40mg twice. He was also having mild withdrawal symptoms, CIWA 4-6, and was given Ativan 2mg IV.     GI was consulted and determined he should have endoscopy while intubated. He was started on octreotide and pantoprazole drips.     He will be admitted to MICU for intubation in pursuit of endoscopy.      SUBJECTIVE: Patient seen and examined at bedside.     Unable to assess as patient intubated/ sedated     OBJECTIVE:    VITAL SIGNS:  ICU Vital Signs Last 24 Hrs  T(C): 36.7 (06 Dec 2024 04:00), Max: 37 (05 Dec 2024 14:36)  T(F): 98.1 (06 Dec 2024 04:00), Max: 98.6 (05 Dec 2024 14:36)  HR: 116 (06 Dec 2024 07:58) (88 - 129)  BP: 123/66 (06 Dec 2024 05:00) (72/54 - 151/89)  BP(mean): 82 (06 Dec 2024 05:00) (43 - 96)  ABP: 132/66 (06 Dec 2024 07:30) (105/56 - 138/71)  ABP(mean): 82 (06 Dec 2024 07:30) (68 - 87)  RR: 20 (06 Dec 2024 07:00) (16 - 32)  SpO2: 100% (06 Dec 2024 07:58) (90% - 100%)    O2 Parameters below as of 06 Dec 2024 07:00  Patient On (Oxygen Delivery Method): ventilator, AC    O2 Concentration (%): 40      Mode: AC/ CMV (Assist Control/ Continuous Mandatory Ventilation), RR (machine): 20, TV (machine): 400, FiO2: 50, PEEP: 8, ITime: 1, MAP: 10, PC: 85, PIP: 23    12-05 @ 07:01  -  12-06 @ 07:00  --------------------------------------------------------  IN: 4307.9 mL / OUT: 400 mL / NET: 3907.9 mL      CAPILLARY BLOOD GLUCOSE      POCT Blood Glucose.: 345 mg/dL (06 Dec 2024 07:31)      PHYSICAL EXAM:    General: NAD  HEENT: NC/AT; PERRL, clear conjunctiva  Neck: supple  Respiratory: CTA b/l  Cardiovascular: +S1/S2; tachycardic   Abdomen: distended abdomen  +BS x4  Extremities: WWP, 2+ peripheral pulses b/l; no LE edema  Skin: normal color and turgor; no rash  Neurological: intubated/ sedated     MEDICATIONS:  MEDICATIONS  (STANDING):  acetylcysteine IVPB 4.8 Gram(s) IV Intermittent once  acetylcysteine IVPB 14 Gram(s) IV Intermittent every 24 hours  albumin human  5% IVPB 250 milliLiter(s) IV Intermittent once  chlorhexidine 0.12% Liquid 15 milliLiter(s) Oral Mucosa every 12 hours  chlorhexidine 2% Cloths 1 Application(s) Topical <User Schedule>  CRRT Treatment    <Continuous>  dextrose 5%. 1000 milliLiter(s) (50 mL/Hr) IV Continuous <Continuous>  dextrose 5%. 1000 milliLiter(s) (100 mL/Hr) IV Continuous <Continuous>  dextrose 50% Injectable 25 Gram(s) IV Push once  dextrose 50% Injectable 12.5 Gram(s) IV Push once  dextrose 50% Injectable 25 Gram(s) IV Push once  fentaNYL   Infusion. 2 MICROgram(s)/kG/Hr (19.1 mL/Hr) IV Continuous <Continuous>  folic acid Injectable 1 milliGRAM(s) IV Push daily  glucagon  Injectable 1 milliGRAM(s) IntraMuscular once  insulin lispro (ADMELOG) corrective regimen sliding scale   SubCutaneous every 6 hours  lactulose Retention Enema 200 Gram(s) Rectal once  meropenem  IVPB 1000 milliGRAM(s) IV Intermittent every 8 hours  norepinephrine Infusion 0.8 MICROgram(s)/kG/Min (71.5 mL/Hr) IV Continuous <Continuous>  octreotide  Infusion 50 MICROgram(s)/Hr (10 mL/Hr) IV Continuous <Continuous>  pantoprazole Infusion 8 mG/Hr (10 mL/Hr) IV Continuous <Continuous>  petrolatum Ophthalmic Ointment 1 Application(s) Both EYES two times a day  PrismaSATE Dialysate BK 0 / 3.5 5000 milliLiter(s) (1400 mL/Hr) CRRT <Continuous>  PrismaSOL Filtration BGK 0 / 2.5 5000 milliLiter(s) (1200 mL/Hr) CRRT <Continuous>  PrismaSOL Filtration BGK 0 / 2.5 5000 milliLiter(s) (200 mL/Hr) CRRT <Continuous>  propofol Infusion 30 MICROgram(s)/kG/Min (17.2 mL/Hr) IV Continuous <Continuous>  propofol Injectable 20 milliGRAM(s) IV Push once  sodium bicarbonate  Infusion 0.236 mEq/kG/Hr (150 mL/Hr) IV Continuous <Continuous>  thiamine IVPB 500 milliGRAM(s) IV Intermittent daily  vancomycin  IVPB      vancomycin  IVPB 750 milliGRAM(s) IV Intermittent every 12 hours  vasopressin Infusion 0.04 Unit(s)/Min (6 mL/Hr) IV Continuous <Continuous>    MEDICATIONS  (PRN):  dextrose Oral Gel 15 Gram(s) Oral once PRN Blood Glucose LESS THAN 70 milliGRAM(s)/deciliter      ALLERGIES:  Allergies    amoxicillin (Angioedema)    Intolerances        LABS:                        6.9    26.26 )-----------( 136      ( 06 Dec 2024 04:10 )             20.7     12-06    135  |  98  |  30[H]  ----------------------------<  197[H]  6.6[HH]   |  16[L]  |  2.05[H]    Ca    8.1[L]      06 Dec 2024 04:10  Phos  7.1     12-06  Mg     1.80     12-06    TPro  5.3[L]  /  Alb  2.1[L]  /  TBili  3.3[H]  /  DBili  x   /  AST  1173[H]  /  ALT  415[H]  /  AlkPhos  116  12-06    PT/INR - ( 06 Dec 2024 01:20 )   PT: 30.0 sec;   INR: 2.54 ratio         PTT - ( 06 Dec 2024 01:20 )  PTT:25.5 sec  Urinalysis Basic - ( 06 Dec 2024 04:10 )    Color: x / Appearance: x / SG: x / pH: x  Gluc: 197 mg/dL / Ketone: x  / Bili: x / Urobili: x   Blood: x / Protein: x / Nitrite: x   Leuk Esterase: x / RBC: x / WBC x   Sq Epi: x / Non Sq Epi: x / Bacteria: x        RADIOLOGY & ADDITIONAL TESTS: Reviewed.

## 2024-12-08 NOTE — PROVIDER CONTACT NOTE (HYPOGLYCEMIA EVENT) - NS PROVIDER CONTACT BACKGROUND-HYPO
Age: 44y    Gender: Male    POCT Blood Glucose:  122 mg/dL (12-08-24 @ 06:33)  131 mg/dL (12-08-24 @ 06:19)  66 mg/dL (12-08-24 @ 05:54)  74 mg/dL (12-08-24 @ 05:03)  83 mg/dL (12-07-24 @ 23:03)  92 mg/dL (12-07-24 @ 17:09)  118 mg/dL (12-07-24 @ 12:09)      eMAR:  dextrose 50% Injectable   50 milliLiter(s) IV Push (12-08-24 @ 06:02)

## 2024-12-08 NOTE — PATIENT PROFILE ADULT - FALL HARM RISK - HARM RISK INTERVENTIONS
Assistance with ambulation/Assistance OOB with selected safe patient handling equipment/Communicate Risk of Fall with Harm to all staff/Monitor gait and stability/Reinforce activity limits and safety measures with patient and family/Sit up slowly, dangle for a short time, stand at bedside before walking/Tailored Fall Risk Interventions/Visual Cue: Yellow wristband and red socks/Bed in lowest position, wheels locked, appropriate side rails in place/Call bell, personal items and telephone in reach/Instruct patient to call for assistance before getting out of bed or chair/Non-slip footwear when patient is out of bed/[place] to call system/Physically safe environment - no spills, clutter or unnecessary equipment/Purposeful Proactive Rounding/Room/bathroom lighting operational, light cord in reach

## 2024-12-08 NOTE — PATIENT PROFILE ADULT - FUNCTIONAL ASSESSMENT - BASIC MOBILITY 6.
1-calculated by average/Not able to assess (calculate score using [facility] averaging method)

## 2024-12-08 NOTE — PROGRESS NOTE ADULT - PROBLEM SELECTOR PLAN 2
Pt with hyperkalemia iso MELITA. On admission serum potassium WNL at 5.2 (12/5). Serum potassium elevated/increased to 7.8 (mild hemolysis) on 12/6/24. Received medical management for hyperkalemia. HD consent obtained and placed in chart. Due to persistent hyperkalemia, worsening kidney failure and hemodynamic instability patient initiated on CRRT in MICU but now unable to tolerated CRRT.  Last potassium was WNL.  Monitor labs if in line with goals of care and use intracellular shifting agents if able.
Pt with hyperkalemia iso MELITA. On admission serum potassium WNL at 5.2 (12/5). Serum potassium elevated/increased to 7.8 (mild hemolysis) on 12/6/24. Received medical management for hyperkalemia. HD consent obtained and placed in chart. Due to persistent hyperkalemia, worsening kidney failure and hemodynamic instability patient initiated on CRRT in MICU. Labs reviewed and serum potassium now WNL. CRRT orders renewed. Monitor serum potassium.

## 2024-12-08 NOTE — PROGRESS NOTE ADULT - PROBLEM SELECTOR PLAN 3
Patient with AGMA iso MELITA and lactic acidosis. On admission SCO2 low at 16 (12/5). SCO2 decreased further to 11, venous pH low 7.25 and lactate elevated at 12.1. Started on sodium bicarb infusion and SCO2 low/improved to 16 and venous pH was 7.35 (12/6). Discontinued sodium bicarbonate infusion as patient is on CRRT. SCO2 remains mildly low at 19. Lactate elevated at 6.1 and pH low at 7.29. Continue on CRRT. Monitor labs.    If you have any questions, please feel free to contact me.  Ketan Del Real MD  Nephrology Fellow  [phone] / [employer] Teams (Preferred)  (Please check the on-call schedule to reach the appropriate Nephrology Fellow)
Patient with AGMA iso MELITA and lactic acidosis. Serum bicarbonate now low after holding CRRT.  Last bicarb 10.  Now on bicarbonate drip.  Would continue and monitor BMP if in line with goals of care.

## 2024-12-08 NOTE — PROGRESS NOTE ADULT - ASSESSMENT
ASSESSMENT/PLAN:44 year old male with a history of alcohol use disorder, alcohol cirrhosis c/b esophageal varices, hypertension, T2DM, and H pylori (s/p treatment September 2024) who presented to [facility] 12/5 with dark stools accepted to MICU for hypovolemic shock in setting of GIB- intubated for airway protection in setting of UGIB.        ===NEURO===  #Encephalopathy: Toxic metabolic in setting of GIB and elevated ammonia poss infectious etiology   #Intubated/ sedated  - off fent gtt, on propofol  - ammonia elevated 155>198>120 cont to trend   - unable to give PO lactulose in setting of no GI access due to varies- cont to trend should come down with CRRT  - Thiamine 500 x3d and Folic Acid    ===CARDIAC===  #Shock-  Hypovolemic in setting of UGIB vs vasoplegic   #Peaked T waves in setting of hyperkalemia   - on levophed 0.7 and vasopressin 0.04, requirements have been escalating   - Continue fluid resuscitation with blood, s/p 6 total units PRBC, and LR/abumin boluses  - bedside pocus hyperdynamic LV   - official TTE:  Technically very difficult study.the left ventricular systolic function appears grossly normal. RV not well visualized. Structurally normal mitral valve with normal leaflet excursion. There is mitral valve thickening of the anterior and posterior leaflets. There is calcification of the mitral valve annulus. Mitral valve leaflets are diffusely calcified. There is trace mitral regurgitation.  - s/p calcium gluc x 2 for cardiac stability     #HTN  - Hold anti-htn meds iso shock    ===PULM===  #Intubated- for airway protection in setting of UGIB   - MV 28/420/5/40%  -  CXR clear  - monitor ABG      ===RENAL===  #MELITA likely ATN in setting of sepsis  #Metabolic Acidosis with lactic acidosis   #Hyperkalemia   #Hepatorenal?  - Cr at baseline .5-> 2.05   - K as high as 7.8 no hemolyzed- received multiple cocktails with insulin/ dextrose/ calcium/ bicarb and lasix 40 x 1   - Booker placed strict Is and Os  - CRRT initiated  12/6, maintaining net even   - trend labs q 6 while on CRRT (CBC, CMP, ABG, PTT)   - nephro following  - s/p bicarb gtt , will restart while off CRRT for IR procedure  - patient appears volume down on exam, continue resuscitation with blood/fluids     ===GI===  #GI Bleed  - cont Octreotide drip  - cont Pantoprazole drip  - s/p CTX   - GI following  - s/p EGD 12/5- 4 large columns of grade III esophageal varices with oozing. One column with red dada sign. Successful placement of 6 bands across 4 columns (two varices double banded) with incomplete eradication of varices.  - repeat EGD 12/6- 2 more varices banded   - goal H/H > 7 s/p 6 u PRBC / 1 FFP/1 plt/ 1 cryo  - NPO- no PO access   - IR consulted for TIPS however given appropriate rise in Hb from 9>11 s/p 2u prbc, will hold off for now given lower suspicion for active hemorrhage, worsening shock may be to to multiorgan failure. IR attending discussed case w/ hepatology attending who is in agreement. pt remains with very high mortality risk. will reconsult IR if suspicious pt is bleeding again.       #Cirrhosis: hx of G2 varices seen on scope 6/2024   #Hyperammonemia   - MELD score 29  - US: cirrhosis with portal HTN no flow visualized in portal system due to slow flow vs thrombus   - CT with IV contrast: small ascites cirrhosis with portal HTN   - s/p NAC gtt x 2 doses- no need to cont as per hepatology   - Hepatology following   - unable to give rifaximin/ lactulose no PO access- NO NGT AS PER HEPATOLOGY   - no safe pocket of ascites for paracentesis seen on pocus today  - CRRT to help with clearance of ammonia       ===HEME/ONC===  #Anemia- iso GI bleed  #thrombocytopenia  #Elevated INR  - Baseline H/H 13s, presented with H/H 9.6/29.1  - goal H/H > 7 , s/p 6 u PRBC total/ 1 FFP/1 plt/ 1 cryo  - IV Vitamin K ordered x5 days for elevated INR likely iso synthetic dysfunction  - CBC/ Coags q 6, active type and screen   - VTE PPX: SCDs     ===ID===  #Septic shock possible SBP vs UTI  - patient with ?puss during booker insertion   - s/p CTX 1g qd and vanco, continue empiric meropenem (12/6 -) CRRT dosing  - BCx, UCx- NGTD  - Chlamydia/ Gonorrhea ordered F/U   - MRSA pcr + staph, mupirocin ordered  - no safe pocket for paracentesis at this time     ===ENDO===  #T2DM on Jardiance   - ISS, FS q6h  - s/p multiple doses of insulin/ dextrose for hyperkalemia treatment     ===ICU Checklist===  - Code Status: DNR   - Lines: RIJ shiley 12/6 LIJ TLC 12/6 Axillary A line 12/6 Booker 12/6- coude   - DVT Ppx: SCDs      12/7- Pt is critically ill with poor overall prognosis given high MELD score, worsening shock and multi organ failure. had multiple conversation with wife at bedside today, she is aware he is at high risk for further decompensation including death, pt is on max medical therapy and chest compressions would unfortunately be futile, pt's wife in agreement, Pt is DNR, MOLST was filled and placed in chart.  ASSESSMENT/PLAN:44 year old male with a history of alcohol use disorder, alcohol cirrhosis c/b esophageal varices, hypertension, T2DM, and H pylori (s/p treatment September 2024) who presented to [facility] 12/5 with dark stools accepted to MICU for hypovolemic shock in setting of GIB- intubated for airway protection in setting of UGIB.        ===NEURO===  #Encephalopathy: Toxic metabolic in setting of GIB and elevated ammonia poss infectious etiology   #Intubated/ sedated  - on prop and added fent gtt for comfort  - ammonia elevated 155>198>120 cont to trend   - unable to give PO lactulose in setting of no GI access due to varies- cont to trend should come down with CRRT  - Thiamine 500 x3d and Folic Acid    ===CARDIAC===  #Shock-  Hypovolemic in setting of UGIB vs vasoplegic   #Peaked T waves in setting of hyperkalemia   - on levophed 1 and vasopressin 0.04, pressors capped  - Continue fluid resuscitation with blood, s/p 6 total units PRBC, and LR/abumin boluses  - bedside pocus hyperdynamic LV   - official TTE:  Technically very difficult study.the left ventricular systolic function appears grossly normal. RV not well visualized. Structurally normal mitral valve with normal leaflet excursion. There is mitral valve thickening of the anterior and posterior leaflets. There is calcification of the mitral valve annulus. Mitral valve leaflets are diffusely calcified. There is trace mitral regurgitation.  - s/p calcium gluc x 2 for cardiac stability   - DNR following discussion with wife    #HTN  - Hold anti-htn meds iso shock    ===PULM===  #Intubated- for airway protection in setting of UGIB   - MV 28/420/5/40%  -  CXR clear  - monitor ABG      ===RENAL===  #MELITA likely ATN in setting of sepsis  #Metabolic Acidosis with lactic acidosis   #Hyperkalemia   #Hepatorenal?  - Cr at baseline .5-> 2.05. K as high as 7.8 no hemolyzed- received multiple cocktails with insulin/ dextrose/ calcium/ bicarb and lasix 40 x 1   - Booker placed strict Is and Os  - CRRT initiated 12/6, stopped overnight 12/7 as pt unable to tolerate due to hypotension  - nephro following  - s/p bicarb gtt   - will no longer check labs following discussion with wife    ===GI===  #GI Bleed  - cont Octreotide drip  - cont Pantoprazole drip  - s/p CTX   - GI following  - s/p EGD 12/5- 4 large columns of grade III esophageal varices with oozing. One column with red dada sign. Successful placement of 6 bands across 4 columns (two varices double banded) with incomplete eradication of varices.  - repeat EGD 12/6- 2 more varices banded   - goal H/H > 7 s/p 6 u PRBC / 1 FFP/1 plt/ 1 cryo  - NPO- no PO access   - IR consulted for TIPS however given appropriate rise in Hb from 9>11 s/p 2u prbc, will hold off for now given lower suspicion for active hemorrhage, worsening shock may be to to multiorgan failure. IR attending discussed case w/ hepatology attending who is in agreement. pt remains with very high mortality risk. will reconsult IR if suspicious pt is bleeding again.       #Cirrhosis: hx of G2 varices seen on scope 6/2024   #Hyperammonemia   - MELD score 29  - US: cirrhosis with portal HTN no flow visualized in portal system due to slow flow vs thrombus   - CT with IV contrast: small ascites cirrhosis with portal HTN   - s/p NAC gtt x 2 doses- no need to cont as per hepatology   - Hepatology following   - unable to give rifaximin/ lactulose no PO access- NO NGT AS PER HEPATOLOGY   - no safe pocket of ascites for paracentesis seen on pocus today  - s/p CRRT to help with clearance of ammonia, off now as pt unable to tolerate due to hypotension      ===HEME/ONC===  #Anemia- iso GI bleed  #thrombocytopenia  #Elevated INR  - Baseline H/H 13s, presented with H/H 9.6/29.1  - goal H/H > 7 , s/p 6 u PRBC total/ 1 FFP/1 plt/ 1 cryo  - IV Vitamin K ordered x5 days for elevated INR likely iso synthetic dysfunction  - CBC/ Coags q 6, active type and screen - no longer checking labs  - VTE PPX: SCDs     ===ID===  #Septic shock possible SBP vs UTI  - patient with ?puss during booker insertion   - s/p CTX 1g qd and vanco, continue empiric meropenem (12/6 -) CRRT dosing  - BCx, UCx- NGTD  - Chlamydia/ Gonorrhea sent, F/U   - MRSA pcr + staph, mupirocin ordered  - no safe pocket for paracentesis appreciated on pocus    ===ENDO===  #T2DM on Jardiance   #hypoglycemia  - ISS, FS q6h  - s/p multiple doses of insulin/ dextrose for hyperkalemia treatment   - no futher FS following discussion with wife    ===ICU Checklist===  - Code Status: DNR   - Lines: RIJ shiley 12/6 LIJ TLC 12/6 Axillary A line 12/6 Booker 12/6- coude   - DVT Ppx: SCDs      12/7- Pt is critically ill with poor overall prognosis given high MELD score, worsening shock and multi organ failure. had multiple conversation with wife at bedside today, she is aware he is at high risk for further decompensation including death, pt is on max medical therapy and chest compressions would unfortunately be futile, pt's wife in agreement, Pt is DNR, MOLST was filled and placed in chart.   12/8- CRRT discontinued overnight as pt did not tolerate it due to hypotension. SBP 70sand MAP 40s this am, pt is actively dying. following discussion with wife, decision made for comfort care measures only, no further lab draws. wife in agreement with starting fent gtt for comfort.  ASSESSMENT/PLAN:44 year old male with a history of alcohol use disorder, alcohol cirrhosis c/b esophageal varices, hypertension, T2DM, and H pylori (s/p treatment September 2024) who presented to [facility] 12/5 with dark stools accepted to MICU for hypovolemic shock in setting of GIB- intubated for airway protection in setting of UGIB.        ===NEURO===  #Encephalopathy: Toxic metabolic in setting of GIB and elevated ammonia poss infectious etiology   #Intubated/ sedated  - s/p prop , now on fent gtt for comfort  - ammonia elevated 155>198>120 , unable to give PO lactulose in setting of no GI access due to varies, s/p CRRT  - s/p Thiamine 500 x3d and Folic Acid    ===CARDIAC===  #Shock-  Hypovolemic in setting of UGIB vs vasoplegic   #Peaked T waves in setting of hyperkalemia   - on levophed 1 and vasopressin 0.04, pressors capped  - Continue fluid resuscitation with blood, s/p 6 total units PRBC, and LR/abumin boluses  - bedside pocus hyperdynamic LV   - official TTE:  Technically very difficult study.the left ventricular systolic function appears grossly normal. RV not well visualized. Structurally normal mitral valve with normal leaflet excursion. There is mitral valve thickening of the anterior and posterior leaflets. There is calcification of the mitral valve annulus. Mitral valve leaflets are diffusely calcified. There is trace mitral regurgitation.  - s/p calcium gluc x 2 for cardiac stability   - DNR following discussion with wife    #HTN  - Hold anti-htn meds iso shock    ===PULM===  #Intubated- for airway protection in setting of UGIB   - MV 28/420/5/40%  -  CXR clear  - monitor ABG      ===RENAL===  #MELITA likely ATN in setting of sepsis  #Metabolic Acidosis with lactic acidosis   #Hyperkalemia   #Hepatorenal?  - Cr at baseline .5-> 2.05. K as high as 7.8 no hemolyzed- received multiple cocktails with insulin/ dextrose/ calcium/ bicarb and lasix 40 x 1   - Booker placed strict Is and Os  - CRRT initiated 12/6, stopped overnight 12/7 as pt unable to tolerate due to hypotension  - nephro following  - s/p bicarb gtt   - will no longer check labs following discussion with wife    ===GI===  #GI Bleed  - s/p Octreotide drip and Pantoprazole drip  - s/p CTX   - GI following  - s/p EGD 12/5- 4 large columns of grade III esophageal varices with oozing. One column with red dada sign. Successful placement of 6 bands across 4 columns (two varices double banded) with incomplete eradication of varices.  - repeat EGD 12/6- 2 more varices banded   - goal H/H > 7 s/p 6 u PRBC / 1 FFP/1 plt/ 1 cryo  - NPO- no PO access   - IR consulted for TIPS however given appropriate rise in Hb from 9>11 s/p 2u prbc, will hold off for now given lower suspicion for active hemorrhage, worsening shock may be to to multiorgan failure. IR attending discussed case w/ hepatology attending who is in agreement. pt remains with very high mortality risk. will reconsult IR if suspicious pt is bleeding again.       #Cirrhosis: hx of G2 varices seen on scope 6/2024   #Hyperammonemia   - MELD score 29  - US: cirrhosis with portal HTN no flow visualized in portal system due to slow flow vs thrombus   - CT with IV contrast: small ascites cirrhosis with portal HTN   - s/p NAC gtt x 2 doses- no need to cont as per hepatology   - Hepatology following   - unable to give rifaximin/ lactulose no PO access- NO NGT AS PER HEPATOLOGY   - no safe pocket of ascites for paracentesis seen on pocus today  - s/p CRRT to help with clearance of ammonia, off 12/7 as pt unable to tolerate due to hypotension      ===HEME/ONC===  #Anemia- iso GI bleed  #thrombocytopenia  #Elevated INR  - Baseline H/H 13s, presented with H/H 9.6/29.1  - goal H/H > 7 , s/p 6 u PRBC total/ 1 FFP/1 plt/ 1 cryo  - IV Vitamin K ordered x5 days for elevated INR likely iso synthetic dysfunction  - CBC/ Coags q 6, active type and screen - no longer checking labs  - VTE PPX: SCDs     ===ID===  #Septic shock possible SBP vs UTI  - patient with ?puss during booker insertion   - s/p CTX 1g qd and vanco, continue empiric meropenem (12/6 -) CRRT dosing  - BCx, UCx- NGTD  - Chlamydia/ Gonorrhea sent, F/U   - MRSA pcr + staph, mupirocin ordered  - no safe pocket for paracentesis appreciated on pocus    ===ENDO===  #T2DM on Jardiance   #hypoglycemia  - ISS, FS q6h  - s/p multiple doses of insulin/ dextrose for hyperkalemia treatment   - no futher FS following discussion with wife    ===ICU Checklist===  - Code Status: DNR   - Lines: RIJ shiley 12/6 LIJ TLC 12/6 Axillary A line 12/6 Booker 12/6- coude   - DVT Ppx: SCDs      12/7- Pt is critically ill with poor overall prognosis given high MELD score, worsening shock and multi organ failure. had multiple conversation with wife at bedside today, she is aware he is at high risk for further decompensation including death, pt is on max medical therapy and chest compressions would unfortunately be futile, pt's wife in agreement, Pt is DNR, MOLST was filled and placed in chart.   12/8- CRRT discontinued overnight as pt did not tolerate it due to hypotension. SBP 70sand MAP 40s this am, pt is actively dying. following discussion with wife, decision made for comfort care measures only, no further lab draws. wife in agreement with starting fent gtt for comfort.  ASSESSMENT/PLAN:44 year old male with a history of alcohol use disorder, alcohol cirrhosis c/b esophageal varices, hypertension, T2DM, and H pylori (s/p treatment September 2024) who presented to [facility] 12/5 with dark stools accepted to MICU for hypovolemic shock in setting of GIB- intubated for airway protection in setting of UGIB.        ===NEURO===  #Encephalopathy: Toxic metabolic in setting of GIB and elevated ammonia poss infectious etiology   #Intubated/ sedated  - s/p prop , now on fent gtt for comfort  - ammonia elevated 155>198>120 , unable to give PO lactulose in setting of no GI access due to varies, s/p CRRT  - s/p Thiamine 500 x3d and Folic Acid    ===CARDIAC===  #Shock-  Hypovolemic in setting of UGIB vs vasoplegic   #Peaked T waves in setting of hyperkalemia   - on levophed 1 and vasopressin 0.04, pressors capped  - Continue fluid resuscitation with blood, s/p 6 total units PRBC, and LR/abumin boluses  - bedside pocus hyperdynamic LV   - official TTE:  Technically very difficult study.the left ventricular systolic function appears grossly normal. RV not well visualized. Structurally normal mitral valve with normal leaflet excursion. There is mitral valve thickening of the anterior and posterior leaflets. There is calcification of the mitral valve annulus. Mitral valve leaflets are diffusely calcified. There is trace mitral regurgitation.  - s/p calcium gluc x 2 for cardiac stability   - DNR following discussion with wife    #HTN  - Hold anti-htn meds iso shock    ===PULM===  #Intubated- for airway protection in setting of UGIB   - MV 28/420/5/40%  -  CXR clear  - monitor ABG      ===RENAL===  #MELITA likely ATN in setting of sepsis  #Metabolic Acidosis with lactic acidosis   #Hyperkalemia   #Hepatorenal?  - Cr at baseline .5-> 2.05. K as high as 7.8 no hemolyzed- received multiple cocktails with insulin/ dextrose/ calcium/ bicarb and lasix 40 x 1   - Booker placed strict Is and Os  - CRRT initiated 12/6, stopped overnight 12/7 as pt unable to tolerate due to hypotension  - nephro following  - s/p bicarb gtt   - will no longer check labs following discussion with wife    ===GI===  #GI Bleed  - s/p Octreotide drip and Pantoprazole drip  - s/p CTX   - GI following  - s/p EGD 12/5- 4 large columns of grade III esophageal varices with oozing. One column with red dada sign. Successful placement of 6 bands across 4 columns (two varices double banded) with incomplete eradication of varices.  - repeat EGD 12/6- 2 more varices banded   - goal H/H > 7 s/p 6 u PRBC / 1 FFP/1 plt/ 1 cryo  - NPO- no PO access   - IR consulted for TIPS however given appropriate rise in Hb from 9>11 s/p 2u prbc, will hold off for now given lower suspicion for active hemorrhage, worsening shock may be to to multiorgan failure. IR attending discussed case w/ hepatology attending who is in agreement. pt remains with very high mortality risk. will reconsult IR if suspicious pt is bleeding again.       #Cirrhosis: hx of G2 varices seen on scope 6/2024   #Hyperammonemia   - MELD score 29  - US: cirrhosis with portal HTN no flow visualized in portal system due to slow flow vs thrombus   - CT with IV contrast: small ascites cirrhosis with portal HTN   - s/p NAC gtt x 2 doses- no need to cont as per hepatology   - Hepatology following   - unable to give rifaximin/ lactulose no PO access- NO NGT AS PER HEPATOLOGY   - no safe pocket of ascites for paracentesis seen on pocus today  - s/p CRRT to help with clearance of ammonia, off 12/7 as pt unable to tolerate due to hypotension      ===HEME/ONC===  #Anemia- iso GI bleed  #thrombocytopenia  #Elevated INR  - Baseline H/H 13s, presented with H/H 9.6/29.1  - goal H/H > 7 , s/p 6 u PRBC total/ 1 FFP/1 plt/ 1 cryo  - IV Vitamin K ordered x5 days for elevated INR likely iso synthetic dysfunction  - CBC/ Coags q 6, active type and screen - no longer checking labs  - VTE PPX: SCDs     ===ID===  #Septic shock possible SBP vs UTI  - patient with ?puss during booker insertion   - s/p CTX 1g qd and vanco, continue empiric meropenem (12/6 -) CRRT dosing  - BCx, UCx- NGTD  - Chlamydia/ Gonorrhea sent, F/U   - MRSA pcr + staph, mupirocin ordered  - no safe pocket for paracentesis appreciated on pocus    ===ENDO===  #T2DM on Jardiance   #hypoglycemia  - ISS, FS q6h  - s/p multiple doses of insulin/ dextrose for hyperkalemia treatment   - no futher FS following discussion with wife    ===ICU Checklist===  - Code Status: DNR   - Lines: RIJ shiley 12/6 LIJ TLC 12/6 Axillary A line 12/6 Booker 12/6- coude   - DVT Ppx: SCDs    12/7- Pt is critically ill with poor overall prognosis given high MELD score, worsening shock and multi organ failure. had multiple conversation with wife at bedside today, she is aware he is at high risk for further decompensation including death, pt is on max medical therapy and chest compressions would unfortunately be futile, pt's wife in agreement, Pt is DNR, MOLST was filled and placed in chart.   12/8- CRRT discontinued overnight as pt did not tolerate it due to hypotension. SBP 70sand MAP 40s this am, pt is actively dying. following discussion with wife, decision made for comfort care measures only, no further lab draws. wife in agreement with starting fent gtt for comfort. [facility] called, case deemed not candidate for donation.

## 2024-12-08 NOTE — PROGRESS NOTE ADULT - TIME BILLING
Agree with above. Seen and examined with team on rounds. Agree with physical exam findings and history as stated above. Severe liver injury with very high MELD. Multiorgan failure with renal failure and shock state. Unable to tolerate CRRT with severe shock and worsening vasopressor requirements. CRRT stopped last night. All focus on comfort. Family at the bedside and they understand the situation is futile. No tips as there is no active bleeding from GI tract with stable hgb. Supportive care.

## 2024-12-08 NOTE — PROGRESS NOTE ADULT - PROBLEM SELECTOR PLAN 1
Pt w/ MELITA iso WIL and hemorrhagic/septic shock, requiring multiple IV vasopressors. [facility] KEITH/Edita reviewed. Baseline Scr 0.5-0.8. Last Scr WNL at 0.86 on 10/22/24. On ED labs Scr WNL at 0.64 and increased to 1.4 later that day (12/5). CT ab/pelvis w/ IV contrast (12/5) w/o hydronephrosis. Pt had decreasing UOP despite diuretic challenge. Patient initiated on CRRT on 12/6/24 and was tolerating treatment well however developed worsening shock 12/7 to 12/8 and was unable to tolerate further CRRT.  CRRT held.  Pressors capped after discussion between primary team and family.  At present time patient is too hemodynamically unstable to tolerate CRRT.  Prognosis remains poor.  Emotional support provided to mother at bedside.  Monitor labs and urine output.

## 2024-12-08 NOTE — PROGRESS NOTE ADULT - SUBJECTIVE AND OBJECTIVE BOX
INTERVAL HPI/OVERNIGHT EVENTS: CRRT stopped around 11pm overnight due to hypotension despite levophed/vaso.     HPI: 44 year old male with a history of alcohol use disorder, alcohol cirrhosis c/b esophageal varices, hypertension, T2DM, and H pylori (s/p treatment September 2024) who presented to [facility] 12/5 with dark stools beginning that morning. He had felt his stomach becoming more distended prior to presentation and then 12/5 AM began to have watery diarrhea, becoming dark red in character with over 10 bowel movements and no improvement in bleeding.     He had been sober for 3 months, however relapsed a few days prior, drinking for 3 days with his last drink being 2-3 days prior to presentation. He also missed his home medications for the preceding 2 days. He has never had withdrawal in the past. He has required paracentesis before, last in August. After the paracentesis he was also admitted for abdominal infection, UTI, and H pylori. He has no recent travel nor sick contacts. He is fatigued and light-headed, without fevers, chills, CP, SOB, urinary symptoms.    On presentation, initial vitals were T 98F, /89, , 100%. Labs were significant for WBC 11.3, Hgb 9.6, TBili 2.4, and INR 1.71. Abdominal US showed cirrhosis, very poor flow in portal venous system concerning for possible thrombosis, and moderate ascites in RUQ. CT Abdomen Pelvis x/ contrast was also performed showing cirrhosis with portal hypertension and no active GI bleed.     While in the ED he vomited blood and syncopized. Concern was for decompensated cirrhosis with unknown source of GI bleed. He was given 1g CTX, 50mcg Octreotide and Pantoprazole 40mg twice. He was also having mild withdrawal symptoms, CIWA 4-6, and was given Ativan 2mg IV.     GI was consulted and determined he should have endoscopy while intubated. He was started on octreotide and pantoprazole drips.     He will be admitted to MICU for intubation in pursuit of endoscopy.      SUBJECTIVE: Patient seen and examined at bedside.     Unable to assess as patient intubated/ sedated     OBJECTIVE:    VITAL SIGNS:  ICU Vital Signs Last 24 Hrs  T(C): 36.7 (06 Dec 2024 04:00), Max: 37 (05 Dec 2024 14:36)  T(F): 98.1 (06 Dec 2024 04:00), Max: 98.6 (05 Dec 2024 14:36)  HR: 116 (06 Dec 2024 07:58) (88 - 129)  BP: 123/66 (06 Dec 2024 05:00) (72/54 - 151/89)  BP(mean): 82 (06 Dec 2024 05:00) (43 - 96)  ABP: 132/66 (06 Dec 2024 07:30) (105/56 - 138/71)  ABP(mean): 82 (06 Dec 2024 07:30) (68 - 87)  RR: 20 (06 Dec 2024 07:00) (16 - 32)  SpO2: 100% (06 Dec 2024 07:58) (90% - 100%)    O2 Parameters below as of 06 Dec 2024 07:00  Patient On (Oxygen Delivery Method): ventilator, AC    O2 Concentration (%): 40      Mode: AC/ CMV (Assist Control/ Continuous Mandatory Ventilation), RR (machine): 20, TV (machine): 400, FiO2: 50, PEEP: 8, ITime: 1, MAP: 10, PC: 85, PIP: 23    12-05 @ 07:01  -  12-06 @ 07:00  --------------------------------------------------------  IN: 4307.9 mL / OUT: 400 mL / NET: 3907.9 mL      CAPILLARY BLOOD GLUCOSE      POCT Blood Glucose.: 345 mg/dL (06 Dec 2024 07:31)      PHYSICAL EXAM:    General: NAD  HEENT: NC/AT; PERRL, clear conjunctiva  Neck: supple  Respiratory: CTA b/l  Cardiovascular: +S1/S2; tachycardic   Abdomen: distended abdomen  +BS x4  Extremities: WWP, 2+ peripheral pulses b/l; no LE edema  Skin: normal color and turgor; no rash  Neurological: intubated/ sedated     MEDICATIONS:  MEDICATIONS  (STANDING):  acetylcysteine IVPB 4.8 Gram(s) IV Intermittent once  acetylcysteine IVPB 14 Gram(s) IV Intermittent every 24 hours  albumin human  5% IVPB 250 milliLiter(s) IV Intermittent once  chlorhexidine 0.12% Liquid 15 milliLiter(s) Oral Mucosa every 12 hours  chlorhexidine 2% Cloths 1 Application(s) Topical <User Schedule>  CRRT Treatment    <Continuous>  dextrose 5%. 1000 milliLiter(s) (50 mL/Hr) IV Continuous <Continuous>  dextrose 5%. 1000 milliLiter(s) (100 mL/Hr) IV Continuous <Continuous>  dextrose 50% Injectable 25 Gram(s) IV Push once  dextrose 50% Injectable 12.5 Gram(s) IV Push once  dextrose 50% Injectable 25 Gram(s) IV Push once  fentaNYL   Infusion. 2 MICROgram(s)/kG/Hr (19.1 mL/Hr) IV Continuous <Continuous>  folic acid Injectable 1 milliGRAM(s) IV Push daily  glucagon  Injectable 1 milliGRAM(s) IntraMuscular once  insulin lispro (ADMELOG) corrective regimen sliding scale   SubCutaneous every 6 hours  lactulose Retention Enema 200 Gram(s) Rectal once  meropenem  IVPB 1000 milliGRAM(s) IV Intermittent every 8 hours  norepinephrine Infusion 0.8 MICROgram(s)/kG/Min (71.5 mL/Hr) IV Continuous <Continuous>  octreotide  Infusion 50 MICROgram(s)/Hr (10 mL/Hr) IV Continuous <Continuous>  pantoprazole Infusion 8 mG/Hr (10 mL/Hr) IV Continuous <Continuous>  petrolatum Ophthalmic Ointment 1 Application(s) Both EYES two times a day  PrismaSATE Dialysate BK 0 / 3.5 5000 milliLiter(s) (1400 mL/Hr) CRRT <Continuous>  PrismaSOL Filtration BGK 0 / 2.5 5000 milliLiter(s) (1200 mL/Hr) CRRT <Continuous>  PrismaSOL Filtration BGK 0 / 2.5 5000 milliLiter(s) (200 mL/Hr) CRRT <Continuous>  propofol Infusion 30 MICROgram(s)/kG/Min (17.2 mL/Hr) IV Continuous <Continuous>  propofol Injectable 20 milliGRAM(s) IV Push once  sodium bicarbonate  Infusion 0.236 mEq/kG/Hr (150 mL/Hr) IV Continuous <Continuous>  thiamine IVPB 500 milliGRAM(s) IV Intermittent daily  vancomycin  IVPB      vancomycin  IVPB 750 milliGRAM(s) IV Intermittent every 12 hours  vasopressin Infusion 0.04 Unit(s)/Min (6 mL/Hr) IV Continuous <Continuous>    MEDICATIONS  (PRN):  dextrose Oral Gel 15 Gram(s) Oral once PRN Blood Glucose LESS THAN 70 milliGRAM(s)/deciliter      ALLERGIES:  Allergies    amoxicillin (Angioedema)    Intolerances        LABS:                        6.9    26.26 )-----------( 136      ( 06 Dec 2024 04:10 )             20.7     12-06    135  |  98  |  30[H]  ----------------------------<  197[H]  6.6[HH]   |  16[L]  |  2.05[H]    Ca    8.1[L]      06 Dec 2024 04:10  Phos  7.1     12-06  Mg     1.80     12-06    TPro  5.3[L]  /  Alb  2.1[L]  /  TBili  3.3[H]  /  DBili  x   /  AST  1173[H]  /  ALT  415[H]  /  AlkPhos  116  12-06    PT/INR - ( 06 Dec 2024 01:20 )   PT: 30.0 sec;   INR: 2.54 ratio         PTT - ( 06 Dec 2024 01:20 )  PTT:25.5 sec  Urinalysis Basic - ( 06 Dec 2024 04:10 )    Color: x / Appearance: x / SG: x / pH: x  Gluc: 197 mg/dL / Ketone: x  / Bili: x / Urobili: x   Blood: x / Protein: x / Nitrite: x   Leuk Esterase: x / RBC: x / WBC x   Sq Epi: x / Non Sq Epi: x / Bacteria: x        RADIOLOGY & ADDITIONAL TESTS: Reviewed. INTERVAL HPI/OVERNIGHT EVENTS: CRRT stopped around 11pm overnight due to hypotension despite levophed/vaso. SBP 70s this morning. Hb 11>10s overnight.     HPI: 44 year old male with a history of alcohol use disorder, alcohol cirrhosis c/b esophageal varices, hypertension, T2DM, and H pylori (s/p treatment September 2024) who presented to [facility] 12/5 with dark stools beginning that morning. He had felt his stomach becoming more distended prior to presentation and then 12/5 AM began to have watery diarrhea, becoming dark red in character with over 10 bowel movements and no improvement in bleeding.     He had been sober for 3 months, however relapsed a few days prior, drinking for 3 days with his last drink being 2-3 days prior to presentation. He also missed his home medications for the preceding 2 days. He has never had withdrawal in the past. He has required paracentesis before, last in August. After the paracentesis he was also admitted for abdominal infection, UTI, and H pylori. He has no recent travel nor sick contacts. He is fatigued and light-headed, without fevers, chills, CP, SOB, urinary symptoms.    On presentation, initial vitals were T 98F, /89, , 100%. Labs were significant for WBC 11.3, Hgb 9.6, TBili 2.4, and INR 1.71. Abdominal US showed cirrhosis, very poor flow in portal venous system concerning for possible thrombosis, and moderate ascites in RUQ. CT Abdomen Pelvis x/ contrast was also performed showing cirrhosis with portal hypertension and no active GI bleed.     While in the ED he vomited blood and syncopized. Concern was for decompensated cirrhosis with unknown source of GI bleed. He was given 1g CTX, 50mcg Octreotide and Pantoprazole 40mg twice. He was also having mild withdrawal symptoms, CIWA 4-6, and was given Ativan 2mg IV.     GI was consulted and determined he should have endoscopy while intubated. He was started on octreotide and pantoprazole drips.     He will be admitted to MICU for intubation in pursuit of endoscopy.      SUBJECTIVE: Patient seen and examined at bedside.     Unable to assess as patient intubated/ sedated     OBJECTIVE:    VITAL SIGNS:  ICU Vital Signs Last 24 Hrs  T(C): 36.7 (06 Dec 2024 04:00), Max: 37 (05 Dec 2024 14:36)  T(F): 98.1 (06 Dec 2024 04:00), Max: 98.6 (05 Dec 2024 14:36)  HR: 116 (06 Dec 2024 07:58) (88 - 129)  BP: 123/66 (06 Dec 2024 05:00) (72/54 - 151/89)  BP(mean): 82 (06 Dec 2024 05:00) (43 - 96)  ABP: 132/66 (06 Dec 2024 07:30) (105/56 - 138/71)  ABP(mean): 82 (06 Dec 2024 07:30) (68 - 87)  RR: 20 (06 Dec 2024 07:00) (16 - 32)  SpO2: 100% (06 Dec 2024 07:58) (90% - 100%)    O2 Parameters below as of 06 Dec 2024 07:00  Patient On (Oxygen Delivery Method): ventilator, AC    O2 Concentration (%): 40      Mode: AC/ CMV (Assist Control/ Continuous Mandatory Ventilation), RR (machine): 20, TV (machine): 400, FiO2: 50, PEEP: 8, ITime: 1, MAP: 10, PC: 85, PIP: 23    12-05 @ 07:01  -  12-06 @ 07:00  --------------------------------------------------------  IN: 4307.9 mL / OUT: 400 mL / NET: 3907.9 mL      CAPILLARY BLOOD GLUCOSE      POCT Blood Glucose.: 345 mg/dL (06 Dec 2024 07:31)      PHYSICAL EXAM:    General: NAD  HEENT: NC/AT; PERRL, clear conjunctiva  Neck: supple  Respiratory: CTA b/l  Cardiovascular: +S1/S2; tachycardic   Abdomen: distended abdomen  +BS x4  Extremities: WWP, 2+ peripheral pulses b/l; no LE edema  Skin: normal color and turgor; no rash  Neurological: intubated/ sedated     MEDICATIONS:  MEDICATIONS  (STANDING):  acetylcysteine IVPB 4.8 Gram(s) IV Intermittent once  acetylcysteine IVPB 14 Gram(s) IV Intermittent every 24 hours  albumin human  5% IVPB 250 milliLiter(s) IV Intermittent once  chlorhexidine 0.12% Liquid 15 milliLiter(s) Oral Mucosa every 12 hours  chlorhexidine 2% Cloths 1 Application(s) Topical <User Schedule>  CRRT Treatment    <Continuous>  dextrose 5%. 1000 milliLiter(s) (50 mL/Hr) IV Continuous <Continuous>  dextrose 5%. 1000 milliLiter(s) (100 mL/Hr) IV Continuous <Continuous>  dextrose 50% Injectable 25 Gram(s) IV Push once  dextrose 50% Injectable 12.5 Gram(s) IV Push once  dextrose 50% Injectable 25 Gram(s) IV Push once  fentaNYL   Infusion. 2 MICROgram(s)/kG/Hr (19.1 mL/Hr) IV Continuous <Continuous>  folic acid Injectable 1 milliGRAM(s) IV Push daily  glucagon  Injectable 1 milliGRAM(s) IntraMuscular once  insulin lispro (ADMELOG) corrective regimen sliding scale   SubCutaneous every 6 hours  lactulose Retention Enema 200 Gram(s) Rectal once  meropenem  IVPB 1000 milliGRAM(s) IV Intermittent every 8 hours  norepinephrine Infusion 0.8 MICROgram(s)/kG/Min (71.5 mL/Hr) IV Continuous <Continuous>  octreotide  Infusion 50 MICROgram(s)/Hr (10 mL/Hr) IV Continuous <Continuous>  pantoprazole Infusion 8 mG/Hr (10 mL/Hr) IV Continuous <Continuous>  petrolatum Ophthalmic Ointment 1 Application(s) Both EYES two times a day  PrismaSATE Dialysate BK 0 / 3.5 5000 milliLiter(s) (1400 mL/Hr) CRRT <Continuous>  PrismaSOL Filtration BGK 0 / 2.5 5000 milliLiter(s) (1200 mL/Hr) CRRT <Continuous>  PrismaSOL Filtration BGK 0 / 2.5 5000 milliLiter(s) (200 mL/Hr) CRRT <Continuous>  propofol Infusion 30 MICROgram(s)/kG/Min (17.2 mL/Hr) IV Continuous <Continuous>  propofol Injectable 20 milliGRAM(s) IV Push once  sodium bicarbonate  Infusion 0.236 mEq/kG/Hr (150 mL/Hr) IV Continuous <Continuous>  thiamine IVPB 500 milliGRAM(s) IV Intermittent daily  vancomycin  IVPB      vancomycin  IVPB 750 milliGRAM(s) IV Intermittent every 12 hours  vasopressin Infusion 0.04 Unit(s)/Min (6 mL/Hr) IV Continuous <Continuous>    MEDICATIONS  (PRN):  dextrose Oral Gel 15 Gram(s) Oral once PRN Blood Glucose LESS THAN 70 milliGRAM(s)/deciliter      ALLERGIES:  Allergies    amoxicillin (Angioedema)    Intolerances        LABS:                        6.9    26.26 )-----------( 136      ( 06 Dec 2024 04:10 )             20.7     12-06    135  |  98  |  30[H]  ----------------------------<  197[H]  6.6[HH]   |  16[L]  |  2.05[H]    Ca    8.1[L]      06 Dec 2024 04:10  Phos  7.1     12-06  Mg     1.80     12-06    TPro  5.3[L]  /  Alb  2.1[L]  /  TBili  3.3[H]  /  DBili  x   /  AST  1173[H]  /  ALT  415[H]  /  AlkPhos  116  12-06    PT/INR - ( 06 Dec 2024 01:20 )   PT: 30.0 sec;   INR: 2.54 ratio         PTT - ( 06 Dec 2024 01:20 )  PTT:25.5 sec  Urinalysis Basic - ( 06 Dec 2024 04:10 )    Color: x / Appearance: x / SG: x / pH: x  Gluc: 197 mg/dL / Ketone: x  / Bili: x / Urobili: x   Blood: x / Protein: x / Nitrite: x   Leuk Esterase: x / RBC: x / WBC x   Sq Epi: x / Non Sq Epi: x / Bacteria: x        RADIOLOGY & ADDITIONAL TESTS: Reviewed.

## 2024-12-09 NOTE — CHART NOTE - NSCHARTNOTEFT_GEN_A_CORE
: Marylin Del Real    INDICATION: Shock     PROCEDURE:  [x ] LIMITED ECHO  [ x] LIMITED CHEST  [ ] LIMITED RETROPERITONEAL  [x ] LIMITED ABDOMINAL  [ ] LIMITED DVT  [ ] NEEDLE GUIDANCE VASCULAR  [ ] NEEDLE GUIDANCE THORACENTESIS  [ ] NEEDLE GUIDANCE PARACENTESIS  [ ] NEEDLE GUIDANCE PERICARDIOCENTESIS  [ ] OTHER    FINDINGS:  Lungs: A-line bilaterally anteriorly. No PLEFs or consolidations.  Abdomen: Trace ascites subdiaphragmatic space but no safe tappable pocket.   Cardiac: No pericardial effusion. RV<LV. Hyperdynamic LV. IVC flat.     INTERPRETATION: Pt fluid responsive by POCUS evaluation.
DEATH NOTE    Called to bedside to evaluate the patient for asystole on the monitor.    On physical exam, patient did not respond to verbal or noxious stimuli.  No spontaneous respirations.  Absent heart and breath sounds.  Absent radial and carotid pulses.   Pupils are fixed and dilated, no corneal reflex.  EKG rhythm strip shows asystole.  Arterial line is flat. Patient pronounced dead at 0034AM on 12/9/24.  Attending Dr. Daniel notified.
Patient was seen bedside. Given appropriate response to 2u prbc, suspect patient's clinical decompensation is not due to active hemorrhage at this time and more likely due to multiorgan failure.   Case was discussed with the ICU and GI attendings. Will plan to hold off on TIPS at this time, given MELD of 41 and very high risk of mortality and that patient is not demonstrating signs of active hemorrhage at this time.   IR is available if patient condition changes or new information becomes available.     --  Sada Choe MD  Interventional Radiology Resident ([id number])  Available on [employer] TEAMS    For EMERGENT inquiries/questions:  IR Pager ([facility]): [phone]  IR Pager ([facility]): [phone] ; [id number]    For non-emergent consults/questions:   Please place a sunrise order "Consult- Interventional Radiology" with an appropriate callback number    For questions about scheduling during appropriate work hours, call IR :  [facility]: [phone]  [facility]: [phone]    For outpatient IR booking:  [facility]: [phone]  [facility]: [phone] or [email or web address]
: [place]    INDICATION: Shock, respiratory failure     PROCEDURE:  [x ] LIMITED ECHO  [x ] LIMITED CHEST  [ ] LIMITED RETROPERITONEAL  [x ] LIMITED ABDOMINAL  [ ] LIMITED DVT  [ ] NEEDLE GUIDANCE VASCULAR  [ ] NEEDLE GUIDANCE THORACENTESIS  [ ] NEEDLE GUIDANCE PARACENTESIS  [ ] NEEDLE GUIDANCE PERICARDIOCENTESIS  [ ] OTHER    FINDINGS:  Cardiac exam with difficult windows but grossly normal LV systolic function with small LV cavity.  IVC small and collapsible.  No significant pericardial effusions seen.  Lung exam revealed bilateral lung sliding with a-line predominant pattern.  No significant pleural effusions.  Small volume ascites on abdominal exam.  Kidneys appeared grossly normal without evidence of hydronephrosis.      INTERPRETATION:  Etiology of shock likely vasoplegic with some component of hypovolemia given appearance of IVC and small LV cavity.  Patient can likely tolerate additional fluid resuscitation    Images stored on Qpath.
IR Pre-Procedure Note    Patient Age:   44y    Patient Gender:   Male    Procedure (including site / side if known): TIPS and embolization of esophageal/gastric varices    Diagnosis / Indication: Patient is a 44y old  Male who presents with a chief complaint of GI Bleed (07 Dec 2024 10:25)      Interventional Radiology Attending Physician: Dr. Manny Barahona    Ordering Attending Physician: Dr. Teixeira    PAST MEDICAL & SURGICAL HISTORY:  Hypertension      Diabetes      Cirrhosis      Alcohol use      Asthma      Psoriasis      No significant past surgical history           Pertinent Labs:   CBC Full  -  ( 07 Dec 2024 06:55 )  WBC Count : 23.64 K/uL  RBC Count : 3.13 M/uL  Hemoglobin : 9.5 g/dL  Hematocrit : 25.8 %  Platelet Count - Automated : 87 K/uL  Mean Cell Volume : 82.4 fL  Mean Cell Hemoglobin : 30.4 pg  Mean Cell Hemoglobin Concentration : 36.8 g/dL  Auto Neutrophil # : 20.87 K/uL  Auto Lymphocyte # : 0.85 K/uL  Auto Monocyte # : 1.50 K/uL  Auto Eosinophil # : 0.04 K/uL  Auto Basophil # : 0.05 K/uL  Auto Neutrophil % : 88.3 %  Auto Lymphocyte % : 3.6 %  Auto Monocyte % : 6.3 %  Auto Eosinophil % : 0.2 %  Auto Basophil % : 0.2 %    12-07    134[L]  |  97[L]  |  25[H]  ----------------------------<  132[H]  4.3   |  19[L]  |  1.62[H]    Ca    8.0[L]      07 Dec 2024 09:11  Phos  4.9     12-07  Mg     1.70     12-07    TPro  5.9[L]  /  Alb  2.5[L]  /  TBili  6.6[H]  /  DBili  x   /  AST  4596[H]  /  ALT  1840[H]  /  AlkPhos  276[H]  12-07    PT/INR - ( 07 Dec 2024 02:35 )   PT: 39.3 sec;   INR: 3.43 ratio         PTT - ( 07 Dec 2024 02:35 )  PTT:40.0 sec    Family aware of procedure:   [  x] Y   [  ] N

## 2024-12-09 NOTE — DISCHARGE NOTE FOR THE EXPIRED PATIENT - OTHER SIGNIFICANT FINDINGS
44 year old male with a history of alcohol use disorder, alcohol cirrhosis c/b esophageal varices, hypertension, T2DM, and H pylori (s/p treatment September 2024) who presented to [facility] 12/5 with dark stools beginning that morning. He had felt his stomach becoming more distended prior to presentation and then 12/5 AM began to have watery diarrhea, becoming dark red in character with over 10 bowel movements and no improvement in bleeding.     He had been sober for 3 months, however relapsed a few days prior, drinking for 3 days with his last drink being 2-3 days prior to presentation. He also missed his home medications for the preceding 2 days. He has never had withdrawal in the past. He has required paracentesis before, last in August. After the paracentesis he was also admitted for abdominal infection, UTI, and H pylori. He has no recent travel nor sick contacts. He is fatigued and light-headed, without fevers, chills, CP, SOB, urinary symptoms.    On presentation, initial vitals were T 98F, /89, , 100%. Labs were significant for WBC 11.3, Hgb 9.6, TBili 2.4, and INR 1.71. Abdominal US showed cirrhosis, very poor flow in portal venous system concerning for possible thrombosis, and moderate ascites in RUQ. CT Abdomen Pelvis x/ contrast was also performed showing cirrhosis with portal hypertension and no active GI bleed.     While in the ED he vomited blood and syncopized. Concern was for decompensated cirrhosis with unknown source of GI bleed. He was given 1g CTX, 50mcg Octreotide and Pantoprazole 40mg twice. He was also having mild withdrawal symptoms, CIWA 4-6, and was given Ativan 2mg IV.     GI was consulted on 12/6 the patient was transferred to the MICU for intubation for an endoscopy. The endoscopy showed grade III esophageal varices, hematin in the gastric body, large clots in the gastric fundus, portal hypertensive gastropathy, and old blood in the duodenal bulb. The patient's varices were banded for the procedure and bleeding was controlled. The patient required a total of 6 units of packed red blood cell and the patient was kept intubated for a possible repeat endoscopy.    On 12/6 the patient's labs revealed worsening renal function, hyperkalemia (K 7.8), and shock liver with no improvement with medical therapy. The patient also worsening hypotension and high dose pressor requirements. In regards to the hyperkalemia, the patient received 30 units of insulin, dextrose, sodium bicarbonate pushes, sodium bicarbonate drip, lasix, albuterol, and albumin with no improvement. As a result nephrology was consulted and the patient was placed on CRRT. The patient was unable to tolerate the CRRT due to high pressor requirements and was stopped.

## 2024-12-09 NOTE — DISCHARGE NOTE FOR THE EXPIRED PATIENT - HOSPITAL COURSE
44 year old male with a history of alcohol use disorder, alcohol cirrhosis c/b esophageal varices, hypertension, T2DM, and H pylori (s/p treatment September 2024) who presented to [facility] 12/5 with dark stools beginning that morning. He had felt his stomach becoming more distended prior to presentation and then 12/5 AM began to have watery diarrhea, becoming dark red in character with over 10 bowel movements and no improvement in bleeding.     He had been sober for 3 months, however relapsed a few days prior, drinking for 3 days with his last drink being 2-3 days prior to presentation. He also missed his home medications for the preceding 2 days. He has never had withdrawal in the past. He has required paracentesis before, last in August. After the paracentesis he was also admitted for abdominal infection, UTI, and H pylori. He has no recent travel nor sick contacts. He is fatigued and light-headed, without fevers, chills, CP, SOB, urinary symptoms.    On presentation, initial vitals were T 98F, /89, , 100%. Labs were significant for WBC 11.3, Hgb 9.6, TBili 2.4, and INR 1.71. Abdominal US showed cirrhosis, very poor flow in portal venous system concerning for possible thrombosis, and moderate ascites in RUQ. CT Abdomen Pelvis x/ contrast was also performed showing cirrhosis with portal hypertension and no active GI bleed.     While in the ED he vomited blood and syncopized. Concern was for decompensated cirrhosis with unknown source of GI bleed. He was given 1g CTX, 50mcg Octreotide and Pantoprazole 40mg twice. He was also having mild withdrawal symptoms, CIWA 4-6, and was given Ativan 2mg IV.     GI was consulted on 12/6 the patient was transferred to the MICU for intubation for an endoscopy. The endoscopy showed grade III esophageal varices, hematin in the gastric body, large clots in the gastric fundus, portal hypertensive gastropathy, and old blood in the duodenal bulb. The patient's varices were banded for the procedure and bleeding was controlled. The patient required a total of 6 units of packed red blood cell and the patient was kept intubated for a possible repeat endoscopy.    On 12/6 the patient's labs also revealed worsening renal function, hyperkalemia (K 7.8), and shock liver with no improvement with medical therapy. In regards to the hyperkalemia, the patient received 30 units of insulin, dextrose, sodium bicarbonate pushes, sodium bicarbonate drip, lasix, albuterol, and albumin with no improvement. As a result nephrology was consulted and the patient was placed on CRRT. The patient was unable to tolerate the CRRT due to high pressor requirements and was stopped.    The patient also had worsening shock liver. Discussion between IR, MICU and hepatology who stated that the patient is too high risk (Meld 41) and not actively bleeding (stable Hgb, responded to 2u PRBC) for a TIPS procedure and the clinical decline more likely 2/2 multiorgan failure rather than active hemorrhage.    On 12/7 the patient was on maximum medical therapy. The decision was made with the patient's wife to make the patient DNR, comfort measures only with no blood draws. On 12/9 at 0034 the patient became asystolic and time of death was called. Patient's son, wife, and mother were at bedside.           44 year old male with a history of alcohol use disorder, alcohol cirrhosis c/b esophageal varices, hypertension, T2DM, and H pylori (s/p treatment September 2024) who presented to [facility] 12/5 with dark stools beginning that morning. He had felt his stomach becoming more distended prior to presentation and then 12/5 AM began to have watery diarrhea, becoming dark red in character with over 10 bowel movements and no improvement in bleeding.     He had been sober for 3 months, however relapsed a few days prior, drinking for 3 days with his last drink being 2-3 days prior to presentation. He also missed his home medications for the preceding 2 days. He has never had withdrawal in the past. He has required paracentesis before, last in August. After the paracentesis he was also admitted for abdominal infection, UTI, and H pylori. He has no recent travel nor sick contacts. He is fatigued and light-headed, without fevers, chills, CP, SOB, urinary symptoms.    On presentation, initial vitals were T 98F, /89, , 100%. Labs were significant for WBC 11.3, Hgb 9.6, TBili 2.4, and INR 1.71. Abdominal US showed cirrhosis, very poor flow in portal venous system concerning for possible thrombosis, and moderate ascites in RUQ. CT Abdomen Pelvis x/ contrast was also performed showing cirrhosis with portal hypertension and no active GI bleed.     While in the ED he vomited blood and syncopized. Concern was for decompensated cirrhosis with unknown source of GI bleed. He was given 1g CTX, 50mcg Octreotide and Pantoprazole 40mg twice. He was also having mild withdrawal symptoms, CIWA 4-6, and was given Ativan 2mg IV.     GI was consulted and on 12/6 the patient was transferred to the MICU for intubation for an endoscopy. The endoscopy showed grade III esophageal varices, hematin in the gastric body, large clots in the gastric fundus, portal hypertensive gastropathy, and old blood in the duodenal bulb. The patient's varices were banded during the procedure and bleeding was controlled. The patient required a total of 6 units of packed red blood cells throughout admission and the patient was kept intubated for a possible repeat endoscopy.    On 12/6 the patient's labs also revealed worsening renal function, hyperkalemia (K 7.8), and shock liver with no improvement with medical therapy. In regards to the hyperkalemia, the patient received 30 units of insulin, dextrose, sodium bicarbonate pushes, sodium bicarbonate drip, lasix, albuterol, and albumin with no improvement. As a result nephrology was consulted and the patient was placed on CRRT. The patient was unable to tolerate the CRRT due to high pressor requirements and was stopped.    The patient also had worsening shock liver. Discussion between IR, MICU and hepatology who stated that the patient is too high risk (Meld 41) and not actively bleeding (stable Hgb, responded to 2u PRBC) for a TIPS procedure and the clinical decline more likely 2/2 multiorgan failure rather than active hemorrhage.    On 12/7 the patient was on maximum medical therapy with high dose pressors. The decision was made with the patient's wife to make the patient DNR, comfort measures only with no blood draws. On 12/9 the patient became asystolic and time of death was called. Patient's son, wife, and mother were at bedside. Time of death called on 0034 on 12/9/24.

## 2024-12-09 NOTE — PROGRESS NOTE ADULT - SUBJECTIVE AND OBJECTIVE BOX
HPI   Chief Complaint   Patient presents with    Nasal Congestion     Pt left side of her nose hurts and is red she says like a pimple in nose       A 46-year-old female patient comes into the emergency department today with complaints of left-sided nasal pain.  She states that 2 days ago she had a pimple inside her nose but that is gone away but now she has swelling and redness to the outside of her nose and feels like it swollen inside her nose.  States is very painful rates 8 out of 10 on the pain scale.  Any touch to the nose causes worsening pain.  Denies any associated fevers or chills.  For this purpose she comes into the emergency department today for the evaluation.  Otherwise no other complaints present time.              Patient History   History reviewed. No pertinent past medical history.  Past Surgical History:   Procedure Laterality Date    US ASPIRATION INJECTION INTERMEDIATE JOINT  6/12/2019    US ASPIRATION INJECTION INTERMEDIATE JOINT 6/12/2019 [facility]     No family history on file.  Social History     Tobacco Use    Smoking status: Every Day     Types: Cigarettes    Smokeless tobacco: Never   Vaping Use    Vaping status: Never Used   Substance Use Topics    Alcohol use: Not Currently    Drug use: Not Currently       Physical Exam   ED Triage Vitals [12/08/24 1858]   Temperature Heart Rate Respirations BP   36.5 °C (97.7 °F) 86 18 157/77      Pulse Ox Temp src Heart Rate Source Patient Position   100 % -- -- --      BP Location FiO2 (%)     -- --       Physical Exam  Constitutional:       General: She is in acute distress.      Appearance: Normal appearance. She is not ill-appearing or diaphoretic.   HENT:      Head: Normocephalic and atraumatic.      Nose:      Comments: Erythema anterior lateral nose, lateral inner nose edema  Eyes:      Extraocular Movements: Extraocular movements intact.      Conjunctiva/sclera: Conjunctivae normal.      Pupils: Pupils are equal, round, and  Hepatology Progress Note    Interval Events:   Na improved to 127  Denies any ongoing nausea, vomiting or abd pain     Allergies:  amoxicillin (Angioedema)      Hospital Medications:  acetaminophen     Tablet .. 650 milliGRAM(s) Oral every 6 hours PRN  albuterol    90 MICROgram(s) HFA Inhaler 2 Puff(s) Inhalation every 6 hours PRN  aluminum hydroxide/magnesium hydroxide/simethicone Suspension 30 milliLiter(s) Oral every 4 hours PRN  amLODIPine   Tablet 10 milliGRAM(s) Oral daily  bisacodyl Suppository 10 milliGRAM(s) Rectal daily PRN  carvedilol 6.25 milliGRAM(s) Oral every 12 hours  dextrose 5%. 1000 milliLiter(s) IV Continuous <Continuous>  dextrose 5%. 1000 milliLiter(s) IV Continuous <Continuous>  dextrose 50% Injectable 25 Gram(s) IV Push once  dextrose 50% Injectable 25 Gram(s) IV Push once  dextrose 50% Injectable 12.5 Gram(s) IV Push once  dextrose Oral Gel 15 Gram(s) Oral once PRN  folic acid 1 milliGRAM(s) Oral daily  glucagon  Injectable 1 milliGRAM(s) IntraMuscular once  insulin glargine Injectable (LANTUS) 4 Unit(s) SubCutaneous at bedtime  insulin lispro (ADMELOG) corrective regimen sliding scale   SubCutaneous three times a day before meals  insulin lispro (ADMELOG) corrective regimen sliding scale   SubCutaneous at bedtime  insulin lispro Injectable (ADMELOG) 2 Unit(s) SubCutaneous three times a day before meals  lactobacillus acidophilus 1 Tablet(s) Oral daily  lidocaine   4% Patch 1 Patch Transdermal daily  melatonin 3 milliGRAM(s) Oral at bedtime PRN  multivitamin 1 Tablet(s) Oral daily  nystatin Cream 1 Application(s) Topical two times a day  ondansetron Injectable 4 milliGRAM(s) IV Push every 8 hours PRN  pantoprazole    Tablet 40 milliGRAM(s) Oral before breakfast  polyethylene glycol 3350 17 Gram(s) Oral daily  senna 2 Tablet(s) Oral at bedtime  sodium chloride 1.5%. 500 milliLiter(s) IV Continuous <Continuous>  thiamine 100 milliGRAM(s) Oral daily      ROS: 14 point ROS negative unless otherwise state in subjective    PHYSICAL EXAM:   Vital Signs:  Vital Signs Last 24 Hrs  T(C): 36.8 (06 Sep 2024 05:02), Max: 37.1 (05 Sep 2024 12:30)  T(F): 98.2 (06 Sep 2024 05:02), Max: 98.8 (05 Sep 2024 12:30)  HR: 87 (06 Sep 2024 05:02) (75 - 88)  BP: 115/81 (06 Sep 2024 05:02) (110/76 - 132/78)  BP(mean): --  RR: 18 (06 Sep 2024 05:02) (16 - 18)  SpO2: 98% (06 Sep 2024 05:02) (98% - 100%)    Parameters below as of 06 Sep 2024 05:02  Patient On (Oxygen Delivery Method): room air      GENERAL:  No acute distress  HEENT:  NCAT, no scleral icterus  CHEST: no resp distress  HEART:  RRR  ABDOMEN:  Soft, non-tender, non-distended, normoactive bowel sound  NEURO:  Alert and oriented x 3, no asterixis,    LABS:                        12.8   12.10 )-----------( 256      ( 06 Sep 2024 05:00 )             36.2     Mean Cell Volume: 84.0 fL (09-06-24 @ 05:00)    09-06    127<L>  |  93<L>  |  14  ----------------------------<  214<H>  4.5   |  22  |  0.56    Ca    8.2<L>      06 Sep 2024 05:00  Phos  2.8     09-06  Mg     1.90     09-06    TPro  8.4<H>  /  Alb  2.9<L>  /  TBili  3.0<H>  /  DBili  x   /  AST  123<H>  /  ALT  64<H>  /  AlkPhos  181<H>  09-05    LIVER FUNCTIONS - ( 05 Sep 2024 05:22 )  Alb: 2.9 g/dL / Pro: 8.4 g/dL / ALK PHOS: 181 U/L / ALT: 64 U/L / AST: 123 U/L / GGT: x           PT/INR - ( 05 Sep 2024 05:22 )   PT: 25.6 sec;   INR: 2.34 ratio           Urinalysis Basic - ( 06 Sep 2024 05:00 )    Color: x / Appearance: x / SG: x / pH: x  Gluc: 214 mg/dL / Ketone: x  / Bili: x / Urobili: x   Blood: x / Protein: x / Nitrite: x   Leuk Esterase: x / RBC: x / WBC x   Sq Epi: x / Non Sq Epi: x / Bacteria: x      Imaging:      < from: US Abdomen Limited (09.03.24 @ 11:51) >    IMPRESSION:    Small volume ascites in the right upper quadrant.    Incidentally noted cirrhosis.        < end of copied text >  < from: CT Abdomen and Pelvis w/ IV Cont (08.31.24 @ 03:04) >  IMPRESSION:    Interval decrease in abdominopelvic ascites from large volume to   mild/moderate.  Redemonstration of cirrhosis, indeterminate small hypodensities. Varices   indicative of portal hypertension.        --- End of Report ---       reactive to light.   Cardiovascular:      Rate and Rhythm: Normal rate and regular rhythm.   Pulmonary:      Effort: Pulmonary effort is normal. No respiratory distress.      Breath sounds: Normal breath sounds. No stridor. No wheezing.   Musculoskeletal:         General: Normal range of motion.      Cervical back: Normal range of motion.   Skin:     General: Skin is warm and dry.   Neurological:      General: No focal deficit present.      Mental Status: She is alert and oriented to person, place, and time. Mental status is at baseline.   Psychiatric:         Mood and Affect: Mood normal.           ED Course & MDM   Diagnoses as of 12/08/24 1928   Nose cellulitis                 No data recorded     Dudley Coma Scale Score: 15 (12/08/24 1900 : Faiza Villasenor RN)                           Medical Decision Making  A 46-year-old female patient comes into the emergency department today with complaints of left-sided nasal pain.  She states that 2 days ago she had a pimple inside her nose but that is gone away but now she has swelling and redness to the outside of her nose and feels like it swollen inside her nose.  States is very painful rates 8 out of 10 on the pain scale.  Any touch to the nose causes worsening pain.  Denies any associated fevers or chills.  For this purpose she comes into the emergency department today for the evaluation.  Otherwise no other complaints present time.    On clinical examination patient appears to have some erythema over the left anterior lateral nose with some swelling.  Consistent with a nasal cellulitis.  Will give patient first dose of p.o. doxycycline here in the emergency department as well as p.o. tramadol as well as medications for home.  Will give her ENT follow-up.  Patient agrees with this plan expressed verbal understanding.  All questions were answered.    Started the patient    Diagnosis: Nasal cellulitis      Labs Reviewed - No data to display     No orders to  display       Procedure  Procedures     Cristopher Todd PA-C  12/08/24 1928

## 2024-12-11 LAB
CULTURE RESULTS: SIGNIFICANT CHANGE UP
CULTURE RESULTS: SIGNIFICANT CHANGE UP
SPECIMEN SOURCE: SIGNIFICANT CHANGE UP
SPECIMEN SOURCE: SIGNIFICANT CHANGE UP

## 2024-12-18 NOTE — DISCHARGE NOTE FOR THE EXPIRED PATIENT - ATTENDING PHYSICIAN NOTIFIED OF DEATH/AUTOPSY/CONSENT STATUS (NAME OF ATTENDING)
Patient confirmed scheduled appointment:  Date: 3/13  Time: 8 am   Visit type: return diabetes   Provider: jaguar   Location: [facility] [place] virtual   Testing/imaging:   Additional notes: Spoke to pt spouse and re-jesus appt on 2/20 to next avail due to changes in the providers jesus.     Quynh Vega on 12/18/2024 at 11:26 AM       Dr. Daniel

## 2025-07-14 NOTE — PATIENT PROFILE ADULT - SAFE PLACE TO LIVE
Quality 110: Preventive Care And Screening: Influenza Immunization: Influenza immunization was not ordered or administered, reason not given Quality 431: Preventive Care And Screening: Unhealthy Alcohol Use - Screening: Patient not identified as an unhealthy alcohol user when screened for unhealthy alcohol use using a systematic screening method Quality 226: Preventive Care And Screening: Tobacco Use: Screening And Cessation Intervention: Patient screened for tobacco use and is an ex/non-smoker Detail Level: Detailed Quality 130: Documentation Of Current Medications In The Medical Record: Current Medications Documented no

## (undated) DEVICE — BIOPSY FORCEP RADIAL JAW 4 STANDARD WITH NEEDLE

## (undated) DEVICE — ELCTR ECG CONDUCTIVE ADHESIVE

## (undated) DEVICE — DENTURE CUP PINK

## (undated) DEVICE — CONTAINER FORMALIN 80ML YELLOW

## (undated) DEVICE — BIOPSY FORCEP COLD DISP

## (undated) DEVICE — TUBING SUCTION NONCONDUCTIVE 6MM X 12FT

## (undated) DEVICE — TUBING MEDI-VAC W MAXIGRIP CONNECTORS 1/4"X6'

## (undated) DEVICE — SALIVA EJECTOR (BLUE)

## (undated) DEVICE — BASIN EMESIS 10IN GRADUATED MAUVE

## (undated) DEVICE — BITE BLOCK ADULT 20 X 27MM (GREEN)

## (undated) DEVICE — DRSG CURITY GAUZE SPONGE 4 X 4" 12-PLY NON-STERILE

## (undated) DEVICE — UNDERPAD LINEN SAVER 17 X 24"

## (undated) DEVICE — TUBING IV SET GRAVITY 3Y 100" MACRO

## (undated) DEVICE — DRSG 2X2

## (undated) DEVICE — CATH IV SAFE BC 22G X 1" (BLUE)

## (undated) DEVICE — DRSG BANDAID 0.75X3"

## (undated) DEVICE — GOWN LG

## (undated) DEVICE — ELCTR GROUNDING PAD ADULT COVIDIEN

## (undated) DEVICE — CLAMP BX HOT RAD JAW 3

## (undated) DEVICE — PACK IV START WITH CHG

## (undated) DEVICE — LUBRICATING JELLY HR ONE SHOT 3G